# Patient Record
Sex: FEMALE | Race: WHITE | NOT HISPANIC OR LATINO | Employment: OTHER | URBAN - METROPOLITAN AREA
[De-identification: names, ages, dates, MRNs, and addresses within clinical notes are randomized per-mention and may not be internally consistent; named-entity substitution may affect disease eponyms.]

---

## 2017-01-01 ENCOUNTER — APPOINTMENT (OUTPATIENT)
Dept: PHYSICAL THERAPY | Facility: CLINIC | Age: 72
DRG: 856 | End: 2017-01-01
Payer: OTHER MISCELLANEOUS

## 2017-01-02 ENCOUNTER — APPOINTMENT (OUTPATIENT)
Dept: PHYSICAL THERAPY | Facility: CLINIC | Age: 72
DRG: 856 | End: 2017-01-02
Payer: OTHER MISCELLANEOUS

## 2017-01-04 ENCOUNTER — CARE COORDINATION (OUTPATIENT)
Dept: CASE MANAGEMENT | Facility: CLINIC | Age: 72
End: 2017-01-04

## 2017-01-04 ENCOUNTER — HOSPITAL LABORATORY (OUTPATIENT)
Dept: OTHER | Facility: CLINIC | Age: 72
End: 2017-01-04

## 2017-01-04 LAB
AST SERPL W P-5'-P-CCNC: 25 U/L (ref 0–45)
BASOPHILS # BLD AUTO: 0.1 10E9/L (ref 0–0.2)
BASOPHILS NFR BLD AUTO: 0.4 %
BUN SERPL-MCNC: 19 MG/DL (ref 7–30)
CREAT SERPL-MCNC: 0.85 MG/DL (ref 0.52–1.04)
CRP SERPL-MCNC: 85.8 MG/L (ref 0–8)
DIFFERENTIAL METHOD BLD: ABNORMAL
EOSINOPHIL # BLD AUTO: 0.2 10E9/L (ref 0–0.7)
EOSINOPHIL NFR BLD AUTO: 2.1 %
ERYTHROCYTE [DISTWIDTH] IN BLOOD BY AUTOMATED COUNT: 14.1 % (ref 10–15)
ERYTHROCYTE [SEDIMENTATION RATE] IN BLOOD BY WESTERGREN METHOD: 67 MM/H (ref 0–30)
GFR SERPL CREATININE-BSD FRML MDRD: 66 ML/MIN/1.7M2
HCT VFR BLD AUTO: 34 % (ref 35–47)
HGB BLD-MCNC: 10.7 G/DL (ref 11.7–15.7)
IMM GRANULOCYTES # BLD: 0.2 10E9/L (ref 0–0.4)
IMM GRANULOCYTES NFR BLD: 1.8 %
LYMPHOCYTES # BLD AUTO: 1.4 10E9/L (ref 0.8–5.3)
LYMPHOCYTES NFR BLD AUTO: 12.4 %
MCH RBC QN AUTO: 29.2 PG (ref 26.5–33)
MCHC RBC AUTO-ENTMCNC: 31.5 G/DL (ref 31.5–36.5)
MCV RBC AUTO: 93 FL (ref 78–100)
MONOCYTES # BLD AUTO: 0.7 10E9/L (ref 0–1.3)
MONOCYTES NFR BLD AUTO: 6.4 %
NEUTROPHILS # BLD AUTO: 8.7 10E9/L (ref 1.6–8.3)
NEUTROPHILS NFR BLD AUTO: 76.9 %
NRBC # BLD AUTO: 0 10*3/UL
NRBC BLD AUTO-RTO: 0 /100
PLATELET # BLD AUTO: 451 10E9/L (ref 150–450)
RBC # BLD AUTO: 3.66 10E12/L (ref 3.8–5.2)
WBC # BLD AUTO: 11.3 10E9/L (ref 4–11)

## 2017-01-11 LAB
AST SERPL W P-5'-P-CCNC: 23 U/L (ref 0–45)
BASOPHILS # BLD AUTO: 0.1 10E9/L (ref 0–0.2)
BASOPHILS NFR BLD AUTO: 1 %
CREAT SERPL-MCNC: 0.75 MG/DL (ref 0.52–1.04)
CRP SERPL-MCNC: 30.3 MG/L (ref 0–8)
DIFFERENTIAL METHOD BLD: ABNORMAL
EOSINOPHIL # BLD AUTO: 0.3 10E9/L (ref 0–0.7)
EOSINOPHIL NFR BLD AUTO: 4.1 %
ERYTHROCYTE [DISTWIDTH] IN BLOOD BY AUTOMATED COUNT: 13.8 % (ref 10–15)
ERYTHROCYTE [SEDIMENTATION RATE] IN BLOOD BY WESTERGREN METHOD: 62 MM/H (ref 0–30)
GFR SERPL CREATININE-BSD FRML MDRD: 76 ML/MIN/1.7M2
HCT VFR BLD AUTO: 34.7 % (ref 35–47)
HGB BLD-MCNC: 11 G/DL (ref 11.7–15.7)
IMM GRANULOCYTES # BLD: 0.1 10E9/L (ref 0–0.4)
IMM GRANULOCYTES NFR BLD: 0.8 %
LYMPHOCYTES # BLD AUTO: 1.6 10E9/L (ref 0.8–5.3)
LYMPHOCYTES NFR BLD AUTO: 19.7 %
MCH RBC QN AUTO: 29.2 PG (ref 26.5–33)
MCHC RBC AUTO-ENTMCNC: 31.7 G/DL (ref 31.5–36.5)
MCV RBC AUTO: 92 FL (ref 78–100)
MONOCYTES # BLD AUTO: 0.6 10E9/L (ref 0–1.3)
MONOCYTES NFR BLD AUTO: 7.2 %
NEUTROPHILS # BLD AUTO: 5.3 10E9/L (ref 1.6–8.3)
NEUTROPHILS NFR BLD AUTO: 67.2 %
NRBC # BLD AUTO: 0 10*3/UL
NRBC BLD AUTO-RTO: 0 /100
PLATELET # BLD AUTO: 577 10E9/L (ref 150–450)
RBC # BLD AUTO: 3.77 10E12/L (ref 3.8–5.2)
WBC # BLD AUTO: 7.9 10E9/L (ref 4–11)

## 2017-01-11 PROCEDURE — 85652 RBC SED RATE AUTOMATED: CPT | Performed by: INTERNAL MEDICINE

## 2017-01-11 PROCEDURE — 82565 ASSAY OF CREATININE: CPT | Performed by: INTERNAL MEDICINE

## 2017-01-11 PROCEDURE — 84450 TRANSFERASE (AST) (SGOT): CPT | Performed by: INTERNAL MEDICINE

## 2017-01-11 PROCEDURE — 86140 C-REACTIVE PROTEIN: CPT | Performed by: INTERNAL MEDICINE

## 2017-01-11 PROCEDURE — 85025 COMPLETE CBC W/AUTO DIFF WBC: CPT | Performed by: INTERNAL MEDICINE

## 2017-01-12 NOTE — PROGRESS NOTES
Clinic Care Coordination Contact  OUTREACH    Referral Information:  Referral Source: IP/TCU Report  Reason for Contact: Follow up after hospitalization for wound on foot  Care Conference: No     Universal Utilization:   ED Visits in last year: 1  Hospital visits in last year: 1           Multiple Providers or Specialists: Dr Fang, Dr Puri, Inf. Disease    Clinical Concerns:  Current Medical Concerns:Infection  Current Behavioral Concerns: none noted    Education Provided to patient: Importance of keeping areas clean and dry. Ambulate to keep strength, as is able  Clinical Pathway Name: None  Clinical Pathway: None    Medication Management:  Current Outpatient Prescriptions   Medication     order for DME     HYDROmorphone (DILAUDID) 2 MG tablet     colchicine 0.6 MG tablet     senna-docusate (SENOKOT-S;PERICOLACE) 8.6-50 MG per tablet     pantoprazole (PROTONIX) 40 MG EC tablet     ibuprofen 200 MG capsule     cefTRIAXone (ROCEPHIN) 1 GM vial     order for DME     Furosemide (LASIX PO)     Cholecalciferol (VITAMIN D3 PO)     POTASSIUM PO     valsartan-hydrochlorothiazide (DIOVAN-HCT) 80-12.5 MG per tablet     ASPIRIN PO     multivitamin, therapeutic with minerals (THERA-VIT-M) TABS     simvastatin (ZOCOR) 40 MG tablet     No current facility-administered medications for this visit.     Functional Status:  Mobility Status: Independent  Equipment Currently Used at Home: walker, standard  Transportation: Provided by /family     Psychosocial:  Current living arrangement:: I live in a private home with spouse  Financial/Insurance: Work Comp.  Resources and Interventions:  Current Resources: Home Care;          Advanced Care Plans/Directives on file:: Yes        Goals:   Goal 1 Statement: I want to be able to get off the antibiotics, get rid of the PICC line and boot and live normally again  Goal 1 Progression Percent: 20%  Goal 1 Progression Date: 01/09/17     Barriers: Infection persists  Strengths: Member is  strong, healthy and determined  Patient/Caregiver understanding: Spoke to member today as a follow up from most recent hospitaization dx of Sepsis due to group B streptococcus bacteremia and left ankle infection. Stated she had been feeling weak and over [place] was running a temp. Of 102.5. She has had debridement done as well as a wound vac in place. She has a PICC line and continues on antibiotic therapy. Pain is being managed with use of Dilaudid. She is focusing on eating 70 grams of protein per day, drinking 5-8ounce glasses of water. She denies constipation.  Member is able to ambulate with use of walker. She stated she is able to bear weight.  Follow up appt with Infectious Disease on 1/11/2017.  Frequency of Care Coordination: as needed  Upcoming appointment: 01/11/17 (infectious disease)     Plan: Will follow up with member as needed.  Denied the need for additional resources at this time.    Mine Marks RN  Care Coordinator/  Phone: [phone]  Email: [email or web address]

## 2017-01-18 LAB
AST SERPL W P-5'-P-CCNC: 40 U/L (ref 0–45)
BASOPHILS # BLD AUTO: 0.1 10E9/L (ref 0–0.2)
BASOPHILS NFR BLD AUTO: 0.7 %
BUN SERPL-MCNC: 21 MG/DL (ref 7–30)
CRP SERPL-MCNC: 15.8 MG/L (ref 0–8)
DIFFERENTIAL METHOD BLD: ABNORMAL
EOSINOPHIL # BLD AUTO: 0.2 10E9/L (ref 0–0.7)
EOSINOPHIL NFR BLD AUTO: 2.9 %
ERYTHROCYTE [DISTWIDTH] IN BLOOD BY AUTOMATED COUNT: 13.9 % (ref 10–15)
ERYTHROCYTE [SEDIMENTATION RATE] IN BLOOD BY WESTERGREN METHOD: 41 MM/H (ref 0–30)
HCT VFR BLD AUTO: 36.6 % (ref 35–47)
HGB BLD-MCNC: 11.4 G/DL (ref 11.7–15.7)
IMM GRANULOCYTES # BLD: 0.1 10E9/L (ref 0–0.4)
IMM GRANULOCYTES NFR BLD: 0.6 %
LYMPHOCYTES # BLD AUTO: 1.5 10E9/L (ref 0.8–5.3)
LYMPHOCYTES NFR BLD AUTO: 18.4 %
MCH RBC QN AUTO: 28.7 PG (ref 26.5–33)
MCHC RBC AUTO-ENTMCNC: 31.1 G/DL (ref 31.5–36.5)
MCV RBC AUTO: 92 FL (ref 78–100)
MONOCYTES # BLD AUTO: 0.6 10E9/L (ref 0–1.3)
MONOCYTES NFR BLD AUTO: 7 %
NEUTROPHILS # BLD AUTO: 5.8 10E9/L (ref 1.6–8.3)
NEUTROPHILS NFR BLD AUTO: 70.4 %
NRBC # BLD AUTO: 0 10*3/UL
NRBC BLD AUTO-RTO: 0 /100
PLATELET # BLD AUTO: 474 10E9/L (ref 150–450)
RBC # BLD AUTO: 3.97 10E12/L (ref 3.8–5.2)
WBC # BLD AUTO: 8.3 10E9/L (ref 4–11)

## 2017-01-18 PROCEDURE — 82565 ASSAY OF CREATININE: CPT | Performed by: INTERNAL MEDICINE

## 2017-01-18 PROCEDURE — 85025 COMPLETE CBC W/AUTO DIFF WBC: CPT | Performed by: INTERNAL MEDICINE

## 2017-01-18 PROCEDURE — 84520 ASSAY OF UREA NITROGEN: CPT | Performed by: INTERNAL MEDICINE

## 2017-01-18 PROCEDURE — 86140 C-REACTIVE PROTEIN: CPT | Performed by: INTERNAL MEDICINE

## 2017-01-18 PROCEDURE — 84450 TRANSFERASE (AST) (SGOT): CPT | Performed by: INTERNAL MEDICINE

## 2017-01-18 PROCEDURE — 85652 RBC SED RATE AUTOMATED: CPT | Performed by: INTERNAL MEDICINE

## 2017-01-19 LAB
CREAT SERPL-MCNC: 0.88 MG/DL (ref 0.52–1.04)
GFR SERPL CREATININE-BSD FRML MDRD: 64 ML/MIN/1.7M2

## 2017-01-25 LAB
AST SERPL W P-5'-P-CCNC: 25 U/L (ref 0–45)
BASOPHILS # BLD AUTO: 0.1 10E9/L (ref 0–0.2)
BASOPHILS NFR BLD AUTO: 1.1 %
BUN SERPL-MCNC: 26 MG/DL (ref 7–30)
CREAT SERPL-MCNC: 0.83 MG/DL (ref 0.52–1.04)
CRP SERPL-MCNC: 11 MG/L (ref 0–8)
DIFFERENTIAL METHOD BLD: ABNORMAL
EOSINOPHIL # BLD AUTO: 0.3 10E9/L (ref 0–0.7)
EOSINOPHIL NFR BLD AUTO: 4.8 %
ERYTHROCYTE [DISTWIDTH] IN BLOOD BY AUTOMATED COUNT: 14 % (ref 10–15)
ERYTHROCYTE [SEDIMENTATION RATE] IN BLOOD BY WESTERGREN METHOD: 30 MM/H (ref 0–30)
GFR SERPL CREATININE-BSD FRML MDRD: 68 ML/MIN/1.7M2
HCT VFR BLD AUTO: 38.6 % (ref 35–47)
HGB BLD-MCNC: 11.9 G/DL (ref 11.7–15.7)
IMM GRANULOCYTES # BLD: 0 10E9/L (ref 0–0.4)
IMM GRANULOCYTES NFR BLD: 0.3 %
LYMPHOCYTES # BLD AUTO: 1.6 10E9/L (ref 0.8–5.3)
LYMPHOCYTES NFR BLD AUTO: 24.5 %
MCH RBC QN AUTO: 28.2 PG (ref 26.5–33)
MCHC RBC AUTO-ENTMCNC: 30.8 G/DL (ref 31.5–36.5)
MCV RBC AUTO: 92 FL (ref 78–100)
MONOCYTES # BLD AUTO: 0.5 10E9/L (ref 0–1.3)
MONOCYTES NFR BLD AUTO: 8 %
NEUTROPHILS # BLD AUTO: 4 10E9/L (ref 1.6–8.3)
NEUTROPHILS NFR BLD AUTO: 61.3 %
NRBC # BLD AUTO: 0 10*3/UL
NRBC BLD AUTO-RTO: 0 /100
PLATELET # BLD AUTO: 346 10E9/L (ref 150–450)
RBC # BLD AUTO: 4.22 10E12/L (ref 3.8–5.2)
WBC # BLD AUTO: 6.6 10E9/L (ref 4–11)

## 2017-01-25 PROCEDURE — 85025 COMPLETE CBC W/AUTO DIFF WBC: CPT | Performed by: INTERNAL MEDICINE

## 2017-01-25 PROCEDURE — 86140 C-REACTIVE PROTEIN: CPT | Performed by: INTERNAL MEDICINE

## 2017-01-25 PROCEDURE — 84520 ASSAY OF UREA NITROGEN: CPT | Performed by: INTERNAL MEDICINE

## 2017-01-25 PROCEDURE — 85652 RBC SED RATE AUTOMATED: CPT | Performed by: INTERNAL MEDICINE

## 2017-01-25 PROCEDURE — 84450 TRANSFERASE (AST) (SGOT): CPT | Performed by: INTERNAL MEDICINE

## 2017-01-25 PROCEDURE — 82565 ASSAY OF CREATININE: CPT | Performed by: INTERNAL MEDICINE

## 2017-02-01 LAB
AST SERPL W P-5'-P-CCNC: 28 U/L (ref 0–45)
BASOPHILS # BLD AUTO: 0.1 10E9/L (ref 0–0.2)
BASOPHILS NFR BLD AUTO: 0.9 %
BUN SERPL-MCNC: 24 MG/DL (ref 7–30)
CRP SERPL-MCNC: 12.5 MG/L (ref 0–8)
DIFFERENTIAL METHOD BLD: ABNORMAL
EOSINOPHIL # BLD AUTO: 0.3 10E9/L (ref 0–0.7)
EOSINOPHIL NFR BLD AUTO: 6 %
ERYTHROCYTE [DISTWIDTH] IN BLOOD BY AUTOMATED COUNT: 14.1 % (ref 10–15)
ERYTHROCYTE [SEDIMENTATION RATE] IN BLOOD BY WESTERGREN METHOD: 30 MM/H (ref 0–30)
HCT VFR BLD AUTO: 37.5 % (ref 35–47)
HGB BLD-MCNC: 11.6 G/DL (ref 11.7–15.7)
IMM GRANULOCYTES # BLD: 0 10E9/L (ref 0–0.4)
IMM GRANULOCYTES NFR BLD: 0.4 %
LYMPHOCYTES # BLD AUTO: 1.8 10E9/L (ref 0.8–5.3)
LYMPHOCYTES NFR BLD AUTO: 31.1 %
MCH RBC QN AUTO: 28.4 PG (ref 26.5–33)
MCHC RBC AUTO-ENTMCNC: 30.9 G/DL (ref 31.5–36.5)
MCV RBC AUTO: 92 FL (ref 78–100)
MONOCYTES # BLD AUTO: 0.5 10E9/L (ref 0–1.3)
MONOCYTES NFR BLD AUTO: 9.5 %
NEUTROPHILS # BLD AUTO: 3 10E9/L (ref 1.6–8.3)
NEUTROPHILS NFR BLD AUTO: 52.1 %
NRBC # BLD AUTO: 0 10*3/UL
NRBC BLD AUTO-RTO: 0 /100
PLATELET # BLD AUTO: 315 10E9/L (ref 150–450)
RBC # BLD AUTO: 4.08 10E12/L (ref 3.8–5.2)
WBC # BLD AUTO: 5.7 10E9/L (ref 4–11)

## 2017-02-01 PROCEDURE — 86140 C-REACTIVE PROTEIN: CPT | Performed by: INTERNAL MEDICINE

## 2017-02-01 PROCEDURE — 85652 RBC SED RATE AUTOMATED: CPT | Performed by: INTERNAL MEDICINE

## 2017-02-01 PROCEDURE — 84520 ASSAY OF UREA NITROGEN: CPT | Performed by: INTERNAL MEDICINE

## 2017-02-01 PROCEDURE — 82565 ASSAY OF CREATININE: CPT | Performed by: INTERNAL MEDICINE

## 2017-02-01 PROCEDURE — 84450 TRANSFERASE (AST) (SGOT): CPT | Performed by: INTERNAL MEDICINE

## 2017-02-01 PROCEDURE — 85025 COMPLETE CBC W/AUTO DIFF WBC: CPT | Performed by: INTERNAL MEDICINE

## 2017-02-02 LAB
CREAT SERPL-MCNC: 0.76 MG/DL (ref 0.52–1.04)
GFR SERPL CREATININE-BSD FRML MDRD: 75 ML/MIN/1.7M2

## 2017-02-06 ENCOUNTER — CARE COORDINATION (OUTPATIENT)
Dept: CASE MANAGEMENT | Facility: CLINIC | Age: 72
End: 2017-02-06

## 2017-02-07 NOTE — PROGRESS NOTES
Clinic Care Coordination Contact  OUTREACH    Referral Information:  Referral Source: IP/TCU Report  Reason for Contact: follow up  Care Conference: No     Universal Utilization:   ED Visits in last year: 1  Hospital visits in last year: 1  Last PCP appointment: 02/01/17  Missed Appointments: 0  Concerns: none at this time  Multiple Providers or Specialists: Ortho and PCP    Clinical Concerns:  Current Medical Concerns: Foot infection/wound/PICC line    Current Behavioral Concerns: none noted at this time    Education Provided to patient: Importance of ongoing care, treatments and follow up appts   Clinical Pathway Name: None  Clinical Pathway: None    Medication Management:  Current Outpatient Prescriptions   Medication     order for DME     HYDROmorphone (DILAUDID) 2 MG tablet     colchicine 0.6 MG tablet     senna-docusate (SENOKOT-S;PERICOLACE) 8.6-50 MG per tablet     pantoprazole (PROTONIX) 40 MG EC tablet     ibuprofen 200 MG capsule     order for DME     Furosemide (LASIX PO)     Cholecalciferol (VITAMIN D3 PO)     POTASSIUM PO     valsartan-hydrochlorothiazide (DIOVAN-HCT) 80-12.5 MG per tablet     ASPIRIN PO     multivitamin, therapeutic with minerals (THERA-VIT-M) TABS     simvastatin (ZOCOR) 40 MG tablet     No current facility-administered medications for this visit.     Patient Active Problem List   Diagnosis     Syncope     Hypertension     Bradycardia     Hyperlipidemia LDL goal <130     S/P foot surgery      Pain in joint, ankle and foot     Sprain of neck     Sprain and strain of shoulder and upper arm     Elbow strain     Wrist strain     S/P ankle fusion     Cellulitis     Sepsis (H)     Functional Status:  Mobility Status: Independent  Equipment Currently Used at Home: other (see comments) (bunion boot)  Transportation: /friends drive her     Psychosocial:  Current living arrangement:: I live in a private home with spouse  Financial/Insurance: Work Comp     Resources and  Interventions:  Current Resources: Home Care;    PAS Number:  (NA)  Senior Linkage Line Referral Placed:  (NA)  Advanced Care Plans/Directives on file:: Yes  Referrals Placed:  (none today)     Patient/Caregiver understanding: Spoke with member today. Stated she just got her PICC line taken out today. Wound vac was taken away on Tuesday. She saw the plastic surgeon on Friday. Dr Arambula(plastic surgeon) advised treatment of 1 tablespoon bleach, 1 tsp baking soda, and mix into boiled water that has cooled. Soak a gauze pad in the solution , apply to wound and wrap with ace wrap. She stated she has started this and is noticing a difference already. Member is taking only Ibuprofen at this time. Does not feel the need for Dilaudid. Feels everything is going much better and hoping 'this is the end of this'.  Frequency of Care Coordination: Will make 1 additional follow up call to ensure progress continues  Upcoming appointment:  (tbd)     Plan: Follow up call from this writer  Member will continue with treatment as directed and follow up with MD's as directed  Mine Marks RN  FPA Care Coordinator/  Phone: [phone]  Email: [email or web address]

## 2017-02-08 ENCOUNTER — HOSPITAL LABORATORY (OUTPATIENT)
Dept: OTHER | Facility: CLINIC | Age: 72
End: 2017-02-08

## 2017-02-08 LAB
APPEARANCE FLD: NORMAL
COLOR FLD: YELLOW
LYMPHOCYTES NFR FLD MANUAL: 6 %
MONOS+MACROS NFR FLD MANUAL: 1 %
NEUTS BAND NFR FLD MANUAL: 93 %
RBC # FLD: NORMAL /UL
SPECIMEN SOURCE FLD: NORMAL
WBC # FLD AUTO: 4055 /UL

## 2017-02-13 LAB
BACTERIA SPEC CULT: NO GROWTH
MICRO REPORT STATUS: NORMAL
SPECIMEN SOURCE: NORMAL

## 2017-02-20 ENCOUNTER — ANESTHESIA EVENT (OUTPATIENT)
Dept: SURGERY | Facility: CLINIC | Age: 72
DRG: 464 | End: 2017-02-20
Payer: OTHER MISCELLANEOUS

## 2017-02-20 ENCOUNTER — HOSPITAL ENCOUNTER (INPATIENT)
Facility: CLINIC | Age: 72
LOS: 4 days | Discharge: NURSING, REHAB OR LONG-TERM CARE | DRG: 464 | End: 2017-02-24
Attending: ORTHOPAEDIC SURGERY | Admitting: ORTHOPAEDIC SURGERY
Payer: OTHER MISCELLANEOUS

## 2017-02-20 ENCOUNTER — APPOINTMENT (OUTPATIENT)
Dept: GENERAL RADIOLOGY | Facility: CLINIC | Age: 72
DRG: 464 | End: 2017-02-20
Attending: ORTHOPAEDIC SURGERY
Payer: OTHER MISCELLANEOUS

## 2017-02-20 ENCOUNTER — ANESTHESIA (OUTPATIENT)
Dept: SURGERY | Facility: CLINIC | Age: 72
DRG: 464 | End: 2017-02-20
Payer: OTHER MISCELLANEOUS

## 2017-02-20 DIAGNOSIS — M00.262 STREPTOCOCCAL ARTHRITIS OF LEFT KNEE (H): Primary | ICD-10-CM

## 2017-02-20 PROBLEM — M00.9 SEPTIC JOINT OF LEFT KNEE JOINT (H): Status: ACTIVE | Noted: 2017-02-20

## 2017-02-20 LAB
CREAT SERPL-MCNC: 0.9 MG/DL (ref 0.52–1.04)
GFR SERPL CREATININE-BSD FRML MDRD: 62 ML/MIN/1.7M2
PLATELET # BLD AUTO: 300 10E9/L (ref 150–450)
POTASSIUM SERPL-SCNC: 3.2 MMOL/L (ref 3.4–5.3)

## 2017-02-20 PROCEDURE — 25000128 H RX IP 250 OP 636: Performed by: ANESTHESIOLOGY

## 2017-02-20 PROCEDURE — 40000171 ZZH STATISTIC PRE-PROCEDURE ASSESSMENT III: Performed by: ORTHOPAEDIC SURGERY

## 2017-02-20 PROCEDURE — 25000128 H RX IP 250 OP 636: Performed by: NURSE ANESTHETIST, CERTIFIED REGISTERED

## 2017-02-20 PROCEDURE — 36415 COLL VENOUS BLD VENIPUNCTURE: CPT | Performed by: ANESTHESIOLOGY

## 2017-02-20 PROCEDURE — 37000008 ZZH ANESTHESIA TECHNICAL FEE, 1ST 30 MIN: Performed by: ORTHOPAEDIC SURGERY

## 2017-02-20 PROCEDURE — 12000007 ZZH R&B INTERMEDIATE

## 2017-02-20 PROCEDURE — 25000132 ZZH RX MED GY IP 250 OP 250 PS 637: Performed by: ORTHOPAEDIC SURGERY

## 2017-02-20 PROCEDURE — 0SPC0JZ REMOVAL OF SYNTHETIC SUBSTITUTE FROM RIGHT KNEE JOINT, OPEN APPROACH: ICD-10-PCS | Performed by: ORTHOPAEDIC SURGERY

## 2017-02-20 PROCEDURE — 36000058 ZZH SURGERY LEVEL 3 EA 15 ADDTL MIN: Performed by: ORTHOPAEDIC SURGERY

## 2017-02-20 PROCEDURE — S0077 INJECTION, CLINDAMYCIN PHOSP: HCPCS | Performed by: ORTHOPAEDIC SURGERY

## 2017-02-20 PROCEDURE — 71000012 ZZH RECOVERY PHASE 1 LEVEL 1 FIRST HR: Performed by: ORTHOPAEDIC SURGERY

## 2017-02-20 PROCEDURE — 25000125 ZZHC RX 250: Performed by: ANESTHESIOLOGY

## 2017-02-20 PROCEDURE — 85049 AUTOMATED PLATELET COUNT: CPT | Performed by: ORTHOPAEDIC SURGERY

## 2017-02-20 PROCEDURE — 37000009 ZZH ANESTHESIA TECHNICAL FEE, EACH ADDTL 15 MIN: Performed by: ORTHOPAEDIC SURGERY

## 2017-02-20 PROCEDURE — 27210995 ZZH RX 272: Performed by: ORTHOPAEDIC SURGERY

## 2017-02-20 PROCEDURE — 25800025 ZZH RX 258: Performed by: ANESTHESIOLOGY

## 2017-02-20 PROCEDURE — 36000056 ZZH SURGERY LEVEL 3 1ST 30 MIN: Performed by: ORTHOPAEDIC SURGERY

## 2017-02-20 PROCEDURE — 36415 COLL VENOUS BLD VENIPUNCTURE: CPT | Performed by: ORTHOPAEDIC SURGERY

## 2017-02-20 PROCEDURE — 0SHC08Z INSERTION OF SPACER INTO RIGHT KNEE JOINT, OPEN APPROACH: ICD-10-PCS | Performed by: ORTHOPAEDIC SURGERY

## 2017-02-20 PROCEDURE — 25000125 ZZHC RX 250: Performed by: NURSE ANESTHETIST, CERTIFIED REGISTERED

## 2017-02-20 PROCEDURE — 27210794 ZZH OR GENERAL SUPPLY STERILE: Performed by: ORTHOPAEDIC SURGERY

## 2017-02-20 PROCEDURE — 25000125 ZZHC RX 250: Performed by: ORTHOPAEDIC SURGERY

## 2017-02-20 PROCEDURE — 27810169 ZZH OR IMPLANT GENERAL: Performed by: ORTHOPAEDIC SURGERY

## 2017-02-20 PROCEDURE — 82565 ASSAY OF CREATININE: CPT | Performed by: ORTHOPAEDIC SURGERY

## 2017-02-20 PROCEDURE — 87070 CULTURE OTHR SPECIMN AEROBIC: CPT | Performed by: ORTHOPAEDIC SURGERY

## 2017-02-20 PROCEDURE — 25000128 H RX IP 250 OP 636: Performed by: ORTHOPAEDIC SURGERY

## 2017-02-20 PROCEDURE — 84132 ASSAY OF SERUM POTASSIUM: CPT | Performed by: ANESTHESIOLOGY

## 2017-02-20 PROCEDURE — 25800025 ZZH RX 258: Performed by: ORTHOPAEDIC SURGERY

## 2017-02-20 PROCEDURE — 25000566 ZZH SEVOFLURANE, EA 15 MIN: Performed by: ORTHOPAEDIC SURGERY

## 2017-02-20 PROCEDURE — 87075 CULTR BACTERIA EXCEPT BLOOD: CPT | Performed by: ORTHOPAEDIC SURGERY

## 2017-02-20 PROCEDURE — 73560 X-RAY EXAM OF KNEE 1 OR 2: CPT | Mod: RT

## 2017-02-20 DEVICE — BONE CEMENT SIMPLEX W/TOBRAMYCIN 6197-9-001
Type: IMPLANTABLE DEVICE | Site: KNEE | Status: NON-FUNCTIONAL
Removed: 2017-04-24

## 2017-02-20 RX ORDER — ONDANSETRON 4 MG/1
4 TABLET, ORALLY DISINTEGRATING ORAL EVERY 30 MIN PRN
Status: DISCONTINUED | OUTPATIENT
Start: 2017-02-20 | End: 2017-02-20 | Stop reason: HOSPADM

## 2017-02-20 RX ORDER — ALBUTEROL SULFATE 90 UG/1
2 AEROSOL, METERED RESPIRATORY (INHALATION) EVERY 6 HOURS PRN
Status: DISCONTINUED | OUTPATIENT
Start: 2017-02-20 | End: 2017-02-24 | Stop reason: HOSPADM

## 2017-02-20 RX ORDER — CLINDAMYCIN PHOSPHATE 900 MG/50ML
900 INJECTION, SOLUTION INTRAVENOUS SEE ADMIN INSTRUCTIONS
Status: DISCONTINUED | OUTPATIENT
Start: 2017-02-20 | End: 2017-02-20 | Stop reason: HOSPADM

## 2017-02-20 RX ORDER — CLINDAMYCIN PHOSPHATE 900 MG/50ML
900 INJECTION, SOLUTION INTRAVENOUS
Status: COMPLETED | OUTPATIENT
Start: 2017-02-20 | End: 2017-02-20

## 2017-02-20 RX ORDER — SODIUM CHLORIDE, SODIUM LACTATE, POTASSIUM CHLORIDE, CALCIUM CHLORIDE 600; 310; 30; 20 MG/100ML; MG/100ML; MG/100ML; MG/100ML
INJECTION, SOLUTION INTRAVENOUS CONTINUOUS
Status: DISCONTINUED | OUTPATIENT
Start: 2017-02-20 | End: 2017-02-20 | Stop reason: HOSPADM

## 2017-02-20 RX ORDER — HYDROMORPHONE HYDROCHLORIDE 1 MG/ML
.3-.5 INJECTION, SOLUTION INTRAMUSCULAR; INTRAVENOUS; SUBCUTANEOUS
Status: DISCONTINUED | OUTPATIENT
Start: 2017-02-20 | End: 2017-02-24 | Stop reason: HOSPADM

## 2017-02-20 RX ORDER — ACETAMINOPHEN 325 MG/1
650 TABLET ORAL EVERY 4 HOURS PRN
Status: DISCONTINUED | OUTPATIENT
Start: 2017-02-23 | End: 2017-02-24 | Stop reason: HOSPADM

## 2017-02-20 RX ORDER — METOCLOPRAMIDE HYDROCHLORIDE 5 MG/ML
5 INJECTION INTRAMUSCULAR; INTRAVENOUS EVERY 6 HOURS PRN
Status: DISCONTINUED | OUTPATIENT
Start: 2017-02-20 | End: 2017-02-24 | Stop reason: HOSPADM

## 2017-02-20 RX ORDER — ASPIRIN 81 MG/1
81 TABLET, CHEWABLE ORAL DAILY
Status: DISCONTINUED | OUTPATIENT
Start: 2017-02-20 | End: 2017-02-24 | Stop reason: HOSPADM

## 2017-02-20 RX ORDER — NALOXONE HYDROCHLORIDE 0.4 MG/ML
.1-.4 INJECTION, SOLUTION INTRAMUSCULAR; INTRAVENOUS; SUBCUTANEOUS
Status: DISCONTINUED | OUTPATIENT
Start: 2017-02-20 | End: 2017-02-24 | Stop reason: HOSPADM

## 2017-02-20 RX ORDER — POTASSIUM CHLORIDE 29.8 MG/ML
20 INJECTION INTRAVENOUS
Status: DISCONTINUED | OUTPATIENT
Start: 2017-02-20 | End: 2017-02-24 | Stop reason: HOSPADM

## 2017-02-20 RX ORDER — VALSARTAN AND HYDROCHLOROTHIAZIDE 80; 12.5 MG/1; MG/1
1 TABLET, FILM COATED ORAL DAILY
Status: DISCONTINUED | OUTPATIENT
Start: 2017-02-21 | End: 2017-02-24 | Stop reason: HOSPADM

## 2017-02-20 RX ORDER — FUROSEMIDE 20 MG
20-40 TABLET ORAL DAILY
Status: DISCONTINUED | OUTPATIENT
Start: 2017-02-21 | End: 2017-02-24 | Stop reason: HOSPADM

## 2017-02-20 RX ORDER — ALBUTEROL SULFATE 90 UG/1
2 AEROSOL, METERED RESPIRATORY (INHALATION) EVERY 6 HOURS PRN
Status: ON HOLD | COMMUNITY
End: 2017-04-24

## 2017-02-20 RX ORDER — ACETAMINOPHEN 500 MG
1000 TABLET ORAL ONCE
Status: COMPLETED | OUTPATIENT
Start: 2017-02-20 | End: 2017-02-20

## 2017-02-20 RX ORDER — PROPOFOL 10 MG/ML
INJECTION, EMULSION INTRAVENOUS CONTINUOUS PRN
Status: DISCONTINUED | OUTPATIENT
Start: 2017-02-20 | End: 2017-02-20

## 2017-02-20 RX ORDER — ONDANSETRON 2 MG/ML
4 INJECTION INTRAMUSCULAR; INTRAVENOUS EVERY 6 HOURS PRN
Status: DISCONTINUED | OUTPATIENT
Start: 2017-02-20 | End: 2017-02-24 | Stop reason: HOSPADM

## 2017-02-20 RX ORDER — EPHEDRINE SULFATE 50 MG/ML
INJECTION, SOLUTION INTRAMUSCULAR; INTRAVENOUS; SUBCUTANEOUS PRN
Status: DISCONTINUED | OUTPATIENT
Start: 2017-02-20 | End: 2017-02-20

## 2017-02-20 RX ORDER — METOCLOPRAMIDE 5 MG/1
5 TABLET ORAL EVERY 6 HOURS PRN
Status: DISCONTINUED | OUTPATIENT
Start: 2017-02-20 | End: 2017-02-24 | Stop reason: HOSPADM

## 2017-02-20 RX ORDER — ONDANSETRON 4 MG/1
4 TABLET, ORALLY DISINTEGRATING ORAL EVERY 6 HOURS PRN
Status: DISCONTINUED | OUTPATIENT
Start: 2017-02-20 | End: 2017-02-24 | Stop reason: HOSPADM

## 2017-02-20 RX ORDER — AMOXICILLIN 250 MG
1-2 CAPSULE ORAL 2 TIMES DAILY
Status: DISCONTINUED | OUTPATIENT
Start: 2017-02-20 | End: 2017-02-24 | Stop reason: HOSPADM

## 2017-02-20 RX ORDER — HYDROMORPHONE HYDROCHLORIDE 1 MG/ML
.3-.5 INJECTION, SOLUTION INTRAMUSCULAR; INTRAVENOUS; SUBCUTANEOUS EVERY 5 MIN PRN
Status: DISCONTINUED | OUTPATIENT
Start: 2017-02-20 | End: 2017-02-20 | Stop reason: HOSPADM

## 2017-02-20 RX ORDER — LIDOCAINE 40 MG/G
CREAM TOPICAL
Status: DISCONTINUED | OUTPATIENT
Start: 2017-02-20 | End: 2017-02-24 | Stop reason: HOSPADM

## 2017-02-20 RX ORDER — POTASSIUM CHLORIDE 1.5 G/1.58G
20-40 POWDER, FOR SOLUTION ORAL
Status: DISCONTINUED | OUTPATIENT
Start: 2017-02-20 | End: 2017-02-24 | Stop reason: HOSPADM

## 2017-02-20 RX ORDER — CLINDAMYCIN PHOSPHATE 900 MG/50ML
900 INJECTION, SOLUTION INTRAVENOUS EVERY 8 HOURS
Status: COMPLETED | OUTPATIENT
Start: 2017-02-20 | End: 2017-02-21

## 2017-02-20 RX ORDER — SIMVASTATIN 40 MG
40 TABLET ORAL EVERY MORNING
Status: DISCONTINUED | OUTPATIENT
Start: 2017-02-21 | End: 2017-02-24 | Stop reason: HOSPADM

## 2017-02-20 RX ORDER — DEXTROSE MONOHYDRATE, SODIUM CHLORIDE, AND POTASSIUM CHLORIDE 50; 1.49; 4.5 G/1000ML; G/1000ML; G/1000ML
INJECTION, SOLUTION INTRAVENOUS CONTINUOUS
Status: DISCONTINUED | OUTPATIENT
Start: 2017-02-20 | End: 2017-02-24 | Stop reason: HOSPADM

## 2017-02-20 RX ORDER — POTASSIUM CHLORIDE 7.45 MG/ML
10 INJECTION INTRAVENOUS
Status: DISCONTINUED | OUTPATIENT
Start: 2017-02-20 | End: 2017-02-24 | Stop reason: HOSPADM

## 2017-02-20 RX ORDER — HYDROMORPHONE HYDROCHLORIDE 2 MG/1
2-4 TABLET ORAL EVERY 4 HOURS PRN
Status: DISCONTINUED | OUTPATIENT
Start: 2017-02-20 | End: 2017-02-24 | Stop reason: HOSPADM

## 2017-02-20 RX ORDER — LIDOCAINE HYDROCHLORIDE 20 MG/ML
INJECTION, SOLUTION INFILTRATION; PERINEURAL PRN
Status: DISCONTINUED | OUTPATIENT
Start: 2017-02-20 | End: 2017-02-20

## 2017-02-20 RX ORDER — FENTANYL CITRATE 50 UG/ML
INJECTION, SOLUTION INTRAMUSCULAR; INTRAVENOUS PRN
Status: DISCONTINUED | OUTPATIENT
Start: 2017-02-20 | End: 2017-02-20

## 2017-02-20 RX ORDER — PROPOFOL 10 MG/ML
INJECTION, EMULSION INTRAVENOUS PRN
Status: DISCONTINUED | OUTPATIENT
Start: 2017-02-20 | End: 2017-02-20

## 2017-02-20 RX ORDER — ONDANSETRON 2 MG/ML
4 INJECTION INTRAMUSCULAR; INTRAVENOUS EVERY 30 MIN PRN
Status: DISCONTINUED | OUTPATIENT
Start: 2017-02-20 | End: 2017-02-20 | Stop reason: HOSPADM

## 2017-02-20 RX ORDER — PROCHLORPERAZINE MALEATE 5 MG
5 TABLET ORAL EVERY 6 HOURS PRN
Status: DISCONTINUED | OUTPATIENT
Start: 2017-02-20 | End: 2017-02-24 | Stop reason: HOSPADM

## 2017-02-20 RX ORDER — DEXAMETHASONE SODIUM PHOSPHATE 4 MG/ML
INJECTION, SOLUTION INTRA-ARTICULAR; INTRALESIONAL; INTRAMUSCULAR; INTRAVENOUS; SOFT TISSUE PRN
Status: DISCONTINUED | OUTPATIENT
Start: 2017-02-20 | End: 2017-02-20

## 2017-02-20 RX ORDER — MAGNESIUM HYDROXIDE 1200 MG/15ML
LIQUID ORAL PRN
Status: DISCONTINUED | OUTPATIENT
Start: 2017-02-20 | End: 2017-02-20 | Stop reason: HOSPADM

## 2017-02-20 RX ORDER — ACETAMINOPHEN 325 MG/1
975 TABLET ORAL
Status: DISPENSED | OUTPATIENT
Start: 2017-02-20 | End: 2017-02-23

## 2017-02-20 RX ORDER — ONDANSETRON 2 MG/ML
INJECTION INTRAMUSCULAR; INTRAVENOUS PRN
Status: DISCONTINUED | OUTPATIENT
Start: 2017-02-20 | End: 2017-02-20

## 2017-02-20 RX ORDER — FENTANYL CITRATE 50 UG/ML
25-50 INJECTION, SOLUTION INTRAMUSCULAR; INTRAVENOUS
Status: DISCONTINUED | OUTPATIENT
Start: 2017-02-20 | End: 2017-02-20 | Stop reason: HOSPADM

## 2017-02-20 RX ORDER — FENTANYL CITRATE 50 UG/ML
100 INJECTION, SOLUTION INTRAMUSCULAR; INTRAVENOUS
Status: DISCONTINUED | OUTPATIENT
Start: 2017-02-20 | End: 2017-02-20 | Stop reason: HOSPADM

## 2017-02-20 RX ORDER — CALCIUM CARBONATE 500 MG/1
500-1000 TABLET, CHEWABLE ORAL
Status: DISCONTINUED | OUTPATIENT
Start: 2017-02-20 | End: 2017-02-24 | Stop reason: HOSPADM

## 2017-02-20 RX ORDER — POTASSIUM CHLORIDE 1500 MG/1
20-40 TABLET, EXTENDED RELEASE ORAL
Status: DISCONTINUED | OUTPATIENT
Start: 2017-02-20 | End: 2017-02-24 | Stop reason: HOSPADM

## 2017-02-20 RX ADMIN — HYDROMORPHONE HYDROCHLORIDE 4 MG: 2 TABLET ORAL at 23:11

## 2017-02-20 RX ADMIN — FENTANYL CITRATE 100 MCG: 50 INJECTION, SOLUTION INTRAMUSCULAR; INTRAVENOUS at 12:33

## 2017-02-20 RX ADMIN — HYDROMORPHONE HYDROCHLORIDE 0.5 MG: 1 INJECTION, SOLUTION INTRAMUSCULAR; INTRAVENOUS; SUBCUTANEOUS at 14:19

## 2017-02-20 RX ADMIN — TRANEXAMIC ACID 1 G: 100 INJECTION, SOLUTION INTRAVENOUS at 12:44

## 2017-02-20 RX ADMIN — ACETAMINOPHEN 975 MG: 325 TABLET, FILM COATED ORAL at 18:15

## 2017-02-20 RX ADMIN — POTASSIUM CHLORIDE, DEXTROSE MONOHYDRATE AND SODIUM CHLORIDE: 150; 5; 450 INJECTION, SOLUTION INTRAVENOUS at 15:48

## 2017-02-20 RX ADMIN — Medication 5 MG: at 12:50

## 2017-02-20 RX ADMIN — POTASSIUM CHLORIDE, DEXTROSE MONOHYDRATE AND SODIUM CHLORIDE: 150; 5; 450 INJECTION, SOLUTION INTRAVENOUS at 21:38

## 2017-02-20 RX ADMIN — POTASSIUM CHLORIDE 20 MEQ: 1500 TABLET, EXTENDED RELEASE ORAL at 21:38

## 2017-02-20 RX ADMIN — HYDROMORPHONE HYDROCHLORIDE 0.5 MG: 1 INJECTION, SOLUTION INTRAMUSCULAR; INTRAVENOUS; SUBCUTANEOUS at 13:53

## 2017-02-20 RX ADMIN — ASPIRIN 81 MG 81 MG: 81 TABLET ORAL at 18:16

## 2017-02-20 RX ADMIN — PROPOFOL 35 MCG/KG/MIN: 10 INJECTION, EMULSION INTRAVENOUS at 12:33

## 2017-02-20 RX ADMIN — SODIUM CHLORIDE, POTASSIUM CHLORIDE, SODIUM LACTATE AND CALCIUM CHLORIDE: 600; 310; 30; 20 INJECTION, SOLUTION INTRAVENOUS at 11:34

## 2017-02-20 RX ADMIN — PROPOFOL 170 MG: 10 INJECTION, EMULSION INTRAVENOUS at 12:33

## 2017-02-20 RX ADMIN — HYDROMORPHONE HYDROCHLORIDE 0.5 MG: 1 INJECTION, SOLUTION INTRAMUSCULAR; INTRAVENOUS; SUBCUTANEOUS at 13:05

## 2017-02-20 RX ADMIN — LIDOCAINE HYDROCHLORIDE 1 ML: 10 INJECTION, SOLUTION EPIDURAL; INFILTRATION; INTRACAUDAL; PERINEURAL at 11:34

## 2017-02-20 RX ADMIN — DEXAMETHASONE SODIUM PHOSPHATE 4 MG: 4 INJECTION, SOLUTION INTRAMUSCULAR; INTRAVENOUS at 12:52

## 2017-02-20 RX ADMIN — SODIUM CHLORIDE, POTASSIUM CHLORIDE, SODIUM LACTATE AND CALCIUM CHLORIDE: 600; 310; 30; 20 INJECTION, SOLUTION INTRAVENOUS at 13:45

## 2017-02-20 RX ADMIN — SENNOSIDES AND DOCUSATE SODIUM 1 TABLET: 8.6; 5 TABLET ORAL at 21:38

## 2017-02-20 RX ADMIN — Medication 5 MG: at 12:38

## 2017-02-20 RX ADMIN — CLINDAMYCIN PHOSPHATE 900 MG: 18 INJECTION, SOLUTION INTRAVENOUS at 11:35

## 2017-02-20 RX ADMIN — EPINEPHRINE 30 ML GIVEN: 1 INJECTION, SOLUTION INTRAMUSCULAR; SUBCUTANEOUS at 12:21

## 2017-02-20 RX ADMIN — CLINDAMYCIN PHOSPHATE 900 MG: 18 INJECTION, SOLUTION INTRAVENOUS at 21:36

## 2017-02-20 RX ADMIN — HYDROMORPHONE HYDROCHLORIDE 0.5 MG: 1 INJECTION, SOLUTION INTRAMUSCULAR; INTRAVENOUS; SUBCUTANEOUS at 18:15

## 2017-02-20 RX ADMIN — ONDANSETRON 4 MG: 2 INJECTION INTRAMUSCULAR; INTRAVENOUS at 13:41

## 2017-02-20 RX ADMIN — POTASSIUM CHLORIDE 40 MEQ: 1500 TABLET, EXTENDED RELEASE ORAL at 18:19

## 2017-02-20 RX ADMIN — HYDROMORPHONE HYDROCHLORIDE 0.5 MG: 1 INJECTION, SOLUTION INTRAMUSCULAR; INTRAVENOUS; SUBCUTANEOUS at 15:58

## 2017-02-20 RX ADMIN — ACETAMINOPHEN 1000 MG: 500 TABLET ORAL at 11:18

## 2017-02-20 RX ADMIN — PHENYLEPHRINE HYDROCHLORIDE 100 MCG: 10 INJECTION, SOLUTION INTRAMUSCULAR; INTRAVENOUS; SUBCUTANEOUS at 12:48

## 2017-02-20 RX ADMIN — HYDROMORPHONE HYDROCHLORIDE 4 MG: 2 TABLET ORAL at 19:21

## 2017-02-20 RX ADMIN — MIDAZOLAM HYDROCHLORIDE 2 MG: 1 INJECTION, SOLUTION INTRAMUSCULAR; INTRAVENOUS at 11:48

## 2017-02-20 RX ADMIN — Medication 5 MG: at 12:45

## 2017-02-20 RX ADMIN — FENTANYL CITRATE 50 MCG: 50 INJECTION, SOLUTION INTRAMUSCULAR; INTRAVENOUS at 14:27

## 2017-02-20 RX ADMIN — CLINDAMYCIN PHOSPHATE 900 MG: 18 INJECTION, SOLUTION INTRAVENOUS at 12:35

## 2017-02-20 RX ADMIN — LIDOCAINE HYDROCHLORIDE 60 MG: 20 INJECTION, SOLUTION INFILTRATION; PERINEURAL at 12:33

## 2017-02-20 RX ADMIN — Medication 5 MG: at 12:40

## 2017-02-20 NOTE — IP AVS SNAPSHOT
"[place] [address] [facility]: [phone]                                              INTERAGENCY TRANSFER FORM - LAB / IMAGING / EKG / EMG RESULTS   2017                    Hospital Admission Date: 2017  KE LIZ   : 1945  Sex: Female        Attending Provider: Husam Berkowitz MD     Allergies:  Pcn [Penicillin V Potassium], Oxycodone, Sulfa Drugs, Valium [Diazepam], Vicodin [Hydrocodone-acetaminophen], Iodine Solution [Povidone Iodine]    Infection:  None   Service:  ORTHOPEDICS    Ht:  1.676 m (5' 6\")   Wt:  75.3 kg (166 lb)   Admission Wt:  75.3 kg (166 lb)    BMI:  26.79 kg/m 2   BSA:  1.87 m 2            Patient PCP Information     Provider PCP Type    Thad Puri MD General         Lab Results - 3 Days      Blood culture [[id number]]  Resulted: 17 0755, Result status: Preliminary result    Ordering provider: Paul Johnson MD  17 0843 Resulting lab: [facility]    Specimen Information    Type Source Collected On   Blood  17 0912          Components       Value Reference Range Flag Lab   Specimen Description Blood Right Hand   FrStHsLb   Special Requests Aerobic and anaerobic bottles received   FrStHsLb   Culture Micro No growth after 2 days   225   Micro Report Status Pending   225            Blood culture [[id number]]  Resulted: 17 0755, Result status: Preliminary result    Ordering provider: Paul Johnson MD  17 0843 Resulting lab: [facility]    Specimen Information    Type Source Collected On   Blood  17 0908          Components       Value Reference Range Flag Lab   Specimen Description Blood Right Arm   FrStHsLb   Special Requests Aerobic and anaerobic bottles received   FrStHsLb   Culture Micro No growth after 2 days   225   Micro Report Status Pending   225            Platelet count [[id number]]  Resulted: 17 0731, Result status: " Final result    Ordering provider: Husam Berkowitz MD  02/23/17 0000 Resulting lab: [facility]    Specimen Information    Type Source Collected On   Blood  02/23/17 0711          Components       Value Reference Range Flag Lab   Platelet Count 327 150 - 450 10e9/L  FrStHsLb            Hemoglobin [[id number]] (Abnormal)  Resulted: 02/22/17 0729, Result status: Final result    Ordering provider: Husam Berkowitz MD  02/22/17 0000 Resulting lab: [facility]    Specimen Information    Type Source Collected On   Blood  02/22/17 0710          Components       Value Reference Range Flag Lab   Hemoglobin 11.4 11.7 - 15.7 g/dL L FrStHsLb            Fluid Culture Aerobic Bacterial [[id number]]  Resulted: 02/21/17 1248, Result status: Preliminary result    Ordering provider: Husam Berkowitz MD  02/20/17 1257 Resulting lab: [facility]    Specimen Information    Type Source Collected On   Fluid Knee, Right 02/20/17 1257   Comment:  Phone [phone]          Components       Value Reference Range Flag Lab   Specimen Description Right Knee fluid   75   Culture Micro Culture negative monitoring continues   225   Micro Report Status Pending   225            Anaerobic bacterial culture [[id number]]  Resulted: 02/21/17 1136, Result status: Preliminary result    Ordering provider: Husam Berkowitz MD  02/20/17 1257 Resulting lab: [facility]    Specimen Information    Type Source Collected On   Fluid Knee, Right 02/20/17 1257   Comment:  Phone [phone]          Components       Value Reference Range Flag Lab   Specimen Description Right Knee Fluid   FrStHsLb   Special Requests Specimen collected in surgery   FrStHsLb   Culture Micro Culture negative monitoring continues   225   Micro Report Status Pending   225            Hemoglobin [[id number]] (Abnormal)  Resulted: 02/21/17 0731, Result status: Final result     Ordering provider: Husam Berkowitz MD  02/21/17 0001 Resulting lab: [facility]    Specimen Information    Type Source Collected On   Blood  02/21/17 0650          Components       Value Reference Range Flag Lab   Hemoglobin 10.4 11.7 - 15.7 g/dL L FrStHsLb            Potassium [[id number]]  Resulted: 02/21/17 0309, Result status: Final result    Ordering provider: Husam Berkowitz MD  02/21/17 0041 Resulting lab: [facility]    Specimen Information    Type Source Collected On   Blood  02/21/17 0245          Components       Value Reference Range Flag Lab   Potassium 4.3 3.4 - 5.3 mmol/L  FrStHsLb            Creatinine [[id number]]  Resulted: 02/20/17 1720, Result status: Final result    Ordering provider: Husam Berkowitz MD  02/20/17 1526 Resulting lab: [facility]    Specimen Information    Type Source Collected On   Blood  02/20/17 1659          Components       Value Reference Range Flag Lab   Creatinine 0.90 0.52 - 1.04 mg/dL  FrStHsLb   GFR Estimate 62 >60 mL/min/1.7m2  FrStHsLb   Comment:  Non  GFR Calc   GFR Estimate If Black 74 >60 mL/min/1.7m2  FrStHsLb   Comment:   GFR Calc            Platelet count [[id number]]  Resulted: 02/20/17 1705, Result status: Final result    Ordering provider: Husam Berkowitz MD  02/20/17 1526 Resulting lab: [facility]    Specimen Information    Type Source Collected On   Blood  02/20/17 1659          Components       Value Reference Range Flag Lab   Platelet Count 300 150 - 450 10e9/L  FrStHsLb            Potassium [[id number]] (Abnormal)  Resulted: 02/20/17 1116, Result status: Final result    Ordering provider: Nilda Castro MD  02/20/17 1047 Resulting lab: [facility]    Specimen Information    Type Source Collected On   Blood  02/20/17 1055          Components       Value Reference Range Flag Lab   Potassium 3.2 3.4 - 5.3  "mmol/L L FrStHsLb            Testing Performed By     Lab - Abbreviation Name Director Address Valid Date Range    14 - FrStHsLb [facility] Unknown [address] [place] [address] 05/08/15 1057 - Present    75 - Unknown [facility] Unknown [address] [facility] [address] 01/15/15 1019 - Present    225 - Unknown [facility] Unknown [address] [facility] [address] 12/19/14 0954 - Present            Unresulted Labs (24h ago through future)    Start       Ordered    02/24/17 0600  Creatinine  EVERY THREE DAYS,   Routine      02/23/17 1203    02/23/17 0600  Platelet count  (enoxaparin (LOVENOX) (Weight  kg with CrCl greater than 30 mL/min is prechecked))  EVERY THREE DAYS,   Routine     Comments:  Repeat every 3 days while on VTE prophylaxis. If no result is listed, this lab has not been done the past 365 days. LATEST LAB RESULT: Platelet Count (10e9/L)       Date                     Value                 02/01/2017               315              ----------      02/20/17 1526    Unscheduled  Hemoglobin  CONDITIONAL X 2,   Routine     Comments:  Release on POD 1 and POD 2 if the morning Hgb is less than 8.0    02/20/17 1526    Unscheduled  Potassium  (Potassium Replacement - \"Standard\" - For K levels less than 3.4 mmol/L - UU,UR,UA,RH,SH,PH,WY )  CONDITIONAL (SPECIFY),   Routine     Comments:  Obtain Potassium Level for these conditions:  *IF no potassium result within 24 hours before initiation of order set, draw potassium level with next lab collect.    *2 HOURS AFTER last IV potassium replacement dose and 4 hours after an oral replacement dose.  *Next morning after potassium dose.     Repeat Potassium Replacement if necessary.    02/20/17 1537         Imaging Results - 3 Days      XR Knee Port Right 1/2 Views [[id number]]  Resulted: 02/21/17 0750, Result status: Final result    Ordering provider: Husam Berkowitz, " MD  02/20/17 1412 Resulted by: Giovani Lorenzana MD    Performed: 02/20/17 1609 - 02/20/17 1623 Resulting lab: RADIOLOGY RESULTS    Narrative:       XR KNEE PORT RT 1/2 VW 2/20/2017 4:23 PM    COMPARISON: None.    HISTORY: Arthroplasty explantation      Impression:       IMPRESSION: Postoperative changes of right total knee arthroplasty  explantation with spacer placement. Surgical drain in place. No  displaced fractures are seen.    GIOVANI LORENZANA      Testing Performed By     Lab - Abbreviation Name Director Address Valid Date Range    104 - Rad Rslts RADIOLOGY RESULTS Unknown Unknown 02/16/05 1553 - Present                Encounter-Level Documents:     There are no encounter-level documents.      Order-Level Documents - 02/20/2017:            Scan on 2/17/2017 10:34 AM by Outside, Provider : [facility] (below)

## 2017-02-20 NOTE — IP AVS SNAPSHOT
` [place] [address] [facility]: [phone]                 INTERAGENCY TRANSFER FORM - NOTES (H&P, Discharge Summary, Consults, Procedures, Therapies)   2017                    Hospital Admission Date: 2017  MARÍA LIZ   : 1945  Sex: Female        Patient PCP Information     Provider PCP Type    Thad Puri MD General         History & Physicals      H&P signed by Michael Mas at 2017 10:33 AM      Author:  Michael Mas Service:  (none) Author Type:  Physician    Filed:  2017 10:33 AM Date of Service:  2017 10:12 AM Note Created:  2017 10:33 AM    Status:  Signed :  Michael Mas (Physician)     Scan on 2017 10:33 AM by Chace Provider : [facility]- HISTORY AND PHYSICAL 2016          Revision History        User Key Date/Time User Provider Type Action    > [N/A] 2017 10:33 AM Chace Provider Physician Sign                     Discharge Summaries      Discharge Summaries by Husam Berkowitz MD at 2017  8:00 AM     Author:  Husam Berkowitz MD Service:  Orthopedics Author Type:  Physician    Filed:  2017  8:00 AM Date of Service:  2017  8:00 AM Note Created:  2017  7:57 AM    Status:  Signed :  Husam Berkowitz MD (Physician)         Discharge Summary    María Liz MRN# [id number]   YOB: 1945 Age: 71 year old     Date of Admission:  2017  Date of Discharge:  2017  Admitting Physician:  Husam Berkowitz MD  Discharge Physician:[ES1.1]  Husam Berkowitz MD[ES1.2]     Primary Provider: Thad uPri          Admission Diagnoses:   Infected left total knee replacement          Discharge Diagnosis:   Same           Surgical Procedure:   Explantation left total knee           Secondary Diagnosis:[ES1.1]     Patient Active Problem List   Diagnosis     Syncope     Hypertension      Bradycardia     Hyperlipidemia LDL goal <130     S/P foot surgery      Pain in joint, ankle and foot     Sprain of neck     Sprain and strain of shoulder and upper arm     Elbow strain     Wrist strain     S/P ankle fusion     Cellulitis     Sepsis (H)     Septic joint of left knee joint (H)[ES1.2]              Discharge Disposition:   Admited to home care:   Agency: TBD  Discharged to home           Medications Prior to Admission:[ES1.1]     Prescriptions Prior to Admission   Medication Sig Dispense Refill Last Dose     CALCIUM PO Take 600 mg by mouth daily   2/16/2017     Probiotic Product (TRUBIOTICS PO) Take 1 tablet by mouth daily   2/19/2017 at am     albuterol (PROAIR HFA/PROVENTIL HFA/VENTOLIN HFA) 108 (90 BASE) MCG/ACT Inhaler Inhale 2 puffs into the lungs every 6 hours as needed for shortness of breath / dyspnea or wheezing   more than a month at prn     Pyridoxine HCl (VITAMIN B6 PO) Take 1 tablet by mouth daily   2/16/2017     DAKINS EX Externally apply topically daily   2/20/2017 at 0830     Furosemide (LASIX PO) Take 20-40 mg by mouth daily    2/20/2017 at 0500     Cholecalciferol (VITAMIN D3 PO) Take 400 Units by mouth daily   2/16/2017     POTASSIUM PO Take 595 mg by mouth every morning    2/16/2017     valsartan-hydrochlorothiazide (DIOVAN-HCT) 80-12.5 MG per tablet Take 1 tablet by mouth daily   2/20/2017 at 0500     ASPIRIN PO Take 81 mg by mouth daily    2/13/2017     multivitamin, therapeutic with minerals (THERA-VIT-M) TABS Take 1 tablet by mouth daily.   2/13/2017     simvastatin (ZOCOR) 40 MG tablet Take 40 mg by mouth every morning    2/20/2017 at 0500     [DISCONTINUED] IBUPROFEN PO Take 400 mg by mouth At Bedtime   2/15/2017     [DISCONTINUED] Glucosamine-Chondroitin (OSTEO BI-FLEX REGULAR STRENGTH PO) Take 1 tablet by mouth daily   2/13/2017     [DISCONTINUED] HYDROMORPHONE HCL PO Take 4 mg by mouth At Bedtime (Takes 2 x 2mg tablet = 4mg)   2/19/2017 at pm     order for DME Equipment  being ordered: Walker Wheels () and Walker ()  Treatment Diagnosis: impaired gait 1 each 0      order for DME Equipment being ordered: Walker Wheels () and Walker ()  Treatment Diagnosis: difficulty with gait 1 each 0[ES1.3]              Discharge Medications:[ES1.1]     Current Discharge Medication List      START taking these medications    Details   enoxaparin (LOVENOX) 40 MG/0.4ML injection Inject 0.4 mLs (40 mg) Subcutaneous every 24 hours for 7 days  Qty: 2.8 mL, Refills: 0    Associated Diagnoses: Streptococcal arthritis of left knee (H)      polyethylene glycol (MIRALAX/GLYCOLAX) Packet Take 17 g by mouth daily  Qty: 20 packet, Refills: 1    Associated Diagnoses: Streptococcal arthritis of left knee (H)         CONTINUE these medications which have CHANGED    Details   HYDROmorphone (DILAUDID) 2 MG tablet Take 1-2 tablets (2-4 mg) by mouth every 4 hours as needed for moderate to severe pain  Qty: 70 tablet, Refills: 0    Associated Diagnoses: Streptococcal arthritis of left knee (H)         CONTINUE these medications which have NOT CHANGED    Details   CALCIUM PO Take 600 mg by mouth daily      Probiotic Product (TRUBIOTICS PO) Take 1 tablet by mouth daily      albuterol (PROAIR HFA/PROVENTIL HFA/VENTOLIN HFA) 108 (90 BASE) MCG/ACT Inhaler Inhale 2 puffs into the lungs every 6 hours as needed for shortness of breath / dyspnea or wheezing      Pyridoxine HCl (VITAMIN B6 PO) Take 1 tablet by mouth daily      DAKINS EX Externally apply topically daily      Furosemide (LASIX PO) Take 20-40 mg by mouth daily       Cholecalciferol (VITAMIN D3 PO) Take 400 Units by mouth daily      POTASSIUM PO Take 595 mg by mouth every morning       valsartan-hydrochlorothiazide (DIOVAN-HCT) 80-12.5 MG per tablet Take 1 tablet by mouth daily      ASPIRIN PO Take 81 mg by mouth daily       multivitamin, therapeutic with minerals (THERA-VIT-M) TABS Take 1 tablet by mouth daily.      simvastatin (ZOCOR) 40 MG  tablet Take 40 mg by mouth every morning       !! order for DME Equipment being ordered: Walker Wheels () and Walker ()  Treatment Diagnosis: impaired gait  Qty: 1 each, Refills: 0    Associated Diagnoses: Sepsis, due to unspecified organism (H)      !! order for DME Equipment being ordered: Walker Wheels () and Walker ()  Treatment Diagnosis: difficulty with gait  Qty: 1 each, Refills: 0    Associated Diagnoses: Pain in joint, ankle and foot, left       !! - Potential duplicate medications found. Please discuss with provider.      STOP taking these medications       IBUPROFEN PO Comments:   Reason for Stopping:         Glucosamine-Chondroitin (OSTEO BI-FLEX REGULAR STRENGTH PO) Comments:   Reason for Stopping:[ES1.3]                     Consultations:   Consultation during this admission received from infectious disease           Hospital Course:   The patient was admitted after the surgical procedure. The patient underwent an uneventful explantation of her septic left total knee replacement. Postoperatively, anticoagulation  with Lovenox was started. No transfusion was required. The patient will be admited to home care:   Agency: TBD. Home medications have been reconciled. Dilaudid 2 mg was prescribed for pain. Lovenox  will be prescribed.             Pending Results:   None           Discharge Instructions and Follow-Up:        Discharge activity: use a walker  no weight on left side  foot   Discharge follow-up: Follow up with me in 2 weeks   Outpatient therapy: None    Home Care agency: TBD    Supplies and equipment: None        Wound care: dry dressing daily and as needed   Other instructions: None[ES1.1]         Revision History        User Key Date/Time User Provider Type Action    > ES1.3 2/22/2017  8:00 AM Husam Berkowitz MD Physician Sign     ES1.2 2/22/2017  7:58 AM Husam Berkowitz MD Physician      ES1.1 2/22/2017  7:57 AM Husam Berkowitz MD Physician                       Consult Notes      Consults by Jani Adame MD at 2/23/2017  4:54 PM     Author:  Jani Adame MD Service:  (none) Author Type:  Physician    Filed:  2/23/2017  4:54 PM Date of Service:  2/23/2017  4:54 PM Note Created:  2/23/2017  4:49 PM    Status:  Signed :  Jani Adame MD (Physician)     Consult Orders:    1. Orthopedic Surgery IP Consult: Patient to be seen: Routine - within 24 hours; the wound on the ankle is still open from surgery in dec, now in with right knee prosthetic infection; Consultant may enter orders: Yes [[id number]] ordered by Paul Johnson MD at 02/23/17 1425                Orthopedic inpatient consult  I received request.  I talked with Dr. Berkowitz and the charge nurse has also spoken to Dr. Fang's assistant Nick Saldana, PAC    I reviewed the chart and met with the patient and her .  The patient is not having any new pain.  She feels the wound is healing nicely.  She has photographic evidence of the wound healing over time.  She is not having any pain or swelling or  Complaints with regards to the left ankle.    Examination showed there was no significant redness or erythema.  Benign-appearing granulating wound on the top of her left ankle.  No significant edema.    Assessment left ankle fusion with history of slow wound healing, does not look infected  Plan to follow up with Dr. Fang as scheduled.  No intervention recommended at this time.  Jani Adame MD[DH1.1]     Revision History        User Key Date/Time User Provider Type Action    > DH1.1 2/23/2017  4:54 PM Jani Adame MD Physician Sign            Consults by Paul Johnson MD at 2/21/2017  7:00 AM     Author:  Paul Johnson MD Service:  Infectious Disease Author Type:  Physician    Filed:  2/21/2017  9:21 AM Date of Service:  2/21/2017  7:00 AM Note Created:  2/21/2017  8:44 AM    Status:  Signed :  Paul Johnson MD (Physician)     Consult Orders:    1. Infectious  Diseases IP Consult: Patient to be seen: Routine - within 24 hours; Call back #: Septic total knee; Septic total knee; Consultant may enter orders: Yes [[id number]] ordered by Husam Berkowitz MD at 02/20/17 1412                [facility]    Infectious Disease Consultation     Date of Admission:  2/20/2017  Date of Consult (When I saw the patient): 02/21/17    Assessment & Plan   María Henderson is a 71 year old female who was admitted on 2/20/2017. I was asked to see the patient for[DS1.1] right knee prosthetic infection.     Impression[DS1.2]:[DS1.1]   71 y.o female familiar to me from previous admission, with previous multiple ankle surgeries with fusion in place since 2014..   Recent revision surgery in November 2016 and has had draining from the area. Was admitted to [facility] in December bacteremic with Group B Strep and with[DS1.2] concern for ankle infection.[DS1.3] S/P I and D with wound vac placement.[DS1.4]   Even at that time the right knee was painful but the final diagnosis was thought to be gout and not infection of the right knee prosthesis after aspiration for diagnosis.   She was eventually discharged with 6 weeks of Ceftriaxone.   She finished that course 2 weeks ago and right after the right knee started to hurt and swell.[DS1.3]  S/P removal of right knee prosthesis and antibiotics spacer placement.   New this morning complaining of left shoulder pain also has prosthesis in the area, on exam does not appear to be warm or swollen.[DS1.4]     Recommendations:  [DS1.3]  Will start on Ceftriaxone ( PCN listed as allergy but tolerated Ceftriaxone without any issues previously ) as had group B strep bacteremia last admission, this was also found in the ankle wound along with multiple other anaerobes and GNR but think Group B strep is the dominant and possibly the causative organism.   Received Clindamycin perioperatively can stop.   Will wait for the cultures from the OR.    Ortho to evaluate the left shoulder.   Bigger question is continued hardware in the right ankle from the fusion and still a non healing wound, ? Removal of the hardware[DS1.4], ? I and D[DS1.5]       Paul Johnson MD    Reason for Consult   Reason for consult: I was asked by[DS1.1] Dr. Berkowitz[DS1.2] to evaluate this patient for[DS1.1] above mentioned[DS1.2].    Primary Care Physician   Thad Puri    Chief Complaint[DS1.1]   Right knee pain swelling.[DS1.2]     History is obtained from the patient and medical records    History of Present Illness   María Henderson is a 71 year old female[DS1.1] who is familiar to me from admission in December at [facility] where she was admitted bacteremic and Septic with group B strep and left ankle wound infection. This ankle has been an issue for quite some time. 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 Even at that time there was a concern with swelling and pain in the right knee which was then aspirated but tested negative for infection the suspicion was that of gout. She was placed on 6 weeks of IV ceftriaxone for the ankle when she finished that course she started to complain of the the right knee pain and swelling so admitted for removal of the prosthesis.[DS1.4]     Past Medical History   I have reviewed this patient's medical history and updated it with pertinent information if needed.   Past Medical History   Diagnosis Date     Allergic rhinitis      Gastro-oesophageal reflux disease      Heart murmur      ?murmur     Hyperlipidemia      Hypertension      Numbness and tingling      numbness left distal medial tibia     Osteoarthrosis        Past Surgical History   I have reviewed this patient's surgical history and updated it with pertinent information if needed.  Past Surgical History   Procedure Laterality Date     Tonsillectomy       7 foot and ankle        Total knee arthroplasy       Hernia repair       Arthroplasty revision ankle  3/10/2014      Procedure: ARTHROPLASTY REVISION ANKLE;  REVISION LEFT TOTAL ANKLE  WITH CONVERSION TO  IM LAURIE FUSION WITH FEMORAL HEAD ALLOGRAFT (C-ARM);  Surgeon: Ramirez Fang MD;  [facility]:  [facility]     Aspiration needle knee Right 12/27/2016     Procedure: ASPIRATION NEEDLE KNEE;  Surgeon: Jian Fisher MD;  Location:  OR     Bunionectomy vero  10/24/2011     Procedure:BUNIONECTOMY VERO; Left Foot Bunionette Repair (C-Arm); Surgeon:RAMIREZ FANG; Location: [facility]     Irrigation and debridement foot, combined Left 12/27/2016     Procedure: COMBINED IRRIGATION AND DEBRIDEMENT FOOT;  Surgeon: Jian Fisher MD;  Location:  OR     Orthopedic surgery       RTKA     Orthopedic surgery Left      ROTATOR CUFF     Remove hardware arthroplasty knee, irrig & zena, place antibiotic cement spacer, combined Right 2/20/2017     Procedure: COMBINED REMOVE HARDWARE ARTHROPLASTY KNEE, IRRIGATION AND DEBRIDEMENT, PLACE ANTIBIOTIC CEMENT BEADS / SPACER;  Surgeon: Husam Berkowitz MD;  Location:  OR       Prior to Admission Medications   Prior to Admission Medications   Prescriptions Last Dose Informant Patient Reported? Taking?   ASPIRIN PO 2/13/2017 Self Yes Yes   Sig: Take 81 mg by mouth daily    CALCIUM PO 2/16/2017 Self Yes Yes   Sig: Take 600 mg by mouth daily   Cholecalciferol (VITAMIN D3 PO) 2/16/2017 Self Yes Yes   Sig: Take 400 Units by mouth daily   DAKINS EX 2/20/2017 at 0830  Yes Yes   Sig: Externally apply topically daily   Furosemide (LASIX PO) 2/20/2017 at 0500 Self Yes Yes   Sig: Take 20-40 mg by mouth daily    Glucosamine-Chondroitin (OSTEO BI-FLEX REGULAR STRENGTH PO) 2/13/2017 Self Yes Yes   Sig: Take 1 tablet by mouth daily   HYDROMORPHONE HCL PO 2/19/2017 at pm Self Yes Yes   Sig: Take 4 mg by mouth At Bedtime (Takes 2 x 2mg tablet = 4mg)   IBUPROFEN PO 2/15/2017 Self Yes Yes   Sig: Take 400 mg by mouth At Bedtime   POTASSIUM PO 2/16/2017 Self Yes Yes   Sig: Take 595 mg by mouth  every morning    Probiotic Product (TRUBIOTICS PO) 2/19/2017 at am Self Yes Yes   Sig: Take 1 tablet by mouth daily   Pyridoxine HCl (VITAMIN B6 PO) 2/16/2017 Self Yes Yes   Sig: Take 1 tablet by mouth daily   albuterol (PROAIR HFA/PROVENTIL HFA/VENTOLIN HFA) 108 (90 BASE) MCG/ACT Inhaler more than a month at prn Self Yes Yes   Sig: Inhale 2 puffs into the lungs every 6 hours as needed for shortness of breath / dyspnea or wheezing   multivitamin, therapeutic with minerals (THERA-VIT-M) TABS 2/13/2017 Self Yes Yes   Sig: Take 1 tablet by mouth daily.   order for DME   No No   Sig: Equipment being ordered: Walker Wheels () and Walker ()  Treatment Diagnosis: difficulty with gait   order for DME   No No   Sig: Equipment being ordered: Walker Wheels () and Walker ()  Treatment Diagnosis: impaired gait   simvastatin (ZOCOR) 40 MG tablet 2/20/2017 at 0500 Self Yes Yes   Sig: Take 40 mg by mouth every morning    valsartan-hydrochlorothiazide (DIOVAN-HCT) 80-12.5 MG per tablet 2/20/2017 at 0500 Self Yes Yes   Sig: Take 1 tablet by mouth daily      Facility-Administered Medications: None     Allergies   Allergies   Allergen Reactions     Pcn [Penicillin V Potassium] Anaphylaxis     Oxycodone      Sulfa Drugs      Valium [Diazepam] Other (See Comments)     unconsciousness     Vicodin [Hydrocodone-Acetaminophen]      Patient unsure if allergy     Iodine Solution [Povidone Iodine] Rash       Immunization History   Immunization History   Administered Date(s) Administered     Pneumococcal 23 valent 03/11/2014       Social History   I have reviewed this patient's social history and updated it with pertinent information if needed. María Freeman Santiago  reports that she has never smoked. She does not have any smokeless tobacco history on file. She reports that she does not drink alcohol or use illicit drugs.    Family History   I have reviewed this patient's family history and updated it with pertinent  information if needed.   No family history on file.    Review of Systems   The 10 point Review of Systems is negative other than noted in the HPI or here.     Physical Exam   Temp: 98.2  F (36.8  C) Temp src: Oral BP: 129/69 Pulse: 60 Heart Rate: 55 Resp: 16 SpO2: 100 % O2 Device: Nasal cannula Oxygen Delivery: 2 LPM  Vital Signs with Ranges  Temp:  [97.2  F (36.2  C)-98.4  F (36.9  C)] 98.2  F (36.8  C)  Pulse:  [60-77] 60  Heart Rate:  [55-77] 55  Resp:  [10-20] 16  BP: ()/(48-91) 129/69  SpO2:  [92 %-100 %] 100 %  166 lbs 0 oz    GENERAL APPEARANCE: alert and no distress  EYES: Eyes grossly normal to inspection, PERRL and conjunctivae and sclerae normal  HENT: ear canals and TM's normal and nose and mouth without ulcers or lesions  NECK: no adenopathy, no asymmetry, masses, or scars and thyroid normal to palpation  RESP: lungs clear to auscultation - no rales, rhonchi or wheezes  CV: regular rates and rhythm, normal S1 S2, no S3 or S4 and no murmur, click or rub  LYMPHATICS: normal ant/post cervical and supraclavicular nodes  ABDOMEN: soft, nontender, without hepatosplenomegaly or masses and bowel sounds normal  MS: the right knee is in bandage and there is an ace bandage on the left ankle  SKIN: no suspicious lesions or rashes  NEURO: Normal strength and tone, mentation intact and speech normal  PSYCH: mentation appears normal and affect normal/bright    Data[DS1.1]   Lab Results   Component Value Date    WBC 5.7 02/01/2017    HGB 10.4 (L) 02/21/2017    HCT 37.5 02/01/2017     02/20/2017     01/01/2017    POTASSIUM 4.3 02/21/2017    CHLORIDE 107 01/01/2017    CO2 30 01/01/2017    BUN 24 02/01/2017    CR 0.90 02/20/2017    GLC 95 01/01/2017    SED 30 02/01/2017    DD 0.3 10/25/2011    TROPI <0.012 10/25/2011    AST 28 02/01/2017    ALT 50 12/25/2016    ALKPHOS 83 12/25/2016    BILITOTAL 0.8 12/25/2016    INR 1.29 (H) 12/25/2016[DS1.6]       Recent Labs  Lab 02/20/17  1257   CULT Pending      Recent Labs   Lab Test  02/20/17   1257  02/08/17   1345  12/27/16   1655  12/27/16   1647  12/27/16   0915  12/27/16   0853  12/26/16   1202  12/26/16   1154  12/25/16   2132   CULT  Pending  No growth  Heavy growth Beta hemolytic Streptococcus group B Susceptibility testing done on   previous specimen  Light growth Enterobacter cloacae complex  Moderate growth Actinomyces species Identification obtained by MALDI-TOF mass   spectrometry research use only database. Test characteristics determined and   verified by the Infect[facility] Diagnostic Laboratory ([facility]) [place], [place]. Susceptibility testing not routinely done  *  Heavy growth Finegoldia magna (Peptostreptococcus raymundo)  Susceptibility testing not routinely done  *  Culture negative after 4 weeks  No anaerobes isolated  Heavy growth Beta hemolytic Streptococcus group B  Light growth Enterobacter cloacae complex Susceptibility testing done on previous   specimen  Light growth Streptococcus mitis group Susceptibility testing not routinely done  These bacteria are part of normal skin henna, but on occasion, may be true   pathogens.  Clinical correlation must be applied to interpreting this   microbiology result.  *  No anaerobes isolated  No growth  No growth  No growth  Cultured on the 1st day of incubation: Beta hemolytic Streptococcus group B  Critical Value/Significant Value, preliminary result only, called to and read   back by Tamera HURT 12/27/16 at 0252 CF  Susceptibility testing done on previous specimen  *  Cultured on the 1st day of incubation: Beta hemolytic Streptococcus group B  Critical Value/Significant Value, preliminary result only, called to and read   back by Inga BLUMRTS 12.27.16 0116 CF  Susceptibility testing done on previous specimen  *  10,000 to 50,000 colonies/mL mixed urogenital henna[DS1.1]                Revision History        User Key Date/Time User Provider Type Action    > DS1.5  2/21/2017  9:21 AM Paul Johnson MD Physician Sign     DS1.4 2/21/2017  9:07 AM Paul Johnson MD Physician      DS1.3 2/21/2017  9:01 AM Paul Johnson MD Physician      DS1.2 2/21/2017  8:56 AM Paul Johnson MD Physician      DS1.6 2/21/2017  8:45 AM Paul Johnson MD Physician      DS1.1 2/21/2017  8:44 AM Paul Johnson MD Physician                      Progress Notes - Physician (Notes from 02/20/17 through 02/23/17)      Progress Notes by Rona Jimenez LSW at 2/23/2017  2:42 PM     Author:  Rona Jimenez LSW Service:  Social Work Author Type:      Filed:  2/23/2017  3:53 PM Date of Service:  2/23/2017  2:42 PM Note Created:  2/23/2017  2:42 PM    Status:  Addendum :  Rona Jimenez LSW ()         JENNY  D: JENNY following for discharge planning needs.  JENNY was informed that patient will not be ready for discharge until tomorrow as MD will be coming in today or tomorrow to evaluate her ankle wound.  I: JENNY spoke to Ursula in Admissions at [facility] [place] and she confirmed that they will have a bed available for patient tomorrow.  Patient and her significant other were updated on this and they have requested that SW arrange a w/c ride for tomorrow.  Patient anticipates that her Workman's Comp will cover the cost of transportation.  JENNY discussed if Workman's Comp does not cover the cost of transportation that it would be private pay.  JENNY discussed the cost of transportation being a $65 base fee and $4.42 per mile.  JENNY spoke to Destiny at [facility] and scheduled transportation for tomorrow at 1330.  JENNY faxed the facesheet to [facility].  JENNY spoke to Ursula in Admissions at [facility] [place] to update her on the transport time for tomorrow.  A: Patient is alert and oriented X3.  P: SW will continue to follow and assist with finalizing the discharge plan as appropriate.    BRENDA Coleman  [SB1.1]    PAS-RR    D: Per DHS regulation, SW  completed and submitted PAS-RR to [place] [employer] Direct Connect via the Senior LinkAge Line.  PAS-RR confirmation # is : [id number].    I: SW spoke with patient and her significant other and they are aware a PAS-RR has been submitted.  SW reviewed with patient and her significant other that they may be contacted for a follow up appointment within 10 days of hospital discharge if their SNF stay is < 30 days.  Contact information for Senior LinkAge Line was also provided.    A: Patient and significant other verbalized understanding.    P: Further questions may be directed to [facility] LinkAge Line at #[phone], option #4 for PAS-RR staff.    BRENDA Coleman  [SB1.2]         Revision History        User Key Date/Time User Provider Type Action    > SB1.2 2/23/2017  3:53 PM Rona Jimenez LSW  Addend     SB1.1 2/23/2017  2:47 PM Rona Jimenez LSW  Sign            Progress Notes by Paul Johnson MD at 2/23/2017  1:41 PM     Author:  Paul Johnson MD Service:  Infectious Disease Author Type:  Physician    Filed:  2/23/2017  2:22 PM Date of Service:  2/23/2017  1:41 PM Note Created:  2/23/2017  1:41 PM    Status:  Signed :  Paul Johnson MD (Physician)         [facility]    Infectious Disease Progress Note    Date of Service (when I saw the patient): 02/23/2017     Assessment & Plan   María Henderson is a 71 year old female who was admitted on 2/20/2017.     Impression:   71 y.o female familiar to me from previous admission, with previous multiple ankle surgeries with fusion in place since 2014..   Recent revision surgery in November 2016 and has had draining from the area. Was admitted to [facility] in December bacteremic with Group B Strep and with concern for ankle infection. S/P I and D with wound vac placement.   Even at that time the right knee was painful but the final diagnosis was thought to be gout and not infection of the right knee  prosthesis after aspiration for diagnosis.   She was eventually discharged with 6 weeks of Ceftriaxone.   She finished that course 2 weeks ago and right after the right knee started to hurt and swell. S/P removal of right knee prosthesis and antibiotics spacer placement.   Had left shoulder pain this admission, evaluated by Ortho, no concern for infection.      Recommendations:    On Ceftriaxone ( PCN listed as allergy but tolerated Ceftriaxone without any issues previously ) as had group B strep bacteremia last admission, this was also found in the ankle wound along with multiple other anaerobes and GNR but think Group B strep is the dominant and possibly the causative organism.   Will follow up on the cultures from OR so far [facility].   Appreciate Ortho`s evaluation of the left shoulder, no concern for infection per ortho`s eval. The left UE also has itching, but this started even before ceftriaxone was started.   Bigger question is continued hardware in the right ankle from the fusion and still a non healing wound, ? Removal of the hardware, ? I and D     Paul Johnson MD    Interval History   Afebrile      Physical Exam   Temp: 98.6  F (37  C) Temp src: Oral BP: 120/66   Heart Rate: 63 Resp: 16 SpO2: 100 % O2 Device: None (Room air)    Vitals:    02/20/17 1119   Weight: 75.3 kg (166 lb)     Vital Signs with Ranges  Temp:  [97.6  F (36.4  C)-98.6  F (37  C)] 98.6  F (37  C)  Heart Rate:  [62-85] 63  Resp:  [16] 16  BP: (105-148)/(56-74) 120/66  SpO2:  [94 %-100 %] 100 %    Constitutional: Awake, alert, cooperative, no apparent distress  Lungs: Clear to auscultation bilaterally, no crackles or wheezing  Cardiovascular: Regular rate and rhythm, normal S1 and S2, and no murmur noted  Abdomen: Normal bowel sounds, soft, non-distended, non-tender  Skin: No rashes, no cyanosis, no edema  Other: the left UE is slightly erythematous.     Medications     dextrose 5% and 0.45% NaCl + KCl 20 mEq/L 125 mL/hr at 02/22/17 0142        polyethylene glycol  17 g Oral Daily     cefTRIAXone  2 g Intravenous Q24H     aspirin chewable tablet 81 mg  81 mg Oral Daily     furosemide (LASIX) tablet 20-40 mg  20-40 mg Oral Daily     simvastatin  40 mg Oral QAM     valsartan-hydrochlorothiazide  1 tablet Oral Daily     sodium chloride (PF)  3 mL Intracatheter Q8H     enoxaparin  40 mg Subcutaneous Q24H     acetaminophen  975 mg Oral 3 times per day     senna-docusate  1-2 tablet Oral BID       Data   All microbiology laboratory data reviewed.  Recent Labs   Lab Test  02/23/17   0711  02/22/17   0710  02/21/17   0650  02/20/17   1659  02/01/17   0945  01/25/17   1030  01/18/17   1030   WBC   --    --    --    --   5.7  6.6  8.3   HGB   --   11.4*  10.4*   --   11.6*  11.9  11.4*   HCT   --    --    --    --   37.5  38.6  36.6   MCV   --    --    --    --   92  92  92   PLT  327   --    --   300  315  346  474*     Recent Labs   Lab Test  02/20/17   1659  02/01/17   0945  01/25/17   1030   CR  0.90  0.76  0.83     Recent Labs   Lab Test  02/01/17   0945   SED  30     Recent Labs   Lab Test  02/21/17   0912  02/21/17   0908  02/20/17   1257  02/08/17   1345  12/27/16   1655  12/27/16   1647  12/27/16   0915  12/27/16   0853  12/26/16   1202   CULT  No growth after 2 days  No growth after 2 days  Culture negative monitoring continues  Culture negative monitoring continues  No growth  Heavy growth Beta hemolytic Streptococcus group B Susceptibility testing done on   previous specimen  Light growth Enterobacter cloacae complex  Moderate growth Actinomyces species Identification obtained by MALDI-TOF mass   spectrometry research use only database. Test characteristics determined and   verified by the Infectious Diseases Diagnostic Laboratory ([facility]) [place], [place]. Susceptibility testing not routinely done  *  Heavy growth Finegoldia magna (Peptostreptococcus raymundo)  Susceptibility testing not routinely done  *  Culture negative after 4 weeks  No  anaerobes isolated  Heavy growth Beta hemolytic Streptococcus group B  Light growth Enterobacter cloacae complex Susceptibility testing done on previous   specimen  Light growth Streptococcus mitis group Susceptibility testing not routinely done  These bacteria are part of normal skin henna, but on occasion, may be true   pathogens.  Clinical correlation must be applied to interpreting this   microbiology result.  *  No anaerobes isolated  No growth  No growth  No growth  Cultured on the 1st day of incubation: Beta hemolytic Streptococcus group B  Critical Value/Significant Value, preliminary result only, called to and read   back by Tamera HURT 16 at 0252 CF  Susceptibility testing done on previous specimen  *[DS1.1]        Revision History        User Key Date/Time User Provider Type Action    > DS1.1 2017  2:22 PM Paul Johnson MD Physician Sign            Progress Notes by Husam Berkowitz MD at 2017  7:26 AM     Author:  Husam Berkowitz MD Service:  Orthopedics Author Type:  Physician    Filed:  2017  7:27 AM Date of Service:  2017  7:26 AM Note Created:  2017  7:26 AM    Status:  Signed :  Husam Berkowitz MD (Physician)         María Freeman Henderson  2017  POD #3    Doing well.  Pain well-controlled.  Tolerating physical therapy and rehabilitation well.    She has elected to go TCU.    Temperatures:  Current - Temp: 98.1  F (36.7  C); Max - Temp  Av.3  F (36.8  C)  Min: 98  F (36.7  C)  Max: 98.6  F (37  C)  Pulse range: Pulse  Av  Min: 76  Max: 76  Blood pressure range: Systolic (24hrs), Av , Min:105 , Max:143   ; Diastolic (24hrs), Av, Min:56, Max:67    CMS: intact  Labs:none    PLAN:  Discharge plan: Skilled Nursing Facility today.[ES1.1]     Revision History        User Key Date/Time User Provider Type Action    > ES1.1 2017  7:27 AM Husam Berkowitz MD Physician Sign             Progress Notes by Darya Wasserman OT at 2/22/2017  2:11 PM     Author:  Darya Wasserman OT Service:  (none) Author Type:  Occupational Therapist    Filed:  2/22/2017  2:11 PM Date of Service:  2/22/2017  2:11 PM Note Created:  2/22/2017  2:11 PM    Status:  Signed :  Darya Wasserman OT (Occupational Therapist)          02/22/17 1335   Quick Adds   Type of Visit Initial Occupational Therapy Evaluation   Living Environment   Lives With significant other   Living Arrangements house   Home Accessibility stairs to enter home;stairs within home   Number of Stairs to Enter Home 1   Number of Stairs Within Home 14  (basement)   Transportation Available car;family or friend will provide   Living Environment Comment Pt lives with significant other in house, 1 step to enter from garage, 3 from front, one step within home to get to bedroom bath, tub/shower with shower chair, RTS   Self-Care   Dominant Hand right   Usual Activity Tolerance good   Current Activity Tolerance fair   Regular Exercise no   Equipment Currently Used at Home shower chair   Functional Level Prior   Ambulation 0-->independent  (cam boot)   Transferring 0-->independent   Toileting 0-->independent   Bathing 0-->independent   Dressing 0-->independent   Eating 0-->independent   Communication 0-->understands/communicates without difficulty   Swallowing 0-->swallows foods/liquids without difficulty   Cognition 0 - no cognition issues reported   Fall history within last six months no   Which of the above functional risks had a recent onset or change? 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 Current status impacts ability to participate in ADLs, IADLs at OF.   Precautions/Limitations fall precautions;other (see comments)  (CAM boot on L foot, NWB RLE)   Weight-Bearing Status - RLE nonweight-bearing   Cognitive Status Examination   Orientation orientation to person, place and time   Level of Consciousness alert   Able to Follow Commands WNL/WFL   Personal Safety (Cognitive) WNL/WFL   Memory intact   Visual Perception   Visual Perception Wears glasses  (at all times)   Sensory Examination   Sensory Comments Pt denies numbness/tingling in BUEs   Pain Assessment   Patient Currently in Pain Yes, see Vital Sign flowsheet  (4-5/10 pain in R knee)   Range of Motion (ROM)   ROM Quick Adds No deficits were identified   Strength   Manual Muscle Testing Quick Adds No deficits were identified   Hand Strength   Hand Strength Comments WFL   Coordination   Upper Extremity Coordination No deficits were identified   Bed Mobility Skill: Sit to Supine   Level of [place]: Sit/Supine minimum assist (75% patients effort)   Physical Assist/Nonphysical Assist: Sit/Supine 1 person assist   Bed Mobility Skill: Supine to Sit   Level of [place]: Supine/Sit minimum assist (75% patients effort)   Physical Assist/Nonphysical Assist: Supine/Sit 1 person assist   Transfer Skill: Bed to Chair/Chair to Bed   Level of [place]: Bed to Chair contact guard   Physical Assist/Nonphysical Assist: Bed to Chair 1 person assist   Weight-Bearing Restrictions nonweight-bearing   Assistive Device - Transfer Skill Bed to Chair Chair to Bed Rehab Eval standard walker   Transfer Skill: Sit to Stand   Level of [place]: Sit/Stand contact guard   Physical Assist/Nonphysical Assist: Sit/Stand 1 person assist   Transfer Skill: Sit to Stand nonweight-bearing   Assistive Device for Transfer: Sit/Stand standard walker   Transfer  Skill: Toilet Transfer   Level of [place]: Toilet minimum assist (75% patients effort)   Physical Assist/Nonphysical Assist: Toilet 1 person assist   Weight-Bearing Restrictions: Toilet nonweight-bearing   Assistive Device standard walker   Balance   Balance Comments SBA seated EOB; CGA/min A while in stance FWW level while maintaining NWB RLE   Upper Body Dressing   Level of [place]: Dress Upper Body stand-by assist   Physical Assist/Nonphysical Assist: Dress Upper Body 1 person assist   Lower Body Dressing   Level of [place]: Dress Lower Body moderate assist (50% patients effort)   Physical Assist/Nonphysical Assist: Dress Lower Body 1 person assist   Toileting   Level of [place]: Toilet moderate assist (50% patients effort)   Physical Assist/Nonphysical Assist: Toilet 1 person assist   Grooming   Level of [place]: Grooming stand-by assist   Physical Assist/Nonphysical Assist: Grooming 1 person assist   Instrumental Activities of Daily Living (IADL)   Previous Responsibilities meal prep;housekeeping;laundry;shopping;medication management;driving   IADL Comments Pt has support of significant other for IADLs as needed upon return home   Activities of Daily Living Analysis   Impairments Contributing to Impaired Activities of Daily Living balance impaired;pain;post surgical precautions;strength decreased   ADL Comments Pt would benefit from skilled OT to maximize safety and independence in all ADLs including bathing, dressing, grooming and toileting   General Therapy Interventions   Planned Therapy Interventions ADL retraining;IADL retraining;transfer training   Clinical Impression   Criteria for Skilled Therapeutic Interventions Met yes, treatment indicated   OT Diagnosis Impaired ADLs, IADLs and functional mobility tasks   Influenced by the following impairments Decreased strength and functional activity tolerance, impaired balance, pain, post-surgical precautions   Assessment of  "Occupational Performance 5 or more Performance Deficits   Identified Performance Deficits Bathing, dressing, grooming, toileting, transfers, homemaking   Therapy Frequency daily   Predicted Duration of Therapy Intervention (days/wks) 3 days   Anticipated Discharge Disposition Home with Assist   Risks and Benefits of Treatment have been explained. Yes   Patient, Family & other staff in agreement with plan of care Yes   Clinical Impression Comments Pt would benefit from skilled OT to maximize safety and independence in all ADLs, IADLs and functional mobility tasks due to current deficits impacting function   [employer] AM-PAC  \"6 Clicks\" Daily Activity Inpatient Short Form   1. Putting on and taking off regular lower body clothing? 2 - A Lot   2. Bathing (including washing, rinsing, drying)? 2 - A Lot   3. Toileting, which includes using toilet, bedpan or urinal? 2 - A Lot   4. Putting on and taking off regular upper body clothing? 3 - A Little   5. Taking care of personal grooming such as brushing teeth? 3 - A Little   6. Eating meals? 4 - None   Daily Activity Raw Score (Score out of 24.Lower scores equate to lower levels of function) 16   Total Evaluation Time   Total Evaluation Time (Minutes) 10[JL1.1]        Revision History        User Key Date/Time User Provider Type Action    > JL1.1 2/22/2017  2:11 PM Darya Wasserman OT Occupational Therapist Sign            Progress Notes by Paul Johnson MD at 2/22/2017 12:58 PM     Author:  Paul Johnson MD Service:  Infectious Disease Author Type:  Physician    Filed:  2/22/2017  1:45 PM Date of Service:  2/22/2017 12:58 PM Note Created:  2/22/2017 12:58 PM    Status:  Signed :  Paul Johnson MD (Physician)         [facility]    Infectious Disease Progress Note    Date of Service (when I saw the patient): 02/22/2017     Assessment & Plan   María Henderson is a 71 year old female who was admitted on 2/20/2017.     Impression:   71 y.o " female familiar to me from previous admission, with previous multiple ankle surgeries with fusion in place since 2014..   Recent revision surgery in November 2016 and has had draining from the area. Was admitted to [facility] in December bacteremic with Group B Strep and with concern for ankle infection. S/P I and D with wound vac placement.   Even at that time the right knee was painful but the final diagnosis was thought to be gout and not infection of the right knee prosthesis after aspiration for diagnosis.   She was eventually discharged with 6 weeks of Ceftriaxone.   She finished that course 2 weeks ago and right after the right knee started to hurt and swell. S/P removal of right knee prosthesis and antibiotics spacer placement.   Had left shoulder pain this admission, evaluated by Ortho, no concern for infection.      Recommendations:    Will start on Ceftriaxone ( PCN listed as allergy but tolerated Ceftriaxone without any issues previously ) as had group B strep bacteremia last admission, this was also found in the ankle wound along with multiple other anaerobes and GNR but think Group B strep is the dominant and possibly the causative organism.   Will follow up on the cultures from OR.   Appreciate Ortho`s evaluation of the left shoulder, no concern for infection per ortho`s eval.[DS1.1] The left UE also has itching, but this started even before ceftriaxone was started.[DS1.2]   Bigger question is continued hardware in the right ankle from the fusion and still a non healing wound, ? Removal of the hardware, ? I and D     Paul Johnson MD    Interval History   Afebrile      Physical Exam   Temp: 98.3  F (36.8  C) Temp src: Oral BP: 130/63 Pulse: 76 Heart Rate: 76 Resp: 16 SpO2: 97 % O2 Device: None (Room air)    Vitals:    02/20/17 1119   Weight: 75.3 kg (166 lb)     Vital Signs with Ranges  Temp:  [97.8  F (36.6  C)-98.3  F (36.8  C)] 98.3  F (36.8  C)  Pulse:  [76] 76  Heart Rate:  [69-82] 76  Resp:  [16-18]  16  BP: (121-142)/(55-78) 130/63  SpO2:  [96 %-98 %] 97 %    Constitutional: Awake, alert, cooperative, no apparent distress  Lungs: Clear to auscultation bilaterally, no crackles or wheezing  Cardiovascular: Regular rate and rhythm, normal S1 and S2, and no murmur noted  Abdomen: Normal bowel sounds, soft, non-distended, non-tender  Skin: No rashes, no cyanosis, no edema  Other: the left UE is slightly erythematous.     Medications     dextrose 5% and 0.45% NaCl + KCl 20 mEq/L 125 mL/hr at 02/22/17 0142       polyethylene glycol  17 g Oral Daily     cefTRIAXone  2 g Intravenous Q24H     aspirin chewable tablet 81 mg  81 mg Oral Daily     furosemide (LASIX) tablet 20-40 mg  20-40 mg Oral Daily     simvastatin  40 mg Oral QAM     valsartan-hydrochlorothiazide  1 tablet Oral Daily     sodium chloride (PF)  3 mL Intracatheter Q8H     enoxaparin  40 mg Subcutaneous Q24H     acetaminophen  975 mg Oral 3 times per day     senna-docusate  1-2 tablet Oral BID       Data   All microbiology laboratory data reviewed.  Recent Labs   Lab Test  02/22/17   0710  02/21/17   0650  02/20/17   1659  02/01/17   0945  01/25/17   1030  01/18/17   1030   WBC   --    --    --   5.7  6.6  8.3   HGB  11.4*  10.4*   --   11.6*  11.9  11.4*   HCT   --    --    --   37.5  38.6  36.6   MCV   --    --    --   92  92  92   PLT   --    --   300  315  346  474*     Recent Labs   Lab Test  02/20/17   1659  02/01/17   0945  01/25/17   1030   CR  0.90  0.76  0.83     Recent Labs   Lab Test  02/01/17   0945   SED  30     Recent Labs   Lab Test  02/21/17   0912  02/21/17   0908  02/20/17   1257  02/08/17   1345  12/27/16   1655  12/27/16   1647  12/27/16   0915  12/27/16   0853  12/26/16   1202   CULT  No growth after 23 hours  No growth after 23 hours  Culture negative monitoring continues  Culture negative monitoring continues  No growth  Heavy growth Beta hemolytic Streptococcus group B Susceptibility testing done on   previous specimen  Light growth  Enterobacter cloacae complex  Moderate growth Actinomyces species Identification obtained by MALDI-TOF mass   spectrometry research use only database. Test characteristics determined and   verified by the Infectious Diseases Diagnostic Laboratory ([facility]) [place], [place]. Susceptibility testing not routinely done  *  Heavy growth Finegoldia magna (Peptostreptococcus raymundo)  Susceptibility testing not routinely done  *  Culture negative after 4 weeks  No anaerobes isolated  Heavy growth Beta hemolytic Streptococcus group B  Light growth Enterobacter cloacae complex Susceptibility testing done on previous   specimen  Light growth Streptococcus mitis group Susceptibility testing not routinely done  These bacteria are part of normal skin henna, but on occasion, may be true   pathogens.  Clinical correlation must be applied to interpreting this   microbiology result.  *  No anaerobes isolated  No growth  No growth  No growth  Cultured on the 1st day of incubation: Beta hemolytic Streptococcus group B  Critical Value/Significant Value, preliminary result only, called to and read   back by Tamera HURT 12/27/16 at 0252 CF  Susceptibility testing done on previous specimen  *[DS1.1]        Revision History        User Key Date/Time User Provider Type Action    > DS1.2 2/22/2017  1:45 PM Paul Johnson MD Physician Sign     DS1.1 2/22/2017 12:58 PM Paul Johnson MD Physician             Progress Notes by Husam Berkowitz MD at 2/22/2017  7:48 AM     Author:  Husam Berkowitz MD Service:  Orthopedics Author Type:  Physician    Filed:  2/22/2017  7:49 AM Date of Service:  2/22/2017  7:48 AM Note Created:  2/22/2017  7:48 AM    Status:  Signed :  Husam Berkowitz MD (Physician)         Maríamarivel Freeman Santiago  2/22/2017  POD #2    Doing well.  Pain well-controlled.  Tolerating physical therapy and rehabilitation well.    She does not have a shoulder prosthesis. Her  redness is a skin condition. She does not appear to have a shoulder infection.    Temperatures:  Current - Temp: 98  F (36.7  C); Max - Temp  Av.1  F (36.7  C)  Min: 97.8  F (36.6  C)  Max: 98.2  F (36.8  C)  Pulse range: Pulse  Av  Min: 60  Max: 60  Blood pressure range: Systolic (24hrs), Av , Min:121 , Max:142   ; Diastolic (24hrs), Av, Min:55, Max:78    CMS: intact  Labs:[ES1.1]  Hemoglobin   Date Value Ref Range Status   2017 11.4 (L) 11.7 - 15.7 g/dL Final[ES1.2]   ]      PLAN:  Continue physical therapy  Discharge plan: Home Health tomorrow[ES1.1]     Revision History        User Key Date/Time User Provider Type Action    > ES1.2 2017  7:49 AM Husam Berkowitz MD Physician Sign     ES1.1 2017  7:48 AM Husam Berkowitz MD Physician             Progress Notes by Rona Jimenez LSW at 2017  2:08 PM     Author:  Rona Jimenez LSW Service:  Social Work Author Type:      Filed:  2017  3:27 PM Date of Service:  2017  2:08 PM Note Created:  2017  2:08 PM    Status:  Addendum :  Rona Jimenez LSW ()         Care Transition Initial Assessment -   Reason For Consult: discharge planning  Met with: Patient and her significant other.   Active Problems:    Septic joint of left knee joint (H)         DATA  Lives With: significant other  Living Arrangements: house  Description of Support System: Supportive, Involved  Who is your support system?: Significant Other  Support Assessment: Adequate family and caregiver support, Adequate social supports.   Identified issues/concerns regarding health management:   Patient feels that they have adequate support @ home?  Yes           Quality Of Family Relationships: supportive, involved    ASSESSMENT  Cognitive Status:  Awake, alert and oriented X3.  Concerns to be addressed:     Per automatic referral, SW met with patient and her significant other to discuss  discharge planning needs.  Patient is a 71-year-old female who was admitted to the hospital on 2-20-17 with a right knee infection.  Prior to hospitalization, patient was living in a house with her significant other where they were managing well.  Patient is hopeful that she can return home upon discharge and states that her significant other and her are familiar with IV Antibiotics at home.  SW made a referral to [facility] to have them check the patients Home IV Antibiotic coverage.  Patient and her significant other are also aware that MD has mentioned TCU as a possible option.  If TCU is needed, patient and her significant other expressed interest in [facility].  SW made a referral to the facility via Discharge on the Double.  SW will follow up regarding transportation once the discharge plan has been finalized.       PLAN  Financial costs for the patient includes none.  Patient given options and choices for discharge: yes, provided options for Home Infusion agencies and TCU facility options.  Patient Goals and Preferences: Home vs TCU.  Patient anticipates discharging to:  Home vs TCU.    BRENDA Coleman  [SB1.1]    JENNY  D: JENNY following for discharge planning needs.  SW received a message from [facility] with [facility] stating that patient would have 100% coverage for Home IV Antibiotics through her Workman's Comp.  P: JENNY will continue to follow and assist with finalizing the discharge plan as appropriate.    BRENDA Coleman  [SB1.2]           Revision History        User Key Date/Time User Provider Type Action    > SB1.2 2/21/2017  3:27 PM Rona Jimenez LSW  Addend     SB1.1 2/21/2017  2:14 PM Rona Jimenez LSW  Sign            Progress Notes by Estela Vila PT at 2/21/2017 11:40 AM     Author:  Estela Vila PT Service:  Acute IP Rehab Author Type:  Physical Therapist    Filed:  2/21/2017 12:28 PM Date of  Service:  2/21/2017 11:40 AM Note Created:  2/21/2017 12:28 PM    Status:  Signed :  Estela Vila PT (Physical Therapist)            02/21/17 1117   Quick Adds   Type of Visit Initial PT Evaluation   Living Environment   Lives With significant other   Living Arrangements house   Home Accessibility stairs to enter home;stairs within home   Number of Stairs to Enter Home 1   Number of Stairs Within Home 14  (basement)   Living Environment Comment 1 step from garage, 3 from front, one step within home to get to bedrooom bath   Functional Level Prior   Ambulation 0-->independent  (cam boot)   Transferring 0-->independent   Toileting 0-->independent   Bathing 0-->independent   Dressing 0-->independent   Prior Functional Level Comment indep w/ mobility in cam boot on L, noAD, indep w/ ADLS    General Information   Onset of Illness/Injury or Date of Surgery - Date 02/20/17   Referring Physician Husam Berkowitz MD   Patient/Family Goals Statement return home   Pertinent History of Current Problem (include personal factors and/or comorbidities that impact the POC) s/p RTKA hardware removal spacer implant, PMH includes L ankle surgery 1/17, wound on dorsum of L foot   Precautions/Limitations fall precautions   Weight-Bearing Status - LLE (Post op shoe)   Weight-Bearing Status - RLE nonweight-bearing   Cognitive Status Examination   Orientation orientation to person, place and time   Level of Consciousness alert   Follows Commands and Answers Questions 100% of the time   Personal Safety and Judgment intact   Memory intact   Range of Motion (ROM)   ROM Comment Dec R knee ROM - spacer implant, Dec L ankle ROM    Strength   Strength Comments LLE strength WFL, observed to lift RLE AG in immobilizer   Bed Mobility   Bed Mobility Comments CGA   Transfer Skills   Transfer Comments min A with WW, R NWB, L    Gait   Gait Comments min A w/ WW and R NWB, L post op shoe, step to pattern, shortened step length  "  Balance   Balance Comments impaired in standing, non weightbearing   General Therapy Interventions   Planned Therapy Interventions bed mobility training;gait training;strengthening;transfer training   Clinical Impression   Criteria for Skilled Therapeutic Intervention yes, treatment indicated   PT Diagnosis difficulty walking   Influenced by the following impairments Decreased ROM, spacer implant, decreased strength, impaired gait   Functional limitations due to impairments impaired functional mobility   Clinical Presentation Evolving/Changing   Clinical Presentation Rationale Clinical observation   Clinical Decision Making (Complexity) Moderate complexity   Therapy Frequency` 2 times/day   Predicted Duration of Therapy Intervention (days/wks) 3 days   Anticipated Discharge Disposition Home with Home Therapy;Transitional Care Facility  (pending progress)   Risk & Benefits of therapy have been explained Yes   Patient, Family & other staff in agreement with plan of care Yes   [facility] [employer] TM \"6 Clicks\"   2016, Trustees of [facility], under license to [employer].  All rights reserved.   6 Clicks Short Forms Basic Mobility Inpatient Short Form   [facility] [employer]  \"6 Clicks\" V.2 Basic Mobility Inpatient Short Form   1. Turning from your back to your side while in a flat bed without using bedrails? 3 - A Little   2. Moving from lying on your back to sitting on the side of a flat bed without using bedrails? 3 - A Little   3. Moving to and from a bed to a chair (including a wheelchair)? 3 - A Little   4. Standing up from a chair using your arms (e.g., wheelchair, or bedside chair)? 3 - A Little   5. To walk in hospital room? 2 - A Lot   6. Climbing 3-5 steps with a railing? 2 - A Lot   Basic Mobility Raw Score (Score out of 24.Lower scores equate to lower levels of function) 16[LC1.1]        Revision History        User Key Date/Time User Provider Type Action    > LC1.1 2/21/2017 12:28 PM " Estela Vila, PT Physical Therapist Sign            Progress Notes by Halima Oliveira at 2/21/2017 11:06 AM     Author:  Halima Oliveira Service:  Spiritual Health Author Type:      Filed:  2/21/2017 11:13 AM Date of Service:  2/21/2017 11:06 AM Note Created:  2/21/2017 11:06 AM    Status:  Signed :  Halima Oliveira ()         SPIRITUAL HEALTH SERVICES Progress Note  [facility]      PRIMARY FOCUS:      Emotional/spiritual/Synagogue distress    Support for coping    ILLNESS CIRCUMSTANCES:    Reviewed documentation. Reflective conversation shared with Pat which integrated elements of illness and family narratives.         Context of Serious Illness/Symptom(s) -  Sepsis, which started in ankle after surgery in November, is particularly left knee at this time, but concern that infection is spreading into arm as well.    Resources for Support - Significant Other, strong connection to [employer]      DISTRESS:      Emotional/ Existential/Relational Distress - Frustrated and worried about the extended nature of this infection    Spiritual/Sabianist Distress - Cannot understand with so many people praying for her why God hasn't allowed her to heal    Social/Cultural/EconomicDistress - none discussed       SPIRITUAL/Yazidism (Coping):      Zoroastrian/Renetta - Believes strongly in God and that everything happens for a reason.  She has the support of many people through [employer] and volunteer work, which keeps her feeling connected.    Spiritual Practice(s) - Prayer, sewing for [employer], helping with ministry groups    Emotional/Existential Relational Connections - significant other, many connections through re[employer]/volunteering      GOALS OF CARE:    Goals of Care - End of this infection, a true end to it.    Meaning/Sense-Making - None discussed      PLAN:  will place explicit communion request with [facility].  If pt has not d/c,  plans to f/u in 3-6 days.                                                                                                                  Jer Philip Resident  Pager [phone][ED1.1]     Revision History        User Key Date/Time User Provider Type Action    > ED1.1 2017 11:13 AM Halima Oliveira Sign            Progress Notes by Husam Berkowitz MD at 2017  7:11 AM     Author:  Husam Berkowitz MD Service:  Orthopedics Author Type:  Physician    Filed:  2017  7:11 AM Date of Service:  2017  7:11 AM Note Created:  2017  7:11 AM    Status:  Signed :  Husam Berkowitz MD (Physician)         Maríamarivel Freeman Santiago  2017  POD #1    Doing well.  Pain well-controlled.  Temperatures:  Current - Temp: 97.8  F (36.6  C); Max - Temp  Av.8  F (36.6  C)  Min: 97.2  F (36.2  C)  Max: 98.4  F (36.9  C)  Pulse range: Pulse  Av.8  Min: 62  Max: 77  Blood pressure range: Systolic (24hrs), Av , Min:96 , Max:145   ; Diastolic (24hrs), Av, Min:48, Max:91    CMS: intact  Labs:  Hemoglobin   Date Value Ref Range Status   2017 11.6 (L) 11.7 - 15.7 g/dL Final   ]  X-rays are fine.    PLAN:  Start physical therapy  Discharge plan: Home Health vs. TCU[ES1.1]     Revision History        User Key Date/Time User Provider Type Action    > ES1.1 2017  7:11 AM Husam Berkowitz MD Physician Sign                  Procedure Notes     No notes of this type exist for this encounter.         Progress Notes - Therapies (Notes from 17 through 17)      Progress Notes by Darya Wasserman OT at 2017  2:11 PM     Author:  Darya Wasserman OT Service:  (none) Author Type:  Occupational Therapist    Filed:  2017  2:11 PM Date of Service:  2017  2:11 PM Note Created:  2017  2:11 PM    Status:  Signed :  Darya Wasserman OT (Occupational Therapist)          [id number]   Quick Adds   Type of Visit Initial  Occupational Therapy Evaluation   Living Environment   Lives With significant other   Living Arrangements house   Home Accessibility stairs to enter home;stairs within home   Number of Stairs to Enter Home 1   Number of Stairs Within Home 14  (basement)   Transportation Available car;family or friend will provide   Living Environment Comment Pt lives with significant other in house, 1 step to enter from garage, 3 from front, one step within home to get to bedroom bath, tub/shower with shower chair, RTS   Self-Care   Dominant Hand right   Usual Activity Tolerance good   Current Activity Tolerance fair   Regular Exercise no   Equipment Currently Used at Home shower chair   Functional Level Prior   Ambulation 0-->independent  (cam boot)   Transferring 0-->independent   Toileting 0-->independent   Bathing 0-->independent   Dressing 0-->independent   Eating 0-->independent   Communication 0-->understands/communicates without difficulty   Swallowing 0-->swallows foods/liquids without difficulty   Cognition 0 - no cognition issues reported   Fall history within last six months no   Which of the above functional risks had a recent onset or change? transferring;toileting;bathing;dressing   Prior Functional Level Comment Pt reports independence in all ADLs, IADLs and mobility tasks (with use of CAM boot on L) with no AD at baseline   General Information   Onset of Illness/Injury or Date of Surgery - Date 02/20/17   Referring Physician Husam Berkowitz MD   Patient/Family Goals Statement Pt's goal is to d/c home   Additional Occupational Profile Info/Pertinent History of Current Problem Patient s/p R infected TKA hardware removal and space implant. Current status impacts ability to participate in ADLs, IADLs at [facility].   Precautions/Limitations fall precautions;other (see comments)  (CAM boot on L foot, NWB RLE)   Weight-Bearing Status - RLE nonweight-bearing   Cognitive Status Examination   Orientation orientation to  person, place and time   Level of Consciousness alert   Able to Follow Commands WNL/WFL   Personal Safety (Cognitive) WNL/WFL   Memory intact   Visual Perception   Visual Perception Wears glasses  (at all times)   Sensory Examination   Sensory Comments Pt denies numbness/tingling in BUEs   Pain Assessment   Patient Currently in Pain Yes, see Vital Sign flowsheet  (4-5/10 pain in R knee)   Range of Motion (ROM)   ROM Quick Adds No deficits were identified   Strength   Manual Muscle Testing Quick Adds No deficits were identified   Hand Strength   Hand Strength Comments WFL   Coordination   Upper Extremity Coordination No deficits were identified   Bed Mobility Skill: Sit to Supine   Level of Real: Sit/Supine minimum assist (75% patients effort)   Physical Assist/Nonphysical Assist: Sit/Supine 1 person assist   Bed Mobility Skill: Supine to Sit   Level of Real: Supine/Sit minimum assist (75% patients effort)   Physical Assist/Nonphysical Assist: Supine/Sit 1 person assist   Transfer Skill: Bed to Chair/Chair to Bed   Level of Real: Bed to Chair contact guard   Physical Assist/Nonphysical Assist: Bed to Chair 1 person assist   Weight-Bearing Restrictions nonweight-bearing   Assistive Device - Transfer Skill Bed to Chair Chair to Bed Rehab Eval standard walker   Transfer Skill: Sit to Stand   Level of Real: Sit/Stand contact guard   Physical Assist/Nonphysical Assist: Sit/Stand 1 person assist   Transfer Skill: Sit to Stand nonweight-bearing   Assistive Device for Transfer: Sit/Stand standard walker   Transfer Skill: Toilet Transfer   Level of Real: Toilet minimum assist (75% patients effort)   Physical Assist/Nonphysical Assist: Toilet 1 person assist   Weight-Bearing Restrictions: Toilet nonweight-bearing   Assistive Device standard walker   Balance   Balance Comments SBA seated EOB; CGA/min A while in stance FWW level while maintaining NWB RLE   Upper Body Dressing   Level of  [place]: Dress Upper Body stand-by assist   Physical Assist/Nonphysical Assist: Dress Upper Body 1 person assist   Lower Body Dressing   Level of [place]: Dress Lower Body moderate assist (50% patients effort)   Physical Assist/Nonphysical Assist: Dress Lower Body 1 person assist   Toileting   Level of [place]: Toilet moderate assist (50% patients effort)   Physical Assist/Nonphysical Assist: Toilet 1 person assist   Grooming   Level of [place]: Grooming stand-by assist   Physical Assist/Nonphysical Assist: Grooming 1 person assist   Instrumental Activities of Daily Living (IADL)   Previous Responsibilities meal prep;housekeeping;laundry;shopping;medication management;driving   IADL Comments Pt has support of significant other for IADLs as needed upon return home   Activities of Daily Living Analysis   Impairments Contributing to Impaired Activities of Daily Living balance impaired;pain;post surgical precautions;strength decreased   ADL Comments Pt would benefit from skilled OT to maximize safety and independence in all ADLs including bathing, dressing, grooming and toileting   General Therapy Interventions   Planned Therapy Interventions ADL retraining;IADL retraining;transfer training   Clinical Impression   Criteria for Skilled Therapeutic Interventions Met yes, treatment indicated   OT Diagnosis Impaired ADLs, IADLs and functional mobility tasks   Influenced by the following impairments Decreased strength and functional activity tolerance, impaired balance, pain, post-surgical precautions   Assessment of Occupational Performance 5 or more Performance Deficits   Identified Performance Deficits Bathing, dressing, grooming, toileting, transfers, homemaking   Therapy Frequency daily   Predicted Duration of Therapy Intervention (days/wks) 3 days   Anticipated Discharge Disposition Home with Assist   Risks and Benefits of Treatment have been explained. Yes   Patient, Family & other staff in  "agreement with plan of care Yes   Clinical Impression Comments Pt would benefit from skilled OT to maximize safety and independence in all ADLs, IADLs and functional mobility tasks due to current deficits impacting function   [facility] AM-PAC  \"6 Clicks\" Daily Activity Inpatient Short Form   1. Putting on and taking off regular lower body clothing? 2 - A Lot   2. Bathing (including washing, rinsing, drying)? 2 - A Lot   3. Toileting, which includes using toilet, bedpan or urinal? 2 - A Lot   4. Putting on and taking off regular upper body clothing? 3 - A Little   5. Taking care of personal grooming such as brushing teeth? 3 - A Little   6. Eating meals? 4 - None   Daily Activity Raw Score (Score out of 24.Lower scores equate to lower levels of function) 16   Total Evaluation Time   Total Evaluation Time (Minutes) 10[JL1.1]        Revision History        User Key Date/Time User Provider Type Action    > JL1.1 2/22/2017  2:11 PM Darya Wasserman OT Occupational Therapist Sign            Progress Notes by Estela Vila PT at 2/21/2017 11:40 AM     Author:  Estela Vila PT Service:  Acute IP Rehab Author Type:  Physical Therapist    Filed:  2/21/2017 12:28 PM Date of Service:  2/21/2017 11:40 AM Note Created:  2/21/2017 12:28 PM    Status:  Signed :  Estela Vila PT (Physical Therapist)            02/21/17 1117   Quick Adds   Type of Visit Initial PT Evaluation   Living Environment   Lives With significant other   Living Arrangements house   Home Accessibility stairs to enter home;stairs within home   Number of Stairs to Enter Home 1   Number of Stairs Within Home 14  (basement)   Living Environment Comment 1 step from garage, 3 from front, one step within home to get to bedrooom bath   Functional Level Prior   Ambulation 0-->independent  (cam boot)   Transferring 0-->independent   Toileting 0-->independent   Bathing 0-->independent   Dressing 0-->independent   Prior Functional Level " Comment indep w/ mobility in cam boot on L, noAD, indep w/ ADLS    General Information   Onset of Illness/Injury or Date of Surgery - Date 02/20/17   Referring Physician Husam Berkowitz MD   Patient/Family Goals Statement return home   Pertinent History of Current Problem (include personal factors and/or comorbidities that impact the POC) s/p RTKA hardware removal spacer implant, PMH includes L ankle surgery 1/17, wound on dorsum of L foot   Precautions/Limitations fall precautions   Weight-Bearing Status - LLE (Post op shoe)   Weight-Bearing Status - RLE nonweight-bearing   Cognitive Status Examination   Orientation orientation to person, place and time   Level of Consciousness alert   Follows Commands and Answers Questions 100% of the time   Personal Safety and Judgment intact   Memory intact   Range of Motion (ROM)   ROM Comment Dec R knee ROM - spacer implant, Dec L ankle ROM    Strength   Strength Comments LLE strength WFL, observed to lift RLE AG in immobilizer   Bed Mobility   Bed Mobility Comments CGA   Transfer Skills   Transfer Comments min A with WW, R NWB, L    Gait   Gait Comments min A w/ WW and R NWB, L post op shoe, step to pattern, shortened step length   Balance   Balance Comments impaired in standing, non weightbearing   General Therapy Interventions   Planned Therapy Interventions bed mobility training;gait training;strengthening;transfer training   Clinical Impression   Criteria for Skilled Therapeutic Intervention yes, treatment indicated   PT Diagnosis difficulty walking   Influenced by the following impairments Decreased ROM, spacer implant, decreased strength, impaired gait   Functional limitations due to impairments impaired functional mobility   Clinical Presentation Evolving/Changing   Clinical Presentation Rationale Clinical observation   Clinical Decision Making (Complexity) Moderate complexity   Therapy Frequency` 2 times/day   Predicted Duration of Therapy Intervention  "(days/wks) 3 days   Anticipated Discharge Disposition Home with Home Therapy;Transitional Care Facility  (pending progress)   Risk & Benefits of therapy have been explained Yes   Patient, Family & other staff in agreement with plan of care Yes   [facility] TM \"6 Clicks\"   2016, Trustees of [facility], under license to [employer].  All rights reserved.   6 Clicks Short Forms Basic Mobility Inpatient Short Form   [facility] AM-PAC  \"6 Clicks\" V.2 Basic Mobility Inpatient Short Form   1. Turning from your back to your side while in a flat bed without using bedrails? 3 - A Little   2. Moving from lying on your back to sitting on the side of a flat bed without using bedrails? 3 - A Little   3. Moving to and from a bed to a chair (including a wheelchair)? 3 - A Little   4. Standing up from a chair using your arms (e.g., wheelchair, or bedside chair)? 3 - A Little   5. To walk in hospital room? 2 - A Lot   6. Climbing 3-5 steps with a railing? 2 - A Lot   Basic Mobility Raw Score (Score out of 24.Lower scores equate to lower levels of function) 16[LC1.1]        Revision History        User Key Date/Time User Provider Type Action    > LC1.1 2/21/2017 12:28 PM Estela Vila PT Physical Therapist Sign            "

## 2017-02-20 NOTE — ANESTHESIA PROCEDURE NOTES
Peripheral nerve/Neuraxial procedure note : femoral and Femoral  Pre-Procedure  Performed by BARTOLO WORTHINGTON  Location: pre-op      Pre-Anesthestic Checklist: patient identified, IV checked, site marked, risks and benefits discussed, informed consent, monitors and equipment checked, at physician/surgeon's request and post-op pain management    Timeout  Correct Patient: Yes   Correct Procedure: Yes   Correct Site: Yes   Correct Laterality: Yes   Correct Position: Yes   Site Marked: Yes   .   Procedure Documentation    .    Procedure:    Femoral and Femoral.  Local skin infiltrated with 3 mL of 1% lidocaine.     Ultrasound used to identify targeted nerve, plexus, or vascular marker and placed a needle adjacent to it. A permanent image is entered into the patient's record.  Patient Prep;mask, sterile gloves, chlorhexidine gluconate and isopropyl alcohol, patient draped.  Nerve Stim: Initial Level 1 mA. Lowest motor response mA..  Needle: insulated, short bevel (20 G. 2 in). .  Spinal Needle: . . .     Assessment/Narrative  Paresthesias: No.  .  The placement was negative for: blood aspirated, painful injection and site bleeding.  Bolus given via needle..   Secured via.   Complications: none. Comments:  Single shot femoral nerve block  30 ml 0.5% Bupivacaine with 1:400,000 Epinephrine

## 2017-02-20 NOTE — BRIEF OP NOTE
[facility] Brief Operative Note    Pre-operative diagnosis: INFECTED RIGHT TOTAL KNEE    Post-operative diagnosis Septic right total knee replacement      Procedure: Procedure(s):  EXPLANTATION OF RIGHT TOTAL KNEE (BIOMET), INSERTION ANTIBIOTC SPACER - Wound Class: I-Clean   Surgeon(s): Surgeon(s) and Role:     * Husam Berkowitz MD - Primary     * Kingsley Milton Jr. - Assisting   Estimated blood loss: 20 mL    Specimens:   ID Type Source Tests Collected by Time Destination   1 : RIGHT KNEE Fluid Knee, Right ANAEROBIC BACTERIAL CULTURE, FLUID CULTURE AEROBIC BACTERIAL Husam Berkowitz MD 2/20/2017 12:57 PM       Findings: Obvious osteomyelitis of both the femur and the tibia

## 2017-02-20 NOTE — ANESTHESIA PREPROCEDURE EVALUATION
Procedure: Procedure(s):  ARTHROPLASTY REVISION KNEE  Preop diagnosis: INFECTED RIGHT TOTAL KNEE     Allergies   Allergen Reactions     Pcn [Penicillin V Potassium] Anaphylaxis     Oxycodone      Sulfa Drugs      Valium [Diazepam] Other (See Comments)     unconsciousness     Vicodin [Hydrocodone-Acetaminophen]      Patient unsure if allergy     Iodine Solution [Povidone Iodine] Rash     Past Medical History   Diagnosis Date     Allergic rhinitis      Gastro-oesophageal reflux disease      Heart murmur      ?murmur     Hyperlipidemia      Hypertension      Numbness and tingling      numbness left distal medial tibia     Osteoarthrosis      Past Surgical History   Procedure Laterality Date     Tonsillectomy       7 foot and ankle        Total knee arthroplasy       Hernia repair       Arthroplasty revision ankle  3/10/2014     Procedure: ARTHROPLASTY REVISION ANKLE;  REVISION LEFT TOTAL ANKLE  WITH CONVERSION TO  IM LAURIE FUSION WITH FEMORAL HEAD ALLOGRAFT (C-ARM);  Surgeon: Ramirez Fang MD;  Location: [facility]     Aspiration needle knee Right 12/27/2016     Procedure: ASPIRATION NEEDLE KNEE;  Surgeon: Jian Fisher MD;  Location:  OR     Bunionectomy vero  10/24/2011     Procedure:BUNIONECTOMY VERO; Left Foot Bunionette Repair (C-Arm); Surgeon:RAMIREZ FANG; Location:[facility]     Irrigation and debridement foot, combined Left 12/27/2016     Procedure: COMBINED IRRIGATION AND DEBRIDEMENT FOOT;  Surgeon: Jian Fisher MD;  Location:  OR     Orthopedic surgery       RTKA     Orthopedic surgery Left      ROTATOR CUFF     Prior to Admission medications    Medication Sig Start Date End Date Taking? Authorizing Provider   IBUPROFEN PO Take 400 mg by mouth At Bedtime   Yes Reported, Patient   CALCIUM PO Take 600 mg by mouth daily   Yes Reported, Patient   Glucosamine-Chondroitin (OSTEO BI-FLEX REGULAR STRENGTH PO) Take 1 tablet by mouth daily   Yes Reported, Patient   Probiotic Product  (TRUBIOTICS PO) Take 1 tablet by mouth daily   Yes Reported, Patient   albuterol (PROAIR HFA/PROVENTIL HFA/VENTOLIN HFA) 108 (90 BASE) MCG/ACT Inhaler Inhale 2 puffs into the lungs every 6 hours as needed for shortness of breath / dyspnea or wheezing   Yes Reported, Patient   Pyridoxine HCl (VITAMIN B6 PO) Take 1 tablet by mouth daily   Yes Reported, Patient   HYDROMORPHONE HCL PO Take 4 mg by mouth At Bedtime (Takes 2 x 2mg tablet = 4mg)   Yes Reported, Patient   Furosemide (LASIX PO) Take 20-40 mg by mouth daily    Yes Unknown, Entered By History   Cholecalciferol (VITAMIN D3 PO) Take 400 Units by mouth daily   Yes Unknown, Entered By History   POTASSIUM PO Take 595 mg by mouth every morning    Yes Unknown, Entered By History   valsartan-hydrochlorothiazide (DIOVAN-HCT) 80-12.5 MG per tablet Take 1 tablet by mouth daily   Yes Reported, Patient   ASPIRIN PO Take 81 mg by mouth daily    Yes Reported, Patient   multivitamin, therapeutic with minerals (THERA-VIT-M) TABS Take 1 tablet by mouth daily.   Yes Unknown, Entered By History   simvastatin (ZOCOR) 40 MG tablet Take 40 mg by mouth every morning    Yes Reported, Patient   order for DME Equipment being ordered: Walker Wheels () and Walker ()  Treatment Diagnosis: impaired gait 1/2/17   Jian Fisher MD   order for DME Equipment being ordered: Walker Wheels () and Walker ()  Treatment Diagnosis: difficulty with gait 12/28/16   Jian Fisher MD     Current Facility-Administered Medications Ordered in Epic   Medication Dose Route Frequency Last Rate Last Dose     clindamycin (CLEOCIN) infusion 900 mg  900 mg Intravenous Pre-Op/Pre-procedure x 1 dose         clindamycin (CLEOCIN) infusion 900 mg  900 mg Intravenous See Admin Instructions         tranexamic acid (CYKLOKAPRON) 1 g in NaCl 0.9 % 60 mL bolus  1 g Intravenous Once         acetaminophen (TYLENOL) tablet 1,000 mg  1,000 mg Oral Once         lidocaine 1 % 1 mL  1 mL  Other Q1H PRN         sodium chloride (PF) 0.9% PF flush 3 mL  3 mL Intracatheter Q1H PRN         lactated ringers infusion   Intravenous Continuous         No current [facility]-ordered outpatient prescriptions on file.     Wt Readings from Last 1 Encounters:   12/27/16 78.9 kg (174 lb)     Temp Readings from Last 1 Encounters:   01/02/17 35.9  C (96.7  F) (Oral)     BP Readings from Last 6 Encounters:   01/02/17 165/79   03/12/14 112/67   07/12/13 143/82   10/26/11 125/75   10/24/11 118/78     Pulse Readings from Last 4 Encounters:   01/01/17 75   03/11/14 61   10/25/11 50   10/24/11 64     Resp Readings from Last 1 Encounters:   01/02/17 16     SpO2 Readings from Last 1 Encounters:   01/02/17 95%     Recent Labs   Lab Test  02/01/17   0945  01/25/17   1030   01/01/17   0520  12/30/16   2043  12/29/16   0618   NA   --    --    --   143   --   145*   POTASSIUM   --    --    --   3.7  3.4  3.5   CHLORIDE   --    --    --   107   --   110*   CO2   --    --    --   30   --   28   ANIONGAP   --    --    --   6   --   7   GLC   --    --    --   95   --   95   BUN  24  26   < >  13   --   11   CR  0.76  0.83   < >  0.69   --   0.74   MATHEW   --    --    --   9.3   --   8.5    < > = values in this interval not displayed.     Recent Labs   Lab Test  02/01/17   0945  01/25/17   1030   WBC  5.7  6.6   HGB  11.6*  11.9   PLT  315  346     Recent Labs   Lab Test  12/25/16   2030  07/12/13   0925   INR  1.29*  0.91        Anesthesia Evaluation     .        ROS/MED HX    ENT/Pulmonary:     (+)allergic rhinitis, , . .    Neurologic:     (+)neuropathy - L foot ,     Cardiovascular:     (+) Dyslipidemia, hypertension----. : . . . :. valvular problems/murmurs (murmur) .       METS/Exercise Tolerance:     Hematologic:         Musculoskeletal:   (+) arthritis, , , -       GI/Hepatic:     (+) GERD       Renal/Genitourinary:     (+) Nephrolithiasis ,       Endo:         Psychiatric:         Infectious Disease:         Malignancy:         Other:                Physical Exam  Normal systems: dental    Airway   Mallampati: III  TM distance: >3 FB  Neck ROM: full    Dental     Cardiovascular   Rhythm and rate: regular      Pulmonary    breath sounds clear to auscultation                    Anesthesia Plan      History & Physical Review  History and physical reviewed and following examination; no interval change.    ASA Status:  2 .    NPO Status:  > 8 hours    Plan for General and LMA   PONV prophylaxis:  Ondansetron (or other 5HT-3) and Dexamethasone or Solumedrol  Propofol gtt       Postoperative Care      Consents                          .

## 2017-02-20 NOTE — ANESTHESIA POSTPROCEDURE EVALUATION
Patient: María Henderson    Procedure(s):  EXPLANTATION OF RIGHT TOTAL KNEE (BIOMET), INSERTION ANTIBIOTC SPACER - Wound Class: I-Clean    Diagnosis:INFECTED RIGHT TOTAL KNEE   Diagnosis Additional Information: No value filed.    Anesthesia Type:  General, LMA    Note:  Anesthesia Post Evaluation    Patient location during evaluation: PACU  Patient participation: Able to fully participate in evaluation  Level of consciousness: awake, awake and alert and responsive to verbal stimuli  Pain management: adequate  Airway patency: patent  Cardiovascular status: acceptable  Respiratory status: acceptable  Hydration status: acceptable  PONV: none     Anesthetic complications: None          Last vitals:  Vitals:    02/20/17 1420 02/20/17 1427 02/20/17 1430   BP: 135/71  119/69   Pulse:      Resp: 12  10   Temp:      SpO2: 100% 99% 100%         Electronically Signed By: Ana Schrader  February 20, 2017  2:38 PM

## 2017-02-20 NOTE — IP AVS SNAPSHOT
"` [place] [address] [facility]: [phone]                                              INTERAGENCY TRANSFER FORM - NURSING   2017                    Hospital Admission Date: 2017  KE LIZ   : 1945  Sex: Female        Attending Provider: Husam Berkowitz MD     Allergies:  Pcn [Penicillin V Potassium], Oxycodone, Sulfa Drugs, Valium [Diazepam], Vicodin [Hydrocodone-acetaminophen], Iodine Solution [Povidone Iodine]    Infection:  None   Service:  ORTHOPEDICS    Ht:  1.676 m (5' 6\")   Wt:  75.3 kg (166 lb)   Admission Wt:  75.3 kg (166 lb)    BMI:  26.79 kg/m 2   BSA:  1.87 m 2            Patient PCP Information     Provider PCP Type    Thad Puri MD General      Current Code Status     Date Active Code Status Order ID Comments User Context       Prior      Code Status History     Date Active Date Inactive Code Status Order ID Comments User Context    2017  7:32 AM  Full Code [id number]  Husam Berkowitz MD Outpatient    2017  7:56 AM 2017  7:32 AM Full Code [id number]  Husam Berkowitz MD Outpatient    2017  3:26 PM 2017  7:56 AM Full Code [id number]  Husam Berkowitz MD Inpatient    2017  1:51 PM 2017  3:26 PM Full Code [id number]  Masoud Wallis DO Outpatient    2016 12:12 AM 2016  7:28 PM Full Code [id number]  Sujey Hu MD Inpatient    3/10/2014  1:59 PM 3/12/2014  3:57 PM Full Code [id number]  Hallie Musa, RN Inpatient    10/25/2011  4:23 PM 10/26/2011  7:40 PM Full Code [id number]  Garrett Perez, DO Inpatient      Advance Directives        Does patient have a scanned Advance Directive/ACP document in EPIC?           No        Hospital Problems as of 2017              Priority Class Noted POA    Septic joint of left knee joint (H) Medium  2017 Yes      Non-Hospital Problems as of 2017              Priority Class Noted    S/P " foot surgery    10/24/2011    Syncope   10/25/2011    Hypertension   10/25/2011    Bradycardia   10/25/2011    Hyperlipidemia LDL goal <130   10/25/2011    Pain in joint, ankle and foot   1/13/2012    Sprain of neck   2/8/2012    Sprain and strain of shoulder and upper arm   2/8/2012    Elbow strain   2/8/2012    Wrist strain   2/8/2012    S/P ankle fusion   3/10/2014    Cellulitis Medium  12/26/2016    Sepsis (H) Medium  12/27/2016      Immunizations     Name Date      Pneumococcal 23 valent 03/11/14          END      ASSESSMENT     Discharge Profile Flowsheet     EXPECTED DISCHARGE     Patient's communication style  spoken language (English or Bilingual) 02/20/17 1125    Expected Discharge Date  02/24/17 ([facility] at 1330) 02/23/17 1439   FINAL RESOURCES      DISCHARGE NEEDS ASSESSMENT     Resources List  Transitional Care 02/23/17 1439    Concerns To Be Addressed  discharge planning concerns 02/21/17 1407   Transitional Care  [facility] [phone] 02/23/17 1439    Concerns Comments  frustration 02/06/17 1623   PAS Number  [id number] 02/23/17 1552    Patient/family verbalizes understanding of discharge plan recommendations?  Yes 02/24/17 0953   Senior Linkage Line Referral Placed  02/23/17 02/23/17 1552    Medical Team notified of plan?  yes 02/24/17 0953   F/U Appointment Brochure Provided  -- (NA) 02/06/17 1623    Equipment Currently Used at Home  shower chair 02/22/17 1358   Referrals Placed  Senior Linkage Line 02/23/17 1552    Transportation Available  van, wheelchair accessible ([facility] at 1330) 02/23/17 1439   SKIN      # of Referrals Placed by CTS  Senior Linkage Line 02/23/17 1552   Inspection  Full 02/24/17 1008    Equipment Used at Home  none (Has crutches, FWW and 4WW) 03/11/14 1932   Skin WDL  ex 02/24/17 1008    ASSESSMENT OF FUNCTIONAL STATUS     Skin Temperature  warm 02/22/17 2210    Assesssment of Functional Status  Needs placement in a  "SNF/TCF for rehabilitation 02/21/17 1407   Skin Moisture  dry 02/22/17 2210    GASTROINTESTINAL (ADULT,PEDIATRIC,OB)     Skin Integrity  incision(s) 02/24/17 1008    GI WDL  WDL 02/24/17 1008   Skin Color/Characteristics  redness blanchable 02/23/17 0932    Last Bowel Movement  02/23/17 02/24/17 1008   SAFETY      Passing flatus  yes 02/24/17 1008   Safety WDL  WDL 02/24/17 1008    COMMUNICATION ASSESSMENT                        Assessment WDL (Within Defined Limits) Definitions           Safety WDL     Effective: 09/28/15    Row Information: <b>WDL Definition:</b> Bed in low position, wheels locked; call light in reach; upper side rails up x 2; ID band on<br> <font color=\"gray\"><i>Item=AS safety wdl>>List=AS safety wdl>>Version=F14</i></font>      Skin WDL     Effective: 09/28/15    Row Information: <b>WDL Definition:</b> Warm; dry; intact; elastic; without discoloration; pressure points without redness<br> <font color=\"gray\"><i>Item=AS skin wdl>>List=AS skin wdl>>Version=F14</i></font>      Vitals     Vital Signs Flowsheet     VITAL SIGNS     Pain Intervention(s)  Cold applied;Repositioned 02/24/17 1003    Temp  97.6  F (36.4  C) 02/24/17 0812   Response to Interventions  Decrease in pain 02/23/17 2113    Temp src  Axillary 02/24/17 0812   ANALGESIA SIDE EFFECTS MONITORING      Resp  16 02/24/17 0812   Side Effects Monitoring: Respiratory Quality  R 02/24/17 1003    Pulse  81 02/24/17 0812   Side Effects Monitoring: Respiratory Depth  N 02/24/17 1003    Heart Rate  -- 02/24/17 0651   Side Effects Monitoring: Sedation Level  1 02/24/17 1003    Pulse/Heart Rate Source  Monitor 02/24/17 0812   HEIGHT AND WEIGHT      BP  125/59 02/24/17 0812   Height  1.676 m (5' 6\") 02/20/17 1122    BP Location  Left arm 02/24/17 0812   Weight  75.3 kg (166 lb) 02/20/17 1122    Patient Position  Lying 02/20/17 1122   BSA (Calculated - sq m)  1.87 02/20/17 1122    OXYGEN THERAPY     BMI (Calculated)  26.85 02/20/17 1122    SpO2  95 % " 02/24/17 0812   POSITIONING      O2 Device  None (Room air) 02/24/17 0812   Head of Bed (HOB)  HOB at 30-45 degrees 02/24/17 1008    Oxygen Delivery  2 LPM 02/21/17 0314   Body Position  lower extremity elevated, left 02/24/17 1008    PAIN/COMFORT     Positioning/Transfer Devices  immobilization device 02/21/17 2118    Patient Currently in Pain  yes 02/24/17 1003   ECG      Preferred Pain Scale  number (Numeric Rating Pain Scale) 02/24/17 1003   ECG Rhythm  Normal sinus rhythm 02/20/17 1435    0-10 Pain Scale  6 02/24/17 1055   DAILY CARE      Pain Location  Knee 02/24/17 1003   Activity Type  activity adjusted per tolerance;activity encouraged;ambulated in room;ambulated to bathroom;dorsiflexion, plantar flexion encouraged;ROM, active encouraged;sitting, edge of bed;up ad doris;up in chair 02/24/17 1008    Pain Orientation  Right 02/24/17 1003   Activity Level of Assistance  assistance, 1 person 02/24/17 1008    Pain Descriptors  Aching;Constant;Sore 02/23/17 0925                 Patient Lines/Drains/Airways Status    Active LINES/DRAINS/AIRWAYS     Name: Placement date: Placement time: Site: Days: Last dressing change:    Closed/Suction Drain Left  12/27/16         59     Negative Pressure Wound Therapy Leg Left 12/27/16   1656   Leg   58     Incision/Surgical Site 03/10/14 Left Ankle 03/10/14   1056    1082     Incision/Surgical Site 12/27/16 Right Knee 12/27/16   1640    58     Incision/Surgical Site 02/20/17 Right Knee 02/20/17   1342    3             Patient Lines/Drains/Airways Status    Active PICC/CVC     Name: Placement date: Placement time: Site: Days: Additional Info Last dressing change:    PICC Single Lumen 12/29/16 Right Basilic 12/29/16   1225   Basilic   57 External Cath Length (cm): 0 cm            Size (Fr): 4 Fr            Orientation: Right            Extremity Circumference (cm): 30 cm            Catheter Brand: Bard Solo             Dressing Intervention: New dressing;Securing  device;Transparent;Chlorhexidine patch            Description: Valved;Power PICC            Total Catheter Length (cm) Trimmed: 46 cm            Site Prep: Chlorhexidine            Local Anesthetic: Injectable            Inserted by: Tia Mg RN            Insertion attempts with ultrasound: 1            Patient Tolerance: Tolerated well            Placement Verification: Blood Return;Other (Comment)            Difficulty with threading line: No            Tip location: SVC/RA Junction            Full barrier precautions done: Yes            Consent Signed: Yes            Time Out performed: Yes            Lot #: [id number]            Use for : IV antibiotics       PICC Single Lumen 02/23/17 Right Brachial vein lateral 02/23/17   1531   Brachial vein lateral   less than 1 External Cath Length (cm): 3 cm            Size (Fr): 4 Fr            Orientation: Right            Extremity Circumference (cm): 29 cm            Catheter Brand: Bard Solo            Dressing Intervention: Transparent;New dressing;Securing device;Chlorhexidine patch            Description: Valved;Power PICC            Total Catheter Length (cm) Trimmed: 40 cm            Site Prep: Chlorhexidine            Local Anesthetic: Injectable            Inserted by: Gabriela Blake RN            Insertion attempts with ultrasound: 2            Patient Tolerance: Tolerated well            Placement Verification: Other (Comment);Blood Return            Difficulty with threading line: No            Tip location: SVC            Full barrier precautions done: Yes            Consent Signed: Yes            Time Out performed: Yes            Lot #: [id number]            Use for : OK to Use               Intake/Output Detail Report     Date Intake     Output       Net    Shift P.O. I.V. IV Piggyback Total Urine Emesis/NG output Drains Blood Total       Noc 02/22/17 2300 - 02/23/17 0659 -- -- -- -- 200 -- -- -- 200 -200    Day 02/23/17 0700 - 02/23/17 1459 900 -- -- 900  -- -- -- -- -- 900    Denise 02/23/17 1500 - 02/23/17 2259 400 -- -- 400 250 -- -- -- 250 150    Noc 02/23/17 2300 - 02/24/17 0659 500 -- -- 500 550 -- -- -- 550 -50    Day 02/24/17 0700 - 02/24/17 1459 -- -- -- -- -- -- -- -- -- 0      Last Void/BM       Most Recent Value    Urine Occurrence 1 at 02/23/2017 1328    Stool Occurrence 1 at 02/22/2017 0908      Case Management/Discharge Planning     Case Management/Discharge Planning Flowsheet     REFERRAL INFORMATION     Reaction To Health Status  accepting 02/21/17 1407    Did the Initial Social Work Assessment result in a Social Work Case?  Yes 02/21/17 1407   Understanding Of Condition And Treatment  adequate understanding of medical condition;adequate understanding of treatment 02/21/17 1407    Admission Type  inpatient 02/21/17 1407   COPING/STRESS CAREGIVER      Arrived From  home or self-care 02/21/17 1407   Major Change/Loss/Stressor  none 02/21/17 1407    Referral Source  interdisciplinary rounds 02/21/17 1407   Primary Caregiver Strengths  expressive of needs;motivated;positive attitude;successful coping history 02/21/17 1407    # of Referrals Placed by [facility]  Senior Linkage Line 02/23/17 1552   Sources Of Support  adult child(inez);friend(s);other family members 02/21/17 1407    Reason For Consult  discharge planning 02/21/17 1407   Reaction To Health Status  accepting 02/21/17 1407    Record Reviewed  history and physical;medical record 02/21/17 1407   Understanding Of Condition And Treatment  adequate understanding of medical condition;adequate understanding of treatment 02/21/17 1407     Assigned to Case  BRENDA Coleman 02/24/17 0948   EXPECTED DISCHARGE      [facility]  01/02/17 1539   Expected Discharge Date  02/24/17 ([facility] at [facility]30) 02/23/17 1439    Primary Care MD Name  Thad Puri MD 02/21/17 1407   ASSESSMENT/CONCERNS TO BE ADDRESSED      LIVING ENVIRONMENT     Concerns  To Be Addressed  discharge planning concerns 02/21/17 1407    Lives With  significant other 02/22/17 1337   Concerns Comments  frustration 02/06/17 1623    Living Arrangements  house 02/22/17 1337   DISCHARGE PLANNING      Provides Primary Care For  no one 02/21/17 1407   Patient/family verbalizes understanding of discharge plan recommendations?  Yes 02/24/17 0953    Quality Of Family Relationships  supportive;involved 02/21/17 1407   Medical Team notified of plan?  yes 02/24/17 0953    Able to Return to Prior Living Arrangements  other (see comments) (Pending progress in therapy) 02/21/17 1407   Transportation Available  van, wheelchair accessible ([employer] at 1330) 02/23/17 1439    HOME SAFETY     Equipment Used at Home  none (Has crutches, FWW and 4WW) 03/11/14 1932    Patient Feels Safe Living in Home?  yes 02/21/17 1407   FINAL NOTE      ASSESSMENT OF FAMILY/SOCIAL SUPPORT     Final Note  D/C to [facility] on 2-24-17 via w/c at 1330 02/24/17 0953    Marital Status  Lives with Significant Other 02/21/17 1407   FINAL RESOURCES      Who is your support system?  Significant Other 02/21/17 1407   Equipment Currently Used at Home  shower chair 02/22/17 1358    Significant Other's Name  Leon 02/21/17 1407   Resources List  Transitional Care 02/23/17 1439    Description of Support System  Supportive;Involved 02/21/17 1407   Transitional Care  [facility] [phone] 02/23/17 1439    Support Assessment  Adequate family and caregiver support;Adequate social supports 02/21/17 1407   PAS Number  [id number] 02/23/17 1552    Quality of Family Relationships  supportive;involved 02/21/17 1407   Senior Linkage Line Referral Placed  02/23/17 02/23/17 1552    ASSESSMENT OF FUNCTIONAL STATUS     F/U Appointment Brochure Provided  -- (NA) 02/06/17 1623    Assesssment of Functional Status  Needs placement in a SNF/TCF for rehabilitation 02/21/17 1407   Referrals Placed  Senior  Linkage Line 02/23/17 7102    EMPLOYMENT     ABUSE RISK SCREEN      Do you work full or part-time?  no 02/21/17 1407   QUESTION TO PATIENT:  Has a member of your family or a partner(now or in the past) intimidated, hurt, manipulated, or controlled you in any way?  no 02/20/17 1132    COPING/STRESS     QUESTION TO PATIENT: Do you feel safe going back to the place where you are living?  yes 02/20/17 1132    Major Change/Loss/Stressor  hospitalization;surgery/procedure 02/21/17 1407   OBSERVATION: Is there reason to believe there has been maltreatment of a vulnerable adult (ie. Physical/Sexual/Emotional abuse, self neglect, lack of adequate food, shelter, medical care, or financial exploitation)?  no 02/20/17 1132    Patient Personal Strengths  expressive of needs;motivated;positive attitude;strong support system 02/21/17 1407   (R) MENTAL HEALTH SUICIDE RISK      Sources Of Support  friend(s);other family members;significant other 02/21/17 1407   Are you depressed or being treated for depression?  No 02/20/17 1205

## 2017-02-20 NOTE — IP AVS SNAPSHOT
MRN:[id number]                      After Visit Summary   2/20/2017    María Henderson    MRN: [id number]           Thank you!     Thank you for choosing [facility] for your care. Our goal is always to provide you with excellent care. Hearing back from our patients is one way we can continue to improve our services. Please take a few minutes to complete the written survey that you may receive in the mail after you visit with us. Thank you!        Patient Information     Date Of Birth          1945        About your hospital stay     You were admitted on:  February 20, 2017 You last received care in the:  [place] [address] [facility]    You were discharged on:  February 24, 2017        Reason for your hospital stay       Explantation of septic total knee            Reason for your hospital stay       explantation right total knee    She also has a right ankle wound                  Who to Call     For medical emergencies, please call 911.  For non-urgent questions about your medical care, please call your primary care provider or clinic, [phone]  For questions related to your surgery, please call your surgery clinic        Attending Provider     Provider Specialty    Husam Berkowitz MD Orthopedics       Primary Care Provider Office Phone # Fax #    Thad Puri [phone]       [facility] [address]  [place] [address]        After Care Instructions     Activity       Your activity upon discharge: activity as tolerated--no weight on right foot.            Activity - Up with nursing assistance           Diet       Follow this diet upon discharge: Orders Placed This Encounter      Regular Diet Adult            General info for SNF       Length of Stay Estimate: Short Term Care: Estimated # of Days 31-90  Condition at Discharge: Improving  Level of care:skilled   Rehabilitation Potential: Excellent  Admission H&P  remains valid and up-to-date: Yes  Recent Chemotherapy: N/A  Use Nursing Home Standing Orders: Yes            IV access       PICC.            Mantoux instructions       Give two-step Mantoux (PPD) Per Facility Policy Yes            Weight bearing status       NWB right lower extremity    WBAT left foot in cam walker            Wound care       Site:   Left ankle  Instructions:  BID wet to dry gauze            Wound care (specify)       Site:   right knee  Instructions:  Daily dry dressing            Wound care and dressings       Instructions to care for your wound at home: daily dressing changes.                  Follow-up Appointments     Follow Up and recommended labs and tests       Follow up with me in 2 weeks.  The following labs/tests are recommended: per ID.            Follow-up and recommended labs and tests        Follow up with me,  Husam Berkowitz, within 2 weeks. to evaluate after surgery.  No follow up labs or test are needed.                  Additional Services     Occupational Therapy Adult Consult       Evaluate and treat as clinically indicated.    Reason:  Explanted total knee            Physical Therapy Adult Consult       Evaluate and treat as clinically indicated.    Reason:  Explanted right total knee                  Further instructions from your care team       You have been [facility]   Phone Number is [phone]  Fax Number is [phone]    Pending Results     Date and Time Order Name Status Description    2/21/2017 0843 Blood culture Preliminary     2/21/2017 0843 Blood culture Preliminary     2/20/2017 1257 Fluid Culture Aerobic Bacterial Preliminary     2/20/2017 1257 Anaerobic bacterial culture Preliminary             Statement of Approval     Ordered          02/22/17 0757  I have reviewed and agree with all the recommendations and orders detailed in this document.  EFFECTIVE NOW     Approved and electronically signed by:  Husam Berkowitz  "MD Maik             Admission Information     Date & Time Provider Department Dept. Phone    2017 Husam Berkowitz MD [place] [address] [facility] [phone]      Your Vitals Were     Blood Pressure Pulse Temperature Respirations Height Weight    125/59 (BP Location: Left arm) 81 97.6  F (36.4  C) (Axillary) 16 1.676 m (5' 6\") 75.3 kg (166 lb)    Pulse Oximetry BMI (Body Mass Index)                95% 26.79 kg/m2          [employer] Information     [employer] lets you send messages to your doctor, view your test results, renew your prescriptions, schedule appointments and more. To sign up, go to [email or web address] . Click on \"Log in\" on the left side of the screen, which will take you to the Welcome page. Then click on \"Sign up Now\" on the right side of the page.     You will be asked to enter the access code listed below, as well as some personal information. Please follow the directions to create your username and password.     Your access code is: [email or web address]  Expires: 3/30/2017  8:57 AM     Your access code will  in 90 days. If you need help or a new code, please call your [facility] clinic or [phone].        Care EveryWhere ID     This is your Care EveryWhere ID. This could be used by other organizations to access your [facility] medical records  [id number]           Review of your medicines      START taking        Dose / Directions    cefTRIAXone 1 GM vial   Commonly known as:  ROCEPHIN        Dose:  2000 mg   Inject 2 g (2,000 mg) into the vein daily CBC with differential, creatinine, SGOT weekly while on this medication to be faxed to Dr. Douglas office.   Quantity:  600 mL   Refills:  0       enoxaparin 40 MG/0.4ML injection   Commonly known as:  LOVENOX        Dose:  40 mg   Inject 0.4 mLs (40 mg) Subcutaneous every 24 hours for 7 days   Quantity:  2.8 mL   Refills:  0       polyethylene glycol Packet   Commonly known as:  MIRALAX/GLYCOLAX        Dose:  17 g "   Take 17 g by mouth daily   Quantity:  20 packet   Refills:  1         CONTINUE these medicines which may have CHANGED, or have new prescriptions. If we are uncertain of the size of tablets/capsules you have at home, strength may be listed as something that might have changed.        Dose / Directions    HYDROmorphone 2 MG tablet   Commonly known as:  DILAUDID   This may have changed:    - medication strength  - how much to take  - when to take this  - reasons to take this  - additional instructions        Dose:  2-4 mg   Take 1-2 tablets (2-4 mg) by mouth every 4 hours as needed for moderate to severe pain   Quantity:  70 tablet   Refills:  0         CONTINUE these medicines which have NOT CHANGED        Dose / Directions    albuterol 108 (90 BASE) MCG/ACT Inhaler   Commonly known as:  PROAIR HFA/PROVENTIL HFA/VENTOLIN HFA        Dose:  2 puff   Inhale 2 puffs into the lungs every 6 hours as needed for shortness of breath / dyspnea or wheezing   Refills:  0       ASPIRIN PO        Dose:  81 mg   Take 81 mg by mouth daily   Refills:  0       CALCIUM PO        Dose:  600 mg   Take 600 mg by mouth daily   Refills:  0       DAKINS EX        Externally apply topically daily   Refills:  0       LASIX PO        Dose:  20-40 mg   Take 20-40 mg by mouth daily   Refills:  0       multivitamin, therapeutic with minerals Tabs tablet        Dose:  1 tablet   Take 1 tablet by mouth daily.   Refills:  0       * order for DME   Used for:  Pain in joint, ankle and foot, left        Equipment being ordered: Walker Wheels () and Walker () Treatment Diagnosis: difficulty with gait   Quantity:  1 each   Refills:  0       * order for DME   Used for:  Sepsis, due to unspecified organism (H)        Equipment being ordered: Walker Wheels () and Walker () Treatment Diagnosis: impaired gait   Quantity:  1 each   Refills:  0       POTASSIUM PO        Dose:  595 mg   Take 595 mg by mouth every morning   Refills:  0        TRUBIOTICS PO        Dose:  1 tablet   Take 1 tablet by mouth daily   Refills:  0       valsartan-hydrochlorothiazide 80-12.5 MG per tablet   Commonly known as:  DIOVAN-HCT        Dose:  1 tablet   Take 1 tablet by mouth daily   Refills:  0       VITAMIN B6 PO        Dose:  1 tablet   Take 1 tablet by mouth daily   Refills:  0       VITAMIN D3 PO        Dose:  400 Units   Take 400 Units by mouth daily   Refills:  0       ZOCOR 40 MG tablet   Generic drug:  simvastatin        Dose:  40 mg   Take 40 mg by mouth every morning   Refills:  0       * Notice:  This list has 2 medication(s) that are the same as other medications prescribed for you. Read the directions carefully, and ask your doctor or other care provider to review them with you.      STOP taking     IBUPROFEN PO           OSTEO BI-FLEX REGULAR STRENGTH PO                Where to get your medicines      These medications were sent to [facility] [place], [place] - [address]  [address], [place] [address]     Phone:  [phone]     enoxaparin 40 MG/0.4ML injection    polyethylene glycol Packet         Some of these will need a paper prescription and others can be bought over the counter. Ask your nurse if you have questions.     Bring a paper prescription for each of these medications     HYDROmorphone 2 MG tablet       You don't need a prescription for these medications     cefTRIAXone 1 GM vial                Protect others around you: Learn how to safely use, store and throw away your medicines at www.disposemymeds.org.             Medication List: This is a list of all your medications and when to take them. Check marks below indicate your daily home schedule. Keep this list as a reference.      Medications           Morning Afternoon Evening Bedtime As Needed    albuterol 108 (90 BASE) MCG/ACT Inhaler   Commonly known as:  PROAIR HFA/PROVENTIL HFA/VENTOLIN HFA   Inhale 2 puffs into the lungs every 6 hours as needed for  shortness of breath / dyspnea or wheezing                                ASPIRIN PO   Take 81 mg by mouth daily   Last time this was given:  81 mg on 2/24/2017  8:40 AM                                CALCIUM PO   Take 600 mg by mouth daily                                cefTRIAXone 1 GM vial   Commonly known as:  ROCEPHIN   Inject 2 g (2,000 mg) into the vein daily CBC with differential, creatinine, SGOT weekly while on this medication to be faxed to Dr. Douglas office.   Last time this was given:  2 g on 2/24/2017  8:40 AM                                DAKINS EX   Externally apply topically daily                                enoxaparin 40 MG/0.4ML injection   Commonly known as:  LOVENOX   Inject 0.4 mLs (40 mg) Subcutaneous every 24 hours for 7 days   Last time this was given:  40 mg on 2/24/2017  8:39 AM                                HYDROmorphone 2 MG tablet   Commonly known as:  DILAUDID   Take 1-2 tablets (2-4 mg) by mouth every 4 hours as needed for moderate to severe pain   Last time this was given:  4 mg on 2/24/2017 10:54 AM                                LASIX PO   Take 20-40 mg by mouth daily   Last time this was given:  20 mg on 2/24/2017  8:40 AM                                multivitamin, therapeutic with minerals Tabs tablet   Take 1 tablet by mouth daily.                                * order for DME   Equipment being ordered: Walker Wheels () and Walker () Treatment Diagnosis: difficulty with gait                                * order for DME   Equipment being ordered: Walker Wheels () and Walker () Treatment Diagnosis: impaired gait                                polyethylene glycol Packet   Commonly known as:  MIRALAX/GLYCOLAX   Take 17 g by mouth daily   Last time this was given:  17 g on 2/24/2017  8:39 AM                                POTASSIUM PO   Take 595 mg by mouth every morning                                TRUBIOTICS PO   Take 1 tablet by mouth daily                                 valsartan-hydrochlorothiazide 80-12.5 MG per tablet   Commonly known as:  DIOVAN-HCT   Take 1 tablet by mouth daily   Last time this was given:  1 tablet on 2/24/2017  8:39 AM                                VITAMIN B6 PO   Take 1 tablet by mouth daily                                VITAMIN D3 PO   Take 400 Units by mouth daily                                ZOCOR 40 MG tablet   Take 40 mg by mouth every morning   Last time this was given:  40 mg on 2/24/2017  8:40 AM   Generic drug:  simvastatin                                * Notice:  This list has 2 medication(s) that are the same as other medications prescribed for you. Read the directions carefully, and ask your doctor or other care provider to review them with you.

## 2017-02-20 NOTE — IP AVS SNAPSHOT
` [place] [address] [facility]: [phone]            Medication Administration Report for María Henderson as of 02/24/17 1250   Legend:    Given Hold Not Given Due Canceled Entry Other Actions    Time Time (Time) Time  Time-Action       Inactive    Active    Linked        Medications 02/18/17 02/19/17 02/20/17 02/21/17 02/22/17 02/23/17 02/24/17    acetaminophen (TYLENOL) tablet 650 mg  Dose: 650 mg Freq: EVERY 4 HOURS PRN Route: PO  PRN Reason: other  PRN Comment: surgical pain  Start: 02/23/17 0000   Admin Instructions: May give first dose 4 hours after last dose scheduled of acetaminophen.  Maximum acetaminophen dose from all sources = 75 mg/kg/day not to exceed 4 grams/day.           1249 (650 mg)-Given             Dose: 975 mg Freq: 3 times per day Route: PO  Start: 02/20/17 1545   End: 02/23/17 1759   Admin Instructions: Do not use if patient has an active opioid/acetaminophen analgesic order for pain  Maximum acetaminophen dose from all sources = 75 mg/kg/day not to exceed 4 grams/day.       1815 (975 mg)-Given        0311 (975 mg)-Given       (1030)-Not Given       1804 (975 mg)-Given        0134 (975 mg)-Given       0934 (975 mg)-Given       1801 (975 mg)-Given        0219 (975 mg)-Given       1018 (975 mg)-Given       1759-Med Discontinued        albuterol (PROAIR HFA/PROVENTIL HFA/VENTOLIN HFA) Inhaler 2 puff  Dose: 2 puff Freq: EVERY 6 HOURS PRN Route: IN  PRN Reasons: shortness of breath / dyspnea,wheezing  Start: 02/20/17 1526              aspirin chewable tablet 81 mg  Dose: 81 mg Freq: DAILY Route: PO  Start: 02/20/17 1800      1816 (81 mg)-Given        (1621)-Not Given        0940 (81 mg)-Given        0850 (81 mg)-Given        0840 (81 mg)-Given           benzocaine-menthol (CHLORASEPTIC) 6-10 MG lozenge 1-2 lozenge  Dose: 1-2 lozenge Freq: EVERY 1 HOUR PRN Route: BU  PRN Reason: sore throat  PRN Comment: sore throat without fever  Start: 02/20/17 1526               calcium carbonate (TUMS) chewable tablet 500-1,000 mg  Dose: 500-1,000 mg Freq: EVERY 2 HOURS PRN Route: PO  PRN Reason: heartburn  Start: 02/20/17 2313   Admin Instructions: Do not give if calcium level greater than 10 mg/dL.               camphor-menthol (DERMASARRA) lotion  Freq: EVERY 6 HOURS PRN Route: Top  PRN Reason: skin care  Start: 02/22/17 1346   Admin Instructions: Apply to the area of itching               cefTRIAXone (ROCEPHIN) 2 g vial to attach to  ml bag for ADULTS or NS 50 ml bag for PEDS  Dose: 2 g Freq: EVERY 24 HOURS Route: IV  Indications of Use: BONE AND JOINT INFECTION  Start: 02/21/17 0900       1006 (2 g)-New Bag        0935 (2 g)-New Bag        0930 (2 g)-New Bag        0840 (2 g)-New Bag           dextrose 5% and 0.45% NaCl + KCl 20 mEq/L infusion  Rate: 125 mL/hr Freq: CONTINUOUS Route: IV  Start: 02/20/17 1530   Admin Instructions: Change to saline lock when PO well tolerated.       1548 ( )-New Bag       2138 ( )-New Bag        0523 ( )-New Bag       1626 ( )-New Bag        0142 ( )-New Bag             enoxaparin (LOVENOX) injection 40 mg  Dose: 40 mg Freq: EVERY 24 HOURS Route: SC  Start: 02/21/17 0900   Admin Instructions: Check to make sure start date/time is 12-24 hours post op unless documented complication, AND no sooner than 22 hours post op if spinal anesthesia used.   Continue until discharge to home. HOLD if platelet count falls below 50% of baseline or less than 100,000/ L and notify provider.        0953 (40 mg)-Given        0935 (40 mg)-Given        0850 (40 mg)-Given        0839 (40 mg)-Given           furosemide (LASIX) tablet 20-40 mg  Dose: 20-40 mg Freq: DAILY Route: PO  Start: 02/21/17 0900       (1620)-Not Given        (0947)-Not Given        (0858)-Not Given        0840 (20 mg)-Given           HYDROmorphone (DILAUDID) tablet 2-4 mg  Dose: 2-4 mg Freq: EVERY 4 HOURS PRN Route: PO  PRN Reason: moderate to severe pain  Start: 02/20/17 1526   Admin Instructions:  "Hold while on PCA or with regular IV opioid dosing.  IF CrCl is UNKNOWN start at lowest end of dosing range.       1921 (4 mg)-Given       2311 (4 mg)-Given        0311 (4 mg)-Given       0721 (4 mg)-Given       1519 (4 mg)-Given       2042 (4 mg)-Given        0133 (4 mg)-Given       0555 (4 mg)-Given       0933 (4 mg)-Given       1415 (4 mg)-Given       1800 (4 mg)-Given       2214 (4 mg)-Given        0221 (4 mg)-Given       0850 (4 mg)-Given       1434 (4 mg)-Given       2232 (4 mg)-Given        0249 (4 mg)-Given       0635 (4 mg)-Given       1054 (4 mg)-Given           HYDROmorphone (PF) (DILAUDID) injection 0.3-0.5 mg  Dose: 0.3-0.5 mg Freq: EVERY 2 HOURS PRN Route: IV  PRN Reason: severe pain  PRN Comment: or if patient unable to take PO  Start: 02/20/17 1526   Admin Instructions: Hold while on PCA.       1558 (0.5 mg)-Given       1815 (0.5 mg)-Given        0039 (0.5 mg)-Given       1626 (0.5 mg)-Given              lidocaine (LMX4) cream  Freq: EVERY 1 HOUR PRN Route: Top  PRN Reason: pain  PRN Comment: with VAD insertion or accessing implanted port.  Start: 02/20/17 1526   Admin Instructions: Do NOT give if patient has a history of allergy to any local anesthetic or any \"shahab\" product.   Apply 30 minutes prior to VAD insertion or port access.  MAX Dose:  2.5 g (  of 5 g tube)               lidocaine 1 % 1 mL  Dose: 1 mL Freq: EVERY 1 HOUR PRN Route: OTHER  PRN Comment: mild pain with VAD insertion or accessing implanted port  Start: 02/20/17 1526   Admin Instructions: Do NOT give if patient has a history of allergy to any local anesthetic or any \"shahab\" product. MAX dose 1 mL subcutaneous OR intradermal in divided doses.               metoclopramide (REGLAN) tablet 5 mg  Dose: 5 mg Freq: EVERY 6 HOURS PRN Route: PO  PRN Reasons: nausea,vomiting  Start: 02/20/17 1526   Admin Instructions: This is Step 3 of nausea and vomiting management.  Give if nausea not resolved 15 minutes after giving prochlorperazine " (COMPAZINE).  If nausea not resolved in 15-30 minutes, Notify provider.              Or  metoclopramide (REGLAN) injection 5 mg  Dose: 5 mg Freq: EVERY 6 HOURS PRN Route: IV  PRN Reasons: nausea,vomiting  Start: 02/20/17 1526   Admin Instructions: This is Step 3 of nausea and vomiting management.  Give if nausea not resolved 15 minutes after giving prochlorperazine (COMPAZINE).  If nausea not resolved in 15-30 minutes, Notify provider.               naloxone (NARCAN) injection 0.1-0.4 mg  Dose: 0.1-0.4 mg Freq: EVERY 2 MIN PRN Route: IV  PRN Reason: opioid reversal  Start: 02/20/17 1526   Admin Instructions: For respiratory rate LESS than or EQUAL to 8.  Partial reversal dose:  0.1 mg titrated q 2 minutes for Analgesia Side Effects Monitoring Sedation Level of 3 (frequently drowsy, arousable, drifts to sleep during conversation).Full reversal dose:  0.4 mg bolus for Analgesia Side Effects Monitoring Sedation Level of 4 (somnolent, minimal or no response to stimulation).               ondansetron (ZOFRAN-ODT) ODT tab 4 mg  Dose: 4 mg Freq: EVERY 6 HOURS PRN Route: PO  PRN Reason: nausea  Start: 02/20/17 1526   Admin Instructions: This is Step 1 of nausea and vomiting management.  If nausea not resolved in 15 minutes, go to Step 2 prochlorperazine (COMPAZINE). Do not push through foil backing. Peel back foil and gently remove. Place on tongue immediately. Administration with liquid unnecessary              Or  ondansetron (ZOFRAN) injection 4 mg  Dose: 4 mg Freq: EVERY 6 HOURS PRN Route: IV  PRN Reasons: nausea,vomiting  Start: 02/20/17 1526   Admin Instructions: This is Step 1 of nausea and vomiting management.  If nausea not resolved in 15 minutes, go to Step 2 prochlorperazine (COMPAZINE).  Irritant.               polyethylene glycol (MIRALAX/GLYCOLAX) Packet 17 g  Dose: 17 g Freq: DAILY Route: PO  Start: 02/21/17 0900   Admin Instructions: 1 Packet = 17 grams. Mixed prescribed dose in 8 ounces of water. Follow  with 8 oz. of water.        0953 (17 g)-Given        0940 (17 g)-Given        0851 (17 g)-Given        0839 (17 g)-Given           potassium chloride (KLOR-CON) Packet 20-40 mEq  Dose: 20-40 mEq Freq: EVERY 2 HOURS PRN Route: ORAL OR FEED  PRN Reason: potassium supplementation  Start: 02/20/17 1537   Admin Instructions: Use if unable to tolerate tablets.  If Serum K+ 3.0-3.3, dose = 60 mEq po total dose (40 mEq x1 followed in 2 hours by 20 mEq x1). Recheck K+ level 4 hours after dose and the next AM.  If Serum K+ 2.5-2.9, dose = 80 mEq po total dose (40 mEq Q2H x2). Recheck K+ level 4 hours after dose and the next AM.  If Serum K+ less than 2.5, See IV order.  Dissolve packet contents in 4-8 ounces of cold water or juice.               potassium chloride 10 mEq in 100 mL intermittent infusion  Dose: 10 mEq Freq: EVERY 1 HOUR PRN Route: IV  PRN Reason: potassium supplementation  Start: 02/20/17 1537   Admin Instructions: Infuse via PERIPHERAL LINE or CENTRAL LINE. Use for central line replacement if patient weight less than 65 kg, if patient is on TPN with high potassium content or if unit does not stock 20 mEq bags.   If Serum K+ 3.0-3.3, dose = 10 mEq/hr x4 doses (40 mEq IV total dose). Recheck K+ level 2 hours after dose and the next AM.   If Serum K+ less than 3.0, dose = 10 mEq/hr x6 doses (60 mEq IV total dose). Recheck K+ level 2 hours after dose and the next AM.               potassium chloride 10 mEq in 100 mL intermittent infusion with 10 mg lidocaine  Dose: 10 mEq Freq: EVERY 1 HOUR PRN Route: IV  PRN Reason: potassium supplementation  Start: 02/20/17 1537   Admin Instructions: Infuse via PERIPHERAL LINE. Use potassium with lidocaine for pain with peripheral administration.  If Serum K+ 3.0-3.3, dose = 10 mEq/hr x4 doses (40 mEq IV total dose). Recheck K+ level 2 hours after dose and the next AM.  If Serum K+ less than 3.0, dose = 10 mEq/hr x6 doses (60 mEq IV total dose). Recheck K+ level 2 hours after  dose and the next AM.               potassium chloride 20 mEq in 50 mL intermittent infusion  Dose: 20 mEq Freq: EVERY 1 HOUR PRN Route: IV  PRN Reason: potassium supplementation  Start: 02/20/17 1537   Admin Instructions: Infuse via CENTRAL LINE Only. May need EKG if less than 65 kg or on TPN - Max rate is 0.3 mEq/kg/hr for patients not on EKG monitoring.   If Serum K+ 3.0-3.3, dose = 20 mEq/hr x2 doses (40 mEq IV total dose). Recheck K+ level 2 hours after dose and the next AM.  If Serum K+ less than 3.0, dose = 20 mEq/hr x3 doses (60 mEq IV total dose). Recheck K+ level 2 hours after dose and the next AM.               potassium chloride SA (K-DUR/KLOR-CON M) CR tablet 20-40 mEq  Dose: 20-40 mEq Freq: EVERY 2 HOURS PRN Route: PO  PRN Reason: potassium supplementation  Start: 02/20/17 1537   Admin Instructions: Use if able to take PO.   If Serum K+ 3.0-3.3, dose = 60 mEq po total dose (40 mEq x1 followed in 2 hours by 20 mEq x1). Recheck K+ level 4 hours after dose and the next AM.  If Serum K+ 2.5-2.9, dose = 80 mEq po total dose (40 mEq Q2H x2). Recheck K+ level 4 hours after dose and the next AM.  If Serum K+ less than 2.5, See IV order.  DO NOT CRUSH       1819 (40 mEq)-Given       2138 (20 mEq)-Given               prochlorperazine (COMPAZINE) injection 5 mg  Dose: 5 mg Freq: EVERY 6 HOURS PRN Route: IV  PRN Reasons: nausea,vomiting  Start: 02/20/17 1526   Admin Instructions: This is Step 2 of nausea and vomiting management.   If nausea not resolved in 15 minutes, give metoclopramide (REGLAN) if ordered (step 3 of nausea and vomiting management)              Or  prochlorperazine (COMPAZINE) tablet 5 mg  Dose: 5 mg Freq: EVERY 6 HOURS PRN Route: PO  PRN Reasons: nausea,vomiting  Start: 02/20/17 1526   Admin Instructions: This is Step 2 of nausea and vomiting management.   If nausea not resolved in 15 minutes, give metoclopramide (REGLAN) if ordered (step 3 of nausea and vomiting management)                senna-docusate (SENOKOT-S;PERICOLACE) 8.6-50 MG per tablet 1-2 tablet  Dose: 1-2 tablet Freq: 2 TIMES DAILY Route: PO  Start: 02/20/17 2100   Admin Instructions: Start with 1 tablet PO BID, If no bowel movement in 24 hours, increase to 2 tablets PO BID.  Hold for loose stools.       (2136)-Not Given       2138 (1 tablet)-Given        (1234)-Not Given       (2043)-Not Given        (0948)-Not Given       (2047)-Not Given        (0858)-Not Given       (2233)-Not Given        (0849)-Not Given       [ ] 2100           simvastatin (ZOCOR) tablet 40 mg  Dose: 40 mg Freq: EVERY MORNING Route: PO  Start: 02/21/17 0900       (1621)-Not Given        0940 (40 mg)-Given        0851 (40 mg)-Given        0840 (40 mg)-Given           sodium chloride (PF) 0.9% PF flush 10 mL  Dose: 10 mL Freq: EVERY 8 HOURS Route: IK  Start: 02/23/17 1500   Admin Instructions: And Q1H PRN, to lock CVC - Open Ended (Tunneled and Non-Tunneled) dormant lumen.          1842 (10 mL)-Given        0049 (10 mL)-Given       0635 (10 mL)-Given       [ ] 1500       [ ] 2300           sodium chloride (PF) 0.9% PF flush 10-20 mL  Dose: 10-20 mL Freq: EVERY 1 HOUR PRN Route: IK  PRN Reasons: line flush,post meds or blood draw  Start: 02/23/17 1449   Admin Instructions: to flush CVC - Open Ended (Tunneled and Non-Tunneled).  10 mL post IV meds; 20 mL post blood draw.               sodium chloride (PF) 0.9% PF flush 3 mL  Dose: 3 mL Freq: EVERY 8 HOURS Route: IK  Start: 02/20/17 1530   Admin Instructions: And Q1H PRN, to lock peripheral IV dormant line.       (1820)-Not Given        (0312)-Not Given       (1234)-Not Given       (1804)-Not Given        (0142)-Not Given       (1000)-Not Given       1801 (3 mL)-Given        0221 (3 mL)-Given       1000 (3 mL)-Given       1840 (3 mL)-Given        (0300)-Not Given       (1055)-Not Given       [ ] 1800           sodium chloride (PF) 0.9% PF flush 3 mL  Dose: 3 mL Freq: EVERY 1 HOUR PRN Route: IK  PRN Reason: line  flush  PRN Comment: for peripheral IV flush post IV meds  Start: 02/20/17 1526              valsartan-hydrochlorothiazide (DIOVAN-HCT) 80-12.5 MG per tablet 1 tablet  Dose: 1 tablet Freq: DAILY Route: PO  Start: 02/21/17 0900       1519 (1 tablet)-Given        0939 (1 tablet)-Given        0856 (1 tablet)-Given        0839 (1 tablet)-Given          Completed Medications  Medications 02/18/17 02/19/17 02/20/17 02/21/17 02/22/17 02/23/17 02/24/17         Dose: 0.5-5 mL Freq: ONCE PRN Route: OTHER  PRN Comment: mild pain For local anesthetic during PICC insertion.  Start: 02/23/17 1448   End: 02/23/17 1558   Admin Instructions: Give Sub-Q/Intradermal.  Give in divided doses as needed.          1558 (2 mL)-Given by Other              Dose: 5-50 mL Freq: ONCE PRN Route: IK  PRN Reason: line flush  PRN Comment: to flush each lumen with line placement  Start: 02/23/17 1448   End: 02/23/17 1559   Admin Instructions: May repeat x 1          1559 (20 mL)-Given by Other [C]

## 2017-02-20 NOTE — IP AVS SNAPSHOT
"[place] [address] [facility]: [phone]                                              INTERAGENCY TRANSFER FORM - PHYSICIAN ORDERS   2017                    Hospital Admission Date: 2017  KE LIZ   : 1945  Sex: Female        Attending Provider: Husam Berkowitz MD     Allergies:  Pcn [Penicillin V Potassium], Oxycodone, Sulfa Drugs, Valium [Diazepam], Vicodin [Hydrocodone-acetaminophen], Iodine Solution [Povidone Iodine]    Infection:  None   Service:  ORTHOPEDICS    Ht:  1.676 m (5' 6\")   Wt:  75.3 kg (166 lb)   Admission Wt:  75.3 kg (166 lb)    BMI:  26.79 kg/m 2   BSA:  1.87 m 2            Patient PCP Information     Provider PCP Type    Thad Puri MD General      ED Clinical Impression     Diagnosis Description Comment Added By Time Added    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] 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]  Husam Berkowitz MD 2017  7:50 AM      Hospital Problems as of 2017              Priority Class Noted POA    Septic joint of left knee joint (H) Medium  2017 Yes      Non-Hospital Problems as of 2017              Priority Class Noted    S/P foot surgery    10/24/2011    Syncope   10/25/2011    Hypertension   10/25/2011    Bradycardia   10/25/2011    Hyperlipidemia LDL goal <130   10/25/2011    Pain in joint, ankle and foot   2012    Sprain of neck   2012    Sprain and strain of shoulder and upper arm   2012    Elbow strain   2012    Wrist strain   2012    S/P ankle fusion   3/10/2014    Cellulitis Medium  2016    Sepsis (H) Medium  2016      Code Status History     Date Active Date Inactive Code Status Order ID Comments User Context    2017  7:32 AM  Full Code [id number]  Husam Berkowitz MD Outpatient    2017  7:56 AM 2017  7:32 AM Full Code [id number]  Husam Berkowitz MD Outpatient    2017  3:26 PM " 2/22/2017  7:56 AM Full Code [id number]  Husam Berkowitz MD Inpatient    1/2/2017  1:51 PM 2/20/2017  3:26 PM Full Code [id number]  Masoud Wallis, DO Outpatient    12/26/2016 12:12 AM 12/27/2016  7:28 PM Full Code [id number]  Sujey Hu MD Inpatient    3/10/2014  1:59 PM 3/12/2014  3:57 PM Full Code [id number]  Hallie Musa, RN Inpatient    10/25/2011  4:23 PM 10/26/2011  7:40 PM Full Code [id number]  Garrett Perez, DO Inpatient         Medication Review      START taking        Dose / Directions Comments    cefTRIAXone 1 GM vial   Commonly known as:  ROCEPHIN        Dose:  2000 mg   Inject 2 g (2,000 mg) into the vein daily CBC with differential, creatinine, SGOT weekly while on this medication to be faxed to Dr. Douglas office.   Quantity:  600 mL   Refills:  0        enoxaparin 40 MG/0.4ML injection   Commonly known as:  LOVENOX        Dose:  40 mg   Inject 0.4 mLs (40 mg) Subcutaneous every 24 hours for 7 days   Quantity:  2.8 mL   Refills:  0        polyethylene glycol Packet   Commonly known as:  MIRALAX/GLYCOLAX        Dose:  17 g   Take 17 g by mouth daily   Quantity:  20 packet   Refills:  1          CONTINUE these medications which may have CHANGED, or have new prescriptions. If we are uncertain of the size of tablets/capsules you have at home, strength may be listed as something that might have changed.        Dose / Directions Comments    HYDROmorphone 2 MG tablet   Commonly known as:  DILAUDID   This may have changed:    - medication strength  - how much to take  - when to take this  - reasons to take this  - additional instructions        Dose:  2-4 mg   Take 1-2 tablets (2-4 mg) by mouth every 4 hours as needed for moderate to severe pain   Quantity:  70 tablet   Refills:  0          CONTINUE these medications which have NOT CHANGED        Dose / Directions Comments    albuterol 108 (90 BASE) MCG/ACT Inhaler   Commonly known as:  PROAIR HFA/PROVENTIL HFA/VENTOLIN  HFA        Dose:  2 puff   Inhale 2 puffs into the lungs every 6 hours as needed for shortness of breath / dyspnea or wheezing   Refills:  0        ASPIRIN PO        Dose:  81 mg   Take 81 mg by mouth daily   Refills:  0        CALCIUM PO        Dose:  600 mg   Take 600 mg by mouth daily   Refills:  0        DAKINS EX        Externally apply topically daily   Refills:  0        LASIX PO        Dose:  20-40 mg   Take 20-40 mg by mouth daily   Refills:  0        multivitamin, therapeutic with minerals Tabs tablet        Dose:  1 tablet   Take 1 tablet by mouth daily.   Refills:  0        * order for DME   Used for:  Pain in joint, ankle and foot, left        Equipment being ordered: Walker Wheels () and Walker () Treatment Diagnosis: difficulty with gait   Quantity:  1 each   Refills:  0        * order for DME   Used for:  Sepsis, due to unspecified organism (H)        Equipment being ordered: Walker Wheels () and Walker () Treatment Diagnosis: impaired gait   Quantity:  1 each   Refills:  0        POTASSIUM PO        Dose:  595 mg   Take 595 mg by mouth every morning   Refills:  0        TRUBIOTICS PO        Dose:  1 tablet   Take 1 tablet by mouth daily   Refills:  0        valsartan-hydrochlorothiazide 80-12.5 MG per tablet   Commonly known as:  DIOVAN-HCT        Dose:  1 tablet   Take 1 tablet by mouth daily   Refills:  0        VITAMIN B6 PO        Dose:  1 tablet   Take 1 tablet by mouth daily   Refills:  0        VITAMIN D3 PO        Dose:  400 Units   Take 400 Units by mouth daily   Refills:  0        ZOCOR 40 MG tablet   Generic drug:  simvastatin        Dose:  40 mg   Take 40 mg by mouth every morning   Refills:  0        * Notice:  This list has 2 medication(s) that are the same as other medications prescribed for you. Read the directions carefully, and ask your doctor or other care provider to review them with you.      STOP taking     IBUPROFEN PO           OSTEO BI-FLEX REGULAR  STRENGTH PO                     Further instructions from your care team       You have been discharged to [facility]   Phone Number is [phone]  Fax Number is [phone]    Summary of Visit     Reason for your hospital stay       Explantation of septic total knee       Reason for your hospital stay       explantation right total knee    She also has a right ankle wound             After Care     Activity       Your activity upon discharge: activity as tolerated--no weight on right foot.       Activity - Up with nursing assistance           Diet       Follow this diet upon discharge: Orders Placed This Encounter      Regular Diet Adult       General info for SNF       Length of Stay Estimate: Short Term Care: Estimated # of Days 31-90  Condition at Discharge: Improving  Level of care:skilled   Rehabilitation Potential: Excellent  Admission H&P remains valid and up-to-date: Yes  Recent Chemotherapy: N/A  Use Nursing Home Standing Orders: Yes       IV access       PICC.       Mantoux instructions       Give two-step Mantoux (PPD) Per Facility Policy Yes       Weight bearing status       NWB right lower extremity    WBAT left foot in cam walker       Wound care       Site:   Left ankle  Instructions:  BID wet to dry gauze       Wound care (specify)       Site:   right knee  Instructions:  Daily dry dressing       Wound care and dressings       Instructions to care for your wound at home: daily dressing changes.             Referrals     Occupational Therapy Adult Consult       Evaluate and treat as clinically indicated.    Reason:  Explanted total knee       Physical Therapy Adult Consult       Evaluate and treat as clinically indicated.    Reason:  Explanted right total knee              MD face to face encounter       Documentation of Face to Face and Certification for Home Health Services    I certify that patient: María Henderson is under my care and that I, or a nurse practitioner or  physician's assistant working with me, had a face-to-face encounter that meets the physician face-to-face encounter requirements with this patient on: 2/22/2017.    This encounter with the patient was in whole, or in part, for the following medical condition, which is the primary reason for home health care: explanted total knee--infected.    I certify that, based on my findings, the following services are medically necessary home health services: Nursing and Physical Therapy.    My clinical findings support the need for the above services because: Nurse is needed: To provide caregiver training to assist with: IV antibiotics..    Further, I certify that my clinical findings support that this patient is homebound (i.e. absences from home require considerable and taxing effort and are for medical reasons or Religion services or infrequently or of short duration when for other reasons) because: Requires assistance of another person or specialized equipment to access medical services because patient: Has prohibitive pain during ambulation. and Is unable to exit home safely on own due to: problems with both her right knee and left ankle...    Based on the above findings. I certify that this patient is confined to the home and needs intermittent skilled nursing care, physical therapy and/or speech therapy.  The patient is under my care, and I have initiated the establishment of the plan of care.  This patient will be followed by a physician who will periodically review the plan of care.  Physician/Provider to provide follow up care: Thad Puri    Attending hospital physician (the Medicare certified [facility] provider): Husam Berkowitz  Physician Signature: See electronic signature associated with these discharge orders.  Date: 2/22/2017                  Follow-Up Appointment Instructions     Future Labs/Procedures    Follow Up and recommended labs and tests     Comments:    Follow up with me in 2 weeks.   The following labs/tests are recommended: per ID.    Follow-up and recommended labs and tests      Comments:    Follow up with me,  Husam Berkowitz, within 2 weeks. to evaluate after surgery.  No follow up labs or test are needed.      Follow-Up Appointment Instructions     Follow Up and recommended labs and tests       Follow up with me in 2 weeks.  The following labs/tests are recommended: per ID.       Follow-up and recommended labs and tests        Follow up with me,  Husam Berkowitz, within 2 weeks. to evaluate after surgery.  No follow up labs or test are needed.             Statement of Approval     Ordered          02/22/17 0757  I have reviewed and agree with all the recommendations and orders detailed in this document.  EFFECTIVE NOW     Approved and electronically signed by:  Husam Berkowitz MD

## 2017-02-20 NOTE — ANESTHESIA CARE TRANSFER NOTE
Patient: María Henderson    Procedure(s):  EXPLANTATION OF RIGHT TOTAL KNEE (BIOMET), INSERTION ANTIBIOTC SPACER - Wound Class: I-Clean    Diagnosis: INFECTED RIGHT TOTAL KNEE   Diagnosis Additional Information: No value filed.    Anesthesia Type:   General, LMA     Note:  Airway :Face Mask  Patient transferred to:PACU  Comments: Awake.  No c/o pain.  Report to RN.      Vitals: (Last set prior to Anesthesia Care Transfer)    CRNA VITALS  2/20/2017 1337 - 2/20/2017 1409      2/20/2017             NIBP: 120/67    Pulse: 73    NIBP Mean: 82    SpO2: 99 %    Resp Rate (set): 10                Electronically Signed By: Puja Guerra  February 20, 2017  2:09 PM

## 2017-02-20 NOTE — IP AVS SNAPSHOT
[address] [facility]    [address] [place] [address]    Phone:  [phone]                                       After Visit Summary   2/20/2017    María Henderson    MRN: [id number]           After Visit Summary Signature Page     I have received my discharge instructions, and my questions have been answered. I have discussed any challenges I see with this plan with the nurse or doctor.    ..........................................................................................................................................  Patient/Patient Representative Signature      ..........................................................................................................................................  Patient Representative Print Name and Relationship to Patient    ..................................................               ................................................  Date                                            Time    ..........................................................................................................................................  Reviewed by Signature/Title    ...................................................              ..............................................  Date                                                            Time

## 2017-02-20 NOTE — OP NOTE
2/20/2017    María Henderson    Preoperative diagnosis: Septic right total knee replacement    Postoperative diagnosis: Same    Procedure: Explantation septic Right total knee replacement, insertion of antibiotic spacer.    Anesthesia: Gen.    Surgeon: Dr. Husam Berkowitz    Asst.: JERED Larson    Description of procedure:  The patient received a single shot femoral nerve block in the preoperative area. She is brought into the operating Room, and positioned supine on the  Operating room table. Gen. Anesthesia is induced. A thigh tourniquet is placed. A Hancock catheter is placed. The right lower extremity is prepped with ChloraPrep, and draped in standard fashion. A brief time out is held to verify the procedure and laterality. Her previous incisional scar is re-incised. Full-thickness flaps are raised only to the extent necessary. The knee is entered via a medial parapatellar arthrotomy. Deep cultures are sent. We are able to safely displace the extensor mechanism laterally enough without disrupting the quadriceps tendon. Osteotomes were used to develop the implant cement interface under the femoral component, and the component is removed with minimal bone loss. There is obvious osteomyelitis of The femur. Any residual cement is removed.    Subperiosteal dissection is carried down the proximal tibial metaphysis medially. An oscillating saw is used to develop the interface over the medial tibial plateau. The rest of the tibia is freed up with osteotomes, and removed without difficulty. The cement is morselized and removed. This includes the intramedullary cement.    Attention is turned to the patella. An oscillating saw is taken through the cement mantle. The peg is removed with serially increasing Diameter drill bits. All cement is removed.    The knee is copiously irrigated with polyantimicrobial solution and the pulse lavage. A tobramycin cement spacer is placed. 2 drains are brought out via separate stab  incisions. The knee capsule is closed with 0 Ethibond sutures. The subcuticular tissues were closed with 2-0 Vicryl. The skin closure is performed with skin staples. A bulky sterile dressing and knee Immobilizer are applied. The patient was taken to recovery in satisfactory condition.

## 2017-02-21 ENCOUNTER — APPOINTMENT (OUTPATIENT)
Dept: PHYSICAL THERAPY | Facility: CLINIC | Age: 72
DRG: 464 | End: 2017-02-21
Attending: ORTHOPAEDIC SURGERY
Payer: OTHER MISCELLANEOUS

## 2017-02-21 LAB
HGB BLD-MCNC: 10.4 G/DL (ref 11.7–15.7)
POTASSIUM SERPL-SCNC: 4.3 MMOL/L (ref 3.4–5.3)

## 2017-02-21 PROCEDURE — 40000193 ZZH STATISTIC PT WARD VISIT: Performed by: PHYSICAL THERAPIST

## 2017-02-21 PROCEDURE — 25000128 H RX IP 250 OP 636: Performed by: ORTHOPAEDIC SURGERY

## 2017-02-21 PROCEDURE — 85018 HEMOGLOBIN: CPT | Performed by: ORTHOPAEDIC SURGERY

## 2017-02-21 PROCEDURE — 97116 GAIT TRAINING THERAPY: CPT | Mod: GP | Performed by: PHYSICAL THERAPIST

## 2017-02-21 PROCEDURE — 36415 COLL VENOUS BLD VENIPUNCTURE: CPT | Performed by: INTERNAL MEDICINE

## 2017-02-21 PROCEDURE — 12000007 ZZH R&B INTERMEDIATE

## 2017-02-21 PROCEDURE — 40000556 ZZH STATISTIC PERIPHERAL IV START W US GUIDANCE

## 2017-02-21 PROCEDURE — 25000128 H RX IP 250 OP 636: Performed by: INTERNAL MEDICINE

## 2017-02-21 PROCEDURE — 25800025 ZZH RX 258: Performed by: ORTHOPAEDIC SURGERY

## 2017-02-21 PROCEDURE — 25000125 ZZHC RX 250: Performed by: ORTHOPAEDIC SURGERY

## 2017-02-21 PROCEDURE — 97110 THERAPEUTIC EXERCISES: CPT | Mod: GP | Performed by: PHYSICAL THERAPIST

## 2017-02-21 PROCEDURE — 84132 ASSAY OF SERUM POTASSIUM: CPT | Performed by: ORTHOPAEDIC SURGERY

## 2017-02-21 PROCEDURE — 97530 THERAPEUTIC ACTIVITIES: CPT | Mod: GP | Performed by: PHYSICAL THERAPIST

## 2017-02-21 PROCEDURE — 25000132 ZZH RX MED GY IP 250 OP 250 PS 637: Performed by: ORTHOPAEDIC SURGERY

## 2017-02-21 PROCEDURE — S0077 INJECTION, CLINDAMYCIN PHOSP: HCPCS | Performed by: ORTHOPAEDIC SURGERY

## 2017-02-21 PROCEDURE — 87040 BLOOD CULTURE FOR BACTERIA: CPT | Performed by: INTERNAL MEDICINE

## 2017-02-21 PROCEDURE — 36415 COLL VENOUS BLD VENIPUNCTURE: CPT | Performed by: ORTHOPAEDIC SURGERY

## 2017-02-21 PROCEDURE — 97162 PT EVAL MOD COMPLEX 30 MIN: CPT | Mod: GP | Performed by: PHYSICAL THERAPIST

## 2017-02-21 PROCEDURE — 40000257 ZZH STATISTIC CONSULT NO CHARGE VASC ACCESS

## 2017-02-21 RX ORDER — POLYETHYLENE GLYCOL 3350 17 G/17G
17 POWDER, FOR SOLUTION ORAL DAILY
Status: DISCONTINUED | OUTPATIENT
Start: 2017-02-21 | End: 2017-02-24 | Stop reason: HOSPADM

## 2017-02-21 RX ORDER — CEFTRIAXONE 2 G/1
2 INJECTION, POWDER, FOR SOLUTION INTRAMUSCULAR; INTRAVENOUS EVERY 24 HOURS
Status: DISCONTINUED | OUTPATIENT
Start: 2017-02-21 | End: 2017-02-24 | Stop reason: HOSPADM

## 2017-02-21 RX ADMIN — HYDROMORPHONE HYDROCHLORIDE 4 MG: 2 TABLET ORAL at 07:21

## 2017-02-21 RX ADMIN — HYDROMORPHONE HYDROCHLORIDE 4 MG: 2 TABLET ORAL at 03:11

## 2017-02-21 RX ADMIN — ACETAMINOPHEN 975 MG: 325 TABLET, FILM COATED ORAL at 18:04

## 2017-02-21 RX ADMIN — HYDROMORPHONE HYDROCHLORIDE 0.5 MG: 1 INJECTION, SOLUTION INTRAMUSCULAR; INTRAVENOUS; SUBCUTANEOUS at 00:39

## 2017-02-21 RX ADMIN — HYDROMORPHONE HYDROCHLORIDE 0.5 MG: 1 INJECTION, SOLUTION INTRAMUSCULAR; INTRAVENOUS; SUBCUTANEOUS at 16:26

## 2017-02-21 RX ADMIN — CEFTRIAXONE 2 G: 2 INJECTION, POWDER, FOR SOLUTION INTRAMUSCULAR; INTRAVENOUS at 10:06

## 2017-02-21 RX ADMIN — ENOXAPARIN SODIUM 40 MG: 40 INJECTION SUBCUTANEOUS at 09:53

## 2017-02-21 RX ADMIN — ACETAMINOPHEN 975 MG: 325 TABLET, FILM COATED ORAL at 03:11

## 2017-02-21 RX ADMIN — POLYETHYLENE GLYCOL 3350 17 G: 17 POWDER, FOR SOLUTION ORAL at 09:53

## 2017-02-21 RX ADMIN — POTASSIUM CHLORIDE, DEXTROSE MONOHYDRATE AND SODIUM CHLORIDE: 150; 5; 450 INJECTION, SOLUTION INTRAVENOUS at 16:26

## 2017-02-21 RX ADMIN — HYDROMORPHONE HYDROCHLORIDE 4 MG: 2 TABLET ORAL at 20:42

## 2017-02-21 RX ADMIN — CLINDAMYCIN PHOSPHATE 900 MG: 18 INJECTION, SOLUTION INTRAVENOUS at 05:23

## 2017-02-21 RX ADMIN — VALSARTAN AND HYDROCHLOROTHIAZIDE 1 TABLET: 80; 12.5 TABLET, FILM COATED ORAL at 15:19

## 2017-02-21 RX ADMIN — POTASSIUM CHLORIDE, DEXTROSE MONOHYDRATE AND SODIUM CHLORIDE: 150; 5; 450 INJECTION, SOLUTION INTRAVENOUS at 05:23

## 2017-02-21 RX ADMIN — HYDROMORPHONE HYDROCHLORIDE 4 MG: 2 TABLET ORAL at 15:19

## 2017-02-21 NOTE — PROGRESS NOTES
02/21/17 1117   Quick Adds   Type of Visit Initial PT Evaluation   Living Environment   Lives With significant other   Living Arrangements house   Home Accessibility stairs to enter home;stairs within home   Number of Stairs to Enter Home 1   Number of Stairs Within Home 14  (basement)   Living Environment Comment 1 step from garage, 3 from front, one step within home to get to bedrooom bath   Functional Level Prior   Ambulation 0-->independent  (cam boot)   Transferring 0-->independent   Toileting 0-->independent   Bathing 0-->independent   Dressing 0-->independent   Prior Functional Level Comment indep w/ mobility in cam boot on L, noAD, indep w/ ADLS    General Information   Onset of Illness/Injury or Date of Surgery - Date 02/20/17   Referring Physician Husam Berkowitz MD   Patient/Family Goals Statement return home   Pertinent History of Current Problem (include personal factors and/or comorbidities that impact the POC) s/p RTKA hardware removal spacer implant, PMH includes L ankle surgery 1/17, wound on dorsum of L foot   Precautions/Limitations fall precautions   Weight-Bearing Status - LLE (Post op shoe)   Weight-Bearing Status - RLE nonweight-bearing   Cognitive Status Examination   Orientation orientation to person, place and time   Level of Consciousness alert   Follows Commands and Answers Questions 100% of the time   Personal Safety and Judgment intact   Memory intact   Range of Motion (ROM)   ROM Comment Dec R knee ROM - spacer implant, Dec L ankle ROM    Strength   Strength Comments LLE strength WFL, observed to lift RLE AG in immobilizer   Bed Mobility   Bed Mobility Comments CGA   Transfer Skills   Transfer Comments min A with WW, R NWB, L    Gait   Gait Comments min A w/ WW and R NWB, L post op shoe, step to pattern, shortened step length   Balance   Balance Comments impaired in standing, non weightbearing   General Therapy Interventions   Planned Therapy Interventions bed mobility  "training;gait training;strengthening;transfer training   Clinical Impression   Criteria for Skilled Therapeutic Intervention yes, treatment indicated   PT Diagnosis difficulty walking   Influenced by the following impairments Decreased ROM, spacer implant, decreased strength, impaired gait   Functional limitations due to impairments impaired functional mobility   Clinical Presentation Evolving/Changing   Clinical Presentation Rationale Clinical observation   Clinical Decision Making (Complexity) Moderate complexity   Therapy Frequency` 2 times/day   Predicted Duration of Therapy Intervention (days/wks) 3 days   Anticipated Discharge Disposition Home with Home Therapy;Transitional Care Facility  (pending progress)   Risk & Benefits of therapy have been explained Yes   Patient, Family & other staff in agreement with plan of care Yes   [facility] TM \"6 Clicks\"   2016, Trustees of [facility], under license to [employer].  All rights reserved.   6 Clicks Short Forms Basic Mobility Inpatient Short Form   [facility]  \"6 Clicks\" V.2 Basic Mobility Inpatient Short Form   1. Turning from your back to your side while in a flat bed without using bedrails? 3 - A Little   2. Moving from lying on your back to sitting on the side of a flat bed without using bedrails? 3 - A Little   3. Moving to and from a bed to a chair (including a wheelchair)? 3 - A Little   4. Standing up from a chair using your arms (e.g., wheelchair, or bedside chair)? 3 - A Little   5. To walk in hospital room? 2 - A Lot   6. Climbing 3-5 steps with a railing? 2 - A Lot   Basic Mobility Raw Score (Score out of 24.Lower scores equate to lower levels of function) 16     "

## 2017-02-21 NOTE — PROVIDER NOTIFICATION
9:05 AM - I placed a call to Dr. Johnson's office for a call back. Ceftriaxone was ordered for a patient with an allergy to penicillin, which was reported as anaphylaxis. It was over ridden, but no reason was given. Want to make sure physician wants to proceed, given risk of cross sensitivity.    9:14 AM - Received a call back from Dr. Johnson. Patient tolerated a 6 week course of Ceftriaxone, in December, for Group B strep. It is OK to proceed with the order.

## 2017-02-21 NOTE — PLAN OF CARE
Problem: Goal Outcome Summary  Goal: Goal Outcome Summary  Outcome: No Change  VSS on room air. A&O x4. Non weight bearing on left leg. Voided 250 mL on bedside commode. Vomiting this morning, pt declined antiemetic . Left upper arm slightly red and warm, MD notified. Continue to monitor per plan of care.

## 2017-02-21 NOTE — PROVIDER NOTIFICATION
MD Notification    Notified Person:  MD    Notified Persons Name: Dr. Rader    Notification Date/Time:  2/21/17 5490    Notification Interaction:  Message left for MD    Purpose of Notification: pt LUE now red and warm    Orders Received: no new orders, continue to monitor    Comments:

## 2017-02-21 NOTE — PLAN OF CARE
Problem: Goal Outcome Summary  Goal: Goal Outcome Summary  Outcome: No Change  Pain controlled with iv and po dilaudid. Tolerating po ok. Turns well with 1 assist. Will continue to monitor.

## 2017-02-21 NOTE — PROGRESS NOTES
María Henderson  2017  POD #1    Doing well.  Pain well-controlled.  Temperatures:  Current - Temp: 97.8  F (36.6  C); Max - Temp  Av.8  F (36.6  C)  Min: 97.2  F (36.2  C)  Max: 98.4  F (36.9  C)  Pulse range: Pulse  Av.8  Min: 62  Max: 77  Blood pressure range: Systolic (24hrs), Av , Min:96 , Max:145   ; Diastolic (24hrs), Av, Min:48, Max:91    CMS: intact  Labs:  Hemoglobin   Date Value Ref Range Status   2017 11.6 (L) 11.7 - 15.7 g/dL Final   ]  X-rays are fine.    PLAN:  Start physical therapy  Discharge plan: Home Health vs. TCU

## 2017-02-21 NOTE — CONSULTS
[facility]    Infectious Disease Consultation     Date of Admission:  2/20/2017  Date of Consult (When I saw the patient): 02/21/17    Assessment & Plan   María Henderson is a 71 year old female who was admitted on 2/20/2017. I was asked to see the patient for right knee prosthetic infection.     Impression:   71 y.o female familiar to me from previous admission, with previous multiple ankle surgeries with fusion in place since 2014..   Recent revision surgery in November 2016 and has had draining from the area. Was admitted to [facility] in December bacteremic with Group B Strep and with concern for ankle infection. S/P I and D with wound vac placement.   Even at that time the right knee was painful but the final diagnosis was thought to be gout and not infection of the right knee prosthesis after aspiration for diagnosis.   She was eventually discharged with 6 weeks of Ceftriaxone.   She finished that course 2 weeks ago and right after the right knee started to hurt and swell.  S/P removal of right knee prosthesis and antibiotics spacer placement.   New this morning complaining of left shoulder pain also has prosthesis in the area, on exam does not appear to be warm or swollen.     Recommendations:    Will start on Ceftriaxone ( PCN listed as allergy but tolerated Ceftriaxone without any issues previously ) as had group B strep bacteremia last admission, this was also found in the ankle wound along with multiple other anaerobes and GNR but think Group B strep is the dominant and possibly the causative organism.   Received Clindamycin perioperatively can stop.   Will wait for the cultures from the OR.   Ortho to evaluate the left shoulder.   Bigger question is continued hardware in the right ankle from the fusion and still a non healing wound, ? Removal of the hardware, ? I and D       Paul Johnson MD    Reason for Consult   Reason for consult: I was asked by Dr. Berkowitz to evaluate this patient  for above mentioned.    Primary Care Physician   Thad Puri    Chief Complaint   Right knee pain swelling.     History is obtained from the patient and medical records    History of Present Illness   María Henderson is a 71 year old female who is familiar to me from admission in December at [facility] where she was admitted bacteremic and Septic with group B strep and left ankle wound infection. This ankle has been an issue for quite some time. 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 Even at that time there was a concern with swelling and pain in the right knee which was then aspirated but tested negative for infection the suspicion was that of gout. She was placed on 6 weeks of IV ceftriaxone for the ankle when she finished that course she started to complain of the the right knee pain and swelling so admitted for removal of the prosthesis.     Past Medical History   I have reviewed this patient's medical history and updated it with pertinent information if needed.   Past Medical History   Diagnosis Date     Allergic rhinitis      Gastro-oesophageal reflux disease      Heart murmur      ?murmur     Hyperlipidemia      Hypertension      Numbness and tingling      numbness left distal medial tibia     Osteoarthrosis        Past Surgical History   I have reviewed this patient's surgical history and updated it with pertinent information if needed.  Past Surgical History   Procedure Laterality Date     Tonsillectomy       7 foot and ankle        Total knee arthroplasy       Hernia repair       Arthroplasty revision ankle  3/10/2014     Procedure: ARTHROPLASTY REVISION ANKLE;  REVISION LEFT TOTAL ANKLE  WITH CONVERSION TO  IM LAURIE FUSION WITH FEMORAL HEAD ALLOGRAFT (C-ARM);  Surgeon: Ramirez Fang MD;  Location: [facility]     Aspiration needle knee Right 12/27/2016     Procedure: ASPIRATION NEEDLE KNEE;  Surgeon: Jian Fisher MD;  Location:  OR     Bunionectomy [place]  10/24/2011      Procedure:BUNIONECTOMY JONAS; Left Foot Bunionette Repair (C-Arm); Surgeon:BHAVIK PENG; Location: [facility]     Irrigation and debridement foot, combined Left 12/27/2016     Procedure: COMBINED IRRIGATION AND DEBRIDEMENT FOOT;  Surgeon: Jian Fisher MD;  Location:  OR     Orthopedic surgery       RTKA     Orthopedic surgery Left      ROTATOR CUFF     Remove hardware arthroplasty knee, irrig & zena, place antibiotic cement spacer, combined Right 2/20/2017     Procedure: COMBINED REMOVE HARDWARE ARTHROPLASTY KNEE, IRRIGATION AND DEBRIDEMENT, PLACE ANTIBIOTIC CEMENT BEADS / SPACER;  Surgeon: Husam Berkowitz MD;  Location:  OR       Prior to Admission Medications   Prior to Admission Medications   Prescriptions Last Dose Informant Patient Reported? Taking?   ASPIRIN PO 2/13/2017 Self Yes Yes   Sig: Take 81 mg by mouth daily    CALCIUM PO 2/16/2017 Self Yes Yes   Sig: Take 600 mg by mouth daily   Cholecalciferol (VITAMIN D3 PO) 2/16/2017 Self Yes Yes   Sig: Take 400 Units by mouth daily   DAKINS EX 2/20/2017 at 0830  Yes Yes   Sig: Externally apply topically daily   Furosemide (LASIX PO) 2/20/2017 at 0500 Self Yes Yes   Sig: Take 20-40 mg by mouth daily    Glucosamine-Chondroitin (OSTEO BI-FLEX REGULAR STRENGTH PO) 2/13/2017 Self Yes Yes   Sig: Take 1 tablet by mouth daily   HYDROMORPHONE HCL PO 2/19/2017 at pm Self Yes Yes   Sig: Take 4 mg by mouth At Bedtime (Takes 2 x 2mg tablet = 4mg)   IBUPROFEN PO 2/15/2017 Self Yes Yes   Sig: Take 400 mg by mouth At Bedtime   POTASSIUM PO 2/16/2017 Self Yes Yes   Sig: Take 595 mg by mouth every morning    Probiotic Product (TRUBIOTICS PO) 2/19/2017 at am Self Yes Yes   Sig: Take 1 tablet by mouth daily   Pyridoxine HCl (VITAMIN B6 PO) 2/16/2017 Self Yes Yes   Sig: Take 1 tablet by mouth daily   albuterol (PROAIR HFA/PROVENTIL HFA/VENTOLIN HFA) 108 (90 BASE) MCG/ACT Inhaler more than a month at prn Self Yes Yes   Sig: Inhale 2 puffs into the lungs  every 6 hours as needed for shortness of breath / dyspnea or wheezing   multivitamin, therapeutic with minerals (THERA-VIT-M) TABS 2/13/2017 Self Yes Yes   Sig: Take 1 tablet by mouth daily.   order for DME   No No   Sig: Equipment being ordered: Walker Wheels () and Walker ()  Treatment Diagnosis: difficulty with gait   order for DME   No No   Sig: Equipment being ordered: Walker Wheels () and Walker ()  Treatment Diagnosis: impaired gait   simvastatin (ZOCOR) 40 MG tablet 2/20/2017 at 0500 Self Yes Yes   Sig: Take 40 mg by mouth every morning    valsartan-hydrochlorothiazide (DIOVAN-HCT) 80-12.5 MG per tablet 2/20/2017 at 0500 Self Yes Yes   Sig: Take 1 tablet by mouth daily      Facility-Administered Medications: None     Allergies   Allergies   Allergen Reactions     Pcn [Penicillin V Potassium] Anaphylaxis     Oxycodone      Sulfa Drugs      Valium [Diazepam] Other (See Comments)     unconsciousness     Vicodin [Hydrocodone-Acetaminophen]      Patient unsure if allergy     Iodine Solution [Povidone Iodine] Rash       Immunization History   Immunization History   Administered Date(s) Administered     Pneumococcal 23 valent 03/11/2014       Social History   I have reviewed this patient's social history and updated it with pertinent information if needed. María Henderson  reports that she has never smoked. She does not have any smokeless tobacco history on file. She reports that she does not drink alcohol or use illicit drugs.    Family History   I have reviewed this patient's family history and updated it with pertinent information if needed.   No family history on file.    Review of Systems   The 10 point Review of Systems is negative other than noted in the HPI or here.     Physical Exam   Temp: 98.2  F (36.8  C) Temp src: Oral BP: 129/69 Pulse: 60 Heart Rate: 55 Resp: 16 SpO2: 100 % O2 Device: Nasal cannula Oxygen Delivery: 2 LPM  Vital Signs with Ranges  Temp:  [97.2  F (36.2  C)-98.4   F (36.9  C)] 98.2  F (36.8  C)  Pulse:  [60-77] 60  Heart Rate:  [55-77] 55  Resp:  [10-20] 16  BP: ()/(48-91) 129/69  SpO2:  [92 %-100 %] 100 %  166 lbs 0 oz    GENERAL APPEARANCE: alert and no distress  EYES: Eyes grossly normal to inspection, PERRL and conjunctivae and sclerae normal  HENT: ear canals and TM's normal and nose and mouth without ulcers or lesions  NECK: no adenopathy, no asymmetry, masses, or scars and thyroid normal to palpation  RESP: lungs clear to auscultation - no rales, rhonchi or wheezes  CV: regular rates and rhythm, normal S1 S2, no S3 or S4 and no murmur, click or rub  LYMPHATICS: normal ant/post cervical and supraclavicular nodes  ABDOMEN: soft, nontender, without hepatosplenomegaly or masses and bowel sounds normal  MS: the right knee is in bandage and there is an ace bandage on the left ankle  SKIN: no suspicious lesions or rashes  NEURO: Normal strength and tone, mentation intact and speech normal  PSYCH: mentation appears normal and affect normal/bright    Data   Lab Results   Component Value Date    WBC 5.7 02/01/2017    HGB 10.4 (L) 02/21/2017    HCT 37.5 02/01/2017     02/20/2017     01/01/2017    POTASSIUM 4.3 02/21/2017    CHLORIDE 107 01/01/2017    CO2 30 01/01/2017    BUN 24 02/01/2017    CR 0.90 02/20/2017    GLC 95 01/01/2017    SED 30 02/01/2017    DD 0.3 10/25/2011    TROPI <0.012 10/25/2011    AST 28 02/01/2017    ALT 50 12/25/2016    ALKPHOS 83 12/25/2016    BILITOTAL 0.8 12/25/2016    INR 1.29 (H) 12/25/2016       Recent Labs  Lab 02/20/17  1257   CULT Pending     Recent Labs   Lab Test  02/20/17   1257  02/08/17   1345  12/27/16   1655  12/27/16   1647  12/27/16   0915  12/27/16   0853  12/26/16   1202  12/26/16   1154  12/25/16   2132   CULT  Pending  No growth  Heavy growth Beta hemolytic Streptococcus group B Susceptibility testing done on   previous specimen  Light growth Enterobacter cloacae complex  Moderate growth Actinomyces species  Identification obtained by MALDI-TOF mass   spectrometry research use only database. Test characteristics determined and   verified by the Infectious Diseases Diagnostic Laboratory ([facility]) [place], [place]. Susceptibility testing not routinely done  *  Heavy growth Finegoldia magna (Peptostreptococcus raymundo)  Susceptibility testing not routinely done  *  Culture negative after 4 weeks  No anaerobes isolated  Heavy growth Beta hemolytic Streptococcus group B  Light growth Enterobacter cloacae complex Susceptibility testing done on previous   specimen  Light growth Streptococcus mitis group Susceptibility testing not routinely done  These bacteria are part of normal skin henna, but on occasion, may be true   pathogens.  Clinical correlation must be applied to interpreting this   microbiology result.  *  No anaerobes isolated  No growth  No growth  No growth  Cultured on the 1st day of incubation: Beta hemolytic Streptococcus group B  Critical Value/Significant Value, preliminary result only, called to and read   back by Tamera HURT 12/27/16 at 0252 CF  Susceptibility testing done on previous specimen  *  Cultured on the 1st day of incubation: Beta hemolytic Streptococcus group B  Critical Value/Significant Value, preliminary result only, called to and read   back by Inga HURT 12.27.16 0116 CF  Susceptibility testing done on previous specimen  *  10,000 to 50,000 colonies/mL mixed urogenital henna

## 2017-02-21 NOTE — PROGRESS NOTES
Care Transition Initial Assessment - JENNY  Reason For Consult: discharge planning  Met with: Patient and her significant other.   Active Problems:    Septic joint of left knee joint (H)         DATA  Lives With: significant other  Living Arrangements: house  Description of Support System: Supportive, Involved  Who is your support system?: Significant Other  Support Assessment: Adequate family and caregiver support, Adequate social supports.   Identified issues/concerns regarding health management:   Patient feels that they have adequate support @ home?  Yes           Quality Of Family Relationships: supportive, involved    ASSESSMENT  Cognitive Status:  Awake, alert and oriented X3.  Concerns to be addressed:     Per automatic referral, SW met with patient and her significant other to discuss discharge planning needs.  Patient is a 71-year-old female who was admitted to the hospital on 2-20-17 with a right knee infection.  Prior to hospitalization, patient was living in a house with her significant other where they were managing well.  Patient is hopeful that she can return home upon discharge and states that her significant other and her are familiar with IV Antibiotics at home.  SW made a referral to  [facility] to have them check the patients Home IV Antibiotic coverage.  Patient and her significant other are also aware that MD has mentioned TCU as a possible option.  If TCU is needed, patient and her significant other expressed interest in [facility].  SW made a referral to the facility via Discharge on the Double.  SW will follow up regarding transportation once the discharge plan has been finalized.       PLAN  Financial costs for the patient includes none.  Patient given options and choices for discharge: yes, provided options for Home Infusion agencies and TCU facility options.  Patient Goals and Preferences: Home vs TCU.  Patient anticipates discharging to:  Home vs TCU.    Rona  BRENDA Jimenez      JENNY  D: SW following for discharge planning needs.  JENNY received a message from [facility] with [facility] stating that patient would have 100% coverage for Home IV Antibiotics through her Workman's Comp.  P: JENNY will continue to follow and assist with finalizing the discharge plan as appropriate.    BRENDA Coleman

## 2017-02-21 NOTE — PLAN OF CARE
Problem: Goal Outcome Summary  Goal: Goal Outcome Summary     PT: Orders received and chart reviewed. Eval completed. Patient s/p R infected TKA hardware removal and space implant, prior to surgery patient reporting indep with mobility w/o AD using L post op shoe. Pt presently requires CGA for bed mobility and min A for sit to stand with WW, R NWB, L post op shoe. Pt ambulated 10 ft w/ min A and WW, maintaining R NWB. Pt limited by decreased strength and ROM RLE, impaired gait, decreased indep w/ transfers and pain. Will benefit from IP OT as patient's goal is to return home.     Surgeon Discharge Plan:  Home or TCU     Current Functional Status: see above     Barriers to Plan/Home: one step entry from garage and another step within home to gain access to living areas, bed and bath    Addendum: PM session, unable to negotiate single 4 in step

## 2017-02-21 NOTE — PLAN OF CARE
Problem: Goal Outcome Summary  Goal: Goal Outcome Summary  Outcome: No Change  Up with 1, NWB to RLE witih cam boot to LLE. PO dilaudid for pain. DTV.

## 2017-02-21 NOTE — PROGRESS NOTES
SPIRITUAL HEALTH SERVICES Progress Note  [facility]      PRIMARY FOCUS:      Emotional/spiritual/Pentecostal distress    Support for coping    ILLNESS CIRCUMSTANCES:    Reviewed documentation. Reflective conversation shared with Pat which integrated elements of illness and family narratives.         Context of Serious Illness/Symptom(s) -  Sepsis, which started in ankle after surgery in November, is particularly left knee at this time, but concern that infection is spreading into arm as well.    Resources for Support - Significant Other, strong connection to [employer]      DISTRESS:      Emotional/ Existential/Relational Distress - Frustrated and worried about the extended nature of this infection    Spiritual/Hinduism Distress - Cannot understand with so many people praying for her why God hasn't allowed her to heal    Social/Cultural/EconomicDistress - none discussed       SPIRITUAL/Uatsdin (Coping):      Adventist/Renetta - Believes strongly in God and that everything happens for a reason.  She has the support of many people through [employer] and volunteer work, which keeps her feeling connected.    Spiritual Practice(s) - Prayer, sewing for [employer], helping with ministry groups    Emotional/Existential Relational Connections - significant other, many connections through renetta/Alevism/volunteering      GOALS OF CARE:    Goals of Care - End of this infection, a true end to it.    Meaning/Sense-Making - None discussed      PLAN:  will place explicit communion request with [facility].  If pt has not d/c,  plans to f/u in 3-6 days.                                                                                                                 Halima Gage M.Div.  Chaplain Resident  Pager [phone]

## 2017-02-22 ENCOUNTER — APPOINTMENT (OUTPATIENT)
Dept: PHYSICAL THERAPY | Facility: CLINIC | Age: 72
DRG: 464 | End: 2017-02-22
Attending: ORTHOPAEDIC SURGERY
Payer: OTHER MISCELLANEOUS

## 2017-02-22 ENCOUNTER — APPOINTMENT (OUTPATIENT)
Dept: OCCUPATIONAL THERAPY | Facility: CLINIC | Age: 72
DRG: 464 | End: 2017-02-22
Attending: ORTHOPAEDIC SURGERY
Payer: OTHER MISCELLANEOUS

## 2017-02-22 LAB — HGB BLD-MCNC: 11.4 G/DL (ref 11.7–15.7)

## 2017-02-22 PROCEDURE — 40000133 ZZH STATISTIC OT WARD VISIT

## 2017-02-22 PROCEDURE — 97530 THERAPEUTIC ACTIVITIES: CPT | Mod: GP | Performed by: PHYSICAL THERAPY ASSISTANT

## 2017-02-22 PROCEDURE — 40000193 ZZH STATISTIC PT WARD VISIT: Performed by: PHYSICAL THERAPY ASSISTANT

## 2017-02-22 PROCEDURE — 97166 OT EVAL MOD COMPLEX 45 MIN: CPT | Mod: GO

## 2017-02-22 PROCEDURE — 25000128 H RX IP 250 OP 636: Performed by: ORTHOPAEDIC SURGERY

## 2017-02-22 PROCEDURE — 36415 COLL VENOUS BLD VENIPUNCTURE: CPT | Performed by: ORTHOPAEDIC SURGERY

## 2017-02-22 PROCEDURE — 85018 HEMOGLOBIN: CPT | Performed by: ORTHOPAEDIC SURGERY

## 2017-02-22 PROCEDURE — 97535 SELF CARE MNGMENT TRAINING: CPT | Mod: GO

## 2017-02-22 PROCEDURE — 25000128 H RX IP 250 OP 636: Performed by: INTERNAL MEDICINE

## 2017-02-22 PROCEDURE — 97110 THERAPEUTIC EXERCISES: CPT | Mod: GP | Performed by: PHYSICAL THERAPY ASSISTANT

## 2017-02-22 PROCEDURE — 12000007 ZZH R&B INTERMEDIATE

## 2017-02-22 PROCEDURE — 25800025 ZZH RX 258: Performed by: ORTHOPAEDIC SURGERY

## 2017-02-22 PROCEDURE — 97530 THERAPEUTIC ACTIVITIES: CPT | Mod: GO

## 2017-02-22 PROCEDURE — 97116 GAIT TRAINING THERAPY: CPT | Mod: GP | Performed by: PHYSICAL THERAPY ASSISTANT

## 2017-02-22 PROCEDURE — 25000132 ZZH RX MED GY IP 250 OP 250 PS 637: Performed by: ORTHOPAEDIC SURGERY

## 2017-02-22 RX ORDER — HYDROMORPHONE HYDROCHLORIDE 2 MG/1
2-4 TABLET ORAL EVERY 4 HOURS PRN
Qty: 70 TABLET | Refills: 0 | Status: ON HOLD | OUTPATIENT
Start: 2017-02-22 | End: 2017-03-25

## 2017-02-22 RX ORDER — POLYETHYLENE GLYCOL 3350 17 G/17G
17 POWDER, FOR SOLUTION ORAL DAILY
Qty: 20 PACKET | Refills: 1 | Status: ON HOLD | OUTPATIENT
Start: 2017-02-22 | End: 2017-03-22

## 2017-02-22 RX ADMIN — POTASSIUM CHLORIDE, DEXTROSE MONOHYDRATE AND SODIUM CHLORIDE: 150; 5; 450 INJECTION, SOLUTION INTRAVENOUS at 01:42

## 2017-02-22 RX ADMIN — CEFTRIAXONE 2 G: 2 INJECTION, POWDER, FOR SOLUTION INTRAMUSCULAR; INTRAVENOUS at 09:35

## 2017-02-22 RX ADMIN — HYDROMORPHONE HYDROCHLORIDE 4 MG: 2 TABLET ORAL at 01:33

## 2017-02-22 RX ADMIN — POLYETHYLENE GLYCOL 3350 17 G: 17 POWDER, FOR SOLUTION ORAL at 09:40

## 2017-02-22 RX ADMIN — HYDROMORPHONE HYDROCHLORIDE 4 MG: 2 TABLET ORAL at 18:00

## 2017-02-22 RX ADMIN — HYDROMORPHONE HYDROCHLORIDE 4 MG: 2 TABLET ORAL at 22:14

## 2017-02-22 RX ADMIN — HYDROMORPHONE HYDROCHLORIDE 4 MG: 2 TABLET ORAL at 14:15

## 2017-02-22 RX ADMIN — ACETAMINOPHEN 975 MG: 325 TABLET, FILM COATED ORAL at 09:34

## 2017-02-22 RX ADMIN — HYDROMORPHONE HYDROCHLORIDE 4 MG: 2 TABLET ORAL at 09:33

## 2017-02-22 RX ADMIN — ACETAMINOPHEN 975 MG: 325 TABLET, FILM COATED ORAL at 18:01

## 2017-02-22 RX ADMIN — HYDROMORPHONE HYDROCHLORIDE 4 MG: 2 TABLET ORAL at 05:55

## 2017-02-22 RX ADMIN — ENOXAPARIN SODIUM 40 MG: 40 INJECTION SUBCUTANEOUS at 09:35

## 2017-02-22 RX ADMIN — VALSARTAN AND HYDROCHLOROTHIAZIDE 1 TABLET: 80; 12.5 TABLET, FILM COATED ORAL at 09:39

## 2017-02-22 RX ADMIN — ASPIRIN 81 MG 81 MG: 81 TABLET ORAL at 09:40

## 2017-02-22 RX ADMIN — SIMVASTATIN 40 MG: 40 TABLET, FILM COATED ORAL at 09:40

## 2017-02-22 RX ADMIN — ACETAMINOPHEN 975 MG: 325 TABLET, FILM COATED ORAL at 01:34

## 2017-02-22 ASSESSMENT — ACTIVITIES OF DAILY LIVING (ADL): PREVIOUS_RESPONSIBILITIES: MEAL PREP;HOUSEKEEPING;LAUNDRY;SHOPPING;MEDICATION MANAGEMENT;DRIVING

## 2017-02-22 NOTE — PLAN OF CARE
Problem: Goal Outcome Summary  Goal: Goal Outcome Summary  Outcome: Improving  Patient A&Ox4, VSS On RA. CMS intact. Dried drainage. Immobilizer on ALL time. Iv infusing. Void adequately on BSC Or bedpan. NON weight bearing on RT leg. Pain managed po dilaudid & tylenol. Pt progressing per plan of care.

## 2017-02-22 NOTE — PROGRESS NOTES
[facility]    Infectious Disease Progress Note    Date of Service (when I saw the patient): 02/22/2017     Assessment & Plan   María Henderson is a 71 year old female who was admitted on 2/20/2017.     Impression:   71 y.o female familiar to me from previous admission, with previous multiple ankle surgeries with fusion in place since 2014..   Recent revision surgery in November 2016 and has had draining from the area. Was admitted to [facility] in December bacteremic with Group B Strep and with concern for ankle infection. S/P I and D with wound vac placement.   Even at that time the right knee was painful but the final diagnosis was thought to be gout and not infection of the right knee prosthesis after aspiration for diagnosis.   She was eventually discharged with 6 weeks of Ceftriaxone.   She finished that course 2 weeks ago and right after the right knee started to hurt and swell. S/P removal of right knee prosthesis and antibiotics spacer placement.   Had left shoulder pain this admission, evaluated by Ortho, no concern for infection.      Recommendations:    Will start on Ceftriaxone ( PCN listed as allergy but tolerated Ceftriaxone without any issues previously ) as had group B strep bacteremia last admission, this was also found in the ankle wound along with multiple other anaerobes and GNR but think Group B strep is the dominant and possibly the causative organism.   Will follow up on the cultures from OR.   Appreciate Ortho`s evaluation of the left shoulder, no concern for infection per ortho`s eval. The left UE also has itching, but this started even before ceftriaxone was started.   Bigger question is continued hardware in the right ankle from the fusion and still a non healing wound, ? Removal of the hardware, ? I and D     Paul Johnson MD    Interval History   Afebrile      Physical Exam   Temp: 98.3  F (36.8  C) Temp src: Oral BP: 130/63 Pulse: 76 Heart Rate: 76 Resp: 16 SpO2: 97 %  O2 Device: None (Room air)    Vitals:    02/20/17 1119   Weight: 75.3 kg (166 lb)     Vital Signs with Ranges  Temp:  [97.8  F (36.6  C)-98.3  F (36.8  C)] 98.3  F (36.8  C)  Pulse:  [76] 76  Heart Rate:  [69-82] 76  Resp:  [16-18] 16  BP: (121-142)/(55-78) 130/63  SpO2:  [96 %-98 %] 97 %    Constitutional: Awake, alert, cooperative, no apparent distress  Lungs: Clear to auscultation bilaterally, no crackles or wheezing  Cardiovascular: Regular rate and rhythm, normal S1 and S2, and no murmur noted  Abdomen: Normal bowel sounds, soft, non-distended, non-tender  Skin: No rashes, no cyanosis, no edema  Other: the left UE is slightly erythematous.     Medications     dextrose 5% and 0.45% NaCl + KCl 20 mEq/L 125 mL/hr at 02/22/17 0142       polyethylene glycol  17 g Oral Daily     cefTRIAXone  2 g Intravenous Q24H     aspirin chewable tablet 81 mg  81 mg Oral Daily     furosemide (LASIX) tablet 20-40 mg  20-40 mg Oral Daily     simvastatin  40 mg Oral QAM     valsartan-hydrochlorothiazide  1 tablet Oral Daily     sodium chloride (PF)  3 mL Intracatheter Q8H     enoxaparin  40 mg Subcutaneous Q24H     acetaminophen  975 mg Oral 3 times per day     senna-docusate  1-2 tablet Oral BID       Data   All microbiology laboratory data reviewed.  Recent Labs   Lab Test  02/22/17   0710  02/21/17   0650  02/20/17 1659 02/01/17 0945  01/25/17   1030  01/18/17   1030   WBC   --    --    --   5.7  6.6  8.3   HGB  11.4*  10.4*   --   11.6*  11.9  11.4*   HCT   --    --    --   37.5  38.6  36.6   MCV   --    --    --   92  92  92   PLT   --    --   300  315  346  474*     Recent Labs   Lab Test  02/20/17 1659 02/01/17   0945  01/25/17   1030   CR  0.90  0.76  0.83     Recent Labs   Lab Test  02/01/17   0945   SED  30     Recent Labs   Lab Test  02/21/17   0912  02/21/17   0908  02/20/17   1257  02/08/17   1345  12/27/16   1655  12/27/16   1647  12/27/16   0915  12/27/16   0853  12/26/16   1202   CULT  No growth after 23 hours   No growth after 23 hours  Culture negative monitoring continues  Culture negative monitoring continues  No growth  Heavy growth Beta hemolytic Streptococcus group B Susceptibility testing done on   previous specimen  Light growth Enterobacter cloacae complex  Moderate growth Actinomyces species Identification obtained by MALDI-TOF mass   spectrometry research use only database. Test characteristics determined and   verified by the Infectious Diseases Diagnostic Laboratory ([facility]) [place], [place]. Susceptibility testing not routinely done  *  Heavy growth Finegoldia magna (Peptostreptococcus raymundo)  Susceptibility testing not routinely done  *  Culture negative after 4 weeks  No anaerobes isolated  Heavy growth Beta hemolytic Streptococcus group B  Light growth Enterobacter cloacae complex Susceptibility testing done on previous   specimen  Light growth Streptococcus mitis group Susceptibility testing not routinely done  These bacteria are part of normal skin henna, but on occasion, may be true   pathogens.  Clinical correlation must be applied to interpreting this   microbiology result.  *  No anaerobes isolated  No growth  No growth  No growth  Cultured on the 1st day of incubation: Beta hemolytic Streptococcus group B  Critical Value/Significant Value, preliminary result only, called to and read   back by Tamera HURT 12/27/16 at 0252 CF  Susceptibility testing done on previous specimen  *

## 2017-02-22 NOTE — DISCHARGE SUMMARY
Discharge Summary    María Henderson MRN# [id number]   YOB: 1945 Age: 71 year old     Date of Admission:  2/20/2017  Date of Discharge:  2/23/2017  Admitting Physician:  Husam Berkowitz MD  Discharge Physician:  Husam Berkowitz MD     Primary Provider: Thad Puri          Admission Diagnoses:   Infected left total knee replacement          Discharge Diagnosis:   Same           Surgical Procedure:   Explantation left total knee           Secondary Diagnosis:     Patient Active Problem List   Diagnosis     Syncope     Hypertension     Bradycardia     Hyperlipidemia LDL goal <130     S/P foot surgery      Pain in joint, ankle and foot     Sprain of neck     Sprain and strain of shoulder and upper arm     Elbow strain     Wrist strain     S/P ankle fusion     Cellulitis     Sepsis (H)     Septic joint of left knee joint (H)              Discharge Disposition:   Admited to home care:   Agency: TBD  Discharged to home           Medications Prior to Admission:     Prescriptions Prior to Admission   Medication Sig Dispense Refill Last Dose     CALCIUM PO Take 600 mg by mouth daily   2/16/2017     Probiotic Product (TRUBIOTICS PO) Take 1 tablet by mouth daily   2/19/2017 at am     albuterol (PROAIR HFA/PROVENTIL HFA/VENTOLIN HFA) 108 (90 BASE) MCG/ACT Inhaler Inhale 2 puffs into the lungs every 6 hours as needed for shortness of breath / dyspnea or wheezing   more than a month at prn     Pyridoxine HCl (VITAMIN B6 PO) Take 1 tablet by mouth daily   2/16/2017     DAKINS EX Externally apply topically daily   2/20/2017 at 0830     Furosemide (LASIX PO) Take 20-40 mg by mouth daily    2/20/2017 at 0500     Cholecalciferol (VITAMIN D3 PO) Take 400 Units by mouth daily   2/16/2017     POTASSIUM PO Take 595 mg by mouth every morning    2/16/2017     valsartan-hydrochlorothiazide (DIOVAN-HCT) 80-12.5 MG per tablet Take 1 tablet by mouth daily   2/20/2017 at 0500     ASPIRIN PO Take  81 mg by mouth daily    2/13/2017     multivitamin, therapeutic with minerals (THERA-VIT-M) TABS Take 1 tablet by mouth daily.   2/13/2017     simvastatin (ZOCOR) 40 MG tablet Take 40 mg by mouth every morning    2/20/2017 at 0500     [DISCONTINUED] IBUPROFEN PO Take 400 mg by mouth At Bedtime   2/15/2017     [DISCONTINUED] Glucosamine-Chondroitin (OSTEO BI-FLEX REGULAR STRENGTH PO) Take 1 tablet by mouth daily   2/13/2017     [DISCONTINUED] HYDROMORPHONE HCL PO Take 4 mg by mouth At Bedtime (Takes 2 x 2mg tablet = 4mg)   2/19/2017 at pm     order for DME Equipment being ordered: Walker Wheels () and Walker ()  Treatment Diagnosis: impaired gait 1 each 0      order for DME Equipment being ordered: Walker Wheels () and Walker ()  Treatment Diagnosis: difficulty with gait 1 each 0              Discharge Medications:     Current Discharge Medication List      START taking these medications    Details   enoxaparin (LOVENOX) 40 MG/0.4ML injection Inject 0.4 mLs (40 mg) Subcutaneous every 24 hours for 7 days  Qty: 2.8 mL, Refills: 0    Associated Diagnoses: Streptococcal arthritis of left knee (H)      polyethylene glycol (MIRALAX/GLYCOLAX) Packet Take 17 g by mouth daily  Qty: 20 packet, Refills: 1    Associated Diagnoses: Streptococcal arthritis of left knee (H)         CONTINUE these medications which have CHANGED    Details   HYDROmorphone (DILAUDID) 2 MG tablet Take 1-2 tablets (2-4 mg) by mouth every 4 hours as needed for moderate to severe pain  Qty: 70 tablet, Refills: 0    Associated Diagnoses: Streptococcal arthritis of left knee (H)         CONTINUE these medications which have NOT CHANGED    Details   CALCIUM PO Take 600 mg by mouth daily      Probiotic Product (TRUBIOTICS PO) Take 1 tablet by mouth daily      albuterol (PROAIR HFA/PROVENTIL HFA/VENTOLIN HFA) 108 (90 BASE) MCG/ACT Inhaler Inhale 2 puffs into the lungs every 6 hours as needed for shortness of breath / dyspnea or wheezing       Pyridoxine HCl (VITAMIN B6 PO) Take 1 tablet by mouth daily      DAKINS EX Externally apply topically daily      Furosemide (LASIX PO) Take 20-40 mg by mouth daily       Cholecalciferol (VITAMIN D3 PO) Take 400 Units by mouth daily      POTASSIUM PO Take 595 mg by mouth every morning       valsartan-hydrochlorothiazide (DIOVAN-HCT) 80-12.5 MG per tablet Take 1 tablet by mouth daily      ASPIRIN PO Take 81 mg by mouth daily       multivitamin, therapeutic with minerals (THERA-VIT-M) TABS Take 1 tablet by mouth daily.      simvastatin (ZOCOR) 40 MG tablet Take 40 mg by mouth every morning       !! order for DME Equipment being ordered: Walker Wheels () and Walker ()  Treatment Diagnosis: impaired gait  Qty: 1 each, Refills: 0    Associated Diagnoses: Sepsis, due to unspecified organism (H)      !! order for DME Equipment being ordered: Walker Wheels () and Walker ()  Treatment Diagnosis: difficulty with gait  Qty: 1 each, Refills: 0    Associated Diagnoses: Pain in joint, ankle and foot, left       !! - Potential duplicate medications found. Please discuss with provider.      STOP taking these medications       IBUPROFEN PO Comments:   Reason for Stopping:         Glucosamine-Chondroitin (OSTEO BI-FLEX REGULAR STRENGTH PO) Comments:   Reason for Stopping:                     Consultations:   Consultation during this admission received from infectious disease           Hospital Course:   The patient was admitted after the surgical procedure. The patient underwent an uneventful explantation of her septic left total knee replacement. Postoperatively, anticoagulation  with Lovenox was started. No transfusion was required. The patient will be admited to home care:   Agency: TBD. Home medications have been reconciled. Dilaudid 2 mg was prescribed for pain. Lovenox  will be prescribed.             Pending Results:   None           Discharge Instructions and Follow-Up:        Discharge activity: use a  walker  no weight on left side  foot   Discharge follow-up: Follow up with me in 2 weeks   Outpatient therapy: None    Home Care agency: [facility]    Supplies and equipment: None        Wound care: dry dressing daily and as needed   Other instructions: None

## 2017-02-22 NOTE — PROGRESS NOTES
Maíra Freeman Santiago  2017  POD #2    Doing well.  Pain well-controlled.  Tolerating physical therapy and rehabilitation well.    She does not have a shoulder prosthesis. Her redness is a skin condition. She does not appear to have a shoulder infection.    Temperatures:  Current - Temp: 98  F (36.7  C); Max - Temp  Av.1  F (36.7  C)  Min: 97.8  F (36.6  C)  Max: 98.2  F (36.8  C)  Pulse range: Pulse  Av  Min: 60  Max: 60  Blood pressure range: Systolic (24hrs), Av , Min:121 , Max:142   ; Diastolic (24hrs), Av, Min:55, Max:78    CMS: intact  Labs:  Hemoglobin   Date Value Ref Range Status   2017 11.4 (L) 11.7 - 15.7 g/dL Final   ]      PLAN:  Continue physical therapy  Discharge plan: Home Health tomorrow

## 2017-02-22 NOTE — PROGRESS NOTES
02/22/17 1335   Quick Adds   Type of Visit Initial Occupational Therapy Evaluation   Living Environment   Lives With significant other   Living Arrangements house   Home Accessibility stairs to enter home;stairs within home   Number of Stairs to Enter Home 1   Number of Stairs Within Home 14  (basement)   Transportation Available car;family or friend will provide   Living Environment Comment Pt lives with significant other in house, 1 step to enter from garage, 3 from front, one step within home to get to bedroom bath, tub/shower with shower chair, RTS   Self-Care   Dominant Hand right   Usual Activity Tolerance good   Current Activity Tolerance fair   Regular Exercise no   Equipment Currently Used at Home shower chair   Functional Level Prior   Ambulation 0-->independent  (cam boot)   Transferring 0-->independent   Toileting 0-->independent   Bathing 0-->independent   Dressing 0-->independent   Eating 0-->independent   Communication 0-->understands/communicates without difficulty   Swallowing 0-->swallows foods/liquids without difficulty   Cognition 0 - no cognition issues reported   Fall history within last six months no   Which of the above functional risks had a recent onset or change? transferring;toileting;bathing;dressing   Prior Functional Level Comment Pt reports independence in all ADLs, IADLs and mobility tasks (with use of CAM boot on L) with no AD at baseline   General Information   Onset of Illness/Injury or Date of Surgery - Date 02/20/17   Referring Physician Husam Berkowitz MD   Patient/Family Goals Statement Pt's goal is to d/c home   Additional Occupational Profile Info/Pertinent History of Current Problem Patient s/p R infected TKA hardware removal and space implant. Current status impacts ability to participate in ADLs, IADLs at [facility].   Precautions/Limitations fall precautions;other (see comments)  (CAM boot on L foot, NWB RLE)   Weight-Bearing Status - RLE nonweight-bearing    Cognitive Status Examination   Orientation orientation to person, place and time   Level of Consciousness alert   Able to Follow Commands WNL/WFL   Personal Safety (Cognitive) WNL/WFL   Memory intact   Visual Perception   Visual Perception Wears glasses  (at all times)   Sensory Examination   Sensory Comments Pt denies numbness/tingling in BUEs   Pain Assessment   Patient Currently in Pain Yes, see Vital Sign flowsheet  (4-5/10 pain in R knee)   Range of Motion (ROM)   ROM Quick Adds No deficits were identified   Strength   Manual Muscle Testing Quick Adds No deficits were identified   Hand Strength   Hand Strength Comments WFL   Coordination   Upper Extremity Coordination No deficits were identified   Bed Mobility Skill: Sit to Supine   Level of [place]: Sit/Supine minimum assist (75% patients effort)   Physical Assist/Nonphysical Assist: Sit/Supine 1 person assist   Bed Mobility Skill: Supine to Sit   Level of [place]: Supine/Sit minimum assist (75% patients effort)   Physical Assist/Nonphysical Assist: Supine/Sit 1 person assist   Transfer Skill: Bed to Chair/Chair to Bed   Level of [place]: Bed to Chair contact guard   Physical Assist/Nonphysical Assist: Bed to Chair 1 person assist   Weight-Bearing Restrictions nonweight-bearing   Assistive Device - Transfer Skill Bed to Chair Chair to Bed Rehab Eval standard walker   Transfer Skill: Sit to Stand   Level of [place]: Sit/Stand contact guard   Physical Assist/Nonphysical Assist: Sit/Stand 1 person assist   Transfer Skill: Sit to Stand nonweight-bearing   Assistive Device for Transfer: Sit/Stand standard walker   Transfer Skill: Toilet Transfer   Level of [place]: Toilet minimum assist (75% patients effort)   Physical Assist/Nonphysical Assist: Toilet 1 person assist   Weight-Bearing Restrictions: Toilet nonweight-bearing   Assistive Device standard walker   Balance   Balance Comments SBA seated EOB; CGA/min A while in stance FWW level  while maintaining NWB RLE   Upper Body Dressing   Level of [place]: Dress Upper Body stand-by assist   Physical Assist/Nonphysical Assist: Dress Upper Body 1 person assist   Lower Body Dressing   Level of [place]: Dress Lower Body moderate assist (50% patients effort)   Physical Assist/Nonphysical Assist: Dress Lower Body 1 person assist   Toileting   Level of [place]: Toilet moderate assist (50% patients effort)   Physical Assist/Nonphysical Assist: Toilet 1 person assist   Grooming   Level of [place]: Grooming stand-by assist   Physical Assist/Nonphysical Assist: Grooming 1 person assist   Instrumental Activities of Daily Living (IADL)   Previous Responsibilities meal prep;housekeeping;laundry;shopping;medication management;driving   IADL Comments Pt has support of significant other for IADLs as needed upon return home   Activities of Daily Living Analysis   Impairments Contributing to Impaired Activities of Daily Living balance impaired;pain;post surgical precautions;strength decreased   ADL Comments Pt would benefit from skilled OT to maximize safety and independence in all ADLs including bathing, dressing, grooming and toileting   General Therapy Interventions   Planned Therapy Interventions ADL retraining;IADL retraining;transfer training   Clinical Impression   Criteria for Skilled Therapeutic Interventions Met yes, treatment indicated   OT Diagnosis Impaired ADLs, IADLs and functional mobility tasks   Influenced by the following impairments Decreased strength and functional activity tolerance, impaired balance, pain, post-surgical precautions   Assessment of Occupational Performance 5 or more Performance Deficits   Identified Performance Deficits Bathing, dressing, grooming, toileting, transfers, homemaking   Therapy Frequency daily   Predicted Duration of Therapy Intervention (days/wks) 3 days   Anticipated Discharge Disposition Home with Assist   Risks and Benefits of Treatment have  "been explained. Yes   Patient, Family & other staff in agreement with plan of care Yes   Clinical Impression Comments Pt would benefit from skilled OT to maximize safety and independence in all ADLs, IADLs and functional mobility tasks due to current deficits impacting function   [facility] AM-PAC  \"6 Clicks\" Daily Activity Inpatient Short Form   1. Putting on and taking off regular lower body clothing? 2 - A Lot   2. Bathing (including washing, rinsing, drying)? 2 - A Lot   3. Toileting, which includes using toilet, bedpan or urinal? 2 - A Lot   4. Putting on and taking off regular upper body clothing? 3 - A Little   5. Taking care of personal grooming such as brushing teeth? 3 - A Little   6. Eating meals? 4 - None   Daily Activity Raw Score (Score out of 24.Lower scores equate to lower levels of function) 16   Total Evaluation Time   Total Evaluation Time (Minutes) 10     "

## 2017-02-22 NOTE — PLAN OF CARE
Problem: Goal Outcome Summary  Goal: Goal Outcome Summary  OT; Order received, eval completed and treatment initiated. Pt is a 71 year old female s/p R infected TKA hardware removal and space implant. Pt lives with significant other in house, 1 step to enter, 1 step within, tub/shower with shower chair, RTS. Pt reports indep/mod I in all ADLs, IADLs and mobility tasks with no AD at baseline. Pt has support of significant other for ADL/IADLs as needed.      Surgeon Discharge Plan: Home with      Current Functional Status: Pt required min A for bed mobility supine <> sit EOB for RLE management. Pt required min/CGA for stand pivot transfer EOB <> BSC, mod A for LE dressing, min A for toileting.      Barriers to Plan/Home: Stair to enter, stair within, NWB RLE

## 2017-02-22 NOTE — PLAN OF CARE
Problem: Goal Outcome Summary  Goal: Goal Outcome Summary  Surgeon Discharge Plan: Per physician home health tomorrow     Current Functional Status: [facility]. Pt transfers sit <> to stand from wheel chair to FWW with min assist. Pt completed 1 stair x 2 with FWW needing mod assist.      Barriers to Plan/Home: one step entry from garage and another step within home to gain access to living areas, bed and bath.         PM PT: Pt not sure if she should go home, pt's significant other present for PM session and agrees. States they will talk about it tonight.

## 2017-02-23 ENCOUNTER — APPOINTMENT (OUTPATIENT)
Dept: PHYSICAL THERAPY | Facility: CLINIC | Age: 72
DRG: 464 | End: 2017-02-23
Attending: ORTHOPAEDIC SURGERY
Payer: OTHER MISCELLANEOUS

## 2017-02-23 ENCOUNTER — APPOINTMENT (OUTPATIENT)
Dept: OCCUPATIONAL THERAPY | Facility: CLINIC | Age: 72
DRG: 464 | End: 2017-02-23
Attending: ORTHOPAEDIC SURGERY
Payer: OTHER MISCELLANEOUS

## 2017-02-23 LAB — PLATELET # BLD AUTO: 327 10E9/L (ref 150–450)

## 2017-02-23 PROCEDURE — 40000133 ZZH STATISTIC OT WARD VISIT: Performed by: OCCUPATIONAL THERAPY ASSISTANT

## 2017-02-23 PROCEDURE — 97530 THERAPEUTIC ACTIVITIES: CPT | Mod: GO | Performed by: OCCUPATIONAL THERAPY ASSISTANT

## 2017-02-23 PROCEDURE — 97535 SELF CARE MNGMENT TRAINING: CPT | Mod: GO | Performed by: OCCUPATIONAL THERAPY ASSISTANT

## 2017-02-23 PROCEDURE — 25000128 H RX IP 250 OP 636: Performed by: INTERNAL MEDICINE

## 2017-02-23 PROCEDURE — 25000125 ZZHC RX 250: Performed by: INTERNAL MEDICINE

## 2017-02-23 PROCEDURE — 36415 COLL VENOUS BLD VENIPUNCTURE: CPT | Performed by: ORTHOPAEDIC SURGERY

## 2017-02-23 PROCEDURE — 85049 AUTOMATED PLATELET COUNT: CPT | Performed by: ORTHOPAEDIC SURGERY

## 2017-02-23 PROCEDURE — 27210219 ZZH KIT SHRLOCK 5FR DBL LUM PWR P

## 2017-02-23 PROCEDURE — 12000007 ZZH R&B INTERMEDIATE

## 2017-02-23 PROCEDURE — 97530 THERAPEUTIC ACTIVITIES: CPT | Mod: GP | Performed by: PHYSICAL THERAPY ASSISTANT

## 2017-02-23 PROCEDURE — 97110 THERAPEUTIC EXERCISES: CPT | Mod: GP | Performed by: PHYSICAL THERAPY ASSISTANT

## 2017-02-23 PROCEDURE — 36569 INSJ PICC 5 YR+ W/O IMAGING: CPT

## 2017-02-23 PROCEDURE — 40000193 ZZH STATISTIC PT WARD VISIT: Performed by: PHYSICAL THERAPY ASSISTANT

## 2017-02-23 PROCEDURE — 40000257 ZZH STATISTIC CONSULT NO CHARGE VASC ACCESS

## 2017-02-23 PROCEDURE — 25000128 H RX IP 250 OP 636: Performed by: ORTHOPAEDIC SURGERY

## 2017-02-23 PROCEDURE — 25000132 ZZH RX MED GY IP 250 OP 250 PS 637: Performed by: ORTHOPAEDIC SURGERY

## 2017-02-23 RX ORDER — CEFTRIAXONE 1 G/1
2000 INJECTION, POWDER, FOR SOLUTION INTRAMUSCULAR; INTRAVENOUS DAILY
Qty: 600 ML | Refills: 0 | DISCHARGE
Start: 2017-02-23 | End: 2017-04-02

## 2017-02-23 RX ADMIN — ENOXAPARIN SODIUM 40 MG: 40 INJECTION SUBCUTANEOUS at 08:50

## 2017-02-23 RX ADMIN — POLYETHYLENE GLYCOL 3350 17 G: 17 POWDER, FOR SOLUTION ORAL at 08:51

## 2017-02-23 RX ADMIN — ASPIRIN 81 MG 81 MG: 81 TABLET ORAL at 08:50

## 2017-02-23 RX ADMIN — HYDROMORPHONE HYDROCHLORIDE 4 MG: 2 TABLET ORAL at 22:32

## 2017-02-23 RX ADMIN — SIMVASTATIN 40 MG: 40 TABLET, FILM COATED ORAL at 08:51

## 2017-02-23 RX ADMIN — HYDROMORPHONE HYDROCHLORIDE 4 MG: 2 TABLET ORAL at 02:21

## 2017-02-23 RX ADMIN — HYDROMORPHONE HYDROCHLORIDE 4 MG: 2 TABLET ORAL at 14:34

## 2017-02-23 RX ADMIN — ACETAMINOPHEN 975 MG: 325 TABLET, FILM COATED ORAL at 10:18

## 2017-02-23 RX ADMIN — LIDOCAINE HYDROCHLORIDE 2 ML: 10 INJECTION, SOLUTION EPIDURAL; INFILTRATION; INTRACAUDAL; PERINEURAL at 15:58

## 2017-02-23 RX ADMIN — HYDROMORPHONE HYDROCHLORIDE 4 MG: 2 TABLET ORAL at 08:50

## 2017-02-23 RX ADMIN — VALSARTAN AND HYDROCHLOROTHIAZIDE 1 TABLET: 80; 12.5 TABLET, FILM COATED ORAL at 08:56

## 2017-02-23 RX ADMIN — ACETAMINOPHEN 975 MG: 325 TABLET, FILM COATED ORAL at 02:19

## 2017-02-23 RX ADMIN — CEFTRIAXONE 2 G: 2 INJECTION, POWDER, FOR SOLUTION INTRAMUSCULAR; INTRAVENOUS at 09:30

## 2017-02-23 NOTE — PLAN OF CARE
Problem: Goal Outcome Summary  Goal: Goal Outcome Summary  Outcome: Improving  Patient VSS On RA. Alert & oriented x4. Cms intact. Dressing c/d/i. Immobilizer on & NWB on RT foot. Void BSC.  Tylenol & dilaudid po for pain. Iv saline locked. Possible d/c Today?

## 2017-02-23 NOTE — PROGRESS NOTES
[facility]    Infectious Disease Progress Note    Date of Service (when I saw the patient): 02/23/2017     Assessment & Plan   María Henderson is a 71 year old female who was admitted on 2/20/2017.     Impression:   71 y.o female familiar to me from previous admission, with previous multiple ankle surgeries with fusion in place since 2014..   Recent revision surgery in November 2016 and has had draining from the area. Was admitted to [facility] in December bacteremic with Group B Strep and with concern for ankle infection. S/P I and D with wound vac placement.   Even at that time the right knee was painful but the final diagnosis was thought to be gout and not infection of the right knee prosthesis after aspiration for diagnosis.   She was eventually discharged with 6 weeks of Ceftriaxone.   She finished that course 2 weeks ago and right after the right knee started to hurt and swell. S/P removal of right knee prosthesis and antibiotics spacer placement.   Had left shoulder pain this admission, evaluated by Ortho, no concern for infection.      Recommendations:    On Ceftriaxone ( PCN listed as allergy but tolerated Ceftriaxone without any issues previously ) as had group B strep bacteremia last admission, this was also found in the ankle wound along with multiple other anaerobes and GNR but think Group B strep is the dominant and possibly the causative organism.   Will follow up on the cultures from OR so far [facility].   Appreciate Ortho`s evaluation of the left shoulder, no concern for infection per ortho`s eval. The left UE also has itching, but this started even before ceftriaxone was started.   Bigger question is continued hardware in the right ankle from the fusion and still a non healing wound, ? Removal of the hardware, ? I and D     Paul Johnson MD    Interval History   Afebrile      Physical Exam   Temp: 98.6  F (37  C) Temp src: Oral BP: 120/66   Heart Rate: 63 Resp: 16 SpO2: 100 % O2  Device: None (Room air)    Vitals:    02/20/17 1119   Weight: 75.3 kg (166 lb)     Vital Signs with Ranges  Temp:  [97.6  F (36.4  C)-98.6  F (37  C)] 98.6  F (37  C)  Heart Rate:  [62-85] 63  Resp:  [16] 16  BP: (105-148)/(56-74) 120/66  SpO2:  [94 %-100 %] 100 %    Constitutional: Awake, alert, cooperative, no apparent distress  Lungs: Clear to auscultation bilaterally, no crackles or wheezing  Cardiovascular: Regular rate and rhythm, normal S1 and S2, and no murmur noted  Abdomen: Normal bowel sounds, soft, non-distended, non-tender  Skin: No rashes, no cyanosis, no edema  Other: the left UE is slightly erythematous.     Medications     dextrose 5% and 0.45% NaCl + KCl 20 mEq/L 125 mL/hr at 02/22/17 0142       polyethylene glycol  17 g Oral Daily     cefTRIAXone  2 g Intravenous Q24H     aspirin chewable tablet 81 mg  81 mg Oral Daily     furosemide (LASIX) tablet 20-40 mg  20-40 mg Oral Daily     simvastatin  40 mg Oral QAM     valsartan-hydrochlorothiazide  1 tablet Oral Daily     sodium chloride (PF)  3 mL Intracatheter Q8H     enoxaparin  40 mg Subcutaneous Q24H     acetaminophen  975 mg Oral 3 times per day     senna-docusate  1-2 tablet Oral BID       Data   All microbiology laboratory data reviewed.  Recent Labs   Lab Test  02/23/17   0711  02/22/17   0710  02/21/17   0650  02/20/17 1659 02/01/17   0945  01/25/17   1030  01/18/17   1030   WBC   --    --    --    --   5.7  6.6  8.3   HGB   --   11.4*  10.4*   --   11.6*  11.9  11.4*   HCT   --    --    --    --   37.5  38.6  36.6   MCV   --    --    --    --   92  92  92   PLT  327   --    --   300  315  346  474*     Recent Labs   Lab Test  02/20/17 1659 02/01/17   0945  01/25/17   1030   CR  0.90  0.76  0.83     Recent Labs   Lab Test  02/01/17   0945   SED  30     Recent Labs   Lab Test  02/21/17   0912  02/21/17   0908  02/20/17   1257  02/08/17   1345  12/27/16   1655  12/27/16   1647  12/27/16   0915  12/27/16   0853  12/26/16   1202   CULT  No  growth after 2 days  No growth after 2 days  Culture negative monitoring continues  Culture negative monitoring continues  No growth  Heavy growth Beta hemolytic Streptococcus group B Susceptibility testing done on   previous specimen  Light growth Enterobacter cloacae complex  Moderate growth Actinomyces species Identification obtained by MALDI-TOF mass   spectrometry research use only database. Test characteristics determined and   verified by the Infectious Diseases Diagnostic Laboratory ([facility]) [place], [place]. Susceptibility testing not routinely done  *  Heavy growth Finegoldia magna (Peptostreptococcus raymundo)  Susceptibility testing not routinely done  *  Culture negative after 4 weeks  No anaerobes isolated  Heavy growth Beta hemolytic Streptococcus group B  Light growth Enterobacter cloacae complex Susceptibility testing done on previous   specimen  Light growth Streptococcus mitis group Susceptibility testing not routinely done  These bacteria are part of normal skin henna, but on occasion, may be true   pathogens.  Clinical correlation must be applied to interpreting this   microbiology result.  *  No anaerobes isolated  No growth  No growth  No growth  Cultured on the 1st day of incubation: Beta hemolytic Streptococcus group B  Critical Value/Significant Value, preliminary result only, called to and read   back by Tamera HURT 12/27/16 at 0252 CF  Susceptibility testing done on previous specimen  *

## 2017-02-23 NOTE — PLAN OF CARE
Problem: Goal Outcome Summary  Goal: Goal Outcome Summary  Surgeon Discharge Plan: TCU      Current Functional Status: [facility]. Pt transfers sit <> to stand from wheel chair to FWW with min assist. Pt transfer from FWW to bed with min assist. Pt transfer from wheel chair to bed with FWW with min assist.      Barriers to Plan/Home: None     Pt discharging today to TCU 2/23. Goals not met.

## 2017-02-23 NOTE — PLAN OF CARE
Problem: Goal Outcome Summary  Goal: Goal Outcome Summary  Occupational Therapy Discharge Summary     Reason for therapy discharge:    Discharged to transitional care facility.     Progress towards therapy goal(s). See goals on Care Plan in [facility] electronic health record for goal details.  Goals not met.  Barriers to achieving goals:   discharge from facility.     Therapy recommendation(s):    Continued therapy is recommended.  Rationale/Recommendations:  to maximize safety and independence in all ADLs, IADLs and mobility tasks as well as continued strengthening. .

## 2017-02-23 NOTE — PLAN OF CARE
Problem: Goal Outcome Summary  Goal: Goal Outcome Summary  Surgeon Discharge Plan: Home with HH however pt stated she would be discharging to TCU due to limited amount of help available at home.      Current Functional Status: Pt Tal for RLE to advance over to EOB with supine to sit, Tal sit to stand, pt able to maintain NWB RLE during amb to/from bathroom, pt fatigues easily with task, several brief standing rest periods required during mobility.  Completed toilet transfer with Tal, LB dressing with AE SBA.       Barriers to Plan/Home: Stair to enter, stair within, NWB RLE     Pt to d/c to TCU today, GOALS NOT MET, see discharge summary

## 2017-02-23 NOTE — PLAN OF CARE
Problem: Goal Outcome Summary  Goal: Goal Outcome Summary  Outcome: Improving  Pt A&Ox4, VSS, CMS intact, dressing changed, incision C,D,I with staples, pt up with assist of one and walker to BSC, voiding well, dilaudid/tylenol for pain management, continues on IV antibx, PICC line to be placed today, significant other changing left ankle wound as per home regimen, tolerating regular diet. Pt plan to d/c to TCU tomorrow.

## 2017-02-23 NOTE — PLAN OF CARE
Problem: Goal Outcome Summary  Goal: Goal Outcome Summary  Outcome: Improving  Pt A&Ox4, VSS, CMS intact, dressing changed, incision C,D,I with staples and small amount of bloody drainage, up with assist of one and walker to chair/BSC, voiding well, tolerating regular diet, dilaudid/tylenol for pain management, IV SL, continues on IV antibiotics, ID following.

## 2017-02-23 NOTE — PROGRESS NOTES
María Pete Henderson  2017  POD #3    Doing well.  Pain well-controlled.  Tolerating physical therapy and rehabilitation well.    She has elected to go TCU.    Temperatures:  Current - Temp: 98.1  F (36.7  C); Max - Temp  Av.3  F (36.8  C)  Min: 98  F (36.7  C)  Max: 98.6  F (37  C)  Pulse range: Pulse  Av  Min: 76  Max: 76  Blood pressure range: Systolic (24hrs), Av , Min:105 , Max:143   ; Diastolic (24hrs), Av, Min:56, Max:67    CMS: intact  Labs:none    PLAN:  Discharge plan: Skilled Nursing Facility today.

## 2017-02-23 NOTE — CONSULTS
Orthopedic inpatient consult  I received request.  I talked with Dr. Berkowitz and the charge nurse has also spoken to Dr. Fang's assistant Nick Saldana, PAC    I reviewed the chart and met with the patient and her .  The patient is not having any new pain.  She feels the wound is healing nicely.  She has photographic evidence of the wound healing over time.  She is not having any pain or swelling or  Complaints with regards to the left ankle.    Examination showed there was no significant redness or erythema.  Benign-appearing granulating wound on the top of her left ankle.  No significant edema.    Assessment left ankle fusion with history of slow wound healing, does not look infected  Plan to follow up with Dr. Fang as scheduled.  No intervention recommended at this time.  Jani Adame MD

## 2017-02-23 NOTE — PROGRESS NOTES
JENNY  D: JENNY following for discharge planning needs.  JENNY was informed that patient will not be ready for discharge until tomorrow as MD will be coming in today or tomorrow to evaluate her ankle wound.  I: JENNY spoke to Ursula in Admissions at [facility] and she confirmed that they will have a bed available for patient tomorrow.  Patient and her significant other were updated on this and they have requested that SW arrange a w/c ride for tomorrow.  Patient anticipates that her Workman's Comp will cover the cost of transportation.  SW discussed if Workman's Comp does not cover the cost of transportation that it would be private pay.  SW discussed the cost of transportation being a $65 base fee and $4.42 per mile.  JENNY spoke to Destiny at [facility] and scheduled transportation for tomorrow at 1330.  JENNY faxed the facesheet to [facility].  JENNY spoke to Ursula in Admissions at [facility] to update her on the transport time for tomorrow.  A: Patient is alert and oriented X3.  P: SW will continue to follow and assist with finalizing the discharge plan as appropriate.    BRENDA Coleman      PAS-RR    D: Per DHS regulation, JENNY completed and submitted PAS-RR to [place] [employer] Direct Connect via the Senior LinkAge Line.  PAS-RR confirmation # is : [id number].    I: JENNY spoke with patient and her significant other and they are aware a PAS-RR has been submitted.  JENNY reviewed with patient and her significant other that they may be contacted for a follow up appointment within 10 days of hospital discharge if their SNF stay is < 30 days.  Contact information for [facility] LinkAge Line was also provided.    A: Patient and significant other verbalized understanding.    P: Further questions may be directed to [facility] LinkAge Line at #[phone], option #4 for PAS-RR staff.    BRENDA Coleman

## 2017-02-24 VITALS
BODY MASS INDEX: 26.68 KG/M2 | SYSTOLIC BLOOD PRESSURE: 125 MMHG | OXYGEN SATURATION: 95 % | WEIGHT: 166 LBS | TEMPERATURE: 97.6 F | RESPIRATION RATE: 16 BRPM | HEIGHT: 66 IN | DIASTOLIC BLOOD PRESSURE: 59 MMHG | HEART RATE: 81 BPM

## 2017-02-24 LAB
CREAT SERPL-MCNC: 0.83 MG/DL (ref 0.52–1.04)
GFR SERPL CREATININE-BSD FRML MDRD: 68 ML/MIN/1.7M2

## 2017-02-24 PROCEDURE — 25000128 H RX IP 250 OP 636: Performed by: INTERNAL MEDICINE

## 2017-02-24 PROCEDURE — 82565 ASSAY OF CREATININE: CPT | Performed by: INTERNAL MEDICINE

## 2017-02-24 PROCEDURE — 40000239 ZZH STATISTIC VAT ROUNDS

## 2017-02-24 PROCEDURE — 25000128 H RX IP 250 OP 636: Performed by: ORTHOPAEDIC SURGERY

## 2017-02-24 PROCEDURE — 25000132 ZZH RX MED GY IP 250 OP 250 PS 637: Performed by: ORTHOPAEDIC SURGERY

## 2017-02-24 RX ADMIN — ENOXAPARIN SODIUM 40 MG: 40 INJECTION SUBCUTANEOUS at 08:39

## 2017-02-24 RX ADMIN — VALSARTAN AND HYDROCHLOROTHIAZIDE 1 TABLET: 80; 12.5 TABLET, FILM COATED ORAL at 08:39

## 2017-02-24 RX ADMIN — CEFTRIAXONE 2 G: 2 INJECTION, POWDER, FOR SOLUTION INTRAMUSCULAR; INTRAVENOUS at 08:40

## 2017-02-24 RX ADMIN — ACETAMINOPHEN 650 MG: 325 TABLET, FILM COATED ORAL at 12:49

## 2017-02-24 RX ADMIN — POLYETHYLENE GLYCOL 3350 17 G: 17 POWDER, FOR SOLUTION ORAL at 08:39

## 2017-02-24 RX ADMIN — FUROSEMIDE 20 MG: 20 TABLET ORAL at 08:40

## 2017-02-24 RX ADMIN — ASPIRIN 81 MG 81 MG: 81 TABLET ORAL at 08:40

## 2017-02-24 RX ADMIN — HYDROMORPHONE HYDROCHLORIDE 4 MG: 2 TABLET ORAL at 10:54

## 2017-02-24 RX ADMIN — HYDROMORPHONE HYDROCHLORIDE 4 MG: 2 TABLET ORAL at 06:35

## 2017-02-24 RX ADMIN — SIMVASTATIN 40 MG: 40 TABLET, FILM COATED ORAL at 08:40

## 2017-02-24 RX ADMIN — HYDROMORPHONE HYDROCHLORIDE 4 MG: 2 TABLET ORAL at 02:49

## 2017-02-24 NOTE — PROGRESS NOTES
JENNY  D: Per MD order, patient okay to discharge today to Rehab.  I: JENNY faxed the discharge orders and confirmed that transportation is arranged for today at 1330.  JENNY spoke to Yocasta in Admissions at [facility] to update her that patient is ready for discharge today and the time of transportation.  A: Patient is alert and oriented X3.  Patient and her significant other are aware and in agreement with the plan for patient to discharge today to Rehab.  P: Patient to discharge today to [facility].  JENNY will be available to assist as needed.    BRENDA Coleman

## 2017-02-24 NOTE — PLAN OF CARE
Problem: Goal Outcome Summary  Goal: Goal Outcome Summary  Outcome: Improving  Pt alert and oriented. Vital signs stable. Dressing CDI. Progressing per plan of care.

## 2017-02-24 NOTE — PROGRESS NOTES
[facility]    Infectious Disease Progress Note    Date of Service (when I saw the patient): 02/24/2017     Assessment & Plan   María Henderson is a 71 year old female who was admitted on 2/20/2017.     Impression:   71 y.o female familiar to me from previous admission, with previous multiple ankle surgeries with fusion in place since 2014..   Recent revision surgery in November 2016 and has had draining from the area. Was admitted to [facility] in December bacteremic with Group B Strep and with concern for ankle infection. S/P I and D with wound vac placement.   Even at that time the right knee was painful but the final diagnosis was thought to be gout and not infection of the right knee prosthesis after aspiration for diagnosis.   She was eventually discharged with 6 weeks of Ceftriaxone.   She finished that course 2 weeks ago and right after the right knee started to hurt and swell. S/P removal of right knee prosthesis and antibiotics spacer placement.   Had left shoulder pain this admission, evaluated by Ortho, no concern for infection.      Recommendations:    On Ceftriaxone ( PCN listed as allergy but tolerated Ceftriaxone without any issues previously ) as had group B strep bacteremia last admission, this was also found in the ankle wound along with multiple other anaerobes and GNR but think Group B strep is the dominant and possibly the causative organism.   Cultures from OR no growth.   Appreciate Ortho`s evaluation of the left shoulder, no concern for infection per ortho`s eval. The left UE also has itching, but this started even before ceftriaxone was started.   Seen by Dr. Adame  for the ankle wound poor healing and no concern of infection in the ankle.   Orders for Ceftriaxone in the discharge navigator.     Palu Johnson MD    Interval History   Afebrile      Physical Exam   Temp: 97.6  F (36.4  C) Temp src: Axillary BP: 125/59 Pulse: 81 Heart Rate: 81 Resp: 16 SpO2: 95 % O2 Device:  None (Room air)    Vitals:    02/20/17 1119   Weight: 75.3 kg (166 lb)     Vital Signs with Ranges  Temp:  [97.6  F (36.4  C)-99.7  F (37.6  C)] 97.6  F (36.4  C)  Pulse:  [81] 81  Heart Rate:  [63-86] 81  Resp:  [16] 16  BP: (104-140)/(54-75) 125/59  SpO2:  [92 %-100 %] 95 %    Constitutional: Awake, alert, cooperative, no apparent distress  Lungs: Clear to auscultation bilaterally, no crackles or wheezing  Cardiovascular: Regular rate and rhythm, normal S1 and S2, and no murmur noted  Abdomen: Normal bowel sounds, soft, non-distended, non-tender  Skin: No rashes, no cyanosis, no edema  Other: the left UE is slightly erythematous.     Medications     dextrose 5% and 0.45% NaCl + KCl 20 mEq/L 125 mL/hr at 02/22/17 0142       sodium chloride (PF)  10 mL Intracatheter Q8H     polyethylene glycol  17 g Oral Daily     cefTRIAXone  2 g Intravenous Q24H     aspirin chewable tablet 81 mg  81 mg Oral Daily     furosemide (LASIX) tablet 20-40 mg  20-40 mg Oral Daily     simvastatin  40 mg Oral QAM     valsartan-hydrochlorothiazide  1 tablet Oral Daily     sodium chloride (PF)  3 mL Intracatheter Q8H     enoxaparin  40 mg Subcutaneous Q24H     senna-docusate  1-2 tablet Oral BID       Data   All microbiology laboratory data reviewed.  Recent Labs   Lab Test  02/23/17   0711  02/22/17   0710  02/21/17   0650  02/20/17 1659  02/01/17   0945  01/25/17   1030  01/18/17   1030   WBC   --    --    --    --   5.7  6.6  8.3   HGB   --   11.4*  10.4*   --   11.6*  11.9  11.4*   HCT   --    --    --    --   37.5  38.6  36.6   MCV   --    --    --    --   92  92  92   PLT  327   --    --   300  315  346  474*     Recent Labs   Lab Test  02/24/17   0557  02/20/17   1659  02/01/17   0945   CR  0.83  0.90  0.76     Recent Labs   Lab Test  02/01/17   0945   SED  30     Recent Labs   Lab Test  02/21/17   0912  02/21/17   0908  02/20/17   1257  02/08/17   1345  12/27/16   1655  12/27/16   1647  12/27/16   0915  12/27/16   0853  12/26/16    1202   CULT  No growth after 3 days  No growth after 3 days  Culture negative monitoring continues  Culture negative monitoring continues  No growth  Heavy growth Beta hemolytic Streptococcus group B Susceptibility testing done on   previous specimen  Light growth Enterobacter cloacae complex  Moderate growth Actinomyces species Identification obtained by MALDI-TOF mass   spectrometry research use only database. Test characteristics determined and   verified by the [facility] Diagnostic Laboratory ([facility]) [place], [place]. Susceptibility testing not routinely done  *  Heavy growth Finegoldia magna (Peptostreptococcus raymundo)  Susceptibility testing not routinely done  *  Culture negative after 4 weeks  No anaerobes isolated  Heavy growth Beta hemolytic Streptococcus group B  Light growth Enterobacter cloacae complex Susceptibility testing done on previous   specimen  Light growth Streptococcus mitis group Susceptibility testing not routinely done  These bacteria are part of normal skin henna, but on occasion, may be true   pathogens.  Clinical correlation must be applied to interpreting this   microbiology result.  *  No anaerobes isolated  No growth  No growth  No growth  Cultured on the 1st day of incubation: Beta hemolytic Streptococcus group B  Critical Value/Significant Value, preliminary result only, called to and read   back by Tamera HURT 12/27/16 at 0252 CF  Susceptibility testing done on previous specimen  *

## 2017-02-24 NOTE — DISCHARGE INSTRUCTIONS
You have been discharged to [facility]   Phone Number is [phone]  Fax Number is [phone]

## 2017-02-26 LAB
BACTERIA SPEC CULT: NO GROWTH
MICRO REPORT STATUS: NORMAL
SPECIMEN SOURCE: NORMAL

## 2017-02-27 ENCOUNTER — RECORDS - HEALTHEAST (OUTPATIENT)
Dept: LAB | Facility: CLINIC | Age: 72
End: 2017-02-27

## 2017-02-27 ENCOUNTER — DOCUMENTATION ONLY (OUTPATIENT)
Dept: CASE MANAGEMENT | Facility: CLINIC | Age: 72
End: 2017-02-27

## 2017-02-27 LAB
AST SERPL W P-5'-P-CCNC: 30 U/L (ref 0–40)
BACTERIA SPEC CULT: NO GROWTH
BACTERIA SPEC CULT: NO GROWTH
CREAT SERPL-MCNC: 0.72 MG/DL (ref 0.6–1.1)
GFR SERPL CREATININE-BSD FRML MDRD: >60 ML/MIN/1.73M2
Lab: NORMAL
Lab: NORMAL
MICRO REPORT STATUS: NORMAL
MICRO REPORT STATUS: NORMAL
SPECIMEN SOURCE: NORMAL
SPECIMEN SOURCE: NORMAL

## 2017-03-06 ENCOUNTER — RECORDS - HEALTHEAST (OUTPATIENT)
Dept: LAB | Facility: CLINIC | Age: 72
End: 2017-03-06

## 2017-03-06 LAB
AST SERPL W P-5'-P-CCNC: 55 U/L (ref 0–40)
BACTERIA SPEC CULT: NORMAL
CREAT SERPL-MCNC: 0.72 MG/DL (ref 0.6–1.1)
GFR SERPL CREATININE-BSD FRML MDRD: >60 ML/MIN/1.73M2
Lab: NORMAL
MICRO REPORT STATUS: NORMAL
SPECIMEN SOURCE: NORMAL

## 2017-03-12 ENCOUNTER — APPOINTMENT (OUTPATIENT)
Dept: GENERAL RADIOLOGY | Facility: CLINIC | Age: 72
End: 2017-03-12
Attending: EMERGENCY MEDICINE
Payer: OTHER MISCELLANEOUS

## 2017-03-12 ENCOUNTER — HOSPITAL ENCOUNTER (EMERGENCY)
Facility: CLINIC | Age: 72
Discharge: HOME | End: 2017-03-12
Attending: EMERGENCY MEDICINE | Admitting: EMERGENCY MEDICINE
Payer: OTHER MISCELLANEOUS

## 2017-03-12 VITALS
RESPIRATION RATE: 16 BRPM | SYSTOLIC BLOOD PRESSURE: 155 MMHG | DIASTOLIC BLOOD PRESSURE: 68 MMHG | TEMPERATURE: 98.3 F | HEIGHT: 66 IN | OXYGEN SATURATION: 99 %

## 2017-03-12 DIAGNOSIS — M00.9 PYOGENIC ARTHRITIS OF LEFT KNEE JOINT, DUE TO UNSPECIFIED ORGANISM (H): ICD-10-CM

## 2017-03-12 DIAGNOSIS — T82.898A OCCLUSION OF PERIPHERALLY INSERTED CENTRAL CATHETER (PICC) LINE, INITIAL ENCOUNTER (H): ICD-10-CM

## 2017-03-12 LAB
ANION GAP SERPL CALCULATED.3IONS-SCNC: 9 MMOL/L (ref 3–14)
BASOPHILS # BLD AUTO: 0.1 10E9/L (ref 0–0.2)
BASOPHILS NFR BLD AUTO: 0.7 %
BUN SERPL-MCNC: 20 MG/DL (ref 7–30)
CALCIUM SERPL-MCNC: 9.5 MG/DL (ref 8.5–10.1)
CHLORIDE SERPL-SCNC: 103 MMOL/L (ref 94–109)
CO2 SERPL-SCNC: 28 MMOL/L (ref 20–32)
CREAT SERPL-MCNC: 0.65 MG/DL (ref 0.52–1.04)
DIFFERENTIAL METHOD BLD: ABNORMAL
EOSINOPHIL # BLD AUTO: 0.2 10E9/L (ref 0–0.7)
EOSINOPHIL NFR BLD AUTO: 2.2 %
ERYTHROCYTE [DISTWIDTH] IN BLOOD BY AUTOMATED COUNT: 15.1 % (ref 10–15)
GFR SERPL CREATININE-BSD FRML MDRD: 89 ML/MIN/1.7M2
GLUCOSE SERPL-MCNC: 93 MG/DL (ref 70–99)
HCT VFR BLD AUTO: 38.5 % (ref 35–47)
HGB BLD-MCNC: 11.5 G/DL (ref 11.7–15.7)
IMM GRANULOCYTES # BLD: 0 10E9/L (ref 0–0.4)
IMM GRANULOCYTES NFR BLD: 0.5 %
LYMPHOCYTES # BLD AUTO: 2 10E9/L (ref 0.8–5.3)
LYMPHOCYTES NFR BLD AUTO: 24.5 %
MAGNESIUM SERPL-MCNC: 2.2 MG/DL (ref 1.6–2.3)
MCH RBC QN AUTO: 27.3 PG (ref 26.5–33)
MCHC RBC AUTO-ENTMCNC: 29.9 G/DL (ref 31.5–36.5)
MCV RBC AUTO: 91 FL (ref 78–100)
MONOCYTES # BLD AUTO: 0.7 10E9/L (ref 0–1.3)
MONOCYTES NFR BLD AUTO: 8.3 %
NEUTROPHILS # BLD AUTO: 5.3 10E9/L (ref 1.6–8.3)
NEUTROPHILS NFR BLD AUTO: 63.8 %
NRBC # BLD AUTO: 0 10*3/UL
NRBC BLD AUTO-RTO: 0 /100
PLATELET # BLD AUTO: 368 10E9/L (ref 150–450)
POTASSIUM SERPL-SCNC: 4.2 MMOL/L (ref 3.4–5.3)
RBC # BLD AUTO: 4.22 10E12/L (ref 3.8–5.2)
SODIUM SERPL-SCNC: 140 MMOL/L (ref 133–144)
WBC # BLD AUTO: 8.2 10E9/L (ref 4–11)

## 2017-03-12 PROCEDURE — 40000940 XR CHEST 1 VW

## 2017-03-12 PROCEDURE — 27210209 ZZH KIT VALVED SINGLE LUMEN

## 2017-03-12 PROCEDURE — 96365 THER/PROPH/DIAG IV INF INIT: CPT

## 2017-03-12 PROCEDURE — 83735 ASSAY OF MAGNESIUM: CPT | Performed by: EMERGENCY MEDICINE

## 2017-03-12 PROCEDURE — 71010 XR CHEST 1 VW: CPT

## 2017-03-12 PROCEDURE — 36569 INSJ PICC 5 YR+ W/O IMAGING: CPT

## 2017-03-12 PROCEDURE — 85025 COMPLETE CBC W/AUTO DIFF WBC: CPT | Performed by: EMERGENCY MEDICINE

## 2017-03-12 PROCEDURE — 80048 BASIC METABOLIC PNL TOTAL CA: CPT | Performed by: EMERGENCY MEDICINE

## 2017-03-12 PROCEDURE — 99285 EMERGENCY DEPT VISIT HI MDM: CPT | Mod: 25

## 2017-03-12 PROCEDURE — 25000128 H RX IP 250 OP 636: Performed by: EMERGENCY MEDICINE

## 2017-03-12 RX ORDER — LIDOCAINE 40 MG/G
CREAM TOPICAL
Status: DISCONTINUED | OUTPATIENT
Start: 2017-03-12 | End: 2017-03-12 | Stop reason: HOSPADM

## 2017-03-12 RX ORDER — CEFTRIAXONE 2 G/1
2 INJECTION, POWDER, FOR SOLUTION INTRAMUSCULAR; INTRAVENOUS ONCE
Status: COMPLETED | OUTPATIENT
Start: 2017-03-12 | End: 2017-03-12

## 2017-03-12 RX ADMIN — CEFTRIAXONE 2 G: 2 INJECTION, POWDER, FOR SOLUTION INTRAMUSCULAR; INTRAVENOUS at 13:57

## 2017-03-12 ASSESSMENT — ENCOUNTER SYMPTOMS: PALPITATIONS: 1

## 2017-03-12 NOTE — ED AVS SNAPSHOT
[facility] Emergency Department    [address]    [facility] [address]    Phone:  [phone]    Fax:  [phone]                                       María Henderson   MRN: [id number]    Department:  [facility] Emergency Department   Date of Visit:  3/12/2017           After Visit Summary Signature Page     I have received my discharge instructions, and my questions have been answered. I have discussed any challenges I see with this plan with the nurse or doctor.    ..........................................................................................................................................  Patient/Patient Representative Signature      ..........................................................................................................................................  Patient Representative Print Name and Relationship to Patient    ..................................................               ................................................  Date                                            Time    ..........................................................................................................................................  Reviewed by Signature/Title    ...................................................              ..............................................  Date                                                            Time

## 2017-03-12 NOTE — ED NOTES
Pt arrives to ED via EMS for a non-working PICC line. Had a spacer put in R knee on 2/20/1017 and has been receiving home infusion abx. Has not been able to receive home antibiotic infusion for x2 days. Home infusion nurse tried checking for any kinks, flushing with NS flush and had pt raise arm in attempt to get PICC functioning. NWB RLE, Knee immobilizer in place. Ace wrap with walking boot to LLE. Took home medication Dilaudid po at 1200 today last for pain. Denies being on any blood thinners at home. Pain currently 5/10 to R knee. Plan to place PIV for abx infusion.

## 2017-03-12 NOTE — ED NOTES
Unable to flush PICC on arrival. MD order to pull back by 1 cm and attempt to flush. If not kinked and able to flush alteplase ordered to use. Drsg taken down in sterile field, catheter pulled back by 1 cm and at that time still unable to flush. New drsg applied (No securement or sutures at site, pt states was removed prior when PICC wasn't working >1 week ago again and staff member pulled back by 2cm to get PICC working.) Flying squad paged to administer alteplase as advised by [facility] however at that time the PICC was flushing slowly with force but unable to get blood flow. Yelitza states it doesn't feel like a clot and doesn't recommend using alteplase. MD updated and states it is in the subclavian and the picc will need to be replaced anyway so [facility] called and confirmed over phone that they will come to address. Ordere entered and consent to be signed on front of clipboard chart. Pt and S.O. Offered ambulance return home upon dc, declined at this time as S.O. Brought pts wc and fww and contacted kids who are going to meet them and help with transportation.

## 2017-03-12 NOTE — PROGRESS NOTES
[facility] PICC  Procedure Note           Peripherally Inserted Central Line Catheter (PICC):       María Henderson  [id number]   March 12, 2017, 4:52 PM Indication: Medication administration           Pause for the cause: Consent for catheter placement procedure signed  Time out completed  Patient ID's verified using two distinct indicators  All necessary equipment is present   Type of line to be used: PICC   Full barrier precautions used: Yes   Skin preparation: Chloraprep   Date of insertion: March 12, 2017, 4:53 PM   Device type: Single lumen, valved, 4.0   Catheter brand: Mimetas   Lot number: [id number]   Insertion location: Right brachial vein (medial)   Method of placement: MST   Number of attempts: With ultrasound: 0   Without ultrasound: 0   Difficulty threading: Yes (see summary)   Midline IV device: Dressing dry and intact  Transparent semmipermeable dressing applied  Chlorhexidine patch  Catheter securement device   Arm circumference: Adults 15 cm   Midline extremity circumference: 31 cm   Internal length: 39 cm   Midline visible catheter length: 1 cm   Total catheter length: 40 cm   Tip termination: Distal SVC   Method of verification: Chest x-ray   Midline patency post placement: Positive blood return  Flushes without difficulty  Saline locked   Line flush: Line flush documented on the eMAR    Placement verified by: Physician   Catheter placed by: Jose Batres   Discontinuation form initiated: No   Patient tolerance: Tolerated well  Intradermal injection   PICC Educational information to patient (Information from PICC package):  Yes   VAD flushing orders entered:  Yes     Summary:  This procedure was performed with slight difficulty.  PICC observed going up toward neck on TLS sherlock device.  Needed to reposition by burst flushing.      Recorded by Jose Batres    Attestation:  Jose Batres RN

## 2017-03-12 NOTE — ED NOTES
Bed: ED09  Expected date: 3/12/17  Expected time: 12:58 PM  Means of arrival: Ambulance  Comments:  NOÉ

## 2017-03-12 NOTE — ED PROVIDER NOTES
History     Chief Complaint:  Vascular access problem    HPI   María Henderson is a 71 year old female, with a history of GERD, hypertension, hyperlipidemia, heart murmur, osteoarthritis, and numbness and tingling, who presents with vascular access problem. The patient reports having a PICC line placed on 3/5/17 for a knee infection and notes that since then it has been malfunctioning, prompting her to come here for evaluation. It is supposed used for her daily 2g Ceftriaxone. For the past 2 days she notes being off of her antibiotic therapy and notes taking 1 Aspirin daily. She took her last dose of Morphine at 1200 today. She endorses some palpitations.    Allergies:  Penicillins - anaphylaxis  Oxycodone  Sulfa drugs  Valium - unconsciousness  Vicodin  Iodine solution - rash    Medications:    Ceftriaxone (rocephin) 1 gm vial  Hydromorphone (dilaudid) 2 mg tablet  Polyethylene glycol (miralax/glycolax) packet  Calcium po  Probiotic product (trubiotics po)  Albuterol (proair hfa/proventil hfa/ventolin hfa) 108 (90 base) mcg/act inhaler  Pyridoxine hcl (vitamin b6 po)  Dakins ex  Order for dme  Order for dme  Furosemide (lasix po)  Cholecalciferol (vitamin d3 po)  Potassium po  Valsartan-hydrochlorothiazide (diovan-hct) 80-12.5 mg per tablet  Aspirin po  Multivitamin, therapeutic with minerals (thera-vit-m) tabs  Simvastatin (zocor) 40 mg tablet    Past Medical History:    Allergic rhinitis  GERD  Heart murmur  Hyperlipidemia  Hypertension  Numbness and tingling  osteoarthritis    Past Surgical History:    Tonsillectomy   7 foot and ankle    Total knee arthroplasy   Hernia repair   Arthroplasty revision ankle   Aspiration needle knee   Bunionectomy vero   Irrigation and debridement foot, combined   RTKA  ROTATOR CUFF  Remove hardware arthroplasty knee, irrig & zena, place antibiotic cement spacer, combined     Family History:    History reviewed. No pertinent family history.      Social History:  Smoking  "status: never  Alcohol use: no   Marital Status:        Review of Systems   Cardiovascular: Positive for palpitations.   All other systems reviewed and are negative.      Physical Exam     Patient Vitals for the past 24 hrs:   BP Temp Temp src Heart Rate Resp SpO2 Height   03/12/17 1312 181/89 98.3  F (36.8  C) Oral 81 16 99 % 1.676 m (5' 6\")           Physical Exam  General: Patient is alert and interactive when I enter the room  Head:  The scalp, face, and head appear normal  Eyes:  The pupils are equal, round, and reactive to light    Conjunctivae and sclerae are normal  ENT:    External acoustic canals are normal    The oropharynx is normal without erythema.     Uvula is in the midline  Neck:  Normal range of motion  CV:  Regular rate. S1/S2. No murmurs.   Resp:  Lungs are clear without wheezes or rales. No distress  GI:  Abdomen is soft, no rigidity, guarding, or rebound    No distension. No tenderness to palpation in any quadrant.     MS:  Normal tone. Joints grossly normal without effusions.     No asymmetric leg swelling, calf or thigh tenderness.      Normal motor assessment of all extremities.  Skin:  No rash or lesions noted. Normal capillary refill noted. PICC site RUE is c/d/i, not kinked.   Neuro: Speech is normal and fluent. Face is symmetric.     Moving all extremities well.   Psych:  Awake. Alert.  Normal affect.  Appropriate interactions.  Lymph: No anterior cervical lymphadenopathy noted      Emergency Department Course   Imaging:  Radiographic findings were communicated with the patient who voiced understanding of the findings.    XR Chest 1 view  IMPRESSION: There has been placement of a right upper extremity PICC  line. The distal tip is in the right subclavian location and should be  advanced.  As read by Radiology.    Laboratory:  CBC: HGB 11.5 (L), o/w WNL (WBC 8.2, )   BMP: WNL (Creatinine 0.65)  Magnesium: 2.2    Interventions:  1357 - Rocephin 2 g IV      Emergency " Department Course:  Past medical records, nursing notes, and vitals reviewed.  1307: I performed an exam of the patient and obtained history, as documented above.   IV inserted and blood drawn.  The patient was sent for a X-ray while in the emergency department, findings above.  1346: I discussed the case with PICC staff regarding the patient.       Impression & Plan      Medical Decision Making:  María Henderson is a 71 year old female who presents for evaluation of clogged picc. Not flushing.    Broad differential of course considered, highest on my list includes kinking, malposition, blood clot in picc, subclavian DVT.   Xrays shows subclavian PICC, needs to be changed and advanced.   I doubt worrisome etiologies such as DVT, PE, etc.    Plan is home, normal care of PICC. She should talk w/ ortho if given immobility she should be on blood thinners till better.        Diagnosis:    ICD-10-CM    1. Pyogenic arthritis of left knee joint, due to unspecified organism (H) M00.9 REPLACE PICC W PORT OR PUMP   2. Occlusion of peripherally inserted central catheter (PICC) line, initial encounter (H) T82.898A        Disposition:  discharged to home    Discharge Medications:  New Prescriptions    No medications on file         Odell Lemus  3/12/2017   [facility] EMERGENCY DEPARTMENT  I, Odell Lemus, am serving as a scribe at 1:07 PM on 3/12/2017 to document services personally performed by Ruben Wise MD based on my observations and the provider's statements to me.       Ruben Wise MD  03/12/17 1521

## 2017-03-14 LAB
AST SERPL W P-5'-P-CCNC: 27 U/L (ref 0–45)
BASOPHILS # BLD AUTO: 0.1 10E9/L (ref 0–0.2)
BASOPHILS NFR BLD AUTO: 0.9 %
CREAT SERPL-MCNC: 0.8 MG/DL (ref 0.52–1.04)
DIFFERENTIAL METHOD BLD: ABNORMAL
EOSINOPHIL # BLD AUTO: 0.4 10E9/L (ref 0–0.7)
EOSINOPHIL NFR BLD AUTO: 5.7 %
ERYTHROCYTE [DISTWIDTH] IN BLOOD BY AUTOMATED COUNT: 15.2 % (ref 10–15)
GFR SERPL CREATININE-BSD FRML MDRD: 71 ML/MIN/1.7M2
HCT VFR BLD AUTO: 37.9 % (ref 35–47)
HGB BLD-MCNC: 11.7 G/DL (ref 11.7–15.7)
IMM GRANULOCYTES # BLD: 0 10E9/L (ref 0–0.4)
IMM GRANULOCYTES NFR BLD: 0.6 %
LYMPHOCYTES # BLD AUTO: 1.6 10E9/L (ref 0.8–5.3)
LYMPHOCYTES NFR BLD AUTO: 25.1 %
MCH RBC QN AUTO: 27.4 PG (ref 26.5–33)
MCHC RBC AUTO-ENTMCNC: 30.9 G/DL (ref 31.5–36.5)
MCV RBC AUTO: 89 FL (ref 78–100)
MONOCYTES # BLD AUTO: 0.5 10E9/L (ref 0–1.3)
MONOCYTES NFR BLD AUTO: 7.1 %
NEUTROPHILS # BLD AUTO: 3.9 10E9/L (ref 1.6–8.3)
NEUTROPHILS NFR BLD AUTO: 60.6 %
NRBC # BLD AUTO: 0 10*3/UL
NRBC BLD AUTO-RTO: 0 /100
PLATELET # BLD AUTO: 407 10E9/L (ref 150–450)
RBC # BLD AUTO: 4.27 10E12/L (ref 3.8–5.2)
WBC # BLD AUTO: 6.4 10E9/L (ref 4–11)

## 2017-03-14 PROCEDURE — 84450 TRANSFERASE (AST) (SGOT): CPT | Performed by: INTERNAL MEDICINE

## 2017-03-14 PROCEDURE — 82565 ASSAY OF CREATININE: CPT | Performed by: INTERNAL MEDICINE

## 2017-03-14 PROCEDURE — 85025 COMPLETE CBC W/AUTO DIFF WBC: CPT | Performed by: INTERNAL MEDICINE

## 2017-03-21 ENCOUNTER — APPOINTMENT (OUTPATIENT)
Dept: GENERAL RADIOLOGY | Facility: CLINIC | Age: 72
End: 2017-03-21
Attending: EMERGENCY MEDICINE
Payer: OTHER MISCELLANEOUS

## 2017-03-21 ENCOUNTER — HOSPITAL ENCOUNTER (OUTPATIENT)
Facility: CLINIC | Age: 72
Setting detail: OBSERVATION
Discharge: NURSING, REHAB OR LONG-TERM CARE | End: 2017-03-25
Attending: EMERGENCY MEDICINE | Admitting: HOSPITALIST
Payer: OTHER MISCELLANEOUS

## 2017-03-21 DIAGNOSIS — I47.10 PAROXYSMAL SUPRAVENTRICULAR TACHYCARDIA (H): Primary | ICD-10-CM

## 2017-03-21 DIAGNOSIS — M00.262 STREPTOCOCCAL ARTHRITIS OF LEFT KNEE (H): ICD-10-CM

## 2017-03-21 LAB
ALBUMIN SERPL-MCNC: 3.1 G/DL (ref 3.4–5)
ALP SERPL-CCNC: 173 U/L (ref 40–150)
ALT SERPL W P-5'-P-CCNC: 41 U/L (ref 0–50)
ANION GAP SERPL CALCULATED.3IONS-SCNC: 9 MMOL/L (ref 3–14)
AST SERPL W P-5'-P-CCNC: 29 U/L (ref 0–45)
AST SERPL W P-5'-P-CCNC: 32 U/L (ref 0–45)
BASOPHILS # BLD AUTO: 0 10E9/L (ref 0–0.2)
BASOPHILS # BLD AUTO: 0.1 10E9/L (ref 0–0.2)
BASOPHILS NFR BLD AUTO: 0.3 %
BASOPHILS NFR BLD AUTO: 0.7 %
BILIRUB SERPL-MCNC: 0.2 MG/DL (ref 0.2–1.3)
BUN SERPL-MCNC: 27 MG/DL (ref 7–30)
CALCIUM SERPL-MCNC: 9.3 MG/DL (ref 8.5–10.1)
CHLORIDE SERPL-SCNC: 104 MMOL/L (ref 94–109)
CO2 SERPL-SCNC: 29 MMOL/L (ref 20–32)
CREAT SERPL-MCNC: 0.7 MG/DL (ref 0.52–1.04)
CREAT SERPL-MCNC: 0.83 MG/DL (ref 0.52–1.04)
DIFFERENTIAL METHOD BLD: ABNORMAL
DIFFERENTIAL METHOD BLD: ABNORMAL
EOSINOPHIL # BLD AUTO: 0.3 10E9/L (ref 0–0.7)
EOSINOPHIL # BLD AUTO: 0.4 10E9/L (ref 0–0.7)
EOSINOPHIL NFR BLD AUTO: 3.2 %
EOSINOPHIL NFR BLD AUTO: 4.8 %
ERYTHROCYTE [DISTWIDTH] IN BLOOD BY AUTOMATED COUNT: 15.5 % (ref 10–15)
ERYTHROCYTE [DISTWIDTH] IN BLOOD BY AUTOMATED COUNT: 15.7 % (ref 10–15)
GFR SERPL CREATININE-BSD FRML MDRD: 68 ML/MIN/1.7M2
GFR SERPL CREATININE-BSD FRML MDRD: 82 ML/MIN/1.7M2
GLUCOSE SERPL-MCNC: 192 MG/DL (ref 70–99)
HCT VFR BLD AUTO: 40.2 % (ref 35–47)
HCT VFR BLD AUTO: 40.6 % (ref 35–47)
HGB BLD-MCNC: 12.3 G/DL (ref 11.7–15.7)
HGB BLD-MCNC: 12.8 G/DL (ref 11.7–15.7)
IMM GRANULOCYTES # BLD: 0 10E9/L (ref 0–0.4)
IMM GRANULOCYTES # BLD: 0 10E9/L (ref 0–0.4)
IMM GRANULOCYTES NFR BLD: 0.4 %
IMM GRANULOCYTES NFR BLD: 0.4 %
INTERPRETATION ECG - MUSE: NORMAL
INTERPRETATION ECG - MUSE: NORMAL
LYMPHOCYTES # BLD AUTO: 1.6 10E9/L (ref 0.8–5.3)
LYMPHOCYTES # BLD AUTO: 1.7 10E9/L (ref 0.8–5.3)
LYMPHOCYTES NFR BLD AUTO: 17.2 %
LYMPHOCYTES NFR BLD AUTO: 22.7 %
MCH RBC QN AUTO: 27 PG (ref 26.5–33)
MCH RBC QN AUTO: 27.3 PG (ref 26.5–33)
MCHC RBC AUTO-ENTMCNC: 30.6 G/DL (ref 31.5–36.5)
MCHC RBC AUTO-ENTMCNC: 31.5 G/DL (ref 31.5–36.5)
MCV RBC AUTO: 87 FL (ref 78–100)
MCV RBC AUTO: 88 FL (ref 78–100)
MONOCYTES # BLD AUTO: 0.5 10E9/L (ref 0–1.3)
MONOCYTES # BLD AUTO: 0.9 10E9/L (ref 0–1.3)
MONOCYTES NFR BLD AUTO: 7.2 %
MONOCYTES NFR BLD AUTO: 9 %
NEUTROPHILS # BLD AUTO: 4.8 10E9/L (ref 1.6–8.3)
NEUTROPHILS # BLD AUTO: 6.6 10E9/L (ref 1.6–8.3)
NEUTROPHILS NFR BLD AUTO: 64.2 %
NEUTROPHILS NFR BLD AUTO: 69.9 %
NRBC # BLD AUTO: 0 10*3/UL
NRBC # BLD AUTO: 0 10*3/UL
NRBC BLD AUTO-RTO: 0 /100
NRBC BLD AUTO-RTO: 0 /100
PLATELET # BLD AUTO: 359 10E9/L (ref 150–450)
PLATELET # BLD AUTO: 387 10E9/L (ref 150–450)
POTASSIUM SERPL-SCNC: 3.7 MMOL/L (ref 3.4–5.3)
PROT SERPL-MCNC: 7 G/DL (ref 6.8–8.8)
RBC # BLD AUTO: 4.55 10E12/L (ref 3.8–5.2)
RBC # BLD AUTO: 4.69 10E12/L (ref 3.8–5.2)
SODIUM SERPL-SCNC: 142 MMOL/L (ref 133–144)
TROPONIN I SERPL-MCNC: <0.015 UG/L (ref 0–0.04)
WBC # BLD AUTO: 7.5 10E9/L (ref 4–11)
WBC # BLD AUTO: 9.8 10E9/L (ref 4–11)

## 2017-03-21 PROCEDURE — 84484 ASSAY OF TROPONIN QUANT: CPT | Performed by: EMERGENCY MEDICINE

## 2017-03-21 PROCEDURE — 96361 HYDRATE IV INFUSION ADD-ON: CPT

## 2017-03-21 PROCEDURE — 72170 X-RAY EXAM OF PELVIS: CPT

## 2017-03-21 PROCEDURE — 85025 COMPLETE CBC W/AUTO DIFF WBC: CPT | Performed by: EMERGENCY MEDICINE

## 2017-03-21 PROCEDURE — 84439 ASSAY OF FREE THYROXINE: CPT | Performed by: EMERGENCY MEDICINE

## 2017-03-21 PROCEDURE — 99285 EMERGENCY DEPT VISIT HI MDM: CPT | Mod: 25

## 2017-03-21 PROCEDURE — 93005 ELECTROCARDIOGRAM TRACING: CPT

## 2017-03-21 PROCEDURE — 83735 ASSAY OF MAGNESIUM: CPT | Performed by: EMERGENCY MEDICINE

## 2017-03-21 PROCEDURE — 25000128 H RX IP 250 OP 636: Performed by: EMERGENCY MEDICINE

## 2017-03-21 PROCEDURE — 80053 COMPREHEN METABOLIC PANEL: CPT | Performed by: EMERGENCY MEDICINE

## 2017-03-21 PROCEDURE — 96360 HYDRATION IV INFUSION INIT: CPT

## 2017-03-21 PROCEDURE — 85025 COMPLETE CBC W/AUTO DIFF WBC: CPT | Performed by: INTERNAL MEDICINE

## 2017-03-21 PROCEDURE — 84443 ASSAY THYROID STIM HORMONE: CPT | Performed by: EMERGENCY MEDICINE

## 2017-03-21 PROCEDURE — 82565 ASSAY OF CREATININE: CPT | Performed by: INTERNAL MEDICINE

## 2017-03-21 PROCEDURE — 84450 TRANSFERASE (AST) (SGOT): CPT | Performed by: INTERNAL MEDICINE

## 2017-03-21 RX ORDER — SODIUM CHLORIDE 9 MG/ML
1000 INJECTION, SOLUTION INTRAVENOUS CONTINUOUS
Status: DISCONTINUED | OUTPATIENT
Start: 2017-03-21 | End: 2017-03-22

## 2017-03-21 RX ADMIN — SODIUM CHLORIDE 1000 ML: 9 INJECTION, SOLUTION INTRAVENOUS at 22:43

## 2017-03-21 NOTE — IP AVS SNAPSHOT
MRN:[id number]                      After Visit Summary   3/21/2017    María Henderson    MRN: [id number]           Thank you!     Thank you for choosing [facility] for your care. Our goal is always to provide you with excellent care. Hearing back from our patients is one way we can continue to improve our services. Please take a few minutes to complete the written survey that you may receive in the mail after you visit with us. Thank you!        Patient Information     Date Of Birth          1945        About your hospital stay     You were admitted on:  March 22, 2017 You last received care in the:  [facility] Cardiac [facility]    You were discharged on:  March 25, 2017        Reason for your hospital stay       Hip pain.   Chronic knee infection  Palpitations, had ablation with resolution of symptoms.                  Who to Call     For medical emergencies, please call 911.  For non-urgent questions about your medical care, please call your primary care provider or clinic, [phone]          Attending Provider     Provider Specialty    Moreno Mesa MD Emergency Medicine    [facility], Alyson JUAREZ MD Internal Medicine       Primary Care Provider Office Phone # Fax #    Thad Puri [phone]       [facility] [address]  [place] [address]        After Care Instructions     Advance Diet as Tolerated       Follow this diet upon discharge: Orders Placed This Encounter      Regular Diet Adult            Fall precautions           General info for SNF       Length of Stay Estimate: Short Term Care: Estimated # of Days <30  Condition at Discharge: Improving  Level of care:skilled   Rehabilitation Potential: Good  Admission H&P remains valid and up-to-date: Yes  Recent Chemotherapy: N/A  Use Nursing Home Standing Orders: Yes            Mantoux instructions       Give two-step Mantoux (PPD) Per Facility Policy Yes             "Weight bearing status       No weight bearing on the right knee                  Follow-up Appointments     Follow Up       Follow up with Dr. Boyd at the [facility] ([phone]) at 4/26 at 1:15pm            Follow Up and recommended labs and tests       Follow up with intermediate physician.  The following labs/tests are recommended: CBC, BMP in one week.  Follow up with Infectious and Cardiology specialists  in 2-3 weeks.                  Your next 10 appointments already scheduled     Apr 26, 2017  1:15 PM CDT   [facility] EP RETURN with Tyrone Boyd MD   [facility] PHYSICIANS HEART AT [place] ([facility])    [address]  [facility] [address] [phone]              Additional Services     Physical Therapy Adult Consult       Evaluate and treat as clinically indicated.    Reason:  Weakness                  Further instructions from your care team       [facility]  [facility]'s phone number is [phone].    Pending Results     No orders found from 3/19/2017 to 3/22/2017.            Statement of Approval     Ordered          03/25/17 1205  I have reviewed and agree with all the recommendations and orders detailed in this document.  EFFECTIVE NOW     Approved and electronically signed by:  Lewis Crockett MD             Admission Information     Date & Time Provider Department Dept. Phone    3/21/2017 Alyson Ledezma MD [facility] Cardiac Specialty Care [phone]      Your Vitals Were     Blood Pressure Pulse Temperature Respirations Height Weight    141/69 (BP Location: Left arm) 68 99.1  F (37.3  C) (Oral) 16 1.676 m (5' 6\") 76.1 kg (167 lb 12.3 oz)    Pulse Oximetry BMI (Body Mass Index)                95% 27.08 kg/m2          MyChart Information     [employer] lets you send messages to your doctor, view your test results, renew your prescriptions, " "schedule appointments and more. To sign up, go to [email or web address] . Click on \"Log in\" on the left side of the screen, which will take you to the Welcome page. Then click on \"Sign up Now\" on the right side of the page.     You will be asked to enter the access code listed below, as well as some personal information. Please follow the directions to create your username and password.     Your access code is: [email or web address]  Expires: 3/30/2017  9:57 AM     Your access code will  in 90 days. If you need help or a new code, please call your [facility] clinic or [phone].        Care EveryWhere ID     This is your Care EveryWhere ID. This could be used by other organizations to access your [facility] medical records  [id number]           Review of your medicines      START taking        Dose / Directions    acetaminophen 325 MG tablet   Commonly known as:  TYLENOL   Used for:  Streptococcal arthritis of left knee (H)        Dose:  650 mg   Take 2 tablets (650 mg) by mouth every 4 hours as needed for mild pain   Quantity:  100 tablet   Refills:  0       metoprolol 25 MG tablet   Commonly known as:  LOPRESSOR   Used for:  Paroxysmal supraventricular tachycardia (H)        Dose:  25 mg   Take 1 tablet (25 mg) by mouth 2 times daily   Quantity:  60 tablet   Refills:  0         CONTINUE these medicines which have NOT CHANGED        Dose / Directions    albuterol 108 (90 BASE) MCG/ACT Inhaler   Commonly known as:  PROAIR HFA/PROVENTIL HFA/VENTOLIN HFA        Dose:  2 puff   Inhale 2 puffs into the lungs every 6 hours as needed for shortness of breath / dyspnea or wheezing   Refills:  0       ASPIRIN PO        Dose:  81 mg   Take 81 mg by mouth daily   Refills:  0       CALCIUM PO        Dose:  600 mg   Take 600 mg by mouth daily   Refills:  0       cefTRIAXone 1 GM vial   Commonly known as:  ROCEPHIN   Used for:  Streptococcal arthritis of left knee (H)        Dose:  2000 mg   Inject 2 g (2,000 mg) into the vein " daily CBC with differential, creatinine, SGOT weekly while on this medication to be faxed to Dr. Douglas office.   Quantity:  600 mL   Refills:  0       DAKINS EX        Externally apply topically daily   Refills:  0       HYDROmorphone 2 MG tablet   Commonly known as:  DILAUDID   Used for:  Streptococcal arthritis of left knee (H)        Dose:  2-4 mg   Take 1-2 tablets (2-4 mg) by mouth every 4 hours as needed for moderate to severe pain   Quantity:  70 tablet   Refills:  0       LASIX PO        Dose:  20 mg   Take 20 mg by mouth daily   Refills:  0       multivitamin, therapeutic with minerals Tabs tablet        Dose:  1 tablet   Take 1 tablet by mouth daily.   Refills:  0       * order for DME   Used for:  Pain in joint, ankle and foot, left        Equipment being ordered: Walker Wheels () and Walker () Treatment Diagnosis: difficulty with gait   Quantity:  1 each   Refills:  0       * order for DME   Used for:  Sepsis, due to unspecified organism (H)        Equipment being ordered: Walker Wheels () and Walker () Treatment Diagnosis: impaired gait   Quantity:  1 each   Refills:  0       polyethylene glycol Packet   Commonly known as:  MIRALAX/GLYCOLAX        Dose:  1 packet   Take 1 packet by mouth every other day   Refills:  0       POTASSIUM PO        Dose:  595 mg   Take 595 mg by mouth every morning   Refills:  0       TRUBIOTICS PO        Dose:  1 tablet   Take 1 tablet by mouth daily   Refills:  0       valsartan-hydrochlorothiazide 80-12.5 MG per tablet   Commonly known as:  DIOVAN-HCT        Dose:  1 tablet   Take 1 tablet by mouth daily   Refills:  0       VITAMIN B6 PO        Dose:  1 tablet   Take 1 tablet by mouth daily   Refills:  0       VITAMIN D3 PO        Dose:  400 Units   Take 400 Units by mouth daily   Refills:  0       ZOCOR 40 MG tablet   Generic drug:  simvastatin        Dose:  40 mg   Take 40 mg by mouth every morning   Refills:  0       * Notice:  This list has 2  medication(s) that are the same as other medications prescribed for you. Read the directions carefully, and ask your doctor or other care provider to review them with you.         Where to get your medicines      Some of these will need a paper prescription and others can be bought over the counter. Ask your nurse if you have questions.     You don't need a prescription for these medications     acetaminophen 325 MG tablet    metoprolol 25 MG tablet         Information about where to get these medications is not yet available     ! Ask your nurse or doctor about these medications     HYDROmorphone 2 MG tablet                Protect others around you: Learn how to safely use, store and throw away your medicines at www.disposemymeds.org.             Medication List: This is a list of all your medications and when to take them. Check marks below indicate your daily home schedule. Keep this list as a reference.      Medications           Morning Afternoon Evening Bedtime As Needed    acetaminophen 325 MG tablet   Commonly known as:  TYLENOL   Take 2 tablets (650 mg) by mouth every 4 hours as needed for mild pain                                albuterol 108 (90 BASE) MCG/ACT Inhaler   Commonly known as:  PROAIR HFA/PROVENTIL HFA/VENTOLIN HFA   Inhale 2 puffs into the lungs every 6 hours as needed for shortness of breath / dyspnea or wheezing                                ASPIRIN PO   Take 81 mg by mouth daily   Last time this was given:  81 mg on 3/25/2017  8:38 AM                                CALCIUM PO   Take 600 mg by mouth daily                                cefTRIAXone 1 GM vial   Commonly known as:  ROCEPHIN   Inject 2 g (2,000 mg) into the vein daily CBC with differential, creatinine, SGOT weekly while on this medication to be faxed to Dr. Douglas office.   Last time this was given:  2 g on 3/25/2017 12:50 PM                                DAKINS EX   Externally apply topically daily                                 HYDROmorphone 2 MG tablet   Commonly known as:  DILAUDID   Take 1-2 tablets (2-4 mg) by mouth every 4 hours as needed for moderate to severe pain   Last time this was given:  4 mg on 3/25/2017  3:59 PM                                LASIX PO   Take 20 mg by mouth daily                                metoprolol 25 MG tablet   Commonly known as:  LOPRESSOR   Take 1 tablet (25 mg) by mouth 2 times daily   Last time this was given:  25 mg on 3/25/2017  8:38 AM                                multivitamin, therapeutic with minerals Tabs tablet   Take 1 tablet by mouth daily.                                * order for DME   Equipment being ordered: Walker Wheels () and Walker () Treatment Diagnosis: difficulty with gait                                * order for DME   Equipment being ordered: Walker Wheels () and Walker () Treatment Diagnosis: impaired gait                                polyethylene glycol Packet   Commonly known as:  MIRALAX/GLYCOLAX   Take 1 packet by mouth every other day   Last time this was given:  17 g on 3/25/2017  8:38 AM                                POTASSIUM PO   Take 595 mg by mouth every morning                                TRUBIOTICS PO   Take 1 tablet by mouth daily                                valsartan-hydrochlorothiazide 80-12.5 MG per tablet   Commonly known as:  DIOVAN-HCT   Take 1 tablet by mouth daily   Last time this was given:  1 tablet on 3/25/2017 11:30 AM                                VITAMIN B6 PO   Take 1 tablet by mouth daily                                VITAMIN D3 PO   Take 400 Units by mouth daily                                ZOCOR 40 MG tablet   Take 40 mg by mouth every morning   Generic drug:  simvastatin                                * Notice:  This list has 2 medication(s) that are the same as other medications prescribed for you. Read the directions carefully, and ask your doctor or other care provider to review them with you.

## 2017-03-21 NOTE — IP AVS SNAPSHOT
"[facility] CARDIAC SPECIALTY CARE: [phone]                                              INTERAGENCY TRANSFER FORM - PHYSICIAN ORDERS   3/21/2017                    Hospital Admission Date: 3/21/2017  KE LIZ   : 1945  Sex: Female        Attending Provider: Alyson Ledezma MD     Allergies:  Pcn [Penicillin V Potassium], Oxycodone, Sulfa Drugs, Valium [Diazepam], Vicodin [Hydrocodone-acetaminophen], Iodine Solution [Povidone Iodine]    Infection:  None   Service:  CARDIAC CATH    Ht:  1.676 m (5' 6\")   Wt:  76.1 kg (167 lb 12.3 oz)   Admission Wt:  75.5 kg (166 lb 7.2 oz)    BMI:  27.08 kg/m 2   BSA:  1.88 m 2            Patient PCP Information     Provider PCP Type    Thad Puri MD General      ED Clinical Impression     Diagnosis Description Comment Added By Time Added    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] 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]  Lewis Crockett MD 3/25/2017 12:04 PM    Paroxysmal supraventricular tachycardia (H) [I47.1] Paroxysmal supraventricular tachycardia (H) [I47.1]  Yael Reveles DO 3/25/2017  6:18 PM      Hospital Problems as of 3/25/2017              Priority Class Noted POA    Left hip pain Medium  3/22/2017 Yes      Non-Hospital Problems as of 3/25/2017              Priority Class Noted    S/P foot surgery    10/24/2011    Syncope   10/25/2011    Hypertension   10/25/2011    Bradycardia   10/25/2011    Hyperlipidemia LDL goal <130   10/25/2011    Pain in joint, ankle and foot   2012    Sprain of neck   2012    Sprain and strain of shoulder and upper arm   2012    Elbow strain   2012    Wrist strain   2012    S/P ankle fusion   3/10/2014    Cellulitis Medium  2016    Sepsis (H) Medium  2016    Septic joint of left knee joint (H) Medium  2017      Code Status History     Date Active Date Inactive Code Status Order ID Comments User Context    3/25/2017 12:04 PM  " Full Code [id number]  Lewis Crockett MD Outpatient    3/22/2017  1:05 AM 3/25/2017 12:04 PM Full Code [id number]  Alyson Ledezma MD Inpatient    2/23/2017  7:32 AM 3/22/2017  1:05 AM Full Code [id number]  Husam Berkowitz MD Outpatient    2/22/2017  7:56 AM 2/23/2017  7:32 AM Full Code [id number]  Husam Berkowitz MD Outpatient    2/20/2017  3:26 PM 2/22/2017  7:56 AM Full Code [id number]  Husam Berkowitz MD Inpatient    1/2/2017  1:51 PM 2/20/2017  3:26 PM Full Code [id number]  Masoud Wallis DO Outpatient    12/26/2016 12:12 AM 12/27/2016  7:28 PM Full Code [id number]  Sujey Hu MD Inpatient    3/10/2014  1:59 PM 3/12/2014  3:57 PM Full Code [id number]  Hallie Musa RN Inpatient    10/25/2011  4:23 PM 10/26/2011  7:40 PM Full Code [id number]  Garrett Perez DO Inpatient         Medication Review      START taking        Dose / Directions Comments    acetaminophen 325 MG tablet   Commonly known as:  TYLENOL   Used for:  Streptococcal arthritis of left knee (H)        Dose:  650 mg   Take 2 tablets (650 mg) by mouth every 4 hours as needed for mild pain   Quantity:  100 tablet   Refills:  0        metoprolol 25 MG tablet   Commonly known as:  LOPRESSOR   Used for:  Paroxysmal supraventricular tachycardia (H)        Dose:  25 mg   Take 1 tablet (25 mg) by mouth 2 times daily   Quantity:  60 tablet   Refills:  0          CONTINUE these medications which have NOT CHANGED        Dose / Directions Comments    albuterol 108 (90 BASE) MCG/ACT Inhaler   Commonly known as:  PROAIR HFA/PROVENTIL HFA/VENTOLIN HFA        Dose:  2 puff   Inhale 2 puffs into the lungs every 6 hours as needed for shortness of breath / dyspnea or wheezing   Refills:  0        ASPIRIN PO        Dose:  81 mg   Take 81 mg by mouth daily   Refills:  0        CALCIUM PO        Dose:  600 mg   Take 600 mg by mouth daily   Refills:  0        cefTRIAXone 1 GM vial   Commonly known as:   ROCEPHIN   Used for:  Streptococcal arthritis of left knee (H)        Dose:  2000 mg   Inject 2 g (2,000 mg) into the vein daily CBC with differential, creatinine, SGOT weekly while on this medication to be faxed to Dr. Douglas office.   Quantity:  600 mL   Refills:  0        DAKINS EX        Externally apply topically daily   Refills:  0        HYDROmorphone 2 MG tablet   Commonly known as:  DILAUDID   Used for:  Streptococcal arthritis of left knee (H)        Dose:  2-4 mg   Take 1-2 tablets (2-4 mg) by mouth every 4 hours as needed for moderate to severe pain   Quantity:  70 tablet   Refills:  0        LASIX PO        Dose:  20 mg   Take 20 mg by mouth daily   Refills:  0        multivitamin, therapeutic with minerals Tabs tablet        Dose:  1 tablet   Take 1 tablet by mouth daily.   Refills:  0        * order for DME   Used for:  Pain in joint, ankle and foot, left        Equipment being ordered: Walker Wheels () and Walker () Treatment Diagnosis: difficulty with gait   Quantity:  1 each   Refills:  0        * order for DME   Used for:  Sepsis, due to unspecified organism (H)        Equipment being ordered: Walker Wheels () and Walker () Treatment Diagnosis: impaired gait   Quantity:  1 each   Refills:  0        polyethylene glycol Packet   Commonly known as:  MIRALAX/GLYCOLAX        Dose:  1 packet   Take 1 packet by mouth every other day   Refills:  0        POTASSIUM PO        Dose:  595 mg   Take 595 mg by mouth every morning   Refills:  0        TRUBIOTICS PO        Dose:  1 tablet   Take 1 tablet by mouth daily   Refills:  0        valsartan-hydrochlorothiazide 80-12.5 MG per tablet   Commonly known as:  DIOVAN-HCT        Dose:  1 tablet   Take 1 tablet by mouth daily   Refills:  0        VITAMIN B6 PO        Dose:  1 tablet   Take 1 tablet by mouth daily   Refills:  0        VITAMIN D3 PO        Dose:  400 Units   Take 400 Units by mouth daily   Refills:  0        ZOCOR 40 MG tablet    Generic drug:  simvastatin        Dose:  40 mg   Take 40 mg by mouth every morning   Refills:  0        * Notice:  This list has 2 medication(s) that are the same as other medications prescribed for you. Read the directions carefully, and ask your doctor or other care provider to review them with you.              Further instructions from your care team       Patient will discharge to [facility] today via [facility] wheelchair at 18:00 today.  [facility]'s phone number is [phone].    Summary of Visit     Reason for your hospital stay       Hip pain.   Chronic knee infection  Palpitations, had ablation with resolution of symptoms.             After Care     Advance Diet as Tolerated       Follow this diet upon discharge: Orders Placed This Encounter      Regular Diet Adult       Fall precautions           General info for SNF       Length of Stay Estimate: Short Term Care: Estimated # of Days <30  Condition at Discharge: Improving  Level of care:skilled   Rehabilitation Potential: Good  Admission H&P remains valid and up-to-date: Yes  Recent Chemotherapy: N/A  Use Nursing Home Standing Orders: Yes       Mantoux instructions       Give two-step Mantoux (PPD) Per Facility Policy Yes       Weight bearing status       No weight bearing on the right knee             Referrals     Physical Therapy Adult Consult       Evaluate and treat as clinically indicated.    Reason:  Weakness             Your next 10 appointments already scheduled     Apr 26, 2017  1:15 PM CDT   [facility] EP RETURN with Tyrone Boyd MD   [facility] PHYS[facility] AT [place] ([facility])    [address] [address] [address] [phone]              Follow-Up Appointment Instructions     Future Labs/Procedures    Follow Up and recommended labs and tests     Comments:    Follow up with group home physician.  The following labs/tests are recommended: CBC, BMP in one week.  Follow up with  Infectious and Cardiology specialists  in 2-3 weeks.    Follow Up     Comments:    Follow up with Dr. Boyd at the [facility] ([phone]) at 4/26 at 1:15pm      Follow-Up Appointment Instructions     Follow Up       Follow up with Dr. Boyd at the [facility] ([phone]) at 4/26 at 1:15pm       Follow Up and recommended labs and tests       Follow up with prison physician.  The following labs/tests are recommended: CBC, BMP in one week.  Follow up with Infectious and Cardiology specialists  in 2-3 weeks.             Statement of Approval     Ordered          03/25/17 1205  I have reviewed and agree with all the recommendations and orders detailed in this document.  EFFECTIVE NOW     Approved and electronically signed by:  Lewis Crockett MD

## 2017-03-21 NOTE — IP AVS SNAPSHOT
` `           [facility] CARDIAC SPECIALTY CARE: [phone]                 INTERAGENCY TRANSFER FORM - NOTES (H&P, Discharge Summary, Consults, Procedures, Therapies)   3/21/2017                    Hospital Admission Date: 3/21/2017  MARÍA LIZ   : 1945  Sex: Female        Patient PCP Information     Provider PCP Type    Thad Puri MD General      History & Physicals     No notes of this type exist for this encounter.         Discharge Summaries      Discharge Summaries by Lewis Crockett MD at 3/25/2017  3:33 PM     Author:  Lewis Crockett MD Service:  Internal Medicine Author Type:  Physician    Filed:  3/25/2017  3:33 PM Date of Service:  3/25/2017  3:33 PM Note Created:  3/25/2017  3:24 PM    Status:  Signed :  Lewis Crockett MD (Physician)         [facility] Discharge Summary    María Liz MRN# [id number]   Age: 72 year old YOB: 1945     Date of Admission:  3/21/2017  Date of Discharge::[NN1.1]  3/25/2017[NN1.2]   Admitting Physician:  Alyson Ledezma MD  Discharge Physician:[NN1.1]  Lewis Crockett MD[NN1.2]    Home clinic: Dr Thad Puri          Admission Diagnoses:   Hip pain [M25.559]            Discharge Diagnosis:[NN1.1]   Patient Active Problem List   Diagnosis     Syncope     Hypertension     Bradycardia     Hyperlipidemia LDL goal <130     S/P foot surgery      Pain in joint, ankle and foot     Sprain of neck     Sprain and strain of shoulder and upper arm     Elbow strain     Wrist strain     S/P ankle fusion     Cellulitis     Sepsis (H)     Septic joint of left knee joint (H)     Left hip pain[NN1.2]     AVNRT status post ablation   Left hip pain - no fracture           Procedures:   Invasive procedures: ablation of abnormal electrical heart pathway   All laboratory and imaging data in the past 24 hours reviewed[NN1.1]  Lab Results   Component Value Date    WBC 6.6 2017    WBC 9.8 2017     WBC 7.5 03/21/2017    HGB 11.4 (L) 03/23/2017    HGB 12.8 03/21/2017    HGB 12.3 03/21/2017    HCT 36.7 03/23/2017    HCT 40.6 03/21/2017    HCT 40.2 03/21/2017     03/23/2017     03/21/2017     03/21/2017     03/24/2017     03/23/2017     03/21/2017    POTASSIUM 4.1 03/25/2017    POTASSIUM 3.9 03/24/2017    POTASSIUM 3.8 03/23/2017    CHLORIDE 106 03/24/2017    CHLORIDE 105 03/23/2017    CHLORIDE 104 03/21/2017    CO2 30 03/24/2017    CO2 30 03/23/2017    CO2 29 03/21/2017    BUN 18 03/24/2017    BUN 18 03/23/2017    BUN 27 03/21/2017    CR 0.63 03/24/2017    CR 0.75 03/23/2017    CR 0.83 03/21/2017    GLC 95 03/24/2017    GLC 91 03/23/2017     (H) 03/21/2017    SED 30 02/01/2017    SED 30 01/25/2017    SED 41 (H) 01/18/2017    DD 0.3 10/25/2011    TROPI <0.015 03/21/2017    TROPI Canceled, Test credited   Specimen not received   03/21/2017    TROPI <0.012 10/25/2011    AST 29 03/21/2017    AST 32 03/21/2017    AST Canceled, Test credited   Specimen not received   03/21/2017    ALT 41 03/21/2017    ALT Canceled, Test credited   Specimen not received   03/21/2017    ALT 50 12/25/2016    ALKPHOS 173 (H) 03/21/2017    ALKPHOS Canceled, Test credited   Specimen not received   03/21/2017    ALKPHOS 83 12/25/2016    BILITOTAL 0.2 03/21/2017    BILITOTAL Canceled, Test credited   Specimen not received   03/21/2017    BILITOTAL 0.8 12/25/2016    INR 1.29 (H) 12/25/2016    INR 0.91 07/12/2013    INR 1.03 05/14/2005[NN1.3]     Imaging performed:   Chest x-rayAP PORTABLE CHEST X-RAY  3/22/2017 6:38 AM      INDICATION: Verify PICC placement.     COMPARISON: 3/12/2017.         IMPRESSION: There is a right PICC with tip in SVC. This appears to be  a new line compared to the previous x-ray as the course of the line in  the axilla is slightly different. Otherwise no change.     MAURO FREEMAN MD       [facility] procedures [facility]fromed:   [facility]  Echocardiography Laboratory  [address] [place] [address]        Name: KE LIZ  MRN: [id number]  : 1945  Study Date: 2017 09:40 AM  Age: 72 yrs  Gender: Female  Patient Location: [facility]  Reason For Study: Other, Please Specify in Comments  Ordering Physician: KOBE BROOKS  Referring Physician: MARGUERITE DESAI  Performed By: Nando Reynolds RDCS     BSA: 1.8 m2  Height: 66 in  Weight: 166 lb  HR: 68  BP: 139/75 mmHg  _____________________________________________________________________________  __        Procedure  Complete Portable Echo Adult.  _____________________________________________________________________________  __        Interpretation Summary     The visual ejection fraction is estimated at 55-60%.  Left ventricular systolic function is normal.  The ascending aorta is Borderline dilated.        MR LUMBAR SPINE WITHOUT CONTRAST 3/22/2017 3:46 PM      HISTORY: Posterior iliac crest/SI area pain, low back pain.     TECHNIQUE: Sagittal T1 and STIR. Sagittal T2 and axial dual-echo T2.      FINDINGS: Five lumbar vertebrae are assumed. Multilevel degenerative  disc disease changes, greatest on the right at T11-12. There is grade  1 degenerative anterolisthesis at L4-5, L5-S1. Slight scoliosis.      Findings by specific level:     There is facet degenerative change as follows: Mild right T11-12, mild  right T12-L1, mild bilateral L1-2, minimal bilateral L2-3, mild  bilateral L3-4, moderate-prominent right and moderate left L4-5,  moderate right and mild left L5-S1. Based upon sagittal imaging there  is right asymmetric disc bulging at T11-12 without cord compression.     There is disc bulging at L1-2, L2-3, L3-4, L4-5, L5-S1. Multilevel  ligamentum flavum thickening.   These findings combine to result in central stenosis as follows: Mild  L1-2, minimal L2-3 and L3-4, mild-moderate L4-5.   There is right foraminal stenosis as follows: Mild-moderate  L5-S1,  moderate L4-5, mild-moderate L1-2, mild-moderate T11-12.   There is left foraminal stenosis as follows: Mild-moderate L5-S1,  moderate to moderate-severe L4-5, mild-moderate L3-4 and L1-2. The  remaining lumbar neural foramina appear adequate.          IMPRESSION:  1. Multilevel degenerative disc and facet disease.  2. L1-2: Mild central stenosis.  3. L2-3: Minimal central stenosis.  4. L3-4: Minimal central stenosis.  5. L4-5: Grade 1 spondylolisthesis. Mild-moderate central stenosis.  Moderate right and moderate to moderate-severe left foraminal  stenosis.  6. L5-S1: Grade 1 spondylolisthesis.         MARY BRANDON MD  MR HIP LEFT WITHOUT CONTRAST  3/22/2017 10:36 PM     HISTORY:  Left hip pain without significant trauma. Evaluate for  occult fracture.     TECHNIQUE:  Multiplanar, multisequence without contrast.     COMPARISON: None.     FINDINGS:   Osseous and Cartilaginous Structures:  No acute-appearing bony  abnormalities. Mild-to-moderate loss of articular cartilage in the  weightbearing hips bilaterally consistent with early degenerative  change. No evidence for osteonecrosis.     Acetabular Labrum: Small cystic structures around the superior  acetabulum bilaterally likely indicating underlying labral tears. If  clinically indicated, this could be better evaluated with MR  arthrogram.     Hip joint space:  No significant joint effusion.      Trochanteric and Iliopsoas Bursae: No fluid collection in the  trochanteric or iliopsoas bursae.     Common Hamstring Tendon: No evidence of tear or significant  tendinosis.     Additional Findings: There is abnormal edema in the lateral left  gluteus medius muscle consistent with at least grade 1 muscle strain.  There is a tiny focus of fluid signal intensity underneath the distal  right gluteus minimus tendon at the insertion on the greater  trochanter consistent with bursitis (image 24 of series 701 and image  17 of series 601). Distal gluteal tendinous  insertions bilaterally are  otherwise normal.     Internal Pelvic Structures: Unremarkable.         IMPRESSION:   1. No acute bony abnormality.  2. Mild bilateral hip degenerative change.  3. Probable small cystic changes adjacent to the superior acetabular  labrum bilaterally indicative of underlying labral pathology.  4. At least grade 1 muscle strain involving the lateral aspect of the  left gluteus medius muscle. This is likely responsible for the  patient's current symptoms.  5. Minimal bursitis at the distal right gluteus minimus tendinous  insertion.     SHAMA HUNT MD         Medications Prior to Admission:[NN1.1]     Prescriptions Prior to Admission   Medication Sig Dispense Refill Last Dose     polyethylene glycol (MIRALAX/GLYCOLAX) Packet Take 1 packet by mouth every other day   3/20/2017     cefTRIAXone (ROCEPHIN) 1 GM vial Inject 2 g (2,000 mg) into the vein daily CBC with differential, creatinine, SGOT weekly while on this medication to be faxed to Dr. Douglas office. 600 mL 0 3/21/2017 at 1200     CALCIUM PO Take 600 mg by mouth daily   3/21/2017 at Unknown time     Probiotic Product (TRUBIOTICS PO) Take 1 tablet by mouth daily   3/21/2017 at Unknown time     albuterol (PROAIR HFA/PROVENTIL HFA/VENTOLIN HFA) 108 (90 BASE) MCG/ACT Inhaler Inhale 2 puffs into the lungs every 6 hours as needed for shortness of breath / dyspnea or wheezing    at prn     Pyridoxine HCl (VITAMIN B6 PO) Take 1 tablet by mouth daily   3/21/2017 at Unknown time     DAKINS EX Externally apply topically daily   3/21/2017 at Unknown time     Furosemide (LASIX PO) Take 20 mg by mouth daily    3/21/2017 at Unknown time     Cholecalciferol (VITAMIN D3 PO) Take 400 Units by mouth daily   3/21/2017 at Unknown time     POTASSIUM PO Take 595 mg by mouth every morning    3/21/2017 at Unknown time     valsartan-hydrochlorothiazide (DIOVAN-HCT) 80-12.5 MG per tablet Take 1 tablet by mouth daily   3/21/2017 at Unknown time     ASPIRIN PO  Take 81 mg by mouth daily    3/21/2017 at Unknown time     multivitamin, therapeutic with minerals (THERA-VIT-M) TABS Take 1 tablet by mouth daily.   3/21/2017 at Unknown time     simvastatin (ZOCOR) 40 MG tablet Take 40 mg by mouth every morning    3/21/2017 at Unknown time     order for DME Equipment being ordered: Walker Wheels () and Walker ()  Treatment Diagnosis: impaired gait 1 each 0      order for DME Equipment being ordered: Walker Wheels () and Walker ()  Treatment Diagnosis: difficulty with gait 1 each 0[NN1.2]              Discharge Medications:     Current Discharge Medication List      START taking these medications    Details   acetaminophen (TYLENOL) 325 MG tablet Take 2 tablets (650 mg) by mouth every 4 hours as needed for mild pain  Qty: 100 tablet    Associated Diagnoses: Streptococcal arthritis of left knee (H)         CONTINUE these medications which have CHANGED    Details   HYDROmorphone (DILAUDID) 2 MG tablet Take 1-2 tablets (2-4 mg) by mouth every 4 hours as needed for moderate to severe pain  Qty: 70 tablet, Refills: 0    Associated Diagnoses: Streptococcal arthritis of left knee (H)         CONTINUE these medications which have NOT CHANGED    Details   polyethylene glycol (MIRALAX/GLYCOLAX) Packet Take 1 packet by mouth every other day      cefTRIAXone (ROCEPHIN) 1 GM vial Inject 2 g (2,000 mg) into the vein daily CBC with differential, creatinine, SGOT weekly while on this medication to be faxed to Dr. Douglas office.  Qty: 600 mL, Refills: 0    Associated Diagnoses: Streptococcal arthritis of left knee (H)      CALCIUM PO Take 600 mg by mouth daily      Probiotic Product (TRUBIOTICS PO) Take 1 tablet by mouth daily      albuterol (PROAIR HFA/PROVENTIL HFA/VENTOLIN HFA) 108 (90 BASE) MCG/ACT Inhaler Inhale 2 puffs into the lungs every 6 hours as needed for shortness of breath / dyspnea or wheezing      Pyridoxine HCl (VITAMIN B6 PO) Take 1 tablet by mouth daily       DAKINS EX Externally apply topically daily      Furosemide (LASIX PO) Take 20 mg by mouth daily       Cholecalciferol (VITAMIN D3 PO) Take 400 Units by mouth daily      POTASSIUM PO Take 595 mg by mouth every morning       valsartan-hydrochlorothiazide (DIOVAN-HCT) 80-12.5 MG per tablet Take 1 tablet by mouth daily      ASPIRIN PO Take 81 mg by mouth daily       multivitamin, therapeutic with minerals (THERA-VIT-M) TABS Take 1 tablet by mouth daily.      simvastatin (ZOCOR) 40 MG tablet Take 40 mg by mouth every morning       !! order for DME Equipment being ordered: Walker Wheels () and Walker ()  Treatment Diagnosis: impaired gait  Qty: 1 each, Refills: 0    Associated Diagnoses: Sepsis, due to unspecified organism (H)      !! order for DME Equipment being ordered: Walker Wheels () and Walker ()  Treatment Diagnosis: difficulty with gait  Qty: 1 each, Refills: 0    Associated Diagnoses: Pain in joint, ankle and foot, left       !! - Potential duplicate medications found. Please discuss with provider.                Consultations:   Consultation during this admission received from cardiology and orthopedics   Cardiac Electrophysiology Progress Note               Assessment and Plan:      Left hip pain    Psvt, 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  No arrhythmia overnight. Ok to be discharged from our perspective.              Brief History of Illness:      María Henderson is a 72-year-old woman with a past medical history significant for hypertension, hyperlipidemia, gastroesophageal reflux disease, osteoarthritis with prior infected right knee prosthesis which was explanted and antibiotic spacer was devised and maintained on IV Rocephin, presented to the emergency room with left hip pain. Admitted to inpatient treatment.          Hospital Course:          Left hip pain, musculoskeletal. No trauma but it happened after patient stood up and attempted to turn around while using her  walker. No fall or other injuries. She is nonweightbearing on her right leg since her knee surgery a month ago. X-ray pelvis in the emergency room does not reveal any fracture. She is tender around her hip joint but no specific tender point. No skin bruise or hematoma noted. She likely has muscle sprain   - Orthopedic Surgery consulted- pain management advised   - MRI completed. Negative for fractures. Has DDD and OA of the LS spine   - Pain control.   - Therapies.       Supraventricular tachycardia, paroxysmal. Patient does have at least 4 episodes of palpitation that she recalls since 12/2016. Initial episode was during hospitalization in the setting of sepsis when her heart rate was up to 172 and patient reports she had a syncopal episode. Her echocardiogram from December shows normal ejection fraction and without regional wall motion abnormality. It seems thyroid was not tested. TSH at 4.66.   - Cards/EP consulted , ablation has been done   - Required adenosine on evening of 3/23.   - echocardiogram completed.       Infected prosthesis right knee. Patient initially had group B streptococcal sepsis in 12/2016, left ankle was thought to be the source, was treated with Rocephin. Patient had arthrodesis of the left ankle. Subsequently, developed infection of the prosthesis on right knee. She underwent explantation of the right total knee and an antibiotic spacer was placed. She is followed by Dr. Berkowitz and Dr. Johnson. Dr. Berkowitz from Orthopedic Surgery and Dr. Johnson from Infectious Disease.   - PICC line in her right arm which appears normal and was replaced last week, according to the patient, due to abnormal position.   - Continue her prior to admission Rocephin. Planned till 4/2/17        Hypertension and hyperlipidemia. Prior to admission Diovan HCT resumed.             Discharge Instructions and Follow-Up:   Discharge diet: Regular   Discharge activity: Activity as tolerated   Discharge follow-up:  Follow up with primary care provider in 7 days           Discharge Disposition:   Discharged to nursing home      Attestation:  I have reviewed today's vital signs, notes, medications, labs and imaging.  Amount of time performed on this discharge summary: 20 minutes.[NN1.1]    Lewis Crockett MD[NN1.2]        Revision History        User Key Date/Time User Provider Type Action    > NN1.3 3/25/2017  3:33 PM Lewis Crockett MD Physician Sign     NN1.2 3/25/2017  3:25 PM Lewis Crockett MD Physician      NN1.1 3/25/2017  3:24 PM Lewis Crockett MD Physician                      Consult Notes      Consults signed by Tyrone Boyd MD at 3/23/2017 10:47 AM      Author:  Tyrone Boyd MD Service:  Cardiology Author Type:  Physician    Filed:  3/23/2017 10:47 AM Date of Service:  3/22/2017 10:26 AM Note Created:  3/22/2017 10:39 AM    Status:  Signed :  Tyrone Boyd MD (Physician)         CARDIOVASCULAR CONSULTATION       DATE OF SERVICE:  03/22/2017      REASON FOR CONSULTATION:  Evaluation of paroxysmal supraventricular tachycardia.      HISTORY OF PRESENT ILLNESS:  Ms. María Henderson is a delightful 72-year-old lady with a history of hypertension, GERD and osteoarthritis who has had several orthopedic surgeries done (19 surgeons in the left ankle, recent right knee replacement), who presents with symptomatic SVT.      The patient informs me she has had several orthopedic surgeries done.  Most recently she had a right knee replacement done which unfortunately got infected.  She had the hardware removed and currently is on antibiotic therapy at home (PICC line in place).  They have hopes to complete antibiotic and redo knee replacement in the next couple of weeks.        At this time she came to the ER because she was having hip pain.  In the ED she was found to have SVT which self-terminated.      When inquired about SVT, she informs me since 12/27 she has had 4 episodes  "(one per month).  The episodes are associated with significant palpitations, shortness of breath.      EKG was reviewed.  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  Echocardiogram obtained on 12/31 showed EF of 50% with borderline septal hypertrophy.  RV was moderately dilated.  No significant valve disease was noticed.  EKG after conversion revealed normal sinus rhythm with right atrial enlargement and frequent PACs.      ASSESSMENT AND PLAN:  Symptomatic paroxysmal SVT.  We discussed options including doing nothing, start pharmacotherapy or pursue catheter ablation procedure.  She seems to be very symptomatic and has frequent episodes.  I explained the catheter ablation procedure in detail.  She understands there is a 1%  to 2% risk of complications associated with the procedure.  Since she seems to have ongoing infection, I favor starting pharmacotherapy at this time. Once the infection process is resolved we will consider ablation.  Of course if she fails pharmacotherapy, will pursue catheter ablation procedure.      I recommend starting metoprolol 25 mg b.i.d., and she will follow up with me in clinic at [place] on 04/26 at 1:15 p.m.       PHYSICAL EXAM:  Vitals:[LA1.1] /75 (BP Location: Left arm)  Temp 98  F (36.7  C) (Oral)  Resp 18  Ht 1.676 m (5' 6\")  Wt 75.3 kg (166 lb 0.1 oz)  SpO2 94%  BMI 26.79 kg/m2[LA1.2]      Intake/Output Summary (Last 24 hours) at 03/23/17 1046  Last data filed at 03/23/17 1000   Gross per 24 hour   Intake              590 ml   Output             1425 ml   Net             -835 ml[LA1.1]     Vitals:    03/22/17 0122 03/23/17 0616   Weight: 75.5 kg (166 lb 7.2 oz) 75.3 kg (166 lb 0.1 oz)[LA1.2]       Constitutional:  AAO x3.  Pt is in NAD.  HEAD: Normocephalic.  SKIN: Skin normal color, texture and turgor with no lesions or eruptions.  Eyes: PERRL, EOMI.  ENT:  Supple, normal JVP. No lymphadenopathy or thyroid enlargement.  Chest:  CTAB.  Cardiac:  RRR, normal  S1 and S2.  No " murmurs rubs or gallop.  Abdomen:  Normal BS.  Soft, non-tender and non-distended.  No rebound or guarding.    Extremities:  Pedious pulses palpable B/L.  No LE edema noticed.   Neurological: Strength and sensation grossly symmetric and intact throughout.       Review of Systems:   As per subjective, otherwise 5 systems reviewed and negative.    CURRENT MEDICATIONS:[LA1.1]    aspirin chewable tablet 81 mg  81 mg Oral Daily     cefTRIAXone  2 g Intravenous Q24H     polyethylene glycol  17 g Oral Daily     valsartan-hydrochlorothiazide  1 tablet Oral Daily     metoprolol  25 mg Oral BID[LA1.2]     PRN Meds:[LA1.1] albuterol, HYDROmorphone, naloxone, acetaminophen, acetaminophen, ondansetron **OR** ondansetron, potassium chloride, potassium chloride, potassium chloride, potassium chloride with lidocaine, potassium chloride, magnesium sulfate, tramadol[LA1.2]    ALLERGIES[LA1.1]     Allergies   Allergen Reactions     Pcn [Penicillin V Potassium] Anaphylaxis     Oxycodone      Sulfa Drugs      Valium [Diazepam] Other (See Comments)     unconsciousness     Vicodin [Hydrocodone-Acetaminophen]      Patient unsure if allergy     Iodine Solution [Povidone Iodine] Rash[LA1.2]       PAST MEDICAL HISTORY:[LA1.1]  Past Medical History:   Diagnosis Date     Allergic rhinitis      Gastro-oesophageal reflux disease      Heart murmur     ?murmur     Hyperlipidemia      Hypertension      Numbness and tingling     numbness left distal medial tibia     Osteoarthrosis[LA1.2]        PAST SURGICAL HISTORY:[LA1.1]  Past Surgical History:   Procedure Laterality Date     7 foot and ankle        ARTHROPLASTY REVISION ANKLE  3/10/2014    Procedure: ARTHROPLASTY REVISION ANKLE;  REVISION LEFT TOTAL ANKLE  WITH CONVERSION TO  IM LAURIE FUSION WITH FEMORAL HEAD ALLOGRAFT (C-ARM);  Surgeon: Ramirze Fang MD;  Location: [facility]     ASPIRATION NEEDLE KNEE Right 12/27/2016    Procedure: ASPIRATION NEEDLE KNEE;  Surgeon: Jian Fisher MD;   Location:  OR     BUNIONECTOMY JONAS  10/24/2011    Procedure:BUNIONECTOMY JONAS; Left Foot Bunionette Repair (C-Arm); Surgeon:BHAVIK PENG; Location: [facility]     HERNIA REPAIR       IRRIGATION AND DEBRIDEMENT FOOT, COMBINED Left 12/27/2016    Procedure: COMBINED IRRIGATION AND DEBRIDEMENT FOOT;  Surgeon: Jian Fisher MD;  Location:  OR     ORTHOPEDIC SURGERY      RTKA     ORTHOPEDIC SURGERY Left     ROTATOR CUFF     REMOVE HARDWARE ARTHROPLASTY KNEE, IRRIG & JOSE, PLACE ANTIBIOTIC CEMENT SPACER, COMBINED Right 2/20/2017    Procedure: COMBINED REMOVE HARDWARE ARTHROPLASTY KNEE, IRRIGATION AND DEBRIDEMENT, PLACE ANTIBIOTIC CEMENT BEADS / SPACER;  Surgeon: Husam Berkowitz MD;  Location:  OR     TONSILLECTOMY       total knee arthroplasy[LA1.2]         FAMILY HISTORY:[LA1.1]  No family history on file.[LA1.2]    SOCIAL HISTORY:[LA1.1]  Social History     Social History     Marital status:      Spouse name: N/A     Number of children: N/A     Years of education: N/A     Social History Main Topics     Smoking status: Never Smoker     Smokeless tobacco: Not on file     Alcohol use No     Drug use: No     Sexual activity: Yes     Other Topics Concern     Not on file     Social History Narrative[LA1.2]       Review of Systems:  Complete review of system is otherwise negative with the exception of what was described above.     Recent Lab Results:[LA1.1]    Recent Labs  Lab 03/23/17  0615 03/21/17  2238 03/21/17  0815 03/21/17  0615   WBC 6.6 9.8 7.5 Canceled, Test credited Specimen not received   HGB 11.4* 12.8 12.3 Canceled, Test credited Specimen not received   MCV 87 87 88 Canceled, Test credited Specimen not received    359 387 Canceled, Test credited Specimen not received    142  --  Canceled, Test credited Specimen not received   POTASSIUM 3.8 3.7  --  Canceled, Test credited Specimen not received   CHLORIDE 105 104  --  Canceled, Test credited Specimen not received    CO2 30 29  --  Canceled, Test credited Specimen not received   BUN 18 27  --  Canceled, Test credited Specimen not received   CR 0.75 0.83 0.70 Canceled, Test credited Specimen not received   ANIONGAP 7 9  --  Canceled, Test credited Specimen not received   MATHEW 9.3 9.3  --  Canceled, Test credited Specimen not received   GLC 91 192*  --  Canceled, Test credited Specimen not received   ALBUMIN  --  3.1*  --  Canceled, Test credited Specimen not received   PROTTOTAL  --  7.0  --  Canceled, Test credited Specimen not received   BILITOTAL  --  0.2  --  Canceled, Test credited Specimen not received   ALKPHOS  --  173*  --  Canceled, Test credited Specimen not received   ALT  --  41  --  Canceled, Test credited Specimen not received   AST  --  29 32 Canceled, Test credited Specimen not received   TROPI  --  <0.015  --  Canceled, Test credited Specimen not received[LA1.2]             TYRONE CHISHOLM MD             D: 2017 10:26   T: 2017 10:39   MT: cw      Name:     KE LIZ   MRN:      [id number]        Account:       [id number]   :      1945           Consult Date:  2017      Document: [id number][LA1.1]         Revision History        User Key Date/Time User Provider Type Action    > LA1.1 3/23/2017 10:47 Tyrone Kiser MD Physician Sign     LA1.2 3/23/2017 10:46 Tyrone Kiser MD Physician      [N/A] 3/22/2017 10:39 Tyrone Kiser MD Physician Edit                     Progress Notes - Physician (Notes from 17 through 17)      Progress Notes by Yael Reveles DO at 3/25/2017  6:19 PM     Author:  Yael Reveles DO Service:  Hospitalist Author Type:  Physician    Filed:  3/25/2017  6:19 PM Date of Service:  3/25/2017  6:19 PM Note Created:  3/25/2017  6:19 PM    Status:  Signed :  Yael Reveles DO (Physician)         Orders clarified with cardiology - will discharge on Metoprolol 25mg BID. Updated  [place] med rec.    Yael Reveles DO  Internal Medicine - Hospitalist  3/25/2017  6:19 PM[KW1.1]         Revision History        User Key Date/Time User Provider Type Action    > KW1.1 3/25/2017  6:19 PM Yael Reveles DO Physician Sign            Progress Notes by Inga Nascimento LICSW at 3/25/2017  3:59 PM     Author:  Inga Nascimento LICSW Service:  Social Work Author Type:      Filed:  3/25/2017  4:02 PM Date of Service:  3/25/2017  3:59 PM Note Created:  3/25/2017  3:59 PM    Status:  Signed :  Inga Nascimento LICSW ()         SW:  D:  Received discharge orders for patient.  Bed available at [facility] for today.  Patient is asking for transport to be arranged.  [employer] wheelchair ride set up for 18:00 today.  Patient informed of the plan and in agreement to the plan.  Call placed to update [facility] and faxed the orders and the PAS.    PAS-RR    D: Per DHS regulation, SW completed and submitted PAS-RR to [place] [employer] Direct Connect via the Senior LinkAge Line.  PAS-RR confirmation # is : [id number].    I: SW spoke with patient and they are aware a PAS-RR has been submitted.  SW reviewed with patient that they may be contacted for a follow up appointment within 10 days of hospital discharge if their SNF stay is < 30 days.  Contact information for Senior LinkAge Line was also provided.    A: Patient verbalized understanding.    P: Further questions may be directed to [facility] LinkAge Line at #[phone], option #4 for PAS-RR staff.[SJ1.1]     Revision History        User Key Date/Time User Provider Type Action    > SJ1.1 3/25/2017  4:02 PM Inga Nascimento LICSW  Sign            Progress Notes by Joaquim Ruano MD at 3/25/2017 10:30 AM     Author:  Joaquim Ruano MD Service:  Electrophysiology Author Type:  Physician    Filed:  3/25/2017 10:32 AM Date of Service:  3/25/2017 10:30 AM Note Created:  3/25/2017 10:30 AM    Status:   "Signed :  Joaquim Ruano MD (Physician)         Cardiac Electrophysiology Progress Note          Assessment and Plan:     Left hip pain    Psvt, 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  No arrhythmia overnight. Ok to be discharged from our perspective.                 Interval History:   no complaints and doing well; no cp, sob, n/v/d, or abd pain.              Review of Systems:   As per subjective, otherwise 5 systems reviewed and negative.           Physical Exam:   Blood pressure 132/70, pulse 68, temperature 98.2  F (36.8  C), temperature source Oral, resp. rate 18, height 1.676 m (5' 6\"), weight 76.1 kg (167 lb 12.3 oz), SpO2 96 %.          Intake/Output Summary (Last 24 hours) at 03/25/17 1030  Last data filed at 03/25/17 0847   Gross per 24 hour   Intake              640 ml   Output              900 ml   Net             -260 ml       Constitutional:   NAD   Skin:   Warm and dry   Head:   Nontraumatic   Neck:   Supple, symmetrical, trachea midline, no adenopathy, thyroid symmetric, not enlarged and no tenderness, skin normal   Lungs:   normal   Cardiovascular:   Normal apical impulse, regular rate and rhythm, normal S1 and S2, no S3 or S4, and no murmur noted    Abdomen:   Benign   Extremities and Back:   No edema, no right groin hematoma   Neurological:   Grossly nonfocal            Medications:       sodium chloride (PF)  3 mL Intracatheter Q8H     aspirin chewable tablet 81 mg  81 mg Oral Daily     cefTRIAXone  2 g Intravenous Q24H     polyethylene glycol  17 g Oral Daily     valsartan-hydrochlorothiazide  1 tablet Oral Daily     metoprolol  25 mg Oral BID     Admission on 03/12/2017, Discharged on 03/12/2017   Component Date Value Ref Range Status     WBC 03/12/2017 8.2  4.0 - 11.0 10e9/L Final     RBC Count 03/12/2017 4.22  3.8 - 5.2 10e12/L Final     Hemoglobin 03/12/2017 11.5* 11.7 - 15.7 g/dL Final     Hematocrit 03/12/2017 38.5  35.0 - 47.0 % Final     MCV 03/12/2017 91  78 - 100 fl Final "     MCH 03/12/2017 27.3  26.5 - 33.0 pg Final     MCHC 03/12/2017 29.9* 31.5 - 36.5 g/dL Final     RDW 03/12/2017 15.1* 10.0 - 15.0 % Final     Platelet Count 03/12/2017 368  150 - 450 10e9/L Final     Diff Method 03/12/2017 Automated Method   Final     % Neutrophils 03/12/2017 63.8  % Final     % Lymphocytes 03/12/2017 24.5  % Final     % Monocytes 03/12/2017 8.3  % Final     % Eosinophils 03/12/2017 2.2  % Final     % Basophils 03/12/2017 0.7  % Final     % Immature Granulocytes 03/12/2017 0.5  % Final     Nucleated RBCs 03/12/2017 0  0 /100 Final     Absolute Neutrophil 03/12/2017 5.3  1.6 - 8.3 10e9/L Final     Absolute Lymphocytes 03/12/2017 2.0  0.8 - 5.3 10e9/L Final     Absolute Monocytes 03/12/2017 0.7  0.0 - 1.3 10e9/L Final     Absolute Eosinophils 03/12/2017 0.2  0.0 - 0.7 10e9/L Final     Absolute Basophils 03/12/2017 0.1  0.0 - 0.2 10e9/L Final     Abs Immature Granulocytes 03/12/2017 0.0  0 - 0.4 10e9/L Final     Absolute Nucleated RBC 03/12/2017 0.0   Final     Sodium 03/12/2017 140  133 - 144 mmol/L Final     Potassium 03/12/2017 4.2  3.4 - 5.3 mmol/L Final    Specimen slightly hemolyzed, potassium may be falsely elevated     Chloride 03/12/2017 103  94 - 109 mmol/L Final     Carbon Dioxide 03/12/2017 28  20 - 32 mmol/L Final     Anion Gap 03/12/2017 9  3 - 14 mmol/L Final     Glucose 03/12/2017 93  70 - 99 mg/dL Final     Urea Nitrogen 03/12/2017 20  7 - 30 mg/dL Final     Creatinine 03/12/2017 0.65  0.52 - 1.04 mg/dL Final     GFR Estimate 03/12/2017 89  >60 mL/min/1.7m2 Final    Non  GFR Calc     GFR Estimate If Black 03/12/2017   >60 mL/min/1.7m2 Final                    Value:>90   GFR Calc       Calcium 03/12/2017 9.5  8.5 - 10.1 mg/dL Final     Magnesium 03/12/2017 2.2  1.6 - 2.3 mg/dL Final                  Joaquim Ruano MD[QP1.1]         Revision History        User Key Date/Time User Provider Type Action    > QP1.1 3/25/2017 10:32 AM Joaquim Ruano MD  Physician Sign            Progress Notes by Lewis Crockett MD at 3/25/2017  8:00 AM     Author:  Lewis Crockett MD Service:  Internal Medicine Author Type:  Physician    Filed:  3/25/2017  8:03 AM Date of Service:  3/25/2017  8:00 AM Note Created:  3/25/2017  8:00 AM    Status:  Signed :  Lewis Crockett MD (Physician)         [facility]  Hospitalist Progress Note  Lewis Crockett MD 03/25/2017             Assessment and Plan:          María Henderson is a 72-year-old woman with a past medical history significant for hypertension, hyperlipidemia, gastroesophageal reflux disease, osteoarthritis with prior infected right knee prosthesis which was explanted and antibiotic spacer was devised and maintained on IV Rocephin, presented to the emergency room with left hip pain.       Left hip pain, musculoskeletal. No trauma but it happened after patient stood up and attempted to turn around while using her walker. No fall or other injuries. She is nonweightbearing on her right leg since her knee surgery a month ago. X-ray pelvis in the emergency room does not reveal any fracture. She is tender around her hip joint but no specific tender point. No skin bruise or hematoma noted. She could have muscle sprain versus minor hairline fracture not noticeable on x-ray.  - Orthopedic Surgery consulted  - MRI completed.   - Pain control.   - Therapies.       Supraventricular tachycardia, paroxysmal. Patient does have at least 4 episodes of palpitation that she recalls since 12/2016. Initial episode was during hospitalization in the setting of sepsis when her heart rate was up to 172 and patient reports she had a syncopal episode. Her echocardiogram from December shows normal ejection fraction and without regional wall motion abnormality. It seems thyroid was not tested. TSH at 4.66.   - Cards/EP consulted    - Required adenosine on evening of 3/23.   - Ablation planned done 3/24.   -  "echocardiogram completed.       Infected prosthesis right knee. Patient initially had group B streptococcal sepsis in 12/2016, left ankle was thought to be the source, was treated with Rocephin. Patient had arthrodesis of the left ankle. Subsequently, developed infection of the prosthesis on right knee. She underwent explantation of the right total knee and an antibiotic spacer was placed. She is followed by Dr. Berkowitz and Dr. Johnson. Dr. Berkowitz from Orthopedic Surgery and Dr. Johnson from Infectious Disease.   - PICC line in her right arm which appears normal and was replaced last week, according to the patient, due to abnormal position.   - Continue her prior to admission Rocephin. Planned till 4/2/17  - Consider ID consult.       Hypertension and hyperlipidemia. Prior to admission Diovan HCT resumed.   - Hold Lasix at this time.       Deep venous thrombosis prophylaxis. pressure compression device      Code: Full code.       Disposition: [facility] with bed available today        Interval History (Subjective):      Feels well. No palpitations. No fever. Improved pain left hip                   Physical Exam:      Last Vital Signs:  BP (P) 126/73 (BP Location: Left arm)  Pulse 66  Temp (P) 98.2  F (36.8  C) (Oral)  Resp (P) 18  Ht 1.676 m (5' 6\")  Wt 76.1 kg (167 lb 12.3 oz)  SpO2 (P) 96%  BMI 27.08 kg/m2[NN1.1]      Intake/Output Summary (Last 24 hours) at 03/25/17 0803  Last data filed at 03/25/17 0506   Gross per 24 hour   Intake              650 ml   Output              900 ml   Net             -250 ml[NN1.2]       Constitutional: Awake, alert, cooperative, no apparent distress   Respiratory: Clear to auscultation bilaterally, no crackles or wheezing   Cardiovascular: Regular rate and rhythm, normal S1 and S2, and no murmur noted   Abdomen: Normal bowel sounds, soft, non-distended, non-tender   Skin: No rashes, no cyanosis, dry to touch   Neuro: Alert and oriented x3, no weakness, numbness, " memory loss   Extremities: No edema, normal range of motion   Other(s):        All other systems: Negative          Medications:      All current medications were reviewed with changes reflected in problem list.         Data:      All new lab and imaging data was reviewed.   Labs:[NN1.1]    Recent Labs  Lab 03/23/17  0615 03/21/17 2238 03/21/17  0815   WBC 6.6 9.8 7.5   HGB 11.4* 12.8 12.3   HCT 36.7 40.6 40.2   MCV 87 87 88    359 387       Recent Labs  Lab 03/25/17  0515 03/24/17  0610 03/23/17  0615 03/21/17 2238 03/21/17  0815 03/21/17  0615   NA  --  142 142 142  --  Canceled, Test credited Specimen not received   POTASSIUM 4.1 3.9 3.8 3.7  --  Canceled, Test credited Specimen not received   CHLORIDE  --  106 105 104  --  Canceled, Test credited Specimen not received   CO2  --  30 30 29  --  Canceled, Test credited Specimen not received   ANIONGAP  --  6 7 9  --  Canceled, Test credited Specimen not received   GLC  --  95 91 192*  --  Canceled, Test credited Specimen not received   BUN  --  18 18 27  --  Canceled, Test credited Specimen not received   CR  --  0.63 0.75 0.83 0.70 Canceled, Test credited Specimen not received   GFRESTIMATED  --  >90Non  GFR Calc 76 68 82 Canceled, Test credited Specimen not received   GFRESTBLACK  --  >90African American GFR Calc >90African American GFR Calc 82 >90African American GFR Calc Canceled, Test credited Specimen not received   MATHEW  --  9.1 9.3 9.3  --  Canceled, Test credited Specimen not received   MAG 2.2 2.2  --  2.2  --   --    PROTTOTAL  --   --   --  7.0  --  Canceled, Test credited Specimen not received   ALBUMIN  --   --   --  3.1*  --  Canceled, Test credited Specimen not received   BILITOTAL  --   --   --  0.2  --  Canceled, Test credited Specimen not received   ALKPHOS  --   --   --  173*  --  Canceled, Test credited Specimen not received   AST  --   --   --  29 32 Canceled, Test credited Specimen not received   ALT  --   --   --  41   --  Canceled, Test credited Specimen not received[NN1.2]      Imaging:[NN1.1]   No results found for this or any previous visit (from the past 24 hour(s)).[NN1.2]       Revision History        User Key Date/Time User Provider Type Action    > NN1.2 3/25/2017  8:03 AM Lewis Crockett MD Physician Sign     NN1.1 3/25/2017  8:00 AM Lewis Crockett MD Physician             Progress Notes by Husam Berkowitz MD at 3/24/2017  4:34 PM     Author:  Husam Berkowitz MD Service:  Orthopedics Author Type:  Physician    Filed:  3/24/2017  4:35 PM Date of Service:  3/24/2017  4:34 PM Note Created:  3/24/2017  4:34 PM    Status:  Signed :  Husam Berkowitz MD (Physician)         3/24/2017  16:00    María Henderson    Her low back is feeling better. To [facility] tomorrow.    I will see her in the office 4/12/2017.[ES1.1]         Revision History        User Key Date/Time User Provider Type Action    > ES1.1 3/24/2017  4:35 PM Husam Berkowitz MD Physician Sign            Progress Notes by Steve Robles DO at 3/24/2017  7:22 AM     Author:  Steve Robles DO Service:  Hospitalist Author Type:  Physician    Filed:  3/24/2017  2:52 PM Date of Service:  3/24/2017  7:22 AM Note Created:  3/24/2017  7:22 AM    Status:  Addendum :  Steve Robles DO (Physician)         [facility]  Hospitalist Progress Note        Steve Robles DO  03/24/2017        Interval History:[ZA1.1]      Patient with repeat episode of SVT overnight.[ZA1.2]          Assessment and Plan:        María Henderson is a 72-year-old woman with a past medical history significant for hypertension, hyperlipidemia, gastroesophageal reflux disease, osteoarthritis with prior infected right knee prosthesis which was explanted and antibiotic spacer was devised and maintained on IV Rocephin, presented to the emergency room with left hip pain.       Left hip pain,  musculoskeletal. No trauma but it happened after patient stood up and attempted to turn around while using her walker. No fall or other injuries. She is nonweightbearing on her right leg since her knee surgery a month ago. X-ray pelvis in the emergency room does not reveal any fracture. She is tender around her hip joint but no specific tender point. No skin bruise or hematoma noted. She could have muscle sprain versus minor hairline fracture not noticeable on x-ray.   - Orthopedic Surgery consult[ZA1.1]ed[ZA1.2]   - MRI completed.    - Pain control.[ZA1.1]    - Therapies.[ZA1.2]       Supraventricular tachycardia, paroxysmal. Patient does have at least 4 episodes of palpitation that she recalls since 12/2016. Initial episode was during hospitalization in the setting of sepsis when her heart rate was up to 172 and patient reports she had a syncopal episode. Her echocardiogram from December shows normal ejection fraction and without regional wall motion abnormality. It seems thyroid was not tested. TSH at 4.66.    - Tele.    - Cards/EP consulted[ZA1.1] again[ZA1.2].[ZA1.1]    - Required adenosine on evening of 3/23.    -[ZA1.2] Ablation planned for 3/24.[ZA1.3]    - echocardiogram[ZA1.1] completed[ZA1.2].        Infected prosthesis right knee. Patient initially had group B streptococcal sepsis in 12/2016, left ankle was thought to be the source, was treated with Rocephin. Patient had arthrodesis of the left ankle. Subsequently, developed infection of the prosthesis on right knee. She underwent explantation of the right total knee and an antibiotic spacer was placed. She is followed by Dr. Berkowitz and Dr. Johnson. Dr. Berkowitz from Orthopedic Surgery and Dr. Johnson from Infectious Disease.    - PICC line in her right arm which appears normal and was replaced last week, according to the patient, due to abnormal position.    - Continue her prior to admission Rocephin.    - Consider ID consult.       Hypertension and  "hyperlipidemia. Prior to admission Diovan HCT resumed.    - Hold Lasix at this time.       Deep venous thrombosis prophylaxis. pressure compression device      Code: Full code.       Disposition:[ZA1.1] Plan for ablation today due to recurrent SVT[ZA1.3]. [facility] home with bed available in next 1-2 days per report.[ZA1.2]                    Physical Exam:      Heart Rate: 135    Blood pressure 101/88, pulse 71, temperature 98.2  F (36.8  C), temperature source Oral, resp. rate 16, height 1.676 m (5' 6\"), weight 74.2 kg (163 lb 9.3 oz), SpO2 93 %.    Vitals:    17 0122 17 0616 17 0500   Weight: 75.5 kg (166 lb 7.2 oz) 75.3 kg (166 lb 0.1 oz) 74.2 kg (163 lb 9.3 oz)       Vital Sign Ranges  Temperature Temp  Av.1  F (36.7  C)  Min: 98  F (36.7  C)  Max: 98.2  F (36.8  C)   Blood pressure Systolic (24hrs), Av , Min:101 , Max:162        Diastolic (24hrs), Av, Min:67, Max:106      Pulse Pulse  Av.4  Min: 68  Max: 147   Respirations Resp  Av  Min: 16  Max: 16   Pulse oximetry SpO2  Av.5 %  Min: 93 %  Max: 94 %     Vital Signs with Ranges  Temp:  [98  F (36.7  C)-98.2  F (36.8  C)] 98.2  F (36.8  C)  Pulse:  [] 71  Heart Rate:  [] 135  Resp:  [16] 16  BP: (101-162)/() 101/88  SpO2:  [93 %-94 %] 93 %    I/O Last 3 Shifts:   I/O last 3 completed shifts:  In: 825 [P.O.:725; I.V.:100]  Out: 275 [Urine:275]    I/O past 24 hours:     Intake/Output Summary (Last 24 hours) at 17 0722  Last data filed at 17 0300   Gross per 24 hour   Intake              825 ml   Output              275 ml   Net              550 ml     GENERAL: Alert and oriented. NAD. Conversational, appropriate.   HEENT: Normocephalic. EOMI. No icterus or injection. Nares normal.   LUNGS: Clear to auscultation. No dyspnea at rest.   HEART: Regular rate. Extremities perfused.   ABDOMEN: Soft, nontender, and nondistended. Positive bowel sounds.   EXTREMITIES: No LE edema noted. "   NEUROLOGIC: Moves extremities x4. Hip pain improved. Right knee with brace. Distal CMS in tact.          Prior to Admission Medications:        Prescriptions Prior to Admission   Medication Sig Dispense Refill Last Dose     polyethylene glycol (MIRALAX/GLYCOLAX) Packet Take 1 packet by mouth every other day   3/20/2017     cefTRIAXone (ROCEPHIN) 1 GM vial Inject 2 g (2,000 mg) into the vein daily CBC with differential, creatinine, SGOT weekly while on this medication to be faxed to Dr. Douglas office. 600 mL 0 3/21/2017 at 1200     HYDROmorphone (DILAUDID) 2 MG tablet Take 1-2 tablets (2-4 mg) by mouth every 4 hours as needed for moderate to severe pain 70 tablet 0  at prn     CALCIUM PO Take 600 mg by mouth daily   3/21/2017 at Unknown time     Probiotic Product (TRUBIOTICS PO) Take 1 tablet by mouth daily   3/21/2017 at Unknown time     albuterol (PROAIR HFA/PROVENTIL HFA/VENTOLIN HFA) 108 (90 BASE) MCG/ACT Inhaler Inhale 2 puffs into the lungs every 6 hours as needed for shortness of breath / dyspnea or wheezing    at prn     Pyridoxine HCl (VITAMIN B6 PO) Take 1 tablet by mouth daily   3/21/2017 at Unknown time     DAKINS EX Externally apply topically daily   3/21/2017 at Unknown time     Furosemide (LASIX PO) Take 20 mg by mouth daily    3/21/2017 at Unknown time     Cholecalciferol (VITAMIN D3 PO) Take 400 Units by mouth daily   3/21/2017 at Unknown time     POTASSIUM PO Take 595 mg by mouth every morning    3/21/2017 at Unknown time     valsartan-hydrochlorothiazide (DIOVAN-HCT) 80-12.5 MG per tablet Take 1 tablet by mouth daily   3/21/2017 at Unknown time     ASPIRIN PO Take 81 mg by mouth daily    3/21/2017 at Unknown time     multivitamin, therapeutic with minerals (THERA-VIT-M) TABS Take 1 tablet by mouth daily.   3/21/2017 at Unknown time     simvastatin (ZOCOR) 40 MG tablet Take 40 mg by mouth every morning    3/21/2017 at Unknown time     order for DME Equipment being ordered: Walker Wheels ()  and Walker ()  Treatment Diagnosis: impaired gait 1 each 0      order for DME Equipment being ordered: Walker Wheels () and Walker ()  Treatment Diagnosis: difficulty with gait 1 each 0             Medications:        Current Facility-Administered Medications   Medication Last Rate     Current Facility-Administered Medications   Medication Dose Route Frequency     aspirin chewable tablet 81 mg  81 mg Oral Daily     cefTRIAXone  2 g Intravenous Q24H     polyethylene glycol  17 g Oral Daily     valsartan-hydrochlorothiazide  1 tablet Oral Daily     metoprolol  25 mg Oral BID     Current Facility-Administered Medications   Medication Dose Route Frequency     metoprolol  5 mg Intravenous Q4H PRN     magnesium sulfate  2 g Intravenous Daily PRN     magnesium sulfate  4 g Intravenous Q4H PRN     potassium chloride  20-40 mEq Oral Q2H PRN     potassium chloride  20-40 mEq Oral or Feeding Tube Q2H PRN     potassium chloride  10 mEq Intravenous Q1H PRN     potassium chloride with lidocaine  10 mEq Intravenous Q1H PRN     potassium chloride  20 mEq Intravenous Q1H PRN     albuterol  2 puff Inhalation Q6H PRN     HYDROmorphone  0.5 mg Intravenous Q4H PRN     naloxone  0.1-0.4 mg Intravenous Q2 Min PRN     acetaminophen  650 mg Oral Q4H PRN     acetaminophen  650 mg Rectal Q4H PRN     ondansetron  4 mg Oral Q6H PRN    Or     ondansetron  4 mg Intravenous Q6H PRN     tramadol  25-50 mg Oral Q6H PRN            Data:      Lab data reviewed.     Recent Labs  Lab 03/24/17  0610 03/23/17  0615 03/21/17  2238 03/21/17  0815 03/21/17  0615   HGB  --  11.4* 12.8 12.3 Canceled, Test credited Specimen not received   MCV  --  87 87 88 Canceled, Test credited Specimen not received   PLT  --  314 359 387 Canceled, Test credited Specimen not received    142 142  --  Canceled, Test credited Specimen not received   POTASSIUM 3.9 3.8 3.7  --  Canceled, Test credited Specimen not received   CHLORIDE 106 105 104  --  Canceled,  Test credited Specimen not received   CO2 30 30 29  --  Canceled, Test credited Specimen not received   BUN 18 18 27  --  Canceled, Test credited Specimen not received   CR 0.63 0.75 0.83 0.70 Canceled, Test credited Specimen not received   ANIONGAP 6 7 9  --  Canceled, Test credited Specimen not received   MATHEW 9.1 9.3 9.3  --  Canceled, Test credited Specimen not received   GLC 95 91 192*  --  Canceled, Test credited Specimen not received   TROPI  --   --  <0.015  --  Canceled, Test credited Specimen not received           Imaging:      Imaging data reviewed.     Dr. Steve Robles D.O.  [facility]  Pager [phone][ZA1.1]       Revision History        User Key Date/Time User Provider Type Action    > [N/A] 3/24/2017  2:52 PM Steve Robles, DO Physician Addend     ZA1.3 3/24/2017  2:52 PM Steve Robles, DO Physician Addend     ZA1.2 3/24/2017  2:51 PM Steve Robles, DO Physician Sign     ZA1.1 3/24/2017  7:22 AM Steve Robles, DO Physician             Progress Notes by Aubree De Luna LSW at 3/24/2017 11:57 AM     Author:  Aubree De Luna LSW Service:  Social Work Author Type:      Filed:  3/24/2017 11:57 AM Date of Service:  3/24/2017 11:57 AM Note Created:  3/24/2017 11:57 AM    Status:  Signed :  Aubree De Luna LSW ()         D: JENNY following for DC planning.   I: [facility] will have a bed for pt on Saturday. Pt updated and in agreement.   P: SW will follow.     BRENDA Joaquin  [id number][JJ1.1]       Revision History        User Key Date/Time User Provider Type Action    > JJ1.1 3/24/2017 11:57 AM Aubree De Luna LSW  Sign            Progress Notes by Neema Fraser APRN CNP at 3/24/2017 10:59 AM     Author:  Neema Fraser APRN CNP Service:  Electrophysiology Author Type:  Nurse Practitioner    Filed:  3/24/2017 11:07  "AM Date of Service:  3/24/2017 10:59 AM Note Created:  3/24/2017 10:59 AM    Status:  Attested :  Neema Fraser APRN CNP (Nurse Practitioner)    Cosigner:  Tyrone Boyd MD at 3/24/2017 11:53 AM        Attestation signed by Tyrone Boyd MD at 3/24/2017 11:53 AM        ADDENDUM     Patient seen and examined.  Case discussed with PA/NP.  Agree with plan above.    In summary, 72-yo with a Hx of hypertension, GERD and osteoarthritis, who p/w hip pain and was found to have SVT.    She failed therapy with metoprolol and had recurrence of SVT last night.  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    Options were discussed including do nothing, add flecainide or catheter ablation procedure.  Procedure was explained in details.  Patient understands the pro/cons of each option.  she understands there is 1-2% risk of complication associated with procedure.  She would like to pursue ablation.  Consent was signed.         PLAN:     1. SVT ablation today.  2. Consider heparin for DVT prophylaxis.        Physical Exam:  Vitals: /74 (BP Location: Left arm)  Pulse 66  Temp 98.7  F (37.1  C) (Oral)  Resp 17  Ht 1.676 m (5' 6\")  Wt 74.2 kg (163 lb 9.3 oz)  SpO2 95%  BMI 26.4 kg/m2      Intake/Output Summary (Last 24 hours) at 03/24/17 1151  Last data filed at 03/24/17 1000   Gross per 24 hour   Intake              725 ml   Output              275 ml   Net              450 ml       Constitutional:  AAO x3.  Pt is in NAD.  HEAD: Normocephalic.  SKIN: Skin normal color, texture and turgor with no lesions or eruptions.  Neck:  Supple, normal JVP, no carotid bruits.  Eyes: PERRL, EOMI.  Chest:  CTAB.  Cardiac:  RRR, normal  S1 and S2.  No murmurs rubs or gallop.   Abdomen:  Normal BS.  Soft, non-tender and non-distended.  No rebound or guarding.    Extremities:  Pedious pulses palpable B/L.  No LE edema noticed.   Neurological: Strength and sensation symmetric and grossly intact throughout.          Tyrone" "MD Oliver                                     Cardiology Progress Note          Assessment and Plan:   Ms. María Henderson is a delightful 72-year-old lady with a history of SVT (first occurrence in 2016) hypertension, GERD and osteoarthritis who has had several orthopedic surgeries done (19 orthopedic surgeries: on the left ankle, foot, and most recent right knee replacement which is infected and was explanted and is now on a course of IV abx).      1. 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 In the ED she was found to have SVT with rate ~172 bpm which self-terminated.   -Notes 5 symptomatic episodes since December.  -Metoprolol 25 mg bid started this admission  -Had another episode last PM terminated with adenosine 6 mg x 2  -Echo shows normal cardiac function    Discussed option of ablation with patient.  Limited of further BB titration given resting bradycardia (HR in the 50's).  She is hesitant at this time given her other medical problems.  Will be going to a TCU.  Would like to discuss further with her significant other[KJ1.1] today.  Will check back.  Keep NPO for now.[KJ1.2]    Pt is scheduled to see  on  at 1:15 pm in [place].                   Interval History:   Comfortable at rest, felt palpitations lat night with SVT              Review of Systems:   As per subjective, otherwise 5 systems reviewed and negative.           Physical Exam:[KJ1.1]   Blood pressure 141/74, pulse 66, temperature 98.7  F (37.1  C), temperature source Oral, resp. rate 17, height 1.676 m (5' 6\"), weight 74.2 kg (163 lb 9.3 oz), SpO2 95 %.[KJ1.3]      Vital Sign Ranges  Temperature Temp  Av.3  F (36.8  C)  Min: 98  F (36.7  C)  Max: 98.7  F (37.1  C)   Blood pressure Systolic (24hrs), Av , Min:101 , Max:162        Diastolic (24hrs), Av, Min:67, Max:106      Pulse Pulse  Av.7  Min: 66  Max: 147   Respirations Resp  Av.2  Min: 16  " Max: 17   Pulse oximetry SpO2  Av %  Min: 93 %  Max: 95 %         Intake/Output Summary (Last 24 hours) at 17 1100  Last data filed at 17 1000   Gross per 24 hour   Intake              725 ml   Output              275 ml   Net              450 ml[KJ1.1]       Wt Readings from Last 2 Encounters:   17 74.2 kg (163 lb 9.3 oz)   17 75.3 kg (166 lb)[KJ1.3]      Constitutional:   NAD   Skin:   Warm and dry   Head:   Nontraumatic   Lungs:   CTAB    Cardiovascular:   S1, S2, RRR   Abdomen:   Benign   Extremities and Back:   R knee brace, no edema   Neurological:   Grossly nonfocal            Medications:   Reviewed             Data:[KJ1.1]     Results for orders placed or performed during the hospital encounter of 17 (from the past 24 hour(s))   EKG 12-lead, tracing only   Result Value Ref Range    Interpretation ECG Click View Image link to view waveform and result    Basic metabolic panel   Result Value Ref Range    Sodium 142 133 - 144 mmol/L    Potassium 3.9 3.4 - 5.3 mmol/L    Chloride 106 94 - 109 mmol/L    Carbon Dioxide 30 20 - 32 mmol/L    Anion Gap 6 3 - 14 mmol/L    Glucose 95 70 - 99 mg/dL    Urea Nitrogen 18 7 - 30 mg/dL    Creatinine 0.63 0.52 - 1.04 mg/dL    GFR Estimate >90  Non  GFR Calc   >60 mL/min/1.7m2    GFR Estimate If Black >90   GFR Calc   >60 mL/min/1.7m2    Calcium 9.1 8.5 - 10.1 mg/dL   Magnesium (Add on or recollect)   Result Value Ref Range    Magnesium 2.2 1.6 - 2.3 mg/dL[KJ1.3]              Revision History        User Key Date/Time User Provider Type Action    > KJ1.2 3/24/2017 11:07 AM Neema Fraser APRN CNP Nurse Practitioner Sign     KJ1.3 3/24/2017 11:00 AM Neema Fraser APRN CNP Nurse Practitioner      KJ1.1 3/24/2017 10:59 AM Neema Fraser APRN CNP Nurse Practitioner             Progress Notes by Ruthy Bonilla MD at 3/24/2017  2:58 AM     Author:  Ruthy Bonilla MD  "Service:  Hospitalist Author Type:  Physician    Filed:  3/24/2017  3:02 AM Date of Service:  3/24/2017  2:58 AM Note Created:  3/24/2017  2:58 AM    Status:  Signed :  Ruthy Bonilla MD (Physician)           Hospitalist x-cover note    Notified of sustained SVT with HR 130s-140s. She attempted a vagal maneuver with no improvement. She has been asymptomatic.     BP (!) 121/93  Temp 98.2  F (36.8  C) (Oral)  Resp 16  Ht 1.676 m (5' 6\")  Wt 75.3 kg (166 lb 0.1 oz)  SpO2 93%  BMI 26.79 kg/m2    Constitutional: Appears comfortable, NAD  Respiratory: Breathing non-labored. Lungs CTAB - no wheezes, crackles, or rhonchi  Cardiovascular: Heart tachycardic, regular  Neuro: Alert and appropriate      A/P: Patient with paroxsymal SVT, suggestive of AVNRT - per Dr. Boyd    - IV Adenosine was pushed at bedside. Required total 12 mg (6 mg IV x2) and is now back in sinus rhythm with occasional PVCs  - Potassium and Magnesium ordered for later this AM      MARY ANNE Bonilla  Hospitalist  [phone][MW1.1]       Revision History        User Key Date/Time User Provider Type Action    > MW1.1 3/24/2017  3:02 AM Ruthy Bonilla MD Physician Sign            Progress Notes by Aubree De Luna LSW at 3/23/2017  2:39 PM     Author:  Aubree De Luna LSW Service:  Social Work Author Type:      Filed:  3/23/2017  3:20 PM Date of Service:  3/23/2017  2:39 PM Note Created:  3/23/2017  2:39 PM    Status:  Addendum :  Aubree De Luna LSW ()         Care Transition Initial Assessment -   Reason For Consult: discharge planning  Met with: PATIENT and significant other Osman  Active Problems:    Left hip pain         DATA  Lives With: significant other  Living Arrangements: house  Description of Support System: Involved, Supportive  Who is your support system?: Significant Other  Support Assessment: Adequate family and caregiver support. Osman help as " needed. Pt has been using [facility] for home RN/PT/OT. Contact is Domenica, p) [phone] f) [phone]  Identified issues/concerns regarding health management: PT recommends TCU. SW met with pt and Osman while pt was working with PT. Pt agreed returning to TCU best. She [facility]. Pt and Osman agree to referrals to [facility]. Pt would like a private room. SW informed both that a private room is typically an additional extra fee per day not covered by insurance. Pt reports she has worker comp insurance that pays all of her bills, including TCU. SW told her a private room charge could not be guaranteed to be covered. Her workers comp  is [employer], [phone]. Voicemail left for her. Pt reports she does not work on Fridays. Referrals sent via [facility].        Quality Of Family Relationships: involved, supportive    ASSESSMENT  Cognitive Status:  Appears alert and oriented. Making her own decisions. Significant other supportive.  Concerns to be addressed: DC planning.      PLAN  Financial costs for the patient includes: Discussed.  Patient given options and choices for discharge: Yes.  Patient/family is agreeable to the plan? Yes.  Patient Goals and Preferences: TCU.  Patient anticipates discharging to:  TCU.    BRENDA Joaquin  [id number][JJ1.1]      Addendum: [facility] is full on Friday and [facility] will most likely also be full. Salty still assessing. SW spoke with No. She states she will authorize the stay. TCU can call her. On Friday they can call her co-worker, Pratibha Dc, from 8-11am at [phone].[JJ1.2]         Revision History        User Key Date/Time User Provider Type Action    > JJ1.2 3/23/2017  3:20 PM Aubree De Luna LSW  Addend     JJ1.1 3/23/2017  2:57 PM Aubree De Luna LSW  Sign            Progress Notes by Estephania Golden, PT at 3/23/2017   2:54 PM     Author:  Estephania Golden PT Service:  (none) Author Type:  Physical Therapist    Filed:  3/23/2017  2:54 PM Date of Service:  3/23/2017  2:54 PM Note Created:  3/23/2017  2:54 PM    Status:  Signed :  Estephania Golden PT (Physical Therapist)          03/23/17 1433   Quick Adds   Type of Visit Initial PT Evaluation   Living Environment   Lives With significant other   Living Arrangements house   Home Accessibility stairs to enter home;stairs within home   Number of Stairs to Enter Home 1   Number of Stairs Within Home 14  (To basement. )   Stair Railings at Home (Yes)   Transportation Available ( has provided since surgery. )   Living Environment Comment Patient has been home from [facility] for about 2 weeks. Was managing at home until having increased left hip pain and racing heart. Patients w/c doesn't fit in the doors at home so patient has to amb into the bedroom. Able to maintain NWB with ww.    Self-Care   Dominant Hand right   Usual Activity Tolerance good  (Prior to infection and right knee surgery. )   Current Activity Tolerance fair   Regular Exercise no   Equipment Currently Used at Home shower chair   Functional Level Prior   Ambulation 4-->completely dependent   Transferring 1-->assistive equipment   Toileting 1-->assistive equipment   Bathing 1-->assistive equipment   Dressing 0-->independent   Eating 0-->independent   Communication 0-->understands/communicates without difficulty   Swallowing 0-->swallows foods/liquids without difficulty   Cognition 0 - no cognition issues reported   Fall history within last six months no   Which of the above functional risks had a recent onset or change? none   Prior Functional Level Comment Wears cam boot on the left due to multiple surgeries. Patient was getting assist of  since discharge from TCU.    General Information   Onset of Illness/Injury or Date of Surgery - Date 03/21/17   Referring Physician Husam Berkowitz   Patient/Family  Goals Statement To go to TCU.    Pertinent History of Current Problem (include personal factors and/or comorbidities that impact the POC) María Henderson is a 72-year-old woman with a past medical history significant for hypertension, hyperlipidemia, gastroesophageal reflux disease, osteoarthritis with prior infected right knee prosthesis which was explanted and antibiotic spacer was devised and maintained on IV Rocephin, presented to the emergency room with left hip pain. Per othopedic MD, x-rays negative.    Precautions/Limitations fall precautions   Weight-Bearing Status - LUE full weight-bearing   Weight-Bearing Status - RUE full weight-bearing   Weight-Bearing Status - LLE full weight-bearing  (Has cam boot on from previous surgeries. )   Weight-Bearing Status - RLE nonweight-bearing   Cognitive Status Examination   Orientation orientation to person, place and time   Level of Consciousness alert   Follows Commands and Answers Questions 100% of the time   Personal Safety and Judgment intact   Memory intact   Pain Assessment   Patient Currently in Pain Yes, see Vital Sign flowsheet  (Left hip only with sit to stand. )   Range of Motion (ROM)   ROM Comment Left ankle fused. Right knee with knee immobilizer on.    Strength   Strength Comments UE strength 5/5, Bilateral LE strength functional for transfers and amb. Fatigues quickly though due to extra energy needed for NWBing status.    Bed Mobility   Bed Mobility Comments Independent   Transfer Skills   Transfer Comments Sit to stand with suprevision and extra time to get tu full standing position from bed. Less pain and les time needed to stand from w/c.    Gait   Gait Comments Gait with ww 5 feet in room with CGA. Additional 40 feet in daily documentation.    Balance   Balance Comments Good static standing balance with support of walker. Good balance with gait and able to maintain NWBing without difficulty.    Sensory Examination   Sensory Perception no  deficits were identified   Coordination   Coordination no deficits were identified   Muscle Tone   Muscle Tone no deficits were identified   Clinical Impression   Criteria for Skilled Therapeutic Intervention yes, treatment indicated   PT Diagnosis Impaired functional endurance and independence.    Influenced by the following impairments Pain in left hip. NWBing on right LE   Functional limitations due to impairments Needs extra time for transfers and fatigues due to NWBing status.    Clinical Presentation Stable/Uncomplicated   Clinical Presentation Rationale Functional ability prior to admission and currently.    Clinical Decision Making (Complexity) Low complexity   Therapy Frequency` (One time only. )   Predicted Duration of Therapy Intervention (days/wks) (Deferred to TCU. )   Anticipated Equipment Needs at Discharge (Has a ww and a w/c already. )   Anticipated Discharge Disposition Transitional Care Facility   Risk & Benefits of therapy have been explained Yes   Patient, Family & other staff in agreement with plan of care Yes   Total Evaluation Time   Total Evaluation Time (Minutes) 18[SL1.1]        Revision History        User Key Date/Time User Provider Type Action    > SL1.1 3/23/2017  2:54 PM Estephania Golden, PT Physical Therapist Sign            Progress Notes by Steve Robles DO at 3/23/2017  8:41 AM     Author:  Steve Robles DO Service:  Hospitalist Author Type:  Physician    Filed:  3/23/2017 12:04 PM Date of Service:  3/23/2017  8:41 AM Note Created:  3/23/2017  8:41 AM    Status:  Signed :  Steve Robles DO (Physician)         [facility]  Hospitalist Progress Note        Steve Robles DO  03/23/2017        Interval History:[ZA1.1]      Patient states that she is improved today.[ZA1.2]          Assessment and Plan:        María Henderson is a 72-year-old woman with a past medical history significant for hypertension, hyperlipidemia,  gastroesophageal reflux disease, osteoarthritis with prior infected right knee prosthesis which was explanted and antibiotic spacer was devised and maintained on IV Rocephin, presented to the emergency room with left hip pain.      Left hip pain, musculoskeletal. No trauma but it happened after patient stood up and attempted to turn around while using her walker. No fall or other injuries. She is nonweightbearing on her right leg since her knee surgery a month ago. X-ray pelvis in the emergency room does not reveal any fracture. She is tender around her hip joint but no specific tender point. No skin bruise or hematoma noted. She could have muscle sprain versus minor hairline fracture not noticeable on x-ray.  - Orthopedic Surgery consult.   -[ZA1.1] MRI completed.[ZA1.2]   - Pain control.      Supraventricular tachycardia, paroxysmal. Patient does have at least 4 episodes of palpitation that she recalls since 12/2016. Initial episode was during hospitalization in the setting of sepsis when her heart rate was up to 172 and patient reports she had a syncopal episode. Her echocardiogram from December shows normal ejection fraction and without regional wall motion abnormality. It seems thyroid was not tested.   - Tele.   - Cards/EP consulted.   - TSH[ZA1.1] at 4.66.    -[ZA1.2] echocardiogram[ZA1.1] pending.[ZA1.2]       Infected prosthesis right knee. Patient initially had group B streptococcal sepsis in 12/2016, left ankle was thought to be the source, was treated with Rocephin. Patient had arthrodesis of the left ankle. Subsequently, developed infection of the prosthesis on right knee. She underwent explantation of the right total knee and an antibiotic spacer was placed. She is followed by Dr. Berkowitz and Dr. Johnson. Dr. Berkowitz from Orthopedic Surgery and Dr. Johnson from Infectious Disease.   - PICC line in her right arm which appears normal and was replaced last week, according to the patient, due to abnormal  "position.   - Continue her prior to admission Rocephin.   - Consider ID consult.      Hypertension and hyperlipidemia. Prior to admission Diovan HCT resumed.   - Hold Lasix at this time.      Deep venous thrombosis prophylaxis. pressure compression device     Code: Full code.      Disposition:[ZA1.1] Discharge today or tomorrow pending course.[ZA1.2]                    Physical Exam:      Heart Rate: 61    Blood pressure 139/75, temperature 98  F (36.7  C), temperature source Oral, resp. rate 18, height 1.676 m (5' 6\"), weight 75.3 kg (166 lb 0.1 oz), SpO2 94 %.    Vitals:    17 0122 17 0616   Weight: 75.5 kg (166 lb 7.2 oz) 75.3 kg (166 lb 0.1 oz)       Vital Sign Ranges  Temperature Temp  Av.1  F (36.7  C)  Min: 98  F (36.7  C)  Max: 98.2  F (36.8  C)   Blood pressure Systolic (24hrs), Av , Min:114 , Max:145        Diastolic (24hrs), Av, Min:62, Max:82      Pulse No Data Recorded   Respirations Resp  Av.6  Min: 16  Max: 19   Pulse oximetry SpO2  Av %  Min: 94 %  Max: 97 %     Vital Signs with Ranges  Temp:  [98  F (36.7  C)-98.2  F (36.8  C)] 98  F (36.7  C)  Heart Rate:  [61-78] 61  Resp:  [16-19] 18  BP: (114-145)/(62-82) 139/75  SpO2:  [94 %-97 %] 94 %    I/O Last 3 Shifts:   I/O last 3 completed shifts:  In: 480 [P.O.:480]  Out: 1425 [Urine:1425]    I/O past 24 hours:     Intake/Output Summary (Last 24 hours) at 17 0841  Last data filed at 17 0256   Gross per 24 hour   Intake              480 ml   Output             1425 ml   Net             -945 ml     GENERAL: Alert and oriented. NAD. Conversational, appropriate.   HEENT: Normocephalic. EOMI. No icterus or injection. Nares normal.   LUNGS: Clear to auscultation. No dyspnea at rest.   HEART: Regular rate. Extremities perfused.   ABDOMEN: Soft, nontender, and nondistended. Positive bowel sounds.   EXTREMITIES: No LE edema noted.   NEUROLOGIC: Moves extremities x4. Hip pain[ZA1.1] improved[ZA1.2]. Right knee with " brace. Distal CMS in tact.          Prior to Admission Medications:        Prescriptions Prior to Admission   Medication Sig Dispense Refill Last Dose     polyethylene glycol (MIRALAX/GLYCOLAX) Packet Take 1 packet by mouth every other day   3/20/2017     cefTRIAXone (ROCEPHIN) 1 GM vial Inject 2 g (2,000 mg) into the vein daily CBC with differential, creatinine, SGOT weekly while on this medication to be faxed to Dr. Douglas office. 600 mL 0 3/21/2017 at 1200     HYDROmorphone (DILAUDID) 2 MG tablet Take 1-2 tablets (2-4 mg) by mouth every 4 hours as needed for moderate to severe pain 70 tablet 0  at prn     CALCIUM PO Take 600 mg by mouth daily   3/21/2017 at Unknown time     Probiotic Product (TRUBIOTICS PO) Take 1 tablet by mouth daily   3/21/2017 at Unknown time     albuterol (PROAIR HFA/PROVENTIL HFA/VENTOLIN HFA) 108 (90 BASE) MCG/ACT Inhaler Inhale 2 puffs into the lungs every 6 hours as needed for shortness of breath / dyspnea or wheezing    at prn     Pyridoxine HCl (VITAMIN B6 PO) Take 1 tablet by mouth daily   3/21/2017 at Unknown time     DAKINS EX Externally apply topically daily   3/21/2017 at Unknown time     Furosemide (LASIX PO) Take 20 mg by mouth daily    3/21/2017 at Unknown time     Cholecalciferol (VITAMIN D3 PO) Take 400 Units by mouth daily   3/21/2017 at Unknown time     POTASSIUM PO Take 595 mg by mouth every morning    3/21/2017 at Unknown time     valsartan-hydrochlorothiazide (DIOVAN-HCT) 80-12.5 MG per tablet Take 1 tablet by mouth daily   3/21/2017 at Unknown time     ASPIRIN PO Take 81 mg by mouth daily    3/21/2017 at Unknown time     multivitamin, therapeutic with minerals (THERA-VIT-M) TABS Take 1 tablet by mouth daily.   3/21/2017 at Unknown time     simvastatin (ZOCOR) 40 MG tablet Take 40 mg by mouth every morning    3/21/2017 at Unknown time     order for DME Equipment being ordered: Walker Wheels () and Walker ()  Treatment Diagnosis: impaired gait 1 each 0       order for DME Equipment being ordered: Walker Wheels () and Walker ()  Treatment Diagnosis: difficulty with gait 1 each 0             Medications:        Current Facility-Administered Medications   Medication Last Rate     Current Facility-Administered Medications   Medication Dose Route Frequency     aspirin chewable tablet 81 mg  81 mg Oral Daily     cefTRIAXone  2 g Intravenous Q24H     polyethylene glycol  17 g Oral Daily     valsartan-hydrochlorothiazide  1 tablet Oral Daily     metoprolol  25 mg Oral BID     Current Facility-Administered Medications   Medication Dose Route Frequency     albuterol  2 puff Inhalation Q6H PRN     HYDROmorphone  0.5 mg Intravenous Q4H PRN     naloxone  0.1-0.4 mg Intravenous Q2 Min PRN     acetaminophen  650 mg Oral Q4H PRN     acetaminophen  650 mg Rectal Q4H PRN     ondansetron  4 mg Oral Q6H PRN    Or     ondansetron  4 mg Intravenous Q6H PRN     potassium chloride  20-40 mEq Oral Q2H PRN     potassium chloride  20-40 mEq Oral or Feeding Tube Q2H PRN     potassium chloride  10 mEq Intravenous Q1H PRN     potassium chloride with lidocaine  10 mEq Intravenous Q1H PRN     potassium chloride  20 mEq Intravenous Q1H PRN     magnesium sulfate  4 g Intravenous Q4H PRN     tramadol  25-50 mg Oral Q6H PRN            Data:      Lab data reviewed.     Recent Labs  Lab 03/23/17  0615 03/21/17  2238 03/21/17  0815 03/21/17  0615   HGB 11.4* 12.8 12.3 Canceled, Test credited Specimen not received   MCV 87 87 88 Canceled, Test credited Specimen not received    359 387 Canceled, Test credited Specimen not received    142  --  Canceled, Test credited Specimen not received   POTASSIUM 3.8 3.7  --  Canceled, Test credited Specimen not received   CHLORIDE 105 104  --  Canceled, Test credited Specimen not received   CO2 30 29  --  Canceled, Test credited Specimen not received   BUN 18 27  --  Canceled, Test credited Specimen not received   CR 0.75 0.83 0.70 Canceled, Test  "credited Specimen not received   ANIONGAP 7 9  --  Canceled, Test credited Specimen not received   MATHEW 9.3 9.3  --  Canceled, Test credited Specimen not received   GLC 91 192*  --  Canceled, Test credited Specimen not received   TROPI  --  <0.015  --  Canceled, Test credited Specimen not received           Imaging:      Imaging data reviewed.     Dr. Steve Robles D.O.  [facility] Center[facility]  Pager [phone][ZA1.1]       Revision History        User Key Date/Time User Provider Type Action    > ZA1.2 3/23/2017 12:04 PM Steve Robles, DO Physician Sign     ZA1.1 3/23/2017  8:41 AM Steve Robles, DO Physician             Progress Notes by Jeanne Lloyd APRN CNP at 3/23/2017 10:01 AM     Author:  Jeanne Lloyd APRN CNP Service:  Electrophysiology Author Type:  Nurse Practitioner    Filed:  3/23/2017 10:21 AM Date of Service:  3/23/2017 10:01 AM Note Created:  3/23/2017 10:01 AM    Status:  Attested :  Jeanne Lloyd APRN CNP (Nurse Practitioner)    Cosigner:  Tyrone Boyd MD at 3/23/2017 10:49 AM        Attestation signed by Tyrone Boyd MD at 3/23/2017 10:49 AM        ADDENDUM     Patient seen and examined.  Case discussed with PA/NP.  Agree with plan above.    In summary, 72-yo with a Hx of hypertension, GERD and osteoarthritis, who p/w hip pain and was found to have SVT.  She is tolerating metoprolol well.  No recurrence of SVT.       PLAN:    1. Continue Metoprolol  2. Follow up with me in clinic at [facility] on 04/26 at 1:15 p.m.       We will sign off.  Please call with questions.      Physical Exam:  Vitals: /75 (BP Location: Left arm)  Temp 98  F (36.7  C) (Oral)  Resp 18  Ht 1.676 m (5' 6\")  Wt 75.3 kg (166 lb 0.1 oz)  SpO2 94%  BMI 26.79 kg/m2      Intake/Output Summary (Last 24 hours) at 03/23/17 1047  Last data filed at 03/23/17 1000   Gross per 24 hour   Intake              590 ml   Output             1425 ml   Net             -835 ml " "      Constitutional:  AAO x3.  Pt is in NAD.  HEAD: Normocephalic.  SKIN: Skin normal color, texture and turgor with no lesions or eruptions.  Neck:  Supple, normal JVP, no carotid bruits.  Eyes: PERRL, EOMI.  Chest:  CTAB.  Cardiac:   RRR, normal  S1 and S2.  No murmurs rubs or gallop.   Abdomen:  Normal BS.  Soft, non-tender and non-distended.  No rebound or guarding.    Extremities:  Pedious pulses palpable B/L.  No LE edema noticed.   Neurological: Strength and sensation symmetric and grossly intact throughout.          Tyrone Boyd MD                                     Cardiology Progress Note          Assessment and Plan:   Ms. María Henderson is a delightful 72-year-old lady with a history of hypertension, GERD and osteoarthritis who has had several orthopedic surgeries done (19 orthopedic surgeries: on the left ankle, foot, and most recent right knee replacement which is infected and was explanted). Please see consult note for full hx.  She came to the ED because she was having hip pain and palpitations. In the ED she was found to have SVT with rate ~172 bpm which self-terminated. EP was consulted for her SVT    1. 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 B/P stable  Echo completed this morning-results pending  Pt reports that she's had 4 \"episodes\" since 12/16 like she experienced upon admission to the ED.    EP signing off, but please with any questions or concerns.   Pt is scheduled to see  on 4/26 at 1:15 pm in [place].    Jeanne Lloyd CNP        Interval History:   Pt doing well. No further SVT noted and tolerating the metoprolol. VSSA, tele sinus 60's         Medications:[AK1.1]       aspirin chewable tablet 81 mg  81 mg Oral Daily     cefTRIAXone  2 g Intravenous Q24H     polyethylene glycol  17 g Oral Daily     valsartan-hydrochlorothiazide  1 tablet Oral Daily     metoprolol  25 mg Oral BID[AK1.2]           " "  Review of Systems: If done, described below  The Review of Systems is negative other than noted in the HPI             Physical Exam:[AK1.1]   Blood pressure 139/75, temperature 98  F (36.7  C), temperature source Oral, resp. rate 18, height 1.676 m (5' 6\"), weight 75.3 kg (166 lb 0.1 oz), SpO2 94 %.[AK1.2]        Vital Sign Ranges  Temperature Temp  Av.1  F (36.7  C)  Min: 98  F (36.7  C)  Max: 98.2  F (36.8  C)   Blood pressure Systolic (24hrs), Av , Min:114 , Max:145        Diastolic (24hrs), Av, Min:62, Max:82      Pulse No Data Recorded   Respirations Resp  Av.3  Min: 16  Max: 18   Pulse oximetry SpO2  Av.5 %  Min: 94 %  Max: 95 %         Intake/Output Summary (Last 24 hours) at 17 1002  Last data filed at 17 0256   Gross per 24 hour   Intake              240 ml   Output             1425 ml   Net            -1185 ml       Constitutional:   in no apparent distress     Lungs:   symmetric, clear to auscultation     Cardiovascular:   Normal apical impulse, regular rate and rhythm, normal S1 and S2, no S3 or S4, and no murmur noted     Extremities and Back:   No edema, right knee brace on and left foot               Data:[AK1.1]     Lab Results   Component Value Date    WBC 6.6 2017    HGB 11.4 (L) 2017    HCT 36.7 2017     2017     2017    POTASSIUM 3.8 2017    CHLORIDE 105 2017    CO2 30 2017    BUN 18 2017    CR 0.75 2017    GLC 91 2017    SED 30 2017    DD 0.3 10/25/2011    TROPI <0.015 2017    AST 29 2017    ALT 41 2017    ALKPHOS 173 (H) 2017    BILITOTAL 0.2 2017    INR 1.29 (H) 2016[AK1.2]               Revision History        User Key Date/Time User Provider Type Action    > AK1.2 3/23/2017 10:21 AM Jeanne Lloyd, APRN CNP Nurse Practitioner Sign     AK1.1 3/23/2017 10:01 AM Jeanne Lloyd, APRN CNP Nurse Practitioner             Progress Notes by " Husam Berkowitz MD at 3/23/2017  8:32 AM     Author:  Husam Berkowitz MD Service:  Orthopedics Author Type:  Physician    Filed:  3/23/2017  8:33 AM Date of Service:  3/23/2017  8:32 AM Note Created:  3/23/2017  8:32 AM    Status:  Signed :  Husam Berkowitz MD (Physician)         3/23/2017    08:30    I do not see any fractures in the hip or pelvis--radiology has not read this yet.    She has pretty severe lumbar disc degeneration.    Mobilize with PT.[ES1.1]     Revision History        User Key Date/Time User Provider Type Action    > ES1.1 3/23/2017  8:33 AM Husam Berkowitz MD Physician Sign            Progress Notes by Alanna Bateman at 3/22/2017  2:30 PM     Author:  Alanna Bateman Service:  Spiritual Health Author Type:      Filed:  3/22/2017  2:32 PM Date of Service:  3/22/2017  2:30 PM Note Created:  3/22/2017  2:30 PM    Status:  Signed :  Alanna Bateman ()         [facility]  Progress Note  [facility] 66    Pt had several visitors and was waiting for an MRI.  She isn't sure what is causing the pain in her hip and right now, she would like to get that figured out.  There don't seem to be any SH needs right now.  I romero top back tomorrow after she has some test results back.          Alanna Bateman  Chaplain Resident  Pager [phone][HM1.1]     Revision History        User Key Date/Time User Provider Type Action    > HM1.1 3/22/2017  2:32 PM Alanna Bateman Sign            Progress Notes by Steve Robles DO at 3/22/2017  8:58 AM     Author:  Steve Robles DO Service:  Hospitalist Author Type:  Physician    Filed:  3/22/2017 12:33 PM Date of Service:  3/22/2017  8:58 AM Note Created:  3/22/2017  8:58 AM    Status:  Signed :  Steve Robles DO (Physician)         [facility]  Hospitalist Progress Note        Steve Robles,   03/22/2017        Interval History:[ZA1.1]       Patient states that she is awaiting ortho eval. Discussed plan of care, verbalized understanding.[ZA1.2]          Assessment and Plan:        María Henderson is a 72-year-old woman with a past medical history significant for hypertension, hyperlipidemia, gastroesophageal reflux disease, osteoarthritis with prior infected right knee prosthesis which was explanted and antibiotic spacer was devised and maintained on IV Rocephin, presented to the emergency room with left hip pain.     Left hip pain, musculoskeletal. No trauma but it happened after patient stood up and attempted to turn around while using her walker. No fall or other injuries. She is nonweightbearing on her right leg since her knee surgery a month ago. X-ray pelvis in the emergency room does not reveal any fracture. She is tender around her hip joint but no specific tender point. No skin bruise or hematoma noted. She could have muscle sprain versus minor hairline fracture not noticeable on x-ray.[ZA1.1]   -[ZA1.3] Orthopedic Surgery consult.[ZA1.1]    -[ZA1.3] Defer further imaging CT versus MRI or clinical followup to Orthopedic Surgery.[ZA1.1]    - Pain control.[ZA1.3]     Supraventricular tachycardia, paroxysmal. Patient does have at least 4 episodes of palpitation that she recalls since 12/2016. Initial episode was during hospitalization in the setting of sepsis when her heart rate was up to 172 and patient reports she had a syncopal episode. Her echocardiogram from December shows normal ejection fraction and without regional wall motion abnormality. It seems thyroid was not tested.[ZA1.1]    - Tele.    - Cards/EP consulted[ZA1.3].[ZA1.1]    -[ZA1.3] TSH and echocardiogram.     Infected prosthesis right knee. Patient initially had group B streptococcal sepsis in 12/2016, left ankle was thought to be the source, was treated with Rocephin. Patient had arthrodesis of the left ankle. Subsequently, developed infection of the prosthesis on right knee.  "She underwent explantation of the right total knee and an antibiotic spacer was placed. She is followed by Dr. Berkowitz and Dr. Johnson. Dr. Berkowitz from Orthopedic Surgery and Dr. Johsnon from Infectious Disease.[ZA1.1]    -[ZA1.2] PICC line in her right arm which appears normal and was replaced last week, according to the patient, due to abnormal position.[ZA1.1]    - C[ZA1.2]ontinue her prior to admission Rocephin.[ZA1.1]    -[ZA1.2] Consider ID consult.[ZA1.3]     Hypertension and hyperlipidemia. Prior to admission Diovan HCT resumed.[ZA1.1]    - H[ZA1.2]old Lasix at this time.     Deep venous thrombosis prophylaxis. pressure compression device    Code: Full code.     Disposition:[ZA1.1] Pending course.[ZA1.2]                    Physical Exam:      Heart Rate: 78    Blood pressure 134/76, temperature 98  F (36.7  C), temperature source Oral, resp. rate 19, height 1.676 m (5' 6\"), weight 75.5 kg (166 lb 7.2 oz), SpO2 97 %.    Vitals:    17 0122   Weight: 75.5 kg (166 lb 7.2 oz)       Vital Sign Ranges  Temperature Temp  Av.2  F (36.8  C)  Min: 98  F (36.7  C)  Max: 98.3  F (36.8  C)   Blood pressure Systolic (24hrs), Av , Min:115 , Max:140        Diastolic (24hrs), Av, Min:62, Max:96      Pulse No Data Recorded   Respirations Resp  Av.6  Min: 10  Max: 24   Pulse oximetry SpO2  Av.9 %  Min: 94 %  Max: 97 %     Vital Signs with Ranges  Temp:  [98  F (36.7  C)-98.3  F (36.8  C)] 98  F (36.7  C)  Heart Rate:  [] 78  Resp:  [10-24] 19  BP: (115-140)/(62-96) 134/76  SpO2:  [94 %-97 %] 97 %    I/O Last 3 Shifts:        I/O past 24 hours:   No intake or output data in the 24 hours ending 17 0858    GENERAL: Alert and oriented. NAD. Conversational, appropriate.   HEENT: Normocephalic. EOMI. No icterus or injection. Nares normal.   LUNGS: Clear to auscultation. No dyspnea at rest.   HEART: Regular rate. Extremities perfused.   ABDOMEN: Soft, nontender, and nondistended. Positive " bowel sounds.   EXTREMITIES: No LE edema noted.   NEUROLOGIC: Moves extremities x4.[ZA1.1] Hip pain. Right knee with brace. Distal CMS in tact.[ZA1.3]          Prior to Admission Medications:        Prescriptions Prior to Admission   Medication Sig Dispense Refill Last Dose     polyethylene glycol (MIRALAX/GLYCOLAX) Packet Take 1 packet by mouth every other day   3/20/2017     cefTRIAXone (ROCEPHIN) 1 GM vial Inject 2 g (2,000 mg) into the vein daily CBC with differential, creatinine, SGOT weekly while on this medication to be faxed to Dr. Douglas office. 600 mL 0 3/21/2017 at 1200     HYDROmorphone (DILAUDID) 2 MG tablet Take 1-2 tablets (2-4 mg) by mouth every 4 hours as needed for moderate to severe pain 70 tablet 0  at prn     CALCIUM PO Take 600 mg by mouth daily   3/21/2017 at Unknown time     Probiotic Product (TRUBIOTICS PO) Take 1 tablet by mouth daily   3/21/2017 at Unknown time     albuterol (PROAIR HFA/PROVENTIL HFA/VENTOLIN HFA) 108 (90 BASE) MCG/ACT Inhaler Inhale 2 puffs into the lungs every 6 hours as needed for shortness of breath / dyspnea or wheezing    at prn     Pyridoxine HCl (VITAMIN B6 PO) Take 1 tablet by mouth daily   3/21/2017 at Unknown time     DAKEFREN EX Externally apply topically daily   3/21/2017 at Unknown time     Furosemide (LASIX PO) Take 20 mg by mouth daily    3/21/2017 at Unknown time     Cholecalciferol (VITAMIN D3 PO) Take 400 Units by mouth daily   3/21/2017 at Unknown time     POTASSIUM PO Take 595 mg by mouth every morning    3/21/2017 at Unknown time     valsartan-hydrochlorothiazide (DIOVAN-HCT) 80-12.5 MG per tablet Take 1 tablet by mouth daily   3/21/2017 at Unknown time     ASPIRIN PO Take 81 mg by mouth daily    3/21/2017 at Unknown time     multivitamin, therapeutic with minerals (THERA-VIT-M) TABS Take 1 tablet by mouth daily.   3/21/2017 at Unknown time     simvastatin (ZOCOR) 40 MG tablet Take 40 mg by mouth every morning    3/21/2017 at Unknown time     order for  DME Equipment being ordered: Walker Wheels () and Walker ()  Treatment Diagnosis: impaired gait 1 each 0      order for DME Equipment being ordered: Walker Wheels () and Walker ()  Treatment Diagnosis: difficulty with gait 1 each 0             Medications:        Current Facility-Administered Medications   Medication Last Rate     NaCl 75 mL/hr at 03/22/17 0656     Current Facility-Administered Medications   Medication Dose Route Frequency     aspirin chewable tablet 81 mg  81 mg Oral Daily     cefTRIAXone  2 g Intravenous Q24H     polyethylene glycol  17 g Oral Daily     valsartan-hydrochlorothiazide  1 tablet Oral Daily     Current Facility-Administered Medications   Medication Dose Route Frequency     HYDROmorphone (PF)  1 mg Intravenous Q15 Min PRN     albuterol  2 puff Inhalation Q6H PRN     HYDROmorphone  0.5 mg Intravenous Q4H PRN     naloxone  0.1-0.4 mg Intravenous Q2 Min PRN     acetaminophen  650 mg Oral Q4H PRN     acetaminophen  650 mg Rectal Q4H PRN     ondansetron  4 mg Oral Q6H PRN    Or     ondansetron  4 mg Intravenous Q6H PRN     potassium chloride  20-40 mEq Oral Q2H PRN     potassium chloride  20-40 mEq Oral or Feeding Tube Q2H PRN     potassium chloride  10 mEq Intravenous Q1H PRN     potassium chloride with lidocaine  10 mEq Intravenous Q1H PRN     potassium chloride  20 mEq Intravenous Q1H PRN     magnesium sulfate  4 g Intravenous Q4H PRN            Data:      Lab data reviewed.     Recent Labs  Lab 03/21/17 2238 03/21/17  0815   HGB 12.8 12.3   MCV 87 88    387     --    POTASSIUM 3.7  --    CHLORIDE 104  --    CO2 29  --    BUN 27  --    CR 0.83 0.70   ANIONGAP 9  --    MATHEW 9.3  --    *  --    TROPI <0.015  --            Imaging:      Imaging data reviewed.     Dr. Steve Robles D.O.  [facility]  Pager [phone][ZA1.1]       Revision History        User Key Date/Time User Provider Type Action    > ZA1.3 3/22/2017 12:33 PM  Steve Robles DO Physician Sign     ZA1.2 3/22/2017 12:24 PM Steve Robles,  Physician      ZA1.1 3/22/2017  8:58 AM Steve Robles,  Physician             Progress Notes by Shamika Brown RN at 3/22/2017 11:37 AM     Author:  Shamika Brown RN Service:  (none) Author Type:      Filed:  3/22/2017 11:40 AM Date of Service:  3/22/2017 11:37 AM Note Created:  3/22/2017 11:37 AM    Status:  Signed :  Shamika Brown, RN ()         Met with patient gave her Observation brochure and explanation of this.  She has been home from TCU living with her significant other.  Has PICC and home health care RN for IV antibiotic .  She was told she may need to return to a TCU due to left hip pain.  MRI to be done. Awaiting plan for discharge.[GM1.1]     Revision History        User Key Date/Time User Provider Type Action    > GM1.1 3/22/2017 11:40 AM Shamika Brown, RN Case Manager Sign            Progress Notes by Tyrone Boyd MD at 3/22/2017 10:18 AM     Author:  Tyrone Boyd MD Service:  Cardiology Author Type:  Physician    Filed:  3/22/2017 10:26 AM Date of Service:  3/22/2017 10:18 AM Note Created:  3/22/2017 10:18 AM    Status:  Signed :  Tyrone Boyd MD (Physician)         Full consult dictated.  In brief, 73 yo F who p/w Hip pain and was found to have SVT (terminated).  She has ongoing knee infection.      Plan:  - Start Metoprolol  - F/u in clinic with me 04/26 at 1:15 PM.  - Consider ablation if failed medical therapy.    Tyrone Boyd MD[LA1.1]     Revision History        User Key Date/Time User Provider Type Action    > LA1.1 3/22/2017 10:26 AM Tyrone Boyd MD Physician Sign            ED Provider Notes by Moreno Mesa MD at 3/21/2017 10:20 PM     Author:  Moreno Mesa MD Service:  Emergency Medicine Author Type:  Physician    Filed:  3/22/2017  1:02 AM Date of Service:  3/21/2017 10:20 PM Note Created:   "3/21/2017 10:41 PM    Status:  Signed :  Moreno Mesa MD (Physician)           History     Chief Complaint:[GG1.1]  P[GG1.2]alpitaions and Hip Pain[GG1.3]    HPI   María Henderson is a 72 year old female[GG1.1] on 2 g Ceftriaxone daily for recent right knee infection with PICC line in place who pr[GG1.4]esents with[GG1.1] left hip pain and palpitations. The patient had a right knee surgery on 2/20[GG1.4] to have an infected knee prosthesis removed[GG1.5] and has a spacer in p[GG1.4]l[GG1.5]ace. She is not ambulatory on her right leg due to this surgery. The patient reports that she stood up from her wheelchair last night around 2130 when her left hip \"popped\". She states she has been unable to bear weight on her left leg now due to pain and has been unable to get around. The patient states she called EMS tonight because she was unable to get around. On arrival to the ED, she reports that she has pain in her left hip. She states the pain increased if she tries to roll over over ambulate. She denies any pain in her left leg or any numbness or weakness. No trauma or injury.    The patient also reports that she had her 4th episode of tachycardia this evening. She states she has had 3 episodes of palpitations[GG1.4] since December 2016[GG1.5] t but has never been evaluated for them. They normally last for about 45 minutes and then go away on their own. She states that this episode started shortly after 1900 this evening and were present when she got to the ED. This episode lasted longer than usual. The patient denies any chest pain, shortness of breath, abdominal pain, vomiting, diarrhea, or any recent fever cough, or cold.[GG1.4]     Allergies:  Penicillin  Oxycodone  Sulfa  Valium  Vicodin[GG1.1]  Iodine[GG1.2]      Medications:[GG1.1]    cefTRIAXone (ROCEPHIN) 1 GM vial  HYDROmorphone (DILAUDID) 2 MG tablet  polyethylene glycol (MIRALAX/GLYCOLAX) Packet  CALCIUM PO  Probiotic Product (TRUBIOTICS " PO)  albuterol (PROAIR HFA/PROVENTIL HFA/VENTOLIN HFA) 108 (90 BASE) MCG/ACT Inhaler  Pyridoxine HCl (VITAMIN B6 PO)  DAKINS EX  Furosemide (LASIX PO)  Cholecalciferol (VITAMIN D3 PO)  POTASSIUM PO  valsartan-hydrochlorothiazide (DIOVAN-HCT) 80-12.5 MG per tablet  ASPIRIN PO  multivitamin, therapeutic with minerals (THERA-VIT-M) TABS  simvastatin (ZOCOR) 40 MG tablet[GG1.2]    Past Medical History:[GG1.1]    GERD  Heart murmur  Hyperlipidemia  Hypertension  Numbness and tingling  Osteoarthritis  Septic left knee joint[GG1.2]     Past Surgical History:[GG1.1]    Tonsillectomy  Foot and ankle surgery x7  Total knee arthroplasty  Hernia repair  Arthroplasty revision ankle  Aspiration needle knee  Bunionectomy  Rotator cuff repair  Remove hardware arthroplasty knee - 2/20/17[GG1.2]    Family History:[GG1.1]    History reviewed. No pertinent family history.[GG1.2]     Social History:[GG1.1]  Smoking status: No  Alcohol use: No[GG1.2]  Marital Status:   [5]     Review of Systems   Constitutional: Negative for[GG1.1] fever[GG1.4].   HENT: Negative for[GG1.1] congestion[GG1.4].    Respiratory: Negative for[GG1.1] cough[GG1.4] and[GG1.1] shortness of breath[GG1.4].    Cardiovascular: Positive for[GG1.1] palpitations[GG1.4]. Negative for[GG1.1] chest pain[GG1.4] and[GG1.1] leg swelling[GG1.4].   Gastrointestinal: Negative for[GG1.1] abdominal pain[GG1.4],[GG1.1] diarrhea[GG1.4],[GG1.1] nausea[GG1.4] and[GG1.1] vomiting[GG1.4].   Musculoskeletal: Positive for[GG1.1] arthralgias[GG1.4] and[GG1.1] gait problem[GG1.4].   Neurological: Negative for[GG1.1] dizziness[GG1.4],[GG1.1] weakness[GG1.4],[GG1.1] light-headedness[GG1.4] and[GG1.1] numbness[GG1.4].[GG1.1]   All other systems reviewed and are negative[GG1.4].      Physical Exam[GG1.1]     Patient Vitals for the past 24 hrs:   BP Temp Temp src Heart Rate Resp SpO2 Height   03/22/17 0033 124/78 - - 82 18 95 % -   03/22/17 0000 140/81 - - 87 20 96 % -   03/21/17 2245 (!)  "139/95 - - - - - -   03/21/17 2236 - - - 96 10 96 % -   03/21/17 2233 - - - 161 16 97 % -   03/21/17 2231 (!) 124/96 - - - 16 96 % -   03/21/17 2226 115/78 98.3  F (36.8  C) Oral 160 - 96 % 1.676 m (5' 6\")[GG1.5]       Physical Exam[GG1.1]  Nursing note and vitals reviewed.  Constitutional:   Awake alert  HENT:   Pharynx normal, tms normal, atraumatic  Mouth/Throat:  Oropharynx is clear and moist.   Eyes:    Conjunctivae normal and EOM are normal. Pupils are equal, round, and reactive to light.   Neck:    Normal range of motion. Neck supple. No tracheal deviation present.   Cardiovascular: Normal rate, regular rhythm, normal heart sounds and intact distal pulses.    Pulmonary/Chest:  Effort normal and breath sounds normal. No respiratory distress. No wheezes. No rales. No tenderness.   Abdominal:   Soft. The patient exhibits no mass. There is no tenderness. There is no rebound and no guarding.   Musculoskeletal:  Left leg no shortening or rotation. Tender left lateral hip, anterior superior iliac spine area up in pelvis. Can move the right hip. Normal range of motion. No edema.   Lymphadenopathy:  No cervical adenopathy.   Neurological:  Alert and oriented to person, place, and time. No cranial nerve deficit. Normal muscle tone.   Skin:    Skin is warm and dry. Right knee incision clean and dry.   Psychiatric:   Normal mood and affect.[GG1.2]     Emergency Department Course   ECG (22:28:22):  Rate 160 bpm. GA interval *. QRS duration 102. QT/QTc 292/476. P-R-T axes * 56 -11. Supraventricular tachycardia. Incomplete right bundle branch block. Inferior infarct, age undetermined. Abnormal ECG   Interpreted by Moreno Mesa MD.    Repeat ECG (22:42:20):  Rate 93 bpm. GA interval 150. QRS duration 96. QT/QTc 346/430. P-R-T axes 63 31 27. Sinus rhythm with occasional premature ventricular complexes. Otherwise normal ECG   Interpreted at 2243 by Moreno Mesa MD.    Imaging:[GG1.1]  Radiographic findings were " communicated with the patient who voiced understanding of the findings.    X-ray Pelvis, 1-2 views:  Degenerative changes lower lumbar spine and pubic  symphysis. Otherwise negative. No acute fracture is identified.  Result per radiology.[GG1.6]     Laboratory:[GG1.1]  Troponin: <0.015  CBC: WNL (WBC 9.8, HGB 12.8, )   CMP: Glucose 192(H), Albumin 3.1(L0, Alkphos 173(H), o/w WNL (Creatinine 0.83)[GG1.6]    Interventions:[GG1.1]  NS 1L IV Bolus  0010: Dilaudid 1 mg IV[GG1.6]    Emergency Department Course:[GG1.1]  The patient arrived in the emergency department via EMS.  Past medical records, nursing notes, and vitals reviewed.  2241: I performed an exam of the patient and obtained history, as documented above.[GG1.2]  IV inserted and blood drawn. The patient was placed on continuous cardiac monitoring and pulse oximetry.  ECG ordered, results above.[GG1.6]     Nurse had her do[GG1.1] valsalva maneuver[GG1.6] and slowed h[GG1.1]eart[GG1.6] rate from 160 to 90[GG1.1].  Repeat ECG ordered, results above.     The patient was sent for a pelvis x-ray while in the emergency department, findings above.  2359: I rechecked the patient. Explained findings to the patient.[GG1.6]    0010: I spoke to Dr. Ledezma of the hospitalist service who accepts the patient for admission.[GG1.2]     Findings and plan explained to the Patient who consents to admission.   Discussed the patient with Dr. Ledezma, who will admit the patient to a [facility] obs bed for further monitoring, evaluation, and treatment.[GG1.5]     Impression & Plan      Medical Decision Making:[GG1.1]  80 year old female who recently had her right infected knee prosthesis removed and a spacer put in. She had streptococcal infection and is on long term daily Rocephin through a right arm PICC line. She is non weight bearing on the right leg of course. Last night she twisted trying to get into bed and felt something pop in her left hip. She has had severe pain and is unable  to weight bear on the left hip now since last night. In addition, she has had what sounds like 4 episodes of tachycardia since December 2016. She has never sought medical attention for this. Usually these last 30-45 minutes. She had a similar tachycardia episode that started about 7pm tonight. She was in SVT rate 160 on admission to the ED here tonight. Nursing staff had her do valsalva and converted her SVT. Cardiac work up including ECG, troponin and laboratory studies was unremarkable. X-ray of the left hip is negative. Probable soft tissue injury but she is unable to walk on either leg so she will be admitted. Dr. Ledezma will admit.[GG1.5]     Diagnosis:[GG1.1]    ICD-10-CM   1. Hip pain M25.559[GG1.7]     Disposition:[GG1.1] Admitted[GG1.6]    Kristin Desai  3/21/2017    EMERGENCY DEPARTMENT    I, Kristin Desai, am serving as a scribe at 10:41 PM on 3/21/2017 to document services personally performed by Moreno Mesa MD based on my observations and the provider's statements to me.[GG1.1]        Moreno Mesa MD  03/22/17 0102  [CL1.1]     Revision History        User Key Date/Time User Provider Type Action    > [N/A] 3/22/2017  1:02 AM Moreno Mesa MD Physician Sign     [N/A] 3/22/2017  1:02 AM Kristin Desai Share     CL1.1 3/22/2017  1:02 AM Moreno Mesa MD Physician      [N/A] 3/22/2017 12:57 AM Moreno Mesa MD Physician Share     GG1.5 3/22/2017 12:57 AM Kristin Desai Scribe      GG1.7 3/22/2017 12:50 AM Kristin Desai Scribkwame Share     GG1.6 3/22/2017 12:47 AM rKistin Desaiibe      [N/A] 3/22/2017 12:35 AM Moreno Mesa MD Physician Share     [N/A] 3/22/2017 12:33 AM Kristin Desaiibkwame Share     GG1.4 3/22/2017 12:23 AM Kristin Desai     GG1.3 3/22/2017 12:17 AM Kristin Desai      GG1.2 3/22/2017 12:11 AM Kristin Desai      GG1.1 3/21/2017 10:51 PM Kristin Desai            ED Notes by Oly Kurtz, RN  at 3/22/2017 12:16 AM     Author:  Oly Kurtz RN Service:  (none) Author Type:  Registered Nurse    Filed:  3/22/2017 12:22 AM Date of Service:  3/22/2017 12:16 AM Note Created:  3/22/2017 12:22 AM    Status:  Signed :  Oly Kurtz RN (Registered Nurse)         [facility]  ED Nurse Handoff Report    ED Chief complaint: palpitaions ( upon arrival, pt c/o L hip mpain after feelimng a pop. recent R knee L ankle surgeries) and Hip Pain      ED Diagnosis:   Final diagnoses:   Hip pain       Code Status: to be addressed    Allergies:   Allergies   Allergen Reactions     Pcn [Penicillin V Potassium] Anaphylaxis     Oxycodone      Sulfa Drugs      Valium [Diazepam] Other (See Comments)     unconsciousness     Vicodin [Hydrocodone-Acetaminophen]      Patient unsure if allergy     Iodine Solution [Povidone Iodine] Rash       Activity level:  Stand with Assist     Needed?: No    Isolation: No  Infection: Not Applicable    Bariatric?: No      Vital Signs:   Vitals:    03/21/17 2236 03/21/17 2240 03/21/17 2245 03/22/17 0000   BP:   (!) 139/95 140/81   Resp: 10 24  20   Temp:       TempSrc:       SpO2: 96% 94%  96%   Height:           Cardiac Rhythm: ,   Cardiac  Cardiac Rhythm: Normal sinus rhythm    Pain level: 0-10 Pain Scale: 6    Is this patient confused?: No    Patient Report: Initial Complaint: left hip pain from fall day prior, palpitations  Focused Assessment: In PSVT rates 160's on arrival, converted with valsalva to NSR with isolated unifocal pvc's, c/o left hip pain  Tests Performed: labs/rad  Abnormal Results: hip xray wnl  Treatments provided: IVF, 12 lead EKG, dilaudid    Family Comments: significant other present    OBS brochure/video discussed/provided to patient: No    ED Medications:   Medications   0.9% sodium chloride BOLUS (1,000 mLs Intravenous New Bag 3/21/17 2243)     Followed by   0.9% sodium chloride infusion (not administered)   HYDROmorphone  (DILAUDID) injection 1 mg (1 mg Intravenous Given 3/22/17 0010)       Drips infusing?:  No      ED NURSE PHONE NUMBER: [phone][SS1.1]          Revision History        User Key Date/Time User Provider Type Action    > SS1.1 3/22/2017 12:22 AM Oly Kurtz RN Registered Nurse Sign                     Procedure Notes      Procedures by Joaquim Ruano MD at 3/24/2017  3:30 PM     Author:  Joaquim Ruano MD Service:  Electrophysiology Author Type:  Physician    Filed:  3/24/2017  3:30 PM Date of Service:  3/24/2017  3:30 PM Note Created:  3/24/2017  3:30 PM    Status:  Signed :  Joaquim Ruano MD (Physician)         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]     Revision History        User Key Date/Time User Provider Type Action    > QP1.1 3/24/2017  3:30 PM Joaquim Ruano MD Physician Sign                     Progress Notes - Therapies (Notes from 03/22/17 through 03/25/17)      Progress Notes by Estephania Golden, PT at 3/23/2017  2:54 PM     Author:  Estephania Golden PT Service:  (none) Author Type:  Physical Therapist    Filed:  3/23/2017  2:54 PM Date of Service:  3/23/2017  2:54 PM Note Created:  3/23/2017  2:54 PM    Status:  Signed :  Estephania Golden PT (Physical Therapist)          03/23/17 1433   Quick Adds   Type of Visit Initial PT Evaluation   Living Environment   Lives With significant other   Living Arrangements house   Home Accessibility stairs to enter home;stairs within home   Number of Stairs to Enter Home 1   Number of Stairs Within Home 14  (To basement. )   Stair Railings at Home (Yes)   Transportation Available ( has provided since surgery. )   Living Environment Comment Patient has been home from TCU for about 2 weeks. Was managing at home until having increased left hip pain and racing heart. Patients w/c doesn't fit in the doors at home so patient has to amb into the bedroom. Able to maintain NWB with ww.    Self-Care   Dominant Hand right   Usual Activity  Tolerance good  (Prior to infection and right knee surgery. )   Current Activity Tolerance fair   Regular Exercise no   Equipment Currently Used at Home shower chair   Functional Level Prior   Ambulation 4-->completely dependent   Transferring 1-->assistive equipment   Toileting 1-->assistive equipment   Bathing 1-->assistive equipment   Dressing 0-->independent   Eating 0-->independent   Communication 0-->understands/communicates without difficulty   Swallowing 0-->swallows foods/liquids without difficulty   Cognition 0 - no cognition issues reported   Fall history within last six months no   Which of the above functional risks had a recent onset or change? none   Prior Functional Level Comment Wears cam boot on the left due to multiple surgeries. Patient was getting assist of  since discharge from TCU.    General Information   Onset of Illness/Injury or Date of Surgery - Date 03/21/17   Referring Physician Husam Berkowitz   Patient/Family Goals Statement To go to TCU.    Pertinent History of Current Problem (include personal factors and/or comorbidities that impact the POC) María Henderson is a 72-year-old woman with a past medical history significant for hypertension, hyperlipidemia, gastroesophageal reflux disease, osteoarthritis with prior infected right knee prosthesis which was explanted and antibiotic spacer was devised and maintained on IV Rocephin, presented to the emergency room with left hip pain. Per othopedic MD, x-rays negative.    Precautions/Limitations fall precautions   Weight-Bearing Status - LUE full weight-bearing   Weight-Bearing Status - RUE full weight-bearing   Weight-Bearing Status - LLE full weight-bearing  (Has cam boot on from previous surgeries. )   Weight-Bearing Status - RLE nonweight-bearing   Cognitive Status Examination   Orientation orientation to person, place and time   Level of Consciousness alert   Follows Commands and Answers Questions 100% of the time   Personal  Safety and Judgment intact   Memory intact   Pain Assessment   Patient Currently in Pain Yes, see Vital Sign flowsheet  (Left hip only with sit to stand. )   Range of Motion (ROM)   ROM Comment Left ankle fused. Right knee with knee immobilizer on.    Strength   Strength Comments UE strength 5/5, Bilateral LE strength functional for transfers and amb. Fatigues quickly though due to extra energy needed for NWBing status.    Bed Mobility   Bed Mobility Comments Independent   Transfer Skills   Transfer Comments Sit to stand with suprevision and extra time to get tu full standing position from bed. Less pain and les time needed to stand from w/c.    Gait   Gait Comments Gait with ww 5 feet in room with CGA. Additional 40 feet in daily documentation.    Balance   Balance Comments Good static standing balance with support of walker. Good balance with gait and able to maintain NWBing without difficulty.    Sensory Examination   Sensory Perception no deficits were identified   Coordination   Coordination no deficits were identified   Muscle Tone   Muscle Tone no deficits were identified   Clinical Impression   Criteria for Skilled Therapeutic Intervention yes, treatment indicated   PT Diagnosis Impaired functional endurance and independence.    Influenced by the following impairments Pain in left hip. NWBing on right LE   Functional limitations due to impairments Needs extra time for transfers and fatigues due to NWBing status.    Clinical Presentation Stable/Uncomplicated   Clinical Presentation Rationale Functional ability prior to admission and currently.    Clinical Decision Making (Complexity) Low complexity   Therapy Frequency` (One time only. )   Predicted Duration of Therapy Intervention (days/wks) (Deferred to TCU. )   Anticipated Equipment Needs at Discharge (Has a ww and a w/c already. )   Anticipated Discharge Disposition Transitional Care Facility   Risk & Benefits of therapy have been explained Yes   Patient,  Family & other staff in agreement with plan of care Yes   Total Evaluation Time   Total Evaluation Time (Minutes) 18[SL1.1]        Revision History        User Key Date/Time User Provider Type Action    > SL1.1 3/23/2017  2:54 PM Estephania Golden, PT Physical Therapist Sign

## 2017-03-21 NOTE — IP AVS SNAPSHOT
` ` Patient Information     Patient Name Sex María Saucedo ([id number]) Female 1945       Room Bed    [facility] 0244-      Patient Demographics     Address Phone E-mail Address    [address] [place] [address] [phone] (Home)  none (Work)  [phone] (Mobile) [email or web address]      Patient Ethnicity & Race     Ethnic Group Patient Race    American White      Emergency Contact(s)     Name Relation Home Work Mobile    Leon Dyer Significant other [phone]      Documents on File        Status Date Received Description       Documents for the Patient    BERTHA Face Sheet Received but Refused Research () 09     Waiver - Payment  09     Privacy Notice - [place]  09     Insurance Card Received 10/25/11     External Medication Information Consent       Patient ID Received 10/24/11     Consent for Services - Hospital/Clinic Received () 10/24/11     Privacy Notice - [place] Received 10/24/11     Patient ID Received 10/25/11     CMS IM for Patient Signature       Consent for EHR Access  13 Copied from existing Consent for services - C/HOD collected on 10/24/2011    HIM YANELI Authorization  13 Chau Law    [facility] Specified Other       Consent for Services - Hospital/Clinic Received () 13     HIM YANELI Authorization  13     HIM YANELI Authorization  13     Patient ID Received 03/10/14     HIM YANELI Authorization  14     Physical Therapy Certification Sent 06/10/14 6/10/14 to 14    HIM YANELI Authorization  14     Consent for Services - Hospital/Clinic Received () 14     HIM YANELI Authorization  14     HIM YANELI Authorization  09/10/14     Business/Insurance/Care Coordination/Health Form - Patient  10/06/14 ADULT REHABILITATION ATTENDANCE POLICY    Consent to Communicate  10/21/14 AUTHORIZATION TO DISCUSS PROTECTED HEALTH INFORMATION    HIM YANELI Authorization - File Only   10/22/14 AUTHORIZATION FOR E-MAIL COMMUNICATION    Consent for Services/Privacy Notice - Hospital/Clinic Received 12/25/16     HIM YANELI Authorization  01/04/17 [facility]    HIM YANELI Authorization  01/12/17     HIM YANELI Authorization  01/31/17     HIM YNAELI Authorization  02/28/17     HIM YANELI Authorization  03/06/17     HIM YANELI Authorization  03/15/17     HIM YANELI Authorization  03/21/17        Documents for the Encounter    CMS IM for Patient Signature       Observation Notice Received 03/22/17     Observation Notice Received 03/22/17 RICK given    EP Report - HIM Scan   AVNRT Report      Admission Information     Attending Provider Admitting Provider Admission Type Admission Date/Time    Alyson Ledezma MD Kharel, Tirtha R, MD Emergency 03/21/17 2226    Discharge Date Hospital Service Auth/Cert Status Service Area     Cardiac Cath Incomplete [facility] SERVICES    Unit Room/Bed Admission Status        CARDIAC SPEC CARE 0244/0244-01 Admission (Confirmed)       Admission     Complaint    Left hip pain      Hospital Account     Name Acct ID Class Status Primary Coverage    María Henderson [id number] Observation Open WORK COMP - WC OTHER            Guarantor Account (for Hospital Account #[id number])     Name Relation to Pt Service Area Active? Acct Type    , [id number] Self FCS Yes Worker's Compensation    Address Phone          [address] [address] [phone](H)  [phone](O)              Coverage Information (for Hospital Account #[id number])     F/O Payor/Plan Precert #    WORK COMP/WC OTHER     Subscriber Subscriber #    [place], [id number]    Address Phone    [address] [id number] [phone]

## 2017-03-21 NOTE — IP AVS SNAPSHOT
"` [facility] CARDIAC SPECIALTY CARE: [phone]                                              INTERAGENCY TRANSFER FORM - NURSING   3/21/2017                    Hospital Admission Date: 3/21/2017  KE LIZ   : 1945  Sex: Female        Attending Provider: Alyson Ledezma MD     Allergies:  Pcn [Penicillin V Potassium], Oxycodone, Sulfa Drugs, Valium [Diazepam], Vicodin [Hydrocodone-acetaminophen], Iodine Solution [Povidone Iodine]    Infection:  None   Service:  CARDIAC CATH    Ht:  1.676 m (5' 6\")   Wt:  76.1 kg (167 lb 12.3 oz)   Admission Wt:  75.5 kg (166 lb 7.2 oz)    BMI:  27.08 kg/m 2   BSA:  1.88 m 2            Patient PCP Information     Provider PCP Type    Thad Puri MD General      Current Code Status     Date Active Code Status Order ID Comments User Context       Prior      Code Status History     Date Active Date Inactive Code Status Order ID Comments User Context    3/25/2017 12:04 PM  Full Code [id number]  Lewis Crockett MD Outpatient    3/22/2017  1:05 AM 3/25/2017 12:04 PM Full Code [id number]  Alyson Ledezma MD Inpatient    2017  7:32 AM 3/22/2017  1:05 AM Full Code [id number]  Husam Berkowitz MD Outpatient    2017  7:56 AM 2017  7:32 AM Full Code [id number]  Husam Berkowitz MD Outpatient    2017  3:26 PM 2017  7:56 AM Full Code [id number]  Husam Berkowitz MD Inpatient    2017  1:51 PM 2017  3:26 PM Full Code [id number]  Masoud Wallis DO Outpatient    2016 12:12 AM 2016  7:28 PM Full Code [id number]  Sujey Hu MD Inpatient    3/10/2014  1:59 PM 3/12/2014  3:57 PM Full Code [id number]  Hallie Musa RN Inpatient    10/25/2011  4:23 PM 10/26/2011  7:40 PM Full Code [id number]  Garrett Perez, DO Inpatient      Advance Directives        Does patient have a scanned Advance Directive/ACP document in EPIC?           No        Hospital Problems as " of 3/25/2017              Priority Class Noted POA    Left hip pain Medium  3/22/2017 Yes      Non-Hospital Problems as of 3/25/2017              Priority Class Noted    S/P foot surgery    10/24/2011    Syncope   10/25/2011    Hypertension   10/25/2011    Bradycardia   10/25/2011    Hyperlipidemia LDL goal <130   10/25/2011    Pain in joint, ankle and foot   1/13/2012    Sprain of neck   2/8/2012    Sprain and strain of shoulder and upper arm   2/8/2012    Elbow strain   2/8/2012    Wrist strain   2/8/2012    S/P ankle fusion   3/10/2014    Cellulitis Medium  12/26/2016    Sepsis (H) Medium  12/27/2016    Septic joint of left knee joint (H) Medium  2/20/2017      Immunizations     Name Date      Pneumococcal 23 valent 03/11/14          END      ASSESSMENT     Discharge Profile Flowsheet     EXPECTED DISCHARGE     COMMUNICATION ASSESSMENT      Expected Discharge Date  03/22/17 (home) 03/22/17 0744   Patient's communication style  spoken language (English or Bilingual) 03/22/17 0206    DISCHARGE NEEDS ASSESSMENT     FINAL RESOURCES      Concerns To Be Addressed  discharge planning concerns 02/21/17 1407   Resources List  Skilled Nursing Facility 03/25/17 1559    Concerns Comments  frustration 02/06/17 1623   Skilled Nursing Facility  [facility] [phone], Fax: [phone] 03/25/17 1559    Equipment Currently Used at Home  shower chair 03/23/17 1439   PAS Number  [id number] 03/25/17 1559    Transportation Available  -- ( has provided since surgery. ) 03/23/17 1439   Senior Linkage Line Referral Placed  03/25/17 03/25/17 1559    # of Referrals Placed by CTS  Post Acute Facilities 03/23/17 1439   F/U Appointment Brochure Provided  -- (NA) 02/06/17 1623    Equipment Used at Home  none (Has crutches, FWW and 4WW) 03/11/14 1932   Referrals Placed  Senior Linkage Line 02/23/17 1552    ASSESSMENT OF FUNCTIONAL STATUS     SKIN      Assesssment of Functional Status  Needs placement in a SNF/TCF for  "rehabilitation 03/23/17 1439   Inspection  Full 03/25/17 1647    GASTROINTESTINAL (ADULT,PEDIATRIC,OB)     Skin WDL  ex 03/25/17 1647    GI WDL  WDL 03/25/17 1647   Skin Integrity  incision(s);bruise(s);scab(s);scar(s) 03/25/17 1647    Last Bowel Movement  03/24/17 03/24/17 1023   SAFETY      Passing flatus  yes 03/24/17 1023   Safety WDL  WDL 03/25/17 1716                 Assessment WDL (Within Defined Limits) Definitions           Safety WDL     Effective: 09/28/15    Row Information: <b>WDL Definition:</b> Bed in low position, wheels locked; call light in reach; upper side rails up x 2; ID band on<br> <font color=\"gray\"><i>Item=AS safety wdl>>List=AS safety wdl>>Version=F14</i></font>      Skin WDL     Effective: 09/28/15    Row Information: <b>WDL Definition:</b> Warm; dry; intact; elastic; without discoloration; pressure points without redness<br> <font color=\"gray\"><i>Item=AS skin wdl>>List=AS skin wdl>>Version=F14</i></font>      Vitals     Vital Signs Flowsheet     VITAL SIGNS     Height  1.676 m (5' 6\") 03/22/17 0201    Temp  99.1  F (37.3  C) 03/25/17 1553   Height Method  Stated 03/22/17 0201    Temp src  Oral 03/25/17 1553   Weight  76.1 kg (167 lb 12.3 oz) (Bed scale) 03/25/17 0551    Resp  16 03/25/17 1553   Estimated Weight (From ED)  74.8 kg (165 lb) 03/21/17 2227    Pulse  68 03/25/17 0840   BSA (Calculated - sq m)  1.87 03/22/17 0216    Heart Rate  73 03/25/17 1553   BMI (Calculated)  26.92 03/22/17 0216    Pulse/Heart Rate Source  Monitor 03/25/17 1553   EKG MONITORING      BP  141/69 03/25/17 1553   Cardiac Regularity  Regular 03/23/17 0153    BP Location  Left arm 03/25/17 1553   Cardiac Rhythm  NSR 03/23/17 0153    OXYGEN THERAPY     DAILY CARE      SpO2  95 % 03/25/17 1553   Activity Type  activity adjusted per tolerance 03/25/17 1716    O2 Device  None (Room air) 03/25/17 1553   Activity Level of Assistance  assistance, 1 person 03/25/17 1716    Oxygen Delivery  2 LPM 03/25/17 1147   " POSITIONING      PAIN/COMFORT     Body Position  independently positioning 03/25/17 1716    Patient Currently in Pain  yes 03/25/17 1528   Head of Bed (HOB)  HOB at 20-30 degrees 03/25/17 1716    Preferred Pain Scale  number (Numeric Rating Pain Scale) 03/25/17 1528   Positioning/Transfer Devices  pillows 03/23/17 1556    Patient's Stated Pain Goal  3 03/25/17 1528   ECG      0-10 Pain Scale  6 03/25/17 1559   Equipment  electrodes changed;telemetry batteries changed 03/23/17 1556    Pain Location  Leg 03/25/17 1528   ECG Rhythm  Normal sinus rhythm 03/24/17 0308    Pain Orientation  Right 03/25/17 1528   Ectopy  PVC 03/24/17 0308    Pain Descriptors  Aching 03/25/17 1528   RIGHT GROIN INTERVENTIONAL PROCEDURE ACCESS      Pain Intervention(s)  Declines 03/25/17 1528   Site Assessment  Unchanged 03/25/17 1032    Response to Interventions  Decrease in pain 03/25/17 0106   Hemostasis management  Unchanged 03/25/17 1032    ANALGESIA SIDE EFFECTS MONITORING     Femoral Bruit  not present 03/25/17 1032    Side Effects Monitoring: Respiratory Quality  R 03/25/17 0612   CMS Right Extremity  WDL 03/25/17 1032    Side Effects Monitoring: Respiratory Depth  N 03/25/17 0612   Dorsalis Pulse - Right Leg  Normal 03/25/17 0612    Side Effects Monitoring: Sedation Level  1 03/25/17 0612   Posterior Tibial Pulse - Right Leg  Normal 03/24/17 1956    HEIGHT AND WEIGHT                   Patient Lines/Drains/Airways Status    Active LINES/DRAINS/AIRWAYS     Name: Placement date: Placement time: Site: Days: Last dressing change:    Negative Pressure Wound Therapy Leg Left 12/27/16   1656   Leg   88     Incision/Surgical Site 03/10/14 Left Ankle 03/10/14   1056    1111     Incision/Surgical Site 12/27/16 Right Knee 12/27/16   1640    88     Incision/Surgical Site 02/20/17 Right Knee 02/20/17   1342    33             Patient Lines/Drains/Airways Status    Active PICC/CVC     Name: Placement date: Placement time: Site: Days: Additional Info  Last dressing change:    PICC Single Lumen 03/12/17 Right Brachial vein medial 03/12/17   1647   Brachial vein medial   13 Internal Lumen Volume (mL): 2 mL            External Cath Length (cm): 1 cm            Size (Fr): 4 Fr            Orientation: Right            Extremity Circumference (cm): 31 cm            Catheter Brand: Bard            Dressing Intervention: Transparent;Securing device;Chlorhexidine patch            Description: Valved;Power PICC            Total Catheter Length (cm) Trimmed: 40 cm            Site Prep: Chlorhexidine            Local Anesthetic: Injectable            Inserted by: Jose Batres            Patient Tolerance: Tolerated well            Placement Verification: Xray;Blood Return            Difficulty with threading line: Yes             Tip location: SVC            Full barrier precautions done: Yes            Consent Signed: Yes            Time Out performed: Yes            Lot #: [id number]            Use for : as ordered               Intake/Output Detail Report     Date Intake     Output Net    Shift P.O. I.V. IV Piggyback Total Urine Total       Day 03/24/17 0700 - 03/24/17 1459 250 -- -- 250 -- -- 250    Denise 03/24/17 1500 - 03/24/17 2259 360 -- -- 360 600 600 -240    Noc 03/24/17 2300 - 03/25/17 0659 -- 40 -- 40 300 300 -260    Day 03/25/17 0700 - 03/25/17 1459 240 -- -- 240 200 200 40    Denise 03/25/17 1500 - 03/25/17 2259 -- -- -- -- -- -- 0      Last Void/BM       Most Recent Value    Urine Occurrence 2 at 03/23/2017 2200    Stool Occurrence 0 at 03/23/2017 2200      Case Management/Discharge Planning     Case Management/Discharge Planning Flowsheet     REFERRAL INFORMATION     Major Change/Loss/Stressor  none 03/22/17 0206    Admission Type  observation 03/23/17 1439   Patient Personal Strengths  expressive of needs;motivated;positive attitude;strong support system 02/21/17 1407    Arrived From  admitted as an inpatient 03/22/17 0143   EXPECTED DISCHARGE      Referral Source   physician 03/23/17 1439   Expected Discharge Date  03/22/17 (home) 03/22/17 0744    # of Referrals Placed by CTS  Post Acute Facilities 03/23/17 1439   ASSESSMENT/CONCERNS TO BE ADDRESSED      Post Acute Facilities  TCU 03/25/17 1559   Concerns To Be Addressed  discharge planning concerns 02/21/17 1407    Reason For Consult  discharge planning 03/23/17 1439   Concerns Comments  frustration 02/06/17 1623    Record Reviewed  clinical discipline documentation;history and physical;medical record 03/23/17 1439   DISCHARGE PLANNING       Assigned to Case  Inga Azam 03/25/17 1559   Transportation Available  -- ( has provided since surgery. ) 03/23/17 1439    [facility]  01/02/17 1539   Equipment Used at Home  none (Has crutches, FWW and 4WW) 03/11/14 1932    Primary Care MD Name  Thad Puri MD 02/21/17 1407   FINAL NOTE      LIVING ENVIRONMENT     Final Note  D/C to [facility] 03/25/17 1559    Lives With  significant other 03/23/17 1439   FINAL RESOURCES      Living Arrangements  house 03/23/17 1439   Equipment Currently Used at Home  shower chair 03/23/17 1439    Provides Primary Care For  no one, unable/limited ability to care for self 03/23/17 1439   Resources List  Skilled Nursing Facility 03/25/17 1559    Primary Care Provided By  spouse/significant other 03/23/17 1439   Skilled Nursing Facility  [facility] [phone], Fax: [phone] 03/25/17 1559    Quality Of Family Relationships  involved;supportive 03/23/17 1439   PAS Number  [id number] 03/25/17 1559    Able to Return to Prior Living Arrangements  no 03/23/17 1439   Senior Linkage Line Referral Placed  03/25/17 03/25/17 1559    ASSESSMENT OF FAMILY/SOCIAL SUPPORT     F/U Appointment Brochure Provided  -- (NA) 02/06/17 1623    Marital Status  Lives with Significant Other 03/23/17 1439   Referrals Placed  Senior Linkage Line 02/23/17 1552    Who is your support system?   Significant Other 03/23/17 1439   ABUSE RISK SCREEN      Description of Support System  Involved;Supportive 03/23/17 1439   QUESTION TO PATIENT:  Has a member of your family or a partner(now or in the past) intimidated, hurt, manipulated, or controlled you in any way?  no 03/22/17 0206    Support Assessment  Adequate family and caregiver support 03/23/17 1439   QUESTION TO PATIENT: Do you feel safe going back to the place where you are living?  yes 03/22/17 0206    Quality of Family Relationships  supportive;involved 03/23/17 1439   OBSERVATION: Is there reason to believe there has been maltreatment of a vulnerable adult (ie. Physical/Sexual/Emotional abuse, self neglect, lack of adequate food, shelter, medical care, or financial exploitation)?  no 03/22/17 0206    ASSESSMENT OF FUNCTIONAL STATUS     (R) MENTAL HEALTH SUICIDE RISK      Assesssment of Functional Status  Needs placement in a SNF/TCF for rehabilitation 03/23/17 1439   Are you depressed or being treated for depression?  No 03/22/17 0206    COPING/STRESS

## 2017-03-21 NOTE — IP AVS SNAPSHOT
` [facility]: [phone]            Medication Administration Report for María Henderson as of 03/25/17 8958   Legend:    Given Hold Not Given Due Canceled Entry Other Actions    Time Time (Time) Time  Time-Action       Inactive    Active    Linked        Medications 03/19/17 03/20/17 03/21/17 03/22/17 03/23/17 03/24/17 03/25/17    acetaminophen (TYLENOL) Suppository 650 mg  Dose: 650 mg Freq: EVERY 4 HOURS PRN Route: RE  PRN Reason: mild pain  Start: 03/22/17 0104   Admin Instructions: Alternate ibuprofen (if ordered) with acetaminophen.  Maximum acetaminophen dose from all sources = 75 mg/kg/day not to exceed 4 grams/day.               acetaminophen (TYLENOL) tablet 650 mg  Dose: 650 mg Freq: EVERY 4 HOURS PRN Route: PO  PRN Reason: mild pain  Start: 03/22/17 0104   Admin Instructions: Alternate ibuprofen (if ordered) with acetaminophen.  Maximum acetaminophen dose from all sources = 75 mg/kg/day not to exceed 4 grams/day.               acetaminophen-codeine (TYLENOL #3) 300-30 MG per tablet 1-2 tablet  Dose: 1-2 tablet Freq: EVERY 4 HOURS PRN Route: PO  PRN Reason: other  PRN Comment: mild or moderate pain  Start: 03/24/17 1554   Admin Instructions: Post-procedurally for CAR electrophysiology studies.  Maximum acetaminophen dose from all sources= 75 mg/kg/day not to exceed 4 grams               albuterol (PROAIR HFA/PROVENTIL HFA/VENTOLIN HFA) Inhaler 2 puff  Dose: 2 puff Freq: EVERY 6 HOURS PRN Route: IN  PRN Reasons: shortness of breath / dyspnea,wheezing  Start: 03/22/17 0101              aspirin chewable tablet 81 mg  Dose: 81 mg Freq: DAILY Route: PO  Start: 03/22/17 0900       0848 (81 mg)-Given        0917 (81 mg)-Given        0903 (81 mg)-Given        0838 (81 mg)-Given           cefTRIAXone (ROCEPHIN) 2 g vial to attach to  ml bag for ADULTS or NS 50 ml bag for PEDS  Dose: 2 g Freq: EVERY 24 HOURS Route: IV  Indications Comment: infected Rt knee  "arthroplasty  Last Dose: 2 g (03/25/17 1250)  Start: 03/22/17 1200       1152 (2 g)-New Bag        1154 (2 g)-New Bag        1231 (2 g)-New Bag        1250 (2 g)-New Bag           HYDROmorphone (DILAUDID) tablet 2-4 mg  Dose: 2-4 mg Freq: EVERY 3 HOURS PRN Route: PO  PRN Reason: moderate to severe pain  Start: 03/25/17 1534          1559 (4 mg)-Given           HYDROmorphone (PF) (DILAUDID) injection 0.5 mg  Dose: 0.5 mg Freq: EVERY 4 HOURS PRN Route: IV  PRN Reason: moderate to severe pain  Start: 03/22/17 0102       0654 (0.5 mg)-Given       1458 (0.5 mg)-Given       1950 (0.5 mg)-Given         0012 (0.5 mg)-Given       2304 (0.5 mg)-Given        0522 (0.5 mg)-Given           lidocaine (LMX4) cream  Freq: EVERY 1 HOUR PRN Route: Top  PRN Reason: pain  PRN Comment: with VAD insertion or accessing implanted port.  Start: 03/24/17 1554   Admin Instructions: Do NOT give if patient has a history of allergy to any local anesthetic or any \"shahab\" product.   Apply 30 minutes prior to VAD insertion or port access. MAX Dose: 2.5 g (  of 5 g tube)               lidocaine 1 % 1 mL  Dose: 1 mL Freq: EVERY 1 HOUR PRN Route: OTHER  PRN Comment: mild pain with VAD insertion or accessing implanted port  Start: 03/24/17 1554   Admin Instructions: Do NOT give if patient has a history of allergy to any local anesthetic or any \"shahab\" product. MAX dose 1 mL subcutaneous OR intradermal in divided doses.               magnesium sulfate 2 g in NS intermittent infusion ([employer] or [facility])  Dose: 2 g Freq: DAILY PRN Route: IV  PRN Reason: magnesium supplementation  Start: 03/24/17 0302   Admin Instructions: For Serum Mg++ 1.6 - 2 mg/dL  Give 2 g and recheck magnesium level next AM.               magnesium sulfate 4 g in 100 mL sterile water (premade)  Dose: 4 g Freq: EVERY 4 HOURS PRN Route: IV  PRN Reason: magnesium supplementation  Start: 03/24/17 0302   Admin Instructions: For serum Mg++ less than 1.6 mg/dL  Give 4 g and recheck " magnesium level 2 hours after dose, and next AM.               metoprolol (LOPRESSOR) injection 5 mg  Dose: 5 mg Freq: EVERY 4 HOURS PRN Route: IV  PRN Reason: other  PRN Comment: HR > 120, hold for SBP < 90  Start: 03/24/17 0220              metoprolol (LOPRESSOR) tablet 25 mg  Dose: 25 mg Freq: 2 TIMES DAILY Route: PO  Start: 03/22/17 1030       1101 (25 mg)-Given       2118 (25 mg)-Given        0916 (25 mg)-Given       2036 (25 mg)-Given        0903 (25 mg)-Given       2052 (25 mg)-Given        0838 (25 mg)-Given       [ ] 2100           naloxone (NARCAN) injection 0.1-0.4 mg  Dose: 0.1-0.4 mg Freq: EVERY 2 MIN PRN Route: IV  PRN Reason: opioid reversal  Start: 03/24/17 1554   Admin Instructions: For respiratory rate LESS than or EQUAL to 8.  Partial reversal dose:  0.1 mg titrated q 2 minutes for Analgesia Side Effects Monitoring Sedation Level of 3 (frequently drowsy, arousable, drifts to sleep during conversation).Full reversal dose:  0.4 mg bolus for Analgesia Side Effects Monitoring Sedation Level of 4 (somnolent, minimal or no response to stimulation).               ondansetron (ZOFRAN-ODT) ODT tab 4 mg  Dose: 4 mg Freq: EVERY 6 HOURS PRN Route: PO  PRN Reason: nausea  Start: 03/22/17 0105   Admin Instructions: This is Step 1 of nausea and vomiting management.  If nausea not resolved in 15 minutes, go to Step 2 prochlorperazine (COMPAZINE). Do not push through foil backing. Peel back foil and gently remove. Place on tongue immediately. Administration with liquid unnecessary              Or  ondansetron (ZOFRAN) injection 4 mg  Dose: 4 mg Freq: EVERY 6 HOURS PRN Route: IV  PRN Reasons: nausea,vomiting  Start: 03/22/17 0105   Admin Instructions: This is Step 1 of nausea and vomiting management.  If nausea not resolved in 15 minutes, go to Step 2 prochlorperazine (COMPAZINE).  Irritant.               polyethylene glycol (MIRALAX/GLYCOLAX) Packet 17 g  Dose: 17 g Freq: DAILY Route: PO  Start: 03/22/17 0900    Admin Instructions: 1 Packet = 17 grams. Mixed prescribed dose in 8 ounces of water. Follow with 8 oz. of water.        0848 (17 g)-Given        (0934)-Not Given        (0905)-Not Given        0838 (17 g)-Given           potassium chloride (KLOR-CON) Packet 20-40 mEq  Dose: 20-40 mEq Freq: EVERY 2 HOURS PRN Route: ORAL OR FEED  PRN Reason: potassium supplementation  Start: 03/24/17 0302   Admin Instructions: Use if unable to tolerate tablets.    If Serum K+ 3.4-4.0, dose = 20 mEq x1. Recheck K+ level the next AM.  If Serum K+ 3.0-3.3, dose = 60 mEq po total dose (40 mEq x 1 followed in 2 hours by 20 mEq X1). Recheck K+ level 4 hours after dose and the next AM.  If Serum K+ 2.5-2.9, dose = 80 mEq po total dose (40 mEq Q2H x2). Recheck K+ level 4 hours after dose and the next AM.  If Serum K+ less than 2.5, See IV order.  Dissolve packet contents in 4-8 ounces of cold water or juice.               potassium chloride 10 mEq in 100 mL intermittent infusion  Dose: 10 mEq Freq: EVERY 1 HOUR PRN Route: IV  PRN Reason: potassium supplementation  Start: 03/24/17 0302   Admin Instructions: Infuse via PERIPHERAL LINE or CENTRAL LINE. Use for central line replacement if patient weight less than 65 kg, if patient is on TPN with high potassium content or if unit does not stock 20 mEq bags.  If Serum K+ 3.4-4.0, dose = 10 mEq/hr x2 doses. Recheck K+ level the next AM.  If Serum K+ 3.0-3.3, dose = 10 mEq/hr x4 doses (40 mEq IV total dose). Recheck K+ level 2 hours after dose and the next AM.  If Serum K+ less than 3.0, dose = 10 mEq/hr x6 doses (60 mEq IV total dose). Recheck K+ level 2 hours after dose and the next AM.               potassium chloride 10 mEq in 100 mL intermittent infusion with 10 mg lidocaine  Dose: 10 mEq Freq: EVERY 1 HOUR PRN Route: IV  PRN Reason: potassium supplementation  Start: 03/24/17 0302   Admin Instructions: Infuse via PERIPHERAL LINE. Use potassium with lidocaine for pain with peripheral  administration.  If Serum K+ 3.4-4.0, dose = 10 mEq/hr x2 doses. Recheck K+ level the next AM.  If Serum K+ 3.0-3.3, dose = 10 mEq/hr x4 doses (40 mEq IV total dose). Recheck K+ level 2 hours after dose and the next AM.  If Serum K+ less than 3.0, dose = 10 mEq/hr x6 doses (60 mEq IV total dose). Recheck K+ level 2 hours after dose and the next AM.               potassium chloride 20 mEq in 50 mL intermittent infusion  Dose: 20 mEq Freq: EVERY 1 HOUR PRN Route: IV  PRN Reason: potassium supplementation  Start: 03/24/17 0302   Admin Instructions: Infuse via CENTRAL LINE Only.  May need EKG if less than 65 kg or on TPN - Max rate is 0.3 mEq/kg/hr for patients not on EKG monitoring.    If Serum K+ 3.4-4.0, dose = 20 mEq/hr x1 doses. Recheck K+ level the next AM.  If Serum K+ 3.0-3.3, dose = 20 mEq/hr x2 doses (40 mEq IV total dose).  Recheck K+ level 2 hours after dose and the next AM.  If Serum K+ less than 3.0, dose = 20 mEq/hr x3 doses (60 mEq IV total dose). Recheck K+ level 2 hours after dose and the next AM.               potassium chloride SA (K-DUR/KLOR-CON M) CR tablet 20-40 mEq  Dose: 20-40 mEq Freq: EVERY 2 HOURS PRN Route: PO  PRN Reason: potassium supplementation  Start: 03/24/17 0302   Admin Instructions: Use if able to take PO.   If Serum K+ 3.4-4.0, dose = 20 mEq x1. Recheck K+ level the next AM.  If Serum K+ 3.0-3.3, dose = 60 mEq po total dose (40 mEq x1 followed in 2 hours by 20 mEq x1). Recheck K+ level 4 hours after dose and the next AM.  If Serum K+ 2.5-2.9, dose = 80 mEq po total dose (40 mEq Q2H x2). Recheck K+ level 4 hours after dose and the next AM.  If Serum K+ less than 2.5, See IV order.  DO NOT CRUSH          0904 (20 mEq)-Given            sodium chloride (PF) 0.9% PF flush 3 mL  Dose: 3 mL Freq: EVERY 8 HOURS Route: IK  Start: 03/24/17 2200   Admin Instructions: And Q1H PRN, to lock peripheral IV dormant line.                 2304 (3 mL)-Given        0506 (3 mL)-Given [C]       1329 (3  mL)-Given       [ ] 2200           sodium chloride (PF) 0.9% PF flush 3 mL  Dose: 3 mL Freq: EVERY 1 HOUR PRN Route: IK  PRN Reason: line flush  Start: 03/24/17 1554   Admin Instructions: for peripheral IV flush post IV meds               traMADol (ULTRAM) half-tab 25-50 mg  Dose: 25-50 mg Freq: EVERY 6 HOURS PRN Route: PO  PRN Reason: moderate to severe pain  Start: 03/22/17 1532        0157 (25 mg)-Given       0434 (25 mg)-Given       1154 (25 mg)-Given       1949 (25 mg)-Given             valsartan-hydrochlorothiazide (DIOVAN-HCT) 80-12.5 MG per tablet 1 tablet  Dose: 1 tablet Freq: DAILY Route: PO  Start: 03/22/17 0900       0848 (1 tablet)-Given        0917 (1 tablet)-Given        0903 (1 tablet)-Given        1130 (1 tablet)-Given          Completed Medications  Medications 03/19/17 03/20/17 03/21/17 03/22/17 03/23/17 03/24/17 03/25/17         Dose: 12 mg Freq: ONCE Route: IV  Start: 03/24/17 0245   End: 03/24/17 0251         0251 (6 mg)-Given [C]              Dose: 6 mg Freq: ONCE Route: IV  Start: 03/24/17 0230   End: 03/24/17 0253         0253 (6 mg)-Given              Dose: 10-30 mL Freq: ONCE PRN Route: ID  PRN Comment: for implants; for local anesthesia as verbally ordered by provider during the procedure.  Start: 03/24/17 1425   End: 03/24/17 1430   Admin Instructions: The provider administers the medication. Dose may be  into smaller doses for administration          1430 (100 mg)-Given by Other           Discontinued Medications  Medications 03/19/17 03/20/17 03/21/17 03/22/17 03/23/17 03/24/17 03/25/17         Rate: 30 mL/hr Freq: CONTINUOUS Route: IV  Start: 03/24/17 1245   End: 03/24/17 1545   Admin Instructions: Start 2 hours pre-procedure and then per provider direction intra-procedure.  Pre-procedurally for CAR electrophysiology studies.                 1545-Med Discontinued          Dose: 6-12 mg Freq: EVERY 5 MIN PRN Route: IV  PRN Comment: when verbally ordered by provider during  procedure.  Start: 03/24/17 1425   End: 03/24/17 1545   Admin Instructions: Rapid IVP.  Each dose should immediately be followed by a 10 mL NS flush rapid IVP.          1545-Med Discontinued          Dose: 0.5-1 mg Freq: EVERY 1 MIN PRN Route: IV  PRN Reason: other  PRN Comment: when verbally ordered by provider during procedure  Start: 03/24/17 1425   End: 03/24/17 1545         1545-Med Discontinued          Dose: 25,000 Units Freq: ONCE PRN Route: IR  PRN Comment: for pacemaker/defibrillator pocket antibiotic irrigation.   Start: 03/24/17 1425   End: 03/24/17 1545   Admin Instructions: Irrigate pacemaker/defibrillator pocket when verbally ordered by provider during procedure.          1545-Med Discontinued          Dose: 10-30 mL Freq: ONCE PRN Route: ID  PRN Comment: for local anesthesia as verbally ordered by provider during the procedure.  Start: 03/24/17 1425   End: 03/24/17 1545   Admin Instructions: The provider administers the medication. Dose may be  into smaller doses for administration.    To be mixed with lidocaine 1%          1545-Med Discontinued       And    Dose: 10-30 mL Freq: ONCE PRN Route: ID  PRN Comment: for local anesthesia as verbally ordered by provider during the procedure.  Start: 03/24/17 1425   End: 03/24/17 1545   Admin Instructions: The provider administers the medication. Dose may be  into smaller doses for administration.    To be mixed with bupivacaine 0.25%.          1545-Med Discontinued          Dose: 10-30 mL Freq: ONCE PRN Route: ID  PRN Comment: for implants; for local anesthesia as verbally ordered by provider during procedure.  Start: 03/24/17 1425   End: 03/24/17 1545   Admin Instructions: The provider administers the medication. Dose may be  into smaller doses for administration.          1545-Med Discontinued          Dose: 25-50 mg Freq: ONCE PRN Route: IV  PRN Reason: itching  PRN Comment: sedation, when verbally ordered by the provider  during the procedure  Start: 03/24/17 1425   End: 03/24/17 1545         1545-Med Discontinued          Rate: 11.1-89 mL/hr Freq: CONTINUOUS PRN Route: IV  PRN Comment: Titrate as verbally ordered by the provider during the procedure.  Start: 03/24/17 1425   End: 03/24/17 1545   Admin Instructions: For range orders: start at lowest dose ordered  Vesicant.          1545-Med Discontinued          Rate: 20-40 mL/hr Freq: CONTINUOUS PRN Route: IV  PRN Comment: severe pain, when verbally ordered by the provider during the procedure.  Start: 03/24/17 1425   End: 03/24/17 1545         1545-Med Discontinued          Dose: 25-50 mcg Freq: EVERY 2 MIN PRN Route: IV  PRN Reason: severe pain  PRN Comment: or sedation, when verbally ordered by the provider during the procedure.  Start: 03/24/17 1425   End: 03/24/17 1545   Admin Instructions: Doses can be exceeded under direct oversight of patient by physician.          1430 (25 mcg)-Given       1442 (50 mcg)-Given       1455 (25 mcg)-Given       1545-Med Discontinued          Dose: 0.2 mg Freq: EVERY 1 MIN PRN Route: IV  PRN Reason: other  PRN Comment: sedation reversal when verbally ordered by provider during the procedure.  Start: 03/24/17 1425   End: 03/24/17 1545   Admin Instructions: MAX cumulative dose of 1 mg  Irritant.          1545-Med Discontinued          Dose:  mg Freq: ONCE PRN Route: IV  PRN Comment: for diuresis, when verbally ordered by provider during the procedure.  Start: 03/24/17 1425   End: 03/24/17 1545         1545-Med Discontinued          Dose: 1,000-10,000 Units Freq: EVERY 5 MIN PRN Route: IV  PRN Reason: other  PRN Comment: bolus dose Up to a max of 25,000 units, when verbally order by provider during procedure.  Start: 03/24/17 1425   End: 03/24/17 1545   Admin Instructions: Provider may request an additional bolus.          1545-Med Discontinued          Freq: CONTINUOUS PRN Route: IV  PRN Reason: other  PRN Comment: Through ablation catheter,  for anticoagulation during the ablation burn for the cooling solution  Start: 03/24/17 1425   End: 03/24/17 1545   Admin Instructions: when verbally ordered by provider during the procedure.          1545-Med Discontinued          Freq: CONTINUOUS PRN Route: IV  PRN Reason: other  PRN Comment: Through transseptal sheath, for anticoagulation during the ablation burn for the cooling solution  Start: 03/24/17 1425   End: 03/24/17 1545   Admin Instructions: when verbally ordered by provider during the procedure.          1545-Med Discontinued          Rate: 5-25 mL/hr Freq: CONTINUOUS Route: IV  Start: 03/24/17 1430   End: 03/24/17 1545   Admin Instructions: Titrate continuous for anticoagulation as verbally ordered by provider during the procedure.                 1545-Med Discontinued          Dose: 0.01 mg/kg Freq: ONCE PRN Route: IV  PRN Comment: FOR patients less than 60 kg, when verbally ordered by the provider during procedure.  Start: 03/24/17 1425   End: 03/24/17 1545   Admin Instructions: Nurse to dilute 1 mg in 50 mL NaCl 0.9%          1545-Med Discontinued          Dose: 1 mg Freq: ONCE PRN Route: IV  PRN Comment: FOR patients greater than or equal to 60 kg, when verbally ordered by the provider during procedure.  Start: 03/24/17 1425   End: 03/24/17 1545   Admin Instructions: Nurse to dilute 1 mg in 50 mL NaCl 0.9%          1545-Med Discontinued          Rate: 7.5-300 mL/hr Freq: CONTINUOUS PRN Route: IV  PRN Comment: when verbally ordered by the provider during the procedure. Titrate up as directed by the provider.  Last Dose: Stopped (03/24/17 1524)  Start: 03/24/17 1425   End: 03/24/17 1545   Admin Instructions: Protect from light.          1502 (3 mcg/min)-Started       1508-Stopped       1513 (3 mcg/min)-Started       1524-Stopped       1545-Med Discontinued          Dose: 15 mg Freq: ONCE PRN Route: IV  PRN Reason: moderate to severe pain  PRN Comment: when verbally ordered by prescriber during the  "procedure for patients greater or equal to 65 years in age.  Start: 03/24/17 1425   End: 03/24/17 1545   Admin Instructions: 15 mg dose recommended for patients with creatinine clearance greater than 30 mL/min  Contraindicated in advanced renal impairment, for patients with creatinine clearance CrCL less than  30 mL/min            1545-Med Discontinued          Freq: EVERY 1 HOUR PRN Route: Top  PRN Reason: pain  PRN Comment: with VAD insertion or accessing implanted port.  Start: 03/24/17 1235   End: 03/24/17 1545   Admin Instructions: Do NOT give if patient has a history of allergy to any local anesthetic or any \"shahab\" product.   Apply 30 minutes prior to VAD insertion or port access. MAX Dose: 2.5 g (  of 5 g tube)          1545-Med Discontinued          Dose: 1 mL Freq: EVERY 1 HOUR PRN Route: OTHER  PRN Comment: mild pain with VAD insertion or accessing implanted port  Start: 03/24/17 1235   End: 03/24/17 1545   Admin Instructions: Do NOT give if patient has a history of allergy to any local anesthetic or any \"shahab\" product. MAX dose 1 mL subcutaneous OR intradermal in divided doses.          1545-Med Discontinued          Dose: 10-30 mL Freq: ONCE PRN Route: ID  PRN Comment: for implants; for local anesthesia as verbally ordered by provider during the procedure.  Start: 03/24/17 1425   End: 03/24/17 1545   Admin Instructions: The provider administers the medication. Dose may be  into smaller doses for administration          1545-Med Discontinued          Dose: 0.5-2 mg Freq: EVERY 10 MIN PRN Route: IV  PRN Reason: anxiety  PRN Comment: when verbally ordered by the provider during the procedure  Start: 03/24/17 1425   End: 03/24/17 1545   Admin Instructions: For IV PUSH: Dilute with equal volume of NS.          1545-Med Discontinued          Dose: 4 g Freq: EVERY 4 HOURS PRN Route: IV  PRN Reason: magnesium supplementation  Start: 03/22/17 0106   End: 03/24/17 0305   Admin Instructions: For serum " Mg++ less than 1.6 mg/dL  Give 4 g and recheck magnesium level 2 hours after dose, and next AM.          0305-Med Discontinued          Rate: 2.5-30 mL/hr Freq: CONTINUOUS PRN Route: IV  PRN Comment: sedation, when verbally ordered by the provider during the procedure.  Start: 03/24/17 1425   End: 03/24/17 1545         1545-Med Discontinued          Dose: 0.5-2 mg Freq: EVERY 2 MIN PRN Route: IV  PRN Reason: sedation  PRN Comment: when verbally ordered by the provider during the procedure.  Start: 03/24/17 1425   End: 03/24/17 1545   Admin Instructions: Doses can be exceeded under direct oversight of patient by physician.          1430 (0.5 mg)-Given       1435 (0.5 mg)-Given       1442 (1 mg)-Given       1455 (1 mg)-Given       1545-Med Discontinued          Dose: 1-2 mg Freq: EVERY 5 MIN PRN Route: IV  PRN Reason: other  PRN Comment: moderate pain/sedation, when verbally ordered by provider during procedure  Start: 03/24/17 1425   End: 03/24/17 1545         1545-Med Discontinued          Dose: 0.1-0.4 mg Freq: EVERY 2 MIN PRN Route: IV  PRN Reason: opioid reversal  Start: 03/22/17 0103   End: 03/24/17 1612   Admin Instructions: For respiratory rate LESS than or EQUAL to 8.  Partial reversal dose:  0.1 mg titrated q 2 minutes for Analgesia Side Effects Monitoring Sedation Level of 3 (frequently drowsy, arousable, drifts to sleep during conversation).Full reversal dose:  0.4 mg bolus for Analgesia Side Effects Monitoring Sedation Level of 4 (somnolent, minimal or no response to stimulation).          1612-Med Discontinued          Dose: 0.4 mg Freq: EVERY 5 MIN PRN Route: IV  PRN Reason: other  PRN Comment: when verbally ordered by provider during the procedure.  Start: 03/24/17 1425   End: 03/24/17 1545   Admin Instructions: For respiratory rate LESS than or EQUAL to 8.  Partial reversal dose:  0.1 mg titrated q 2 minutes for Analgesia Side Effects Monitoring Sedation Level of 3 (frequently drowsy, arousable,  drifts to sleep during conversation).Full reversal dose:  0.4 mg bolus for Analgesia Side Effects Monitoring Sedation Level of 4 (somnolent, minimal or no response to stimulation).          1545-Med Discontinued          Dose: 4 mg Freq: EVERY 4 HOURS PRN Route: IV  PRN Reason: nausea  PRN Comment: when verbally ordered by provider during procedure  Start: 03/24/17 1425   End: 03/24/17 1545   Admin Instructions: Irritant.          1545-Med Discontinued          Dose: 20-40 mEq Freq: EVERY 2 HOURS PRN Route: ORAL OR FEED  PRN Reason: potassium supplementation  Start: 03/22/17 0106   End: 03/24/17 0303   Admin Instructions: Use if unable to tolerate tablets.  If Serum K+ 3.0-3.3, dose = 60 mEq po total dose (40 mEq x1 followed in 2 hours by 20 mEq x1). Recheck K+ level 4 hours after dose and the next AM.  If Serum K+ 2.5-2.9, dose = 80 mEq po total dose (40 mEq Q2H x2). Recheck K+ level 4 hours after dose and the next AM.  If Serum K+ less than 2.5, See IV order.  Dissolve packet contents in 4-8 ounces of cold water or juice.          0303-Med Discontinued          Dose: 10 mEq Freq: EVERY 1 HOUR PRN Route: IV  PRN Reason: potassium supplementation  Start: 03/22/17 0106   End: 03/24/17 0303   Admin Instructions: Infuse via PERIPHERAL LINE or CENTRAL LINE. Use for central line replacement if patient weight less than 65 kg, if patient is on TPN with high potassium content or if unit does not stock 20 mEq bags.   If Serum K+ 3.0-3.3, dose = 10 mEq/hr x4 doses (40 mEq IV total dose). Recheck K+ level 2 hours after dose and the next AM.   If Serum K+ less than 3.0, dose = 10 mEq/hr x6 doses (60 mEq IV total dose). Recheck K+ level 2 hours after dose and the next AM.          0303-Med Discontinued          Dose: 10 mEq Freq: EVERY 1 HOUR PRN Route: IV  PRN Reason: potassium supplementation  Start: 03/22/17 0106   End: 03/24/17 0303   Admin Instructions: Infuse via PERIPHERAL LINE. Use potassium with lidocaine for pain with  peripheral administration.  If Serum K+ 3.0-3.3, dose = 10 mEq/hr x4 doses (40 mEq IV total dose). Recheck K+ level 2 hours after dose and the next AM.  If Serum K+ less than 3.0, dose = 10 mEq/hr x6 doses (60 mEq IV total dose). Recheck K+ level 2 hours after dose and the next AM.          0303-Med Discontinued          Dose: 20 mEq Freq: EVERY 1 HOUR PRN Route: IV  PRN Reason: potassium supplementation  Start: 03/22/17 0106   End: 03/24/17 0303   Admin Instructions: Infuse via CENTRAL LINE Only. May need EKG if less than 65 kg or on TPN - Max rate is 0.3 mEq/kg/hr for patients not on EKG monitoring.   If Serum K+ 3.0-3.3, dose = 20 mEq/hr x2 doses (40 mEq IV total dose). Recheck K+ level 2 hours after dose and the next AM.  If Serum K+ less than 3.0, dose = 20 mEq/hr x3 doses (60 mEq IV total dose). Recheck K+ level 2 hours after dose and the next AM.          0303-Med Discontinued          Dose: 20-40 mEq Freq: EVERY 2 HOURS PRN Route: PO  PRN Reason: potassium supplementation  Start: 03/22/17 0106   End: 03/24/17 0303   Admin Instructions: Use if able to take PO.   If Serum K+ 3.0-3.3, dose = 60 mEq po total dose (40 mEq x1 followed in 2 hours by 20 mEq x1). Recheck K+ level 4 hours after dose and the next AM.  If Serum K+ 2.5-2.9, dose = 80 mEq po total dose (40 mEq Q2H x2). Recheck K+ level 4 hours after dose and the next AM.  If Serum K+ less than 2.5, See IV order.  DO NOT CRUSH          0303-Med Discontinued          Dose: 6.25-25 mg Freq: EVERY 4 HOURS PRN Route: IV  PRN Reason: nausea  PRN Comment: when verbally ordered by the provider during the procedure  Start: 03/24/17 1425   End: 03/24/17 9158   Admin Instructions: Vesicant. Dilute 25 mg with 10 mL of normal saline in a syringe. Resulting concentration = 2.5 mg/ mL. Administer over 3-5 minutes. High risk of tissue necrosis with extravasation.          1545-Med Discontinued          Dose: 1-5 mg Freq: ONCE PRN Route: IV  PRN Comment: when verbally  ordered by provider during procedure.  Start: 03/24/17 1425   End: 03/24/17 1545   Admin Instructions: TEST DOSE  Give slow IV push          1545-Med Discontinued          Dose: 5-40 mg Freq: EVERY 10 MIN PRN Route: IV  PRN Comment: when verbally ordered by provider during procedure.  Start: 03/24/17 1425   End: 03/24/17 1545   Admin Instructions: (MAX: 100 mg) Can be started 5 minutes after test dose.  Give slow IV push          1545-Med Discontinued          Dose: 3 mL Freq: EVERY 8 HOURS Route: IK  Start: 03/24/17 1245   End: 03/24/17 1545   Admin Instructions: And Q1H PRN, to lock peripheral IV dormant line.          (8532)-Not Given       1545-Med Discontinued          Dose: 3 mL Freq: EVERY 1 HOUR PRN Route: IK  PRN Reason: line flush  Start: 03/24/17 1235   End: 03/24/17 1545   Admin Instructions: for peripheral IV flush post IV meds          1545-Med Discontinued     Medications 03/19/17 03/20/17 03/21/17 03/22/17 03/23/17 03/24/17 03/25/17

## 2017-03-21 NOTE — IP AVS SNAPSHOT
"          [facility]: [phone]                                              INTERAGENCY TRANSFER FORM - LAB / IMAGING / EKG / EMG RESULTS   3/21/2017                    Hospital Admission Date: 3/21/2017  KE LIZ   : 1945  Sex: Female        Attending Provider: Alyson Ledezma MD     Allergies:  Pcn [Penicillin V Potassium], Oxycodone, Sulfa Drugs, Valium [Diazepam], Vicodin [Hydrocodone-acetaminophen], Iodine Solution [Povidone Iodine]    Infection:  None   Service:  CARDIAC CATH    Ht:  1.676 m (5' 6\")   Wt:  76.1 kg (167 lb 12.3 oz)   Admission Wt:  75.5 kg (166 lb 7.2 oz)    BMI:  27.08 kg/m 2   BSA:  1.88 m 2            Patient PCP Information     Provider PCP Type    Thad Puri MD General         Lab Results - 3 Days      Magnesium [[id number]]  Resulted: 17, Result status: Final result    Ordering provider: Alyson Ledezma MD  17 0515 Resulting lab: [facility]    Specimen Information    Type Source Collected On     17 0515          Components       Value Reference Range Flag Lab   Magnesium 2.2 1.6 - 2.3 mg/dL  FrStHsLb            Potassium [[id number]]  Resulted: 17 0531, Result status: Final result    Ordering provider: Alyson Ledezma MD  17 0001 Resulting lab: [facility]    Specimen Information    Type Source Collected On   Blood  17 0515          Components       Value Reference Range Flag Lab   Potassium 4.1 3.4 - 5.3 mmol/L  FrStHsLb            Basic metabolic panel [[id number]]  Resulted: 17 0655, Result status: Final result    Ordering provider: Steve Robles DO  17 0000 Resulting lab: [facility]    Specimen Information    Type Source Collected On   Blood  17 0610          Components       Value Reference Range Flag Lab   Sodium 142 133 - 144 mmol/L  FrStHsLb   Potassium 3.9 3.4 - 5.3 mmol/L  FrStHsLb   Chloride 106 " 94 - 109 mmol/L  FrStHsLb   Carbon Dioxide 30 20 - 32 mmol/L  FrStHsLb   Anion Gap 6 3 - 14 mmol/L  FrStHsLb   Glucose 95 70 - 99 mg/dL  FrStHsLb   Urea Nitrogen 18 7 - 30 mg/dL  FrStHsLb   Creatinine 0.63 0.52 - 1.04 mg/dL  FrStHsLb   GFR Estimate -- >60 mL/min/1.7m2  FrStHsLb   Result:         >90  Non  GFR Calc     GFR Estimate If Black -- >60 mL/min/1.7m2  FrStHsLb   Result:         >90   GFR Calc     Calcium 9.1 8.5 - 10.1 mg/dL  FrStHsLb   Result:              Magnesium (Add on or recollect) [[id number]]  Resulted: 03/24/17 0655, Result status: Final result    Ordering provider: Ruthy Bonilla MD  03/24/17 0303 Resulting lab: [facility]    Specimen Information    Type Source Collected On   Blood  03/24/17 0610          Components       Value Reference Range Flag Lab   Magnesium 2.2 1.6 - 2.3 mg/dL  FrStHsLb            Basic metabolic panel [[id number]]  Resulted: 03/23/17 0700, Result status: Final result    Ordering provider: Steve Robles DO  03/23/17 0000 Resulting lab: [facility]    Specimen Information    Type Source Collected On   Blood  03/23/17 0615          Components       Value Reference Range Flag Lab   Sodium 142 133 - 144 mmol/L  FrStHsLb   Potassium 3.8 3.4 - 5.3 mmol/L  FrStHsLb   Chloride 105 94 - 109 mmol/L  FrStHsLb   Carbon Dioxide 30 20 - 32 mmol/L  FrStHsLb   Anion Gap 7 3 - 14 mmol/L  FrStHsLb   Glucose 91 70 - 99 mg/dL  FrStHsLb   Urea Nitrogen 18 7 - 30 mg/dL  FrStHsLb   Creatinine 0.75 0.52 - 1.04 mg/dL  FrStHsLb   GFR Estimate 76 >60 mL/min/1.7m2  FrStHsLb   Comment:  Non  GFR Calc   GFR Estimate If Black -- >60 mL/min/1.7m2  FrStHsLb   Result:         >90   GFR Calc     Calcium 9.3 8.5 - 10.1 mg/dL  FrStHsLb   Result:              CBC with platelets [[id number]] (Abnormal)  Resulted: 03/23/17 0644, Result status: Final result    Ordering provider: Steve Robles DO   03/23/17 0000 Resulting lab: [facility]    Specimen Information    Type Source Collected On   Blood  03/23/17 0615          Components       Value Reference Range Flag Lab   WBC 6.6 4.0 - 11.0 10e9/L  FrStHsLb   RBC Count 4.21 3.8 - 5.2 10e12/L  FrStHsLb   Hemoglobin 11.4 11.7 - 15.7 g/dL L FrStHsLb   Hematocrit 36.7 35.0 - 47.0 %  FrStHsLb   MCV 87 78 - 100 fl  FrStHsLb   MCH 27.1 26.5 - 33.0 pg  FrStHsLb   MCHC 31.1 31.5 - 36.5 g/dL L FrStHsLb   RDW 15.9 10.0 - 15.0 % H FrStHsLb   Platelet Count 314 150 - 450 10e9/L  FrStHsLb            CBC with platelets + differential [[id number]]  Resulted: 03/23/17 0638, Result status: Final result    Ordering provider: Maryjo Moran MD  03/21/17 2238 Resulting lab: [facility]    Specimen Information    Type Source Collected On   Blood  03/21/17 0615          Components       Value Reference Range Flag Lab   WBC -- 4.0 - 11.0 10e9/L  FrStHsLb   Result:         Canceled, Test credited   Specimen not received     RBC Count -- 3.8 - 5.2 10e12/L  FrStHsLb   Result:         Canceled, Test credited   Specimen not received     Hemoglobin -- 11.7 - 15.7 g/dL  FrStHsLb   Result:         Canceled, Test credited   Specimen not received     Hematocrit -- 35.0 - 47.0 %  FrStHsLb   Result:         Canceled, Test credited   Specimen not received     MCV -- 78 - 100 fl  FrStHsLb   Result:         Canceled, Test credited   Specimen not received     MCH -- 26.5 - 33.0 pg  FrStHsLb   Result:         Canceled, Test credited   Specimen not received     MCHC -- 31.5 - 36.5 g/dL  FrStHsLb   Result:         Canceled, Test credited   Specimen not received     RDW -- 10.0 - 15.0 %  FrStHsLb   Result:         Canceled, Test credited   Specimen not received     Platelet Count -- 150 - 450 10e9/L  FrStHsLb   Result:         Canceled, Test credited   Specimen not received     Diff Method --   FrStHsLb   Result:         Canceled, Test credited   Specimen not received               Comprehensive metabolic panel [[id number]]  Resulted: 03/23/17 0638, Result status: Final result    Ordering provider: Maryjo Moran MD  03/21/17 2238 Resulting lab: [facility]    Specimen Information    Type Source Collected On   Blood  03/21/17 0615          Components       Value Reference Range Flag Lab   Sodium -- 133 - 144 mmol/L  FrStHsLb   Result:         Canceled, Test credited   Specimen not received     Potassium -- 3.4 - 5.3 mmol/L  FrStHsLb   Result:         Canceled, Test credited   Specimen not received     Chloride -- 94 - 109 mmol/L  FrStHsLb   Result:         Canceled, Test credited   Specimen not received     Carbon Dioxide -- 20 - 32 mmol/L  FrStHsLb   Result:         Canceled, Test credited   Specimen not received     Anion Gap -- 6 - 17 mmol/L  FrStHsLb   Result:         Canceled, Test credited   Specimen not received     Glucose -- 70 - 99 mg/dL  FrStHsLb   Result:         Canceled, Test credited   Specimen not received     Urea Nitrogen -- 7 - 30 mg/dL  FrStHsLb   Result:         Canceled, Test credited   Specimen not received     Creatinine -- 0.52 - 1.04 mg/dL  FrStHsLb   Result:         Canceled, Test credited   Specimen not received     GFR Estimate -- >60 mL/min/1.7m2  FrStHsLb   Result:         Canceled, Test credited   Specimen not received     GFR Estimate If Black -- >60 mL/min/1.7m2  FrStHsLb   Result:         Canceled, Test credited   Specimen not received     Calcium -- 8.5 - 10.1 mg/dL  FrStHsLb   Result:         Canceled, Test credited   Specimen not received     Bilirubin Total -- 0.2 - 1.3 mg/dL  FrStHsLb   Result:         Canceled, Test credited   Specimen not received     Albumin -- 3.4 - 5.0 g/dL  FrStHsLb   Result:         Canceled, Test credited   Specimen not received     Protein Total -- 6.8 - 8.8 g/dL  FrStHsLb   Result:         Canceled, Test credited   Specimen not received     Alkaline Phosphatase -- 40 - 150 U/L  FrStHsLb   Result:    "      Canceled, Test credited   Specimen not received     ALT -- 0 - 50 U/L  FrStHsLb   Result:         Canceled, Test credited   Specimen not received     AST -- 0 - 45 U/L  FrStHsLb   Result:         Canceled, Test credited   Specimen not received              Troponin I [[id number]]  Resulted: 03/23/17 0638, Result status: Final result    Ordering provider: Maryjo Moran MD  03/21/17 2238 Resulting lab: [facility]    Specimen Information    Type Source Collected On   Blood  03/21/17 0615          Components       Value Reference Range Flag Lab   Troponin I ES -- 0.000 - 0.045 ug/L  FrStHsLb   Result:         Canceled, Test credited   Specimen not received              Testing Performed By     Lab - Abbreviation Name Director Address Valid Date Range    14 - FrStHsLb [facility] Unknown [address] [place] [address] 05/08/15 1057 - Present            Unresulted Labs (24h ago through future)    Start       Ordered    Unscheduled  Magnesium  (Magnesium Replacement - Adult - \"High\" - Replacement for all levels less than or equal to 2 mg/dL)  CONDITIONAL (SPECIFY),   Routine     Comments:  Obtain Magnesium Level for these conditions:  *IF no magnesium result within 24 hrs before initiation of order set, draw magnesium level with next lab collect.    *2 HOURS AFTER last magnesium replacement dose when magnesium replacement given for level less than 1.6  *Next morning after magnesium dose.     Repeat Magnesium Replacement if necessary.    03/24/17 0302    Unscheduled  Potassium  (Potassium Replacement - \"High\" - Replacement for all levels less than 4.1 mmol/L - UU,UR,UA,RH,SH,PH,WY )  CONDITIONAL (SPECIFY),   Routine     Comments:  Obtain Potassium Level for these conditions:  *IF no potassium result within 24 hrs before initiation of order set, draw potassium level with next lab collect.    *2 HOURS AFTER last IV potassium replacement dose and 4 hours after an oral replacement " dose when potassium replacement given for level less than 3.4.  *Next morning after potassium dose.     Repeat Potassium Replacement if necessary.    03/24/17 0302         Imaging Results - 3 Days      MR Hip Left w/o Contrast [[id number]]  Resulted: 03/23/17 1648, Result status: Final result    Ordering provider: Husam Berkowitz MD  03/22/17 2048 Resulted by: Doug Stephens MD    Performed: 03/22/17 2203 - 03/22/17 2236 Resulting lab: RADIOLOGY RESULTS    Narrative:       MR HIP LEFT WITHOUT CONTRAST  3/22/2017 10:36 PM    HISTORY:  Left hip pain without significant trauma. Evaluate for  occult fracture.    TECHNIQUE:  Multiplanar, multisequence without contrast.    COMPARISON: None.    FINDINGS:   Osseous and Cartilaginous Structures:  No acute-appearing bony  abnormalities. Mild-to-moderate loss of articular cartilage in the  weightbearing hips bilaterally consistent with early degenerative  change. No evidence for osteonecrosis.    Acetabular Labrum: Small cystic structures around the superior  acetabulum bilaterally likely indicating underlying labral tears. If  clinically indicated, this could be better evaluated with MR  arthrogram.    Hip joint space:  No significant joint effusion.     Trochanteric and Iliopsoas Bursae: No fluid collection in the  trochanteric or iliopsoas bursae.    Common Hamstring Tendon: No evidence of tear or significant  tendinosis.    Additional Findings: There is abnormal edema in the lateral left  gluteus medius muscle consistent with at least grade 1 muscle strain.  There is a tiny focus of fluid signal intensity underneath the distal  right gluteus minimus tendon at the insertion on the greater  trochanter consistent with bursitis (image 24 of series 701 and image  17 of series 601). Distal gluteal tendinous insertions bilaterally are  otherwise normal.    Internal Pelvic Structures: Unremarkable.      Impression:       IMPRESSION:   1. No acute bony  abnormality.  2. Mild bilateral hip degenerative change.  3. Probable small cystic changes adjacent to the superior acetabular  labrum bilaterally indicative of underlying labral pathology.  4. At least grade 1 muscle strain involving the lateral aspect of the  left gluteus medius muscle. This is likely responsible for the  patient's current symptoms.  5. Minimal bursitis at the distal right gluteus minimus tendinous  insertion.    SHAMA HUNT MD      MR Lumbar Spine w/o Contrast [[id number]]  Resulted: 03/22/17 1733, Result status: Final result    Ordering provider: Husam Berkowitz MD  03/22/17 1052 Resulted by: Suhas Hernandez MD    Performed: 03/22/17 1526 - 03/22/17 1546 Resulting lab: RADIOLOGY RESULTS    Narrative:       MR LUMBAR SPINE WITHOUT CONTRAST 3/22/2017 3:46 PM     HISTORY: Posterior iliac crest/SI area pain, low back pain.    TECHNIQUE: Sagittal T1 and STIR. Sagittal T2 and axial dual-echo T2.     FINDINGS: Five lumbar vertebrae are assumed. Multilevel degenerative  disc disease changes, greatest on the right at T11-12. There is grade  1 degenerative anterolisthesis at L4-5, L5-S1. Slight scoliosis.     Findings by specific level:    There is facet degenerative change as follows: Mild right T11-12, mild  right T12-L1, mild bilateral L1-2, minimal bilateral L2-3, mild  bilateral L3-4, moderate-prominent right and moderate left L4-5,  moderate right and mild left L5-S1. Based upon sagittal imaging there  is right asymmetric disc bulging at T11-12 without cord compression.    There is disc bulging at L1-2, L2-3, L3-4, L4-5, L5-S1. Multilevel  ligamentum flavum thickening.   These findings combine to result in central stenosis as follows: Mild  L1-2, minimal L2-3 and L3-4, mild-moderate L4-5.   There is right foraminal stenosis as follows: Mild-moderate L5-S1,  moderate L4-5, mild-moderate L1-2, mild-moderate T11-12.   There is left foraminal stenosis as follows: Mild-moderate  L5-S1,  moderate to moderate-severe L4-5, mild-moderate L3-4 and L1-2. The  remaining lumbar neural foramina appear adequate.       Impression:       IMPRESSION:  1. Multilevel degenerative disc and facet disease.  2. L1-2: Mild central stenosis.  3. L2-3: Minimal central stenosis.  4. L3-4: Minimal central stenosis.  5. L4-5: Grade 1 spondylolisthesis. Mild-moderate central stenosis.  Moderate right and moderate to moderate-severe left foraminal  stenosis.  6. L5-S1: Grade 1 spondylolisthesis.          MARY BRANDON MD      XR Chest Port 1 View [[id number]]  Resulted: 17 0704, Result status: Final result    Ordering provider: Kobe Ledezma MD  17 0230 Resulted by: Mauro Freeman MD    Performed: 17 0325 - 17 0335 Resulting lab: RADIOLOGY RESULTS    Narrative:       AP PORTABLE CHEST X-RAY  3/22/2017 6:38 AM     INDICATION: Verify PICC placement.    COMPARISON: 3/12/2017.      Impression:       IMPRESSION: There is a right PICC with tip in SVC. This appears to be  a new line compared to the previous x-ray as the course of the line in  the axilla is slightly different. Otherwise no change.    MAURO FREEMAN MD      Testing Performed By     Lab - Abbreviation Name Director Address Valid Date Range    104 - Rad Rslts RADIOLOGY RESULTS Unknown Unknown 05 1553 - Present               ECG/EMG Results      Echocardiogram Complete [[id number]]  Resulted: 17 0940, Result status: Edited Result - FINAL    Ordering provider: Kobe Ledezma MD  17 0107 Resulted by: Rambo Jansen MD    Performed: 17 0930 - 17 1010 Resulting lab: RADIOLOGY RESULTS    Narrative:       [id number]  [id number]  [id number]  [id number]^[id number]^KOBE^LARRY           [facility]  Echocardiography Laboratory  [address] [place] [id number]        Name: KE LIZ  MRN: [id number]  : 1945  Study Date: 2017 09:40 AM  Age: 72 yrs  Gender:  Female  Patient Location: [facility]  Reason For Study: Other, Please Specify in Comments  Ordering Physician: KOBE BROOKS  Referring Physician: MARGUERITE DESAI  Performed By: Nando Reynolds RDCS     BSA: 1.8 m2  Height: 66 in  Weight: 166 lb  HR: 68  BP: 139/75 mmHg  _____________________________________________________________________________  __        Procedure  Complete Portable Echo Adult.  _____________________________________________________________________________  __        Interpretation Summary     The visual ejection fraction is estimated at 55-60%.  Left ventricular systolic function is normal.  The ascending aorta is Borderline dilated.  _____________________________________________________________________________  __        Left Ventricle  The left ventricle is normal in size. There is borderline concentric left  ventricular hypertrophy. The visual ejection fraction is estimated at 55-60%.  Left ventricular systolic function is normal. Grade I or early diastolic  dysfunction. E by E prime ratio is between 8 and 15, which is indeterminate  for assessment of left ventricular filling pressures. A false chord is noted  (normal variant).     Right Ventricle  The right ventricle is normal in size and function.     Atria  Normal left atrial size. Right atrial size is normal. There is no color  Doppler evidence of an atrial shunt.     Mitral Valve  There is trace mitral regurgitation.        Tricuspid Valve  There is mild (1+) tricuspid regurgitation. The right ventricular systolic  pressure is approximated at 22.3 mmHg plus the right atrial pressure.     Aortic Valve  The aortic valve is trileaflet. There is mild trileaflet aortic sclerosis. No  aortic regurgitation is present. No hemodynamically significant valvular  aortic stenosis.     Pulmonic Valve  There is trace pulmonic valvular regurgitation.     Vessels  The ascending aorta is Borderline dilated. The IVC is normal in size and  reactivity with  respiration, suggesting normal central venous pressure.     Pericardium  No pericardial thickening.        Rhythm  The rhythm was normal sinus.  _____________________________________________________________________________  __  MMode/2D Measurements & Calculations  IVSd: 0.93 cm     LVIDd: 4.0 cm  LVIDs: 2.9 cm  LVPWd: 1.3 cm  FS: 26.8 %  EDV(Teich): 67.9 ml  ESV(Teich): 31.9 ml  LV mass(C)d: 145.6 grams  Ao root diam: 3.6 cm  LA dimension: 2.4 cm  asc Aorta Diam: 3.4 cm  LA/Ao: 0.67  LA Volume (BP): 32.0 ml  LA Volume Index (BP): 17.3 ml/m2           Doppler Measurements & Calculations  MV E max laura: 55.9 cm/sec  MV A max laura: 81.4 cm/sec  MV E/A: 0.69  MV dec time: 0.25 sec  PA acc time: 0.12 sec  TR max laura: 236.0 cm/sec  TR max P.3 mmHg  Lateral E/e': 6.7  Medial E/e': 10.6           _____________________________________________________________________________  __           Report approved by: Brooks Chen 2017 12:25 PM       1    Type Source Collected On     17 0940          View Image (below)              Encounter-Level Documents:     There are no encounter-level documents.      Order-Level Documents:     There are no order-level documents.

## 2017-03-21 NOTE — IP AVS SNAPSHOT
[facility]    [address] [place] [address]., [address]    [place] [address]    Phone:  [phone]                                       After Visit Summary   3/21/2017    María Henderson    MRN: [id number]           After Visit Summary Signature Page     I have received my discharge instructions, and my questions have been answered. I have discussed any challenges I see with this plan with the nurse or doctor.    ..........................................................................................................................................  Patient/Patient Representative Signature      ..........................................................................................................................................  Patient Representative Print Name and Relationship to Patient    ..................................................               ................................................  Date                                            Time    ..........................................................................................................................................  Reviewed by Signature/Title    ...................................................              ..............................................  Date                                                            Time

## 2017-03-22 ENCOUNTER — APPOINTMENT (OUTPATIENT)
Dept: MRI IMAGING | Facility: CLINIC | Age: 72
End: 2017-03-22
Attending: ORTHOPAEDIC SURGERY
Payer: OTHER MISCELLANEOUS

## 2017-03-22 ENCOUNTER — APPOINTMENT (OUTPATIENT)
Dept: GENERAL RADIOLOGY | Facility: CLINIC | Age: 72
End: 2017-03-22
Attending: HOSPITALIST
Payer: OTHER MISCELLANEOUS

## 2017-03-22 PROBLEM — M25.552 LEFT HIP PAIN: Status: ACTIVE | Noted: 2017-03-22

## 2017-03-22 LAB
MAGNESIUM SERPL-MCNC: 2.2 MG/DL (ref 1.6–2.3)
T4 FREE SERPL-MCNC: 1.13 NG/DL (ref 0.76–1.46)
TSH SERPL DL<=0.005 MIU/L-ACNC: 4.66 MU/L (ref 0.4–4)

## 2017-03-22 PROCEDURE — 96376 TX/PRO/DX INJ SAME DRUG ADON: CPT

## 2017-03-22 PROCEDURE — 25000132 ZZH RX MED GY IP 250 OP 250 PS 637: Performed by: HOSPITALIST

## 2017-03-22 PROCEDURE — 99214 OFFICE O/P EST MOD 30 MIN: CPT | Performed by: INTERNAL MEDICINE

## 2017-03-22 PROCEDURE — 25000132 ZZH RX MED GY IP 250 OP 250 PS 637: Performed by: INTERNAL MEDICINE

## 2017-03-22 PROCEDURE — 73721 MRI JNT OF LWR EXTRE W/O DYE: CPT | Mod: LT

## 2017-03-22 PROCEDURE — G0378 HOSPITAL OBSERVATION PER HR: HCPCS

## 2017-03-22 PROCEDURE — 40000257 ZZH STATISTIC CONSULT NO CHARGE VASC ACCESS

## 2017-03-22 PROCEDURE — 25000128 H RX IP 250 OP 636: Performed by: EMERGENCY MEDICINE

## 2017-03-22 PROCEDURE — 96361 HYDRATE IV INFUSION ADD-ON: CPT

## 2017-03-22 PROCEDURE — 40000239 ZZH STATISTIC VAT ROUNDS

## 2017-03-22 PROCEDURE — 99207 ZZC APP CREDIT; MD BILLING SHARED VISIT: CPT | Performed by: HOSPITALIST

## 2017-03-22 PROCEDURE — 99220 ZZC INITIAL OBSERVATION CARE,LEVL III: CPT | Performed by: HOSPITALIST

## 2017-03-22 PROCEDURE — 96374 THER/PROPH/DIAG INJ IV PUSH: CPT

## 2017-03-22 PROCEDURE — 96375 TX/PRO/DX INJ NEW DRUG ADDON: CPT

## 2017-03-22 PROCEDURE — 25000128 H RX IP 250 OP 636: Performed by: HOSPITALIST

## 2017-03-22 PROCEDURE — 72148 MRI LUMBAR SPINE W/O DYE: CPT

## 2017-03-22 PROCEDURE — 40000940 XR CHEST PORT 1 VW

## 2017-03-22 RX ORDER — NALOXONE HYDROCHLORIDE 0.4 MG/ML
.1-.4 INJECTION, SOLUTION INTRAMUSCULAR; INTRAVENOUS; SUBCUTANEOUS
Status: DISCONTINUED | OUTPATIENT
Start: 2017-03-22 | End: 2017-03-22

## 2017-03-22 RX ORDER — POTASSIUM CHLORIDE 1500 MG/1
20-40 TABLET, EXTENDED RELEASE ORAL
Status: DISCONTINUED | OUTPATIENT
Start: 2017-03-22 | End: 2017-03-24

## 2017-03-22 RX ORDER — ONDANSETRON 2 MG/ML
4 INJECTION INTRAMUSCULAR; INTRAVENOUS EVERY 6 HOURS PRN
Status: DISCONTINUED | OUTPATIENT
Start: 2017-03-22 | End: 2017-03-25 | Stop reason: HOSPADM

## 2017-03-22 RX ORDER — POLYETHYLENE GLYCOL 3350 17 G/17G
1 POWDER, FOR SOLUTION ORAL EVERY OTHER DAY
Status: ON HOLD | COMMUNITY
End: 2017-04-24

## 2017-03-22 RX ORDER — L. ACIDOPHILUS/BIFID. ANIMALIS 2B CELL
1 CAPSULE ORAL DAILY
Status: DISCONTINUED | OUTPATIENT
Start: 2017-03-22 | End: 2017-03-22

## 2017-03-22 RX ORDER — VALSARTAN AND HYDROCHLOROTHIAZIDE 80; 12.5 MG/1; MG/1
1 TABLET, FILM COATED ORAL DAILY
Status: DISCONTINUED | OUTPATIENT
Start: 2017-03-22 | End: 2017-03-25 | Stop reason: HOSPADM

## 2017-03-22 RX ORDER — NALOXONE HYDROCHLORIDE 0.4 MG/ML
.1-.4 INJECTION, SOLUTION INTRAMUSCULAR; INTRAVENOUS; SUBCUTANEOUS
Status: DISCONTINUED | OUTPATIENT
Start: 2017-03-22 | End: 2017-03-24

## 2017-03-22 RX ORDER — MAGNESIUM SULFATE HEPTAHYDRATE 40 MG/ML
4 INJECTION, SOLUTION INTRAVENOUS EVERY 4 HOURS PRN
Status: DISCONTINUED | OUTPATIENT
Start: 2017-03-22 | End: 2017-03-24

## 2017-03-22 RX ORDER — ONDANSETRON 4 MG/1
4 TABLET, ORALLY DISINTEGRATING ORAL EVERY 6 HOURS PRN
Status: DISCONTINUED | OUTPATIENT
Start: 2017-03-22 | End: 2017-03-25 | Stop reason: HOSPADM

## 2017-03-22 RX ORDER — HYDROMORPHONE HYDROCHLORIDE 1 MG/ML
1 INJECTION, SOLUTION INTRAMUSCULAR; INTRAVENOUS; SUBCUTANEOUS
Status: DISCONTINUED | OUTPATIENT
Start: 2017-03-22 | End: 2017-03-22

## 2017-03-22 RX ORDER — ACETAMINOPHEN 650 MG/1
650 SUPPOSITORY RECTAL EVERY 4 HOURS PRN
Status: DISCONTINUED | OUTPATIENT
Start: 2017-03-22 | End: 2017-03-25 | Stop reason: HOSPADM

## 2017-03-22 RX ORDER — POTASSIUM CHLORIDE 29.8 MG/ML
20 INJECTION INTRAVENOUS
Status: DISCONTINUED | OUTPATIENT
Start: 2017-03-22 | End: 2017-03-24

## 2017-03-22 RX ORDER — ACETAMINOPHEN 325 MG/1
650 TABLET ORAL EVERY 4 HOURS PRN
Status: DISCONTINUED | OUTPATIENT
Start: 2017-03-22 | End: 2017-03-25 | Stop reason: HOSPADM

## 2017-03-22 RX ORDER — SODIUM CHLORIDE 9 MG/ML
INJECTION, SOLUTION INTRAVENOUS CONTINUOUS
Status: DISCONTINUED | OUTPATIENT
Start: 2017-03-22 | End: 2017-03-22

## 2017-03-22 RX ORDER — CEFTRIAXONE 2 G/1
2 INJECTION, POWDER, FOR SOLUTION INTRAMUSCULAR; INTRAVENOUS EVERY 24 HOURS
Status: DISCONTINUED | OUTPATIENT
Start: 2017-03-22 | End: 2017-03-25 | Stop reason: HOSPADM

## 2017-03-22 RX ORDER — POLYETHYLENE GLYCOL 3350 17 G/17G
17 POWDER, FOR SOLUTION ORAL DAILY
Status: DISCONTINUED | OUTPATIENT
Start: 2017-03-22 | End: 2017-03-25 | Stop reason: HOSPADM

## 2017-03-22 RX ORDER — HYDROMORPHONE HYDROCHLORIDE 1 MG/ML
0.5 INJECTION, SOLUTION INTRAMUSCULAR; INTRAVENOUS; SUBCUTANEOUS EVERY 4 HOURS PRN
Status: DISCONTINUED | OUTPATIENT
Start: 2017-03-22 | End: 2017-03-25 | Stop reason: HOSPADM

## 2017-03-22 RX ORDER — POTASSIUM CHLORIDE 1.5 G/1.58G
20-40 POWDER, FOR SOLUTION ORAL
Status: DISCONTINUED | OUTPATIENT
Start: 2017-03-22 | End: 2017-03-24

## 2017-03-22 RX ORDER — ALBUTEROL SULFATE 90 UG/1
2 AEROSOL, METERED RESPIRATORY (INHALATION) EVERY 6 HOURS PRN
Status: DISCONTINUED | OUTPATIENT
Start: 2017-03-22 | End: 2017-03-25 | Stop reason: HOSPADM

## 2017-03-22 RX ORDER — METOPROLOL TARTRATE 25 MG/1
25 TABLET, FILM COATED ORAL 2 TIMES DAILY
Status: DISCONTINUED | OUTPATIENT
Start: 2017-03-22 | End: 2017-03-25 | Stop reason: HOSPADM

## 2017-03-22 RX ORDER — ASPIRIN 81 MG/1
81 TABLET, CHEWABLE ORAL DAILY
Status: DISCONTINUED | OUTPATIENT
Start: 2017-03-22 | End: 2017-03-25 | Stop reason: HOSPADM

## 2017-03-22 RX ORDER — POTASSIUM CHLORIDE 7.45 MG/ML
10 INJECTION INTRAVENOUS
Status: DISCONTINUED | OUTPATIENT
Start: 2017-03-22 | End: 2017-03-24

## 2017-03-22 RX ADMIN — POLYETHYLENE GLYCOL 3350 17 G: 17 POWDER, FOR SOLUTION ORAL at 08:48

## 2017-03-22 RX ADMIN — SODIUM CHLORIDE: 9 INJECTION, SOLUTION INTRAVENOUS at 06:56

## 2017-03-22 RX ADMIN — HYDROMORPHONE HYDROCHLORIDE 0.5 MG: 1 INJECTION, SOLUTION INTRAMUSCULAR; INTRAVENOUS; SUBCUTANEOUS at 19:50

## 2017-03-22 RX ADMIN — METOPROLOL TARTRATE 25 MG: 25 TABLET, FILM COATED ORAL at 21:18

## 2017-03-22 RX ADMIN — HYDROMORPHONE HYDROCHLORIDE 1 MG: 1 INJECTION, SOLUTION INTRAMUSCULAR; INTRAVENOUS; SUBCUTANEOUS at 00:10

## 2017-03-22 RX ADMIN — VALSARTAN AND HYDROCHLOROTHIAZIDE 1 TABLET: 80; 12.5 TABLET, FILM COATED ORAL at 08:48

## 2017-03-22 RX ADMIN — ASPIRIN 81 MG 81 MG: 81 TABLET ORAL at 08:48

## 2017-03-22 RX ADMIN — SODIUM CHLORIDE: 9 INJECTION, SOLUTION INTRAVENOUS at 14:00

## 2017-03-22 RX ADMIN — CEFTRIAXONE 2 G: 2 INJECTION, POWDER, FOR SOLUTION INTRAMUSCULAR; INTRAVENOUS at 11:52

## 2017-03-22 RX ADMIN — HYDROMORPHONE HYDROCHLORIDE 0.5 MG: 1 INJECTION, SOLUTION INTRAMUSCULAR; INTRAVENOUS; SUBCUTANEOUS at 06:54

## 2017-03-22 RX ADMIN — HYDROMORPHONE HYDROCHLORIDE 0.5 MG: 1 INJECTION, SOLUTION INTRAMUSCULAR; INTRAVENOUS; SUBCUTANEOUS at 14:58

## 2017-03-22 RX ADMIN — METOPROLOL TARTRATE 25 MG: 25 TABLET, FILM COATED ORAL at 11:01

## 2017-03-22 ASSESSMENT — ENCOUNTER SYMPTOMS
ARTHRALGIAS: 1
NUMBNESS: 0
ABDOMINAL PAIN: 0
NAUSEA: 0
LIGHT-HEADEDNESS: 0
WEAKNESS: 0
DIZZINESS: 0
DIARRHEA: 0
FEVER: 0
PALPITATIONS: 1
VOMITING: 0
SHORTNESS OF BREATH: 0
COUGH: 0

## 2017-03-22 NOTE — PLAN OF CARE
Problem: Goal Outcome Summary  Goal: Goal Outcome Summary  Outcome: No Change  Continues to c/o pain in L) hip, currently waiting to go down to MRI. CMS per baseline and VSS. No c/o palpitations and tele remains in SR with some PVCs. Started metorolol per EP MD order. Will continue dilaudid for him pain as needed.

## 2017-03-22 NOTE — H&P
DATE OF ADMISSION:  03/22/2017      PRIMARY CARE PHYSICIAN:  Thad Puri MD      CHIEF COMPLAINT:  Left hip pain.      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  I reviewed her admission and discharge summaries from last month and consult notes and also discussed with the patient and her  as well as Dr. Mesa who evaluated patient in the ER for further information.      According to the patient, she stood up around 9:30 last evening to go to the bed and as she turned the walker to the left, she felt up a pop in her left hip and it started hurting.  Pain is about 6/10 as long as she is still and it worsens to 8/10 with any movement.  It is a constant and nonradiating.  There is no back pain and no knee, calf or ankle pain.  Patient was able to manage to go to the bedroom and she laid down there for a while but the pain was constant and subsequently she could not stand up which made her come to the ER.      She denies any fall or other injuries.  No fever, no bowel or bladder incontinence or retention.  Patient also reports 4 episodes of palpitation lasting about 45 minutes since 12/27/2016.  Denies dizziness, chest pain, shortness of breath or palpitation.  She reports a fainting spell when it happened the first time and the heart rate was up to 170.  This was during December when she was admitted for sepsis due to group B strep bacteremia and left ankle infection.      REVIEW OF SYSTEMS:  All 10-point systems reviewed and is negative other than mentioned in HPI.     This patient was evaluated by Dr. Mesa in the ER.  She was in SVT with heart rate of  160 which terminated after the patient tried to the Valsalva.      PAST MEDICAL HISTORY:   1.  Hypertension, benign.   2.  Hyperlipidemia   3.  Group B streptococcus bacteremia/sepsis and left ankle infection.   4.  Infected prosthesis (right total knee replacement status post explantation of the right total knee with antibiotic spacer placement).      PAST SURGICAL HISTORY:   1.  Tonsillectomy   2.  Right total knee arthroplasty.   3.  Explantation of the right total knee with antibiotic spacer placement.   4.  Rotator cuff surgery, left.   5.  Irrigation and debridement of the left ankle with arthrodesis subsequently.   6.  Bunionectomy.   7.  Hernia repair.      FAMILY HISTORY:  Reviewed and is noncontributory to her presentation.      SOCIAL HISTORY:  Never smoked.  Does not drink alcohol.  Lives with her .      ALLERGIES:  Penicillin, oxycodone, sulfa drugs, Valium, Vicodin, iodine solution.  Patient tolerates cephalosporin though.      HOME MEDICATIONS:  Med reconciliation is pending, but it appears that patient takes:   1.  Ceftriaxone 2 grams IV daily.   2.  Hydromorphone 2-4 mg by mouth every 4 hours as needed.   3.  MiraLax 17 g by mouth daily.   4.  Calcium 600 mg by mouth daily.   5.  Probiotic 1 tab by mouth daily.   6.  Albuterol 2 puffs inhaled every 6 hours as needed.   7.  Pyridoxine 1 tablet by mouth daily.   8.  Dakin's topically to the left ankle.   9.  Lasix 20-40 mg by mouth daily.   10.  Cholecalciferol oral 400 units by mouth daily.   11.  Potassium 595 mg by mouth every morning.   12.  Diovan HCT 80/12.5 mg by mouth daily.   13.  Aspirin 81 mg by mouth daily.   14.  Multivitamin 1 tab by mouth daily.   15.  Simvastatin 40 mg by mouth daily.      PHYSICAL EXAMINATION:   GENERAL:  Patient is alert, awake, oriented, does not appear to be in distress.   VITAL SIGNS:  Blood pressure 118/62, heart rate 79, temperature 98.3, respirations 18, pulse ox 95 on room air.   HEENT:  LORIN VALDERRAMA.   Oral mucosa moist.   NECK:  Supple.   LUNGS:  Bilateral equal air entry, clear to auscultation.   CARDIOVASCULAR:  S1, S2 regular, no tachycardia.   ABDOMEN:  Soft, nontender, bowel sounds active.   MUSCULOSKELETAL:  No edema.  A surgical scar over the right knee has healed.  Knee appears mildly swollen.  Left ankle fusion noted, has healed wound over the ankle joint on the dorsal aspect without any drainage, ulceration or open wound.  She is tender around the left hip joint, but it is diffuse and does not appear to have a specific bony tenderness.  Spine is nontender.  She is able to raise her left leg to 90 degrees without significant worsening of pain.  Sensation is normal above ankle but is diminished ankle and distally which is chronic, though.   NEUROLOGIC:  Alert and oriented.  Cranial nerves II-XII intact.      LABORATORY DATA:  CMP shows mildly elevated alkaline phosphatase and low albumin otherwise normal.  Troponin is negative.        CBC is normal.        X-rays of pelvis shows degenerative changes in the lumber spine and pubic symphysis but no acute fracture noted.      A 12-lead EKG was reviewed by me, which shows SVT, heart rate 160.  Subsequent EKG shows sinus rate control with PVCs.        ASSESSMENT AND PLAN:  María Henderson is a 72-year-old woman with a past medical history significant for hypertension, hyperlipidemia, gastroesophageal reflux disease, osteoarthritis with prior infected right knee prosthesis which was explanted and antibiotic spacer was devised and maintained on IV Rocephin, presented to the emergency room with left hip pain.   1.  Left hip pain, musculoskeletal: No trauma but it happened after patient stood up and attempted to turn around while using her walker.  No fall or other injuries.  She is nonweightbearing on her right leg since her knee surgery a month ago.  X-ray pelvis in the emergency room does not reveal any fracture.  She is tender around her hip joint but no  specific tender point.  No skin bruise or hematoma noted.  She could have muscle sprain versus minor hairline fracture not noticeable on x-ray.  I will hold off on further imaging, will put her on bed rest overnight and I have requested Orthopedic Surgery consult.  Defer further imaging CT versus MRI or clinical followup to Orthopedic Surgery.   a.  Repeat hemoglobin in the morning to see if it drops to indicate any soft tissue hemorrhage.   b.  I have ordered hydromorphone 0.5 mg intravenously every 4 hours as needed for pain.     2.  Supraventricular tachycardia, paroxysmal.  Patient does have at least 4 episodes of palpitation that she recalls since 12/2016.  Initial episode was during hospitalization in the setting of sepsis when her heart rate was up to 172 and patient reports she had a syncopal episode.  Her echocardiogram from December shows normal ejection fraction and without regional wall motion abnormality.  It seems thyroid was not tested.     a.  She will be monitored on telemetry.     b.  Episode in the emergency room terminated with Valsalva maneuver.  Will encourage if it recurs and if continues, will try adenosine.     c.  Given her recurrent episodes, will consult Cardiology.   d.  TSH and 2D echocardiogram ordered.     3.  Infected prosthesis right knee.  Patient initially had group B streptococcal sepsis in 12/2016, left ankle was thought to be the source, was treated with Rocephin.  Patient had arthrodesis of the left ankle.  Subsequently, developed infection of the prosthesis on right knee.  She underwent explantation of the right total knee and an antibiotic spacer was placed.  She is followed by Dr. Berkowitz and Dr. Johnson.  Dr. Berkowitz from Orthopedic Surgery and Dr. Johnson from Infectious Disease.  Followed up with Dr. Berkowitz last week and the stitches were removed.  Her surgical incision has healed well over the right knee.  There is minimal swelling of the knee.  She is  nonweightbearing on right knee, uses walker, otherwise.     a.  She has a PICC line in her right arm which appears normal and was replaced last week, according to the patient, due to abnormal position.     b.  I will continue her prior to admission Rocephin.     c.  Will discuss with infectious disease in morning regarding her course of antibiotics.     4.  Hypertension and hyperlipidemia.  Prior to admission Diovan HCT resumed.     a.  Will hold Lasix at this time.     b.  Follow intake , output and daily weight.     5.  Deep venous thrombosis prophylaxis.  Expect short stay.  Will use pressure compression device but since she is not physically very active, if she stays in hospital beyond 24 hours, will use heparin or Lovenox for deep vein thrombosis prophylaxis.     6.  Code status:  Full code.         KOBE BROOKS MD             D: 2017 01:49   T: 2017 03:00   MT: TD      Name:     KE LIZ   MRN:      [id number]        Account:      [id number]   :      1945           Admitted:     [id number]      Document: [id number]       cc: Thad Puri MD

## 2017-03-22 NOTE — ED NOTES
Bed: ED12  Expected date:   Expected time:   Means of arrival:   Comments:  [id number] female hip pain recent knee surgery

## 2017-03-22 NOTE — PLAN OF CARE
Problem: Goal Outcome Summary  Goal: Goal Outcome Summary  Outcome: No Change  Pt a/o, VSS on RA. Tele SR w/ PVC's, HR 70's-80's. Denies pain. BR, voiding per bedpan. Recent right knee surgery, spacer in place. Receiving IV Rocephin daily. Right knee immobilizer and left orthotic shoe in place. Right knee incision intact. Baseline numbness to left foot (d/t multiple surgeries). New left hip pain preceding admission, Xray negative. RUE PICC, awaiting CXR results to verify placement. Plan for orthopedic and cardiology consults and echocardiogram today.

## 2017-03-22 NOTE — CONSULTS
CARDIOVASCULAR CONSULTATION       DATE OF SERVICE:  03/22/2017      REASON FOR CONSULTATION:  Evaluation of paroxysmal supraventricular tachycardia.      HISTORY OF PRESENT ILLNESS:  Ms. María Henderson is a delightful 72-year-old lady with a history of hypertension, GERD and osteoarthritis who has had several orthopedic surgeries done (19 surgeons in the left ankle, recent right knee replacement), who presents with symptomatic SVT.      The patient informs me she has had several orthopedic surgeries done.  Most recently she had a right knee replacement done which unfortunately got infected.  She had the hardware removed and currently is on antibiotic therapy at home (PICC line in place).  They have hopes to complete antibiotic and redo knee replacement in the next couple of weeks.        At this time she came to the ER because she was having hip pain.  In the ED she was found to have SVT which self-terminated.      When inquired about SVT, she informs me since 12/27 she has had 4 episodes (one per month).  The episodes are associated with significant palpitations, shortness of breath.      EKG was reviewed.  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  Echocardiogram obtained on 12/31 showed EF of 50% with borderline septal hypertrophy.  RV was moderately dilated.  No significant valve disease was noticed.  EKG after conversion revealed normal sinus rhythm with right atrial enlargement and frequent PACs.      ASSESSMENT AND PLAN:  Symptomatic paroxysmal SVT.  We discussed options including doing nothing, start pharmacotherapy or pursue catheter ablation procedure.  She seems to be very symptomatic and has frequent episodes.  I explained the catheter ablation procedure in detail.  She understands there is a 1%  to 2% risk of complications associated with the procedure.  Since she seems to have ongoing infection, I favor starting pharmacotherapy at this time. Once the infection process is resolved we will consider ablation.  Of  "course if she fails pharmacotherapy, will pursue catheter ablation procedure.      I recommend starting metoprolol 25 mg b.i.d., and she will follow up with me in clinic at [place] on 04/26 at 1:15 p.m.       PHYSICAL EXAM:  Vitals: /75 (BP Location: Left arm)  Temp 98  F (36.7  C) (Oral)  Resp 18  Ht 1.676 m (5' 6\")  Wt 75.3 kg (166 lb 0.1 oz)  SpO2 94%  BMI 26.79 kg/m2      Intake/Output Summary (Last 24 hours) at 03/23/17 1046  Last data filed at 03/23/17 1000   Gross per 24 hour   Intake              590 ml   Output             1425 ml   Net             -835 ml     Vitals:    03/22/17 0122 03/23/17 0616   Weight: 75.5 kg (166 lb 7.2 oz) 75.3 kg (166 lb 0.1 oz)       Constitutional:  AAO x3.  Pt is in NAD.  HEAD: Normocephalic.  SKIN: Skin normal color, texture and turgor with no lesions or eruptions.  Eyes: PERRL, EOMI.  ENT:  Supple, normal JVP. No lymphadenopathy or thyroid enlargement.  Chest:  CTAB.  Cardiac:  RRR, normal  S1 and S2.  No murmurs rubs or gallop.  Abdomen:  Normal BS.  Soft, non-tender and non-distended.  No rebound or guarding.    Extremities:  Pedious pulses palpable B/L.  No LE edema noticed.   Neurological: Strength and sensation grossly symmetric and intact throughout.       Review of Systems:   As per subjective, otherwise 5 systems reviewed and negative.    CURRENT MEDICATIONS:    aspirin chewable tablet 81 mg  81 mg Oral Daily     cefTRIAXone  2 g Intravenous Q24H     polyethylene glycol  17 g Oral Daily     valsartan-hydrochlorothiazide  1 tablet Oral Daily     metoprolol  25 mg Oral BID     PRN Meds: albuterol, HYDROmorphone, naloxone, acetaminophen, acetaminophen, ondansetron **OR** ondansetron, potassium chloride, potassium chloride, potassium chloride, potassium chloride with lidocaine, potassium chloride, magnesium sulfate, tramadol    ALLERGIES     Allergies   Allergen Reactions     Pcn [Penicillin V Potassium] Anaphylaxis     Oxycodone      Sulfa Drugs      " Valium [Diazepam] Other (See Comments)     unconsciousness     Vicodin [Hydrocodone-Acetaminophen]      Patient unsure if allergy     Iodine Solution [Povidone Iodine] Rash       PAST MEDICAL HISTORY:  Past Medical History:   Diagnosis Date     Allergic rhinitis      Gastro-oesophageal reflux disease      Heart murmur     ?murmur     Hyperlipidemia      Hypertension      Numbness and tingling     numbness left distal medial tibia     Osteoarthrosis        PAST SURGICAL HISTORY:  Past Surgical History:   Procedure Laterality Date     7 foot and ankle        ARTHROPLASTY REVISION ANKLE  3/10/2014    Procedure: ARTHROPLASTY REVISION ANKLE;  REVISION LEFT TOTAL ANKLE  WITH CONVERSION TO  IM LAURIE FUSION WITH FEMORAL HEAD ALLOGRAFT (C-ARM);  Surgeon: Ramirez Fang MD;  Location: [facility]     ASPIRATION NEEDLE KNEE Right 12/27/2016    Procedure: ASPIRATION NEEDLE KNEE;  Surgeon: Jian Fisher MD;  Location:  OR     BUNIONECTOMY JONAS  10/24/2011    Procedure:BUNIONECTOMY JONAS; Left Foot Bunionette Repair (C-Arm); Surgeon:RAMIREZ FANG; Location: [facility]     HERNIA REPAIR       IRRIGATION AND DEBRIDEMENT FOOT, COMBINED Left 12/27/2016    Procedure: COMBINED IRRIGATION AND DEBRIDEMENT FOOT;  Surgeon: Jina Fisher MD;  Location:  OR     ORTHOPEDIC SURGERY      RTKA     ORTHOPEDIC SURGERY Left     ROTATOR CUFF     REMOVE HARDWARE ARTHROPLASTY KNEE, IRRIG & JOSE, PLACE ANTIBIOTIC CEMENT SPACER, COMBINED Right 2/20/2017    Procedure: COMBINED REMOVE HARDWARE ARTHROPLASTY KNEE, IRRIGATION AND DEBRIDEMENT, PLACE ANTIBIOTIC CEMENT BEADS / SPACER;  Surgeon: Husam Berkowitz MD;  Location:  OR     TONSILLECTOMY       total knee arthroplasy         FAMILY HISTORY:  No family history on file.    SOCIAL HISTORY:  Social History     Social History     Marital status:      Spouse name: N/A     Number of children: N/A     Years of education: N/A     Social History Main Topics      Smoking status: Never Smoker     Smokeless tobacco: Not on file     Alcohol use No     Drug use: No     Sexual activity: Yes     Other Topics Concern     Not on file     Social History Narrative       Review of Systems:  Complete review of system is otherwise negative with the exception of what was described above.     Recent Lab Results:    Recent Labs  Lab 03/23/17  0615 03/21/17  2238 03/21/17  0815 03/21/17  0615   WBC 6.6 9.8 7.5 Canceled, Test credited Specimen not received   HGB 11.4* 12.8 12.3 Canceled, Test credited Specimen not received   MCV 87 87 88 Canceled, Test credited Specimen not received    359 387 Canceled, Test credited Specimen not received    142  --  Canceled, Test credited Specimen not received   POTASSIUM 3.8 3.7  --  Canceled, Test credited Specimen not received   CHLORIDE 105 104  --  Canceled, Test credited Specimen not received   CO2 30 29  --  Canceled, Test credited Specimen not received   BUN 18 27  --  Canceled, Test credited Specimen not received   CR 0.75 0.83 0.70 Canceled, Test credited Specimen not received   ANIONGAP 7 9  --  Canceled, Test credited Specimen not received   MATHEW 9.3 9.3  --  Canceled, Test credited Specimen not received   GLC 91 192*  --  Canceled, Test credited Specimen not received   ALBUMIN  --  3.1*  --  Canceled, Test credited Specimen not received   PROTTOTAL  --  7.0  --  Canceled, Test credited Specimen not received   BILITOTAL  --  0.2  --  Canceled, Test credited Specimen not received   ALKPHOS  --  173*  --  Canceled, Test credited Specimen not received   ALT  --  41  --  Canceled, Test credited Specimen not received   AST  --  29 32 Canceled, Test credited Specimen not received   TROPI  --  <0.015  --  Canceled, Test credited Specimen not received             WILLIE CHISHOLM MD             D: 03/22/2017 10:26   T: 03/22/2017 10:39   MT: cw      Name:     KE LIZ   MRN:      [id number]        Account:       [id number]    :      1945           Consult Date:  2017      Document: [id number]

## 2017-03-22 NOTE — PROGRESS NOTES
Met with patient gave her Observation brochure and explanation of this.  She has been home from TCU living with her significant other.  Has PICC and home health care RN for IV antibiotic .  She was told she may need to return to a TCU due to left hip pain.  MRI to be done. Awaiting plan for discharge.

## 2017-03-22 NOTE — PROGRESS NOTES
Full consult dictated.  In brief, 73 yo F who p/w Hip pain and was found to have SVT (terminated).  She has ongoing knee infection.      Plan:  - Start Metoprolol  - F/u in clinic with me 04/26 at 1:15 PM.  - Consider ablation if failed medical therapy.    Tyrone Boyd MD

## 2017-03-22 NOTE — PROGRESS NOTES
[facility]  Hospitalist Progress Note        Steve Robles, DO  03/22/2017        Interval History:      Patient states that she is awaiting ortho eval. Discussed plan of care, verbalized understanding.          Assessment and Plan:        María Henderson is a 72-year-old woman with a past medical history significant for hypertension, hyperlipidemia, gastroesophageal reflux disease, osteoarthritis with prior infected right knee prosthesis which was explanted and antibiotic spacer was devised and maintained on IV Rocephin, presented to the emergency room with left hip pain.     Left hip pain, musculoskeletal. No trauma but it happened after patient stood up and attempted to turn around while using her walker. No fall or other injuries. She is nonweightbearing on her right leg since her knee surgery a month ago. X-ray pelvis in the emergency room does not reveal any fracture. She is tender around her hip joint but no specific tender point. No skin bruise or hematoma noted. She could have muscle sprain versus minor hairline fracture not noticeable on x-ray.   - Orthopedic Surgery consult.    - Defer further imaging CT versus MRI or clinical followup to Orthopedic Surgery.    - Pain control.     Supraventricular tachycardia, paroxysmal. Patient does have at least 4 episodes of palpitation that she recalls since 12/2016. Initial episode was during hospitalization in the setting of sepsis when her heart rate was up to 172 and patient reports she had a syncopal episode. Her echocardiogram from December shows normal ejection fraction and without regional wall motion abnormality. It seems thyroid was not tested.    - Tele.    - Cards/EP consulted.    - TSH and echocardiogram.     Infected prosthesis right knee. Patient initially had group B streptococcal sepsis in 12/2016, left ankle was thought to be the source, was treated with Rocephin. Patient had arthrodesis of the left ankle. Subsequently,  "developed infection of the prosthesis on right knee. She underwent explantation of the right total knee and an antibiotic spacer was placed. She is followed by Dr. Berkowitz and Dr. Johnson. Dr. Berkowitz from Orthopedic Surgery and Dr. Johnson from Infectious Disease.    - PICC line in her right arm which appears normal and was replaced last week, according to the patient, due to abnormal position.    - Continue her prior to admission Rocephin.    - Consider ID consult.     Hypertension and hyperlipidemia. Prior to admission Diovan HCT resumed.    - Hold Lasix at this time.     Deep venous thrombosis prophylaxis. pressure compression device    Code: Full code.     Disposition: Pending course.                    Physical Exam:      Heart Rate: 78    Blood pressure 134/76, temperature 98  F (36.7  C), temperature source Oral, resp. rate 19, height 1.676 m (5' 6\"), weight 75.5 kg (166 lb 7.2 oz), SpO2 97 %.    Vitals:    17 0122   Weight: 75.5 kg (166 lb 7.2 oz)       Vital Sign Ranges  Temperature Temp  Av.2  F (36.8  C)  Min: 98  F (36.7  C)  Max: 98.3  F (36.8  C)   Blood pressure Systolic (24hrs), Av , Min:115 , Max:140        Diastolic (24hrs), Av, Min:62, Max:96      Pulse No Data Recorded   Respirations Resp  Av.6  Min: 10  Max: 24   Pulse oximetry SpO2  Av.9 %  Min: 94 %  Max: 97 %     Vital Signs with Ranges  Temp:  [98  F (36.7  C)-98.3  F (36.8  C)] 98  F (36.7  C)  Heart Rate:  [] 78  Resp:  [10-24] 19  BP: (115-140)/(62-96) 134/76  SpO2:  [94 %-97 %] 97 %    I/O Last 3 Shifts:        I/O past 24 hours:   No intake or output data in the 24 hours ending 17 0858    GENERAL: Alert and oriented. NAD. Conversational, appropriate.   HEENT: Normocephalic. EOMI. No icterus or injection. Nares normal.   LUNGS: Clear to auscultation. No dyspnea at rest.   HEART: Regular rate. Extremities perfused.   ABDOMEN: Soft, nontender, and nondistended. Positive bowel sounds. "   EXTREMITIES: No LE edema noted.   NEUROLOGIC: Moves extremities x4. Hip pain. Right knee with brace. Distal CMS in tact.          Prior to Admission Medications:        Prescriptions Prior to Admission   Medication Sig Dispense Refill Last Dose     polyethylene glycol (MIRALAX/GLYCOLAX) Packet Take 1 packet by mouth every other day   3/20/2017     cefTRIAXone (ROCEPHIN) 1 GM vial Inject 2 g (2,000 mg) into the vein daily CBC with differential, creatinine, SGOT weekly while on this medication to be faxed to Dr. Douglas office. 600 mL 0 3/21/2017 at 1200     HYDROmorphone (DILAUDID) 2 MG tablet Take 1-2 tablets (2-4 mg) by mouth every 4 hours as needed for moderate to severe pain 70 tablet 0  at prn     CALCIUM PO Take 600 mg by mouth daily   3/21/2017 at Unknown time     Probiotic Product (TRUBIOTICS PO) Take 1 tablet by mouth daily   3/21/2017 at Unknown time     albuterol (PROAIR HFA/PROVENTIL HFA/VENTOLIN HFA) 108 (90 BASE) MCG/ACT Inhaler Inhale 2 puffs into the lungs every 6 hours as needed for shortness of breath / dyspnea or wheezing    at prn     Pyridoxine HCl (VITAMIN B6 PO) Take 1 tablet by mouth daily   3/21/2017 at Unknown time     DAKINS EX Externally apply topically daily   3/21/2017 at Unknown time     Furosemide (LASIX PO) Take 20 mg by mouth daily    3/21/2017 at Unknown time     Cholecalciferol (VITAMIN D3 PO) Take 400 Units by mouth daily   3/21/2017 at Unknown time     POTASSIUM PO Take 595 mg by mouth every morning    3/21/2017 at Unknown time     valsartan-hydrochlorothiazide (DIOVAN-HCT) 80-12.5 MG per tablet Take 1 tablet by mouth daily   3/21/2017 at Unknown time     ASPIRIN PO Take 81 mg by mouth daily    3/21/2017 at Unknown time     multivitamin, therapeutic with minerals (THERA-VIT-M) TABS Take 1 tablet by mouth daily.   3/21/2017 at Unknown time     simvastatin (ZOCOR) 40 MG tablet Take 40 mg by mouth every morning    3/21/2017 at Unknown time     order for DME Equipment being ordered:  Walker Wheels () and Walker ()  Treatment Diagnosis: impaired gait 1 each 0      order for DME Equipment being ordered: Walker Wheels () and Walker ()  Treatment Diagnosis: difficulty with gait 1 each 0             Medications:        Current Facility-Administered Medications   Medication Last Rate     NaCl 75 mL/hr at 03/22/17 0656     Current Facility-Administered Medications   Medication Dose Route Frequency     aspirin chewable tablet 81 mg  81 mg Oral Daily     cefTRIAXone  2 g Intravenous Q24H     polyethylene glycol  17 g Oral Daily     valsartan-hydrochlorothiazide  1 tablet Oral Daily     Current Facility-Administered Medications   Medication Dose Route Frequency     HYDROmorphone (PF)  1 mg Intravenous Q15 Min PRN     albuterol  2 puff Inhalation Q6H PRN     HYDROmorphone  0.5 mg Intravenous Q4H PRN     naloxone  0.1-0.4 mg Intravenous Q2 Min PRN     acetaminophen  650 mg Oral Q4H PRN     acetaminophen  650 mg Rectal Q4H PRN     ondansetron  4 mg Oral Q6H PRN    Or     ondansetron  4 mg Intravenous Q6H PRN     potassium chloride  20-40 mEq Oral Q2H PRN     potassium chloride  20-40 mEq Oral or Feeding Tube Q2H PRN     potassium chloride  10 mEq Intravenous Q1H PRN     potassium chloride with lidocaine  10 mEq Intravenous Q1H PRN     potassium chloride  20 mEq Intravenous Q1H PRN     magnesium sulfate  4 g Intravenous Q4H PRN            Data:      Lab data reviewed.     Recent Labs  Lab 03/21/17  2238 03/21/17  0815   HGB 12.8 12.3   MCV 87 88    387     --    POTASSIUM 3.7  --    CHLORIDE 104  --    CO2 29  --    BUN 27  --    CR 0.83 0.70   ANIONGAP 9  --    MATHEW 9.3  --    *  --    TROPI <0.015  --            Imaging:      Imaging data reviewed.     Dr. Steve Robles D.O.  [facility]ist  Pager [phone]

## 2017-03-22 NOTE — PROGRESS NOTES
[facility]  Progress Note  [facility]    Pt had several visitors and was waiting for an MRI.  She isn't sure what is causing the pain in her hip and right now, she would like to get that figured out.  There don't seem to be any SH needs right now.  I romero top back tomorrow after she has some test results back.          Alanna Bateman  Chaplain Resident  Pager [phone]

## 2017-03-22 NOTE — ED PROVIDER NOTES
"  History     Chief Complaint:  Palpitaions and Hip Pain    HPI   María Henderson is a 72 year old female on 2 g Ceftriaxone daily for recent right knee infection with PICC line in place who presents with left hip pain and palpitations. The patient had a right knee surgery on 2/20 to have an infected knee prosthesis removed and has a spacer in place. She is not ambulatory on her right leg due to this surgery. The patient reports that she stood up from her wheelchair last night around 2130 when her left hip \"popped\". She states she has been unable to bear weight on her left leg now due to pain and has been unable to get around. The patient states she called EMS tonight because she was unable to get around. On arrival to the ED, she reports that she has pain in her left hip. She states the pain increased if she tries to roll over over ambulate. She denies any pain in her left leg or any numbness or weakness. No trauma or injury.    The patient also reports that she had her 4th episode of tachycardia this evening. She states she has had 3 episodes of palpitations since December 2016 t but has never been evaluated for them. They normally last for about 45 minutes and then go away on their own. She states that this episode started shortly after 1900 this evening and were present when she got to the ED. This episode lasted longer than usual. The patient denies any chest pain, shortness of breath, abdominal pain, vomiting, diarrhea, or any recent fever cough, or cold.     Allergies:  Penicillin  Oxycodone  Sulfa  Valium  Vicodin  Iodine      Medications:    cefTRIAXone (ROCEPHIN) 1 GM vial  HYDROmorphone (DILAUDID) 2 MG tablet  polyethylene glycol (MIRALAX/GLYCOLAX) Packet  CALCIUM PO  Probiotic Product (TRUBIOTICS PO)  albuterol (PROAIR HFA/PROVENTIL HFA/VENTOLIN HFA) 108 (90 BASE) MCG/ACT Inhaler  Pyridoxine HCl (VITAMIN B6 PO)  DAKINS EX  Furosemide (LASIX PO)  Cholecalciferol (VITAMIN D3 PO)  POTASSIUM " "PO  valsartan-hydrochlorothiazide (DIOVAN-HCT) 80-12.5 MG per tablet  ASPIRIN PO  multivitamin, therapeutic with minerals (THERA-VIT-M) TABS  simvastatin (ZOCOR) 40 MG tablet    Past Medical History:    GERD  Heart murmur  Hyperlipidemia  Hypertension  Numbness and tingling  Osteoarthritis  Septic left knee joint     Past Surgical History:    Tonsillectomy  Foot and ankle surgery x7  Total knee arthroplasty  Hernia repair  Arthroplasty revision ankle  Aspiration needle knee  Bunionectomy  Rotator cuff repair  Remove hardware arthroplasty knee - 2/20/17    Family History:    History reviewed. No pertinent family history.     Social History:  Smoking status: No  Alcohol use: No  Marital Status:   [5]     Review of Systems   Constitutional: Negative for fever.   HENT: Negative for congestion.    Respiratory: Negative for cough and shortness of breath.    Cardiovascular: Positive for palpitations. Negative for chest pain and leg swelling.   Gastrointestinal: Negative for abdominal pain, diarrhea, nausea and vomiting.   Musculoskeletal: Positive for arthralgias and gait problem.   Neurological: Negative for dizziness, weakness, light-headedness and numbness.   All other systems reviewed and are negative.      Physical Exam     Patient Vitals for the past 24 hrs:   BP Temp Temp src Heart Rate Resp SpO2 Height   03/22/17 0033 124/78 - - 82 18 95 % -   03/22/17 0000 140/81 - - 87 20 96 % -   03/21/17 2245 (!) 139/95 - - - - - -   03/21/17 2236 - - - 96 10 96 % -   03/21/17 2233 - - - 161 16 97 % -   03/21/17 2231 (!) 124/96 - - - 16 96 % -   03/21/17 2226 115/78 98.3  F (36.8  C) Oral 160 - 96 % 1.676 m (5' 6\")       Physical Exam  Nursing note and vitals reviewed.  Constitutional:   Awake alert  HENT:   Pharynx normal, tms normal, atraumatic  Mouth/Throat:  Oropharynx is clear and moist.   Eyes:    Conjunctivae normal and EOM are normal. Pupils are equal, round, and reactive to light.   Neck:    Normal range of " motion. Neck supple. No tracheal deviation present.   Cardiovascular: Normal rate, regular rhythm, normal heart sounds and intact distal pulses.    Pulmonary/Chest:  Effort normal and breath sounds normal. No respiratory distress. No wheezes. No rales. No tenderness.   Abdominal:   Soft. The patient exhibits no mass. There is no tenderness. There is no rebound and no guarding.   Musculoskeletal:  Left leg no shortening or rotation. Tender left lateral hip, anterior superior iliac spine area up in pelvis. Can move the right hip. Normal range of motion. No edema.   Lymphadenopathy:  No cervical adenopathy.   Neurological:  Alert and oriented to person, place, and time. No cranial nerve deficit. Normal muscle tone.   Skin:    Skin is warm and dry. Right knee incision clean and dry.   Psychiatric:   Normal mood and affect.     Emergency Department Course   ECG (22:28:22):  Rate 160 bpm. AZ interval *. QRS duration 102. QT/QTc 292/476. P-R-T axes * 56 -11. Supraventricular tachycardia. Incomplete right bundle branch block. Inferior infarct, age undetermined. Abnormal ECG   Interpreted by Moreno Mesa MD.    Repeat ECG (22:42:20):  Rate 93 bpm. AZ interval 150. QRS duration 96. QT/QTc 346/430. P-R-T axes 63 31 27. Sinus rhythm with occasional premature ventricular complexes. Otherwise normal ECG   Interpreted at 2243 by Moreno Mesa MD.    Imaging:  Radiographic findings were communicated with the patient who voiced understanding of the findings.    X-ray Pelvis, 1-2 views:  Degenerative changes lower lumbar spine and pubic  symphysis. Otherwise negative. No acute fracture is identified.  Result per radiology.     Laboratory:  Troponin: <0.015  CBC: WNL (WBC 9.8, HGB 12.8, )   CMP: Glucose 192(H), Albumin 3.1(L0, Alkphos 173(H), o/w WNL (Creatinine 0.83)    Interventions:  NS 1L IV Bolus  0010: Dilaudid 1 mg IV    Emergency Department Course:  The patient arrived in the emergency department via  EMS.  Past medical records, nursing notes, and vitals reviewed.  2241: I performed an exam of the patient and obtained history, as documented above.  IV inserted and blood drawn. The patient was placed on continuous cardiac monitoring and pulse oximetry.  ECG ordered, results above.     Nurse had her do valsalva maneuver and slowed heart rate from 160 to 90.  Repeat ECG ordered, results above.     The patient was sent for a pelvis x-ray while in the emergency department, findings above.  2359: I rechecked the patient. Explained findings to the patient.    0010: I spoke to Dr. Ledezma of the hospitalist service who accepts the patient for admission.     Findings and plan explained to the Patient who consents to admission.   Discussed the patient with Dr. Ledezma, who will admit the patient to a [facility] bed for further monitoring, evaluation, and treatment.     Impression & Plan      Medical Decision Makin year old female who recently had her right infected knee prosthesis removed and a spacer put in. She had streptococcal infection and is on long term daily Rocephin through a right arm PICC line. She is non weight bearing on the right leg of course. Last night she twisted trying to get into bed and felt something pop in her left hip. She has had severe pain and is unable to weight bear on the left hip now since last night. In addition, she has had what sounds like 4 episodes of tachycardia since 2016. She has never sought medical attention for this. Usually these last 30-45 minutes. She had a similar tachycardia episode that started about 7pm tonight. She was in SVT rate 160 on admission to the ED here tonight. Nursing staff had her do valsalva and converted her SVT. Cardiac work up including ECG, troponin and laboratory studies was unremarkable. X-ray of the left hip is negative. Probable soft tissue injury but she is unable to walk on either leg so she will be admitted. Dr. Ledezma will admit.      Diagnosis:    ICD-10-CM   1. Hip pain M25.559     Disposition: Admitted    Kristin Desai  3/21/2017    EMERGENCY DEPARTMENT    I, Kristin Lloyd, am serving as a scribe at 10:41 PM on 3/21/2017 to document services personally performed by Moreno Mesa MD based on my observations and the provider's statements to me.        Moreno Mesa MD  03/22/17 0102

## 2017-03-23 ENCOUNTER — APPOINTMENT (OUTPATIENT)
Dept: CARDIOLOGY | Facility: CLINIC | Age: 72
End: 2017-03-23
Attending: HOSPITALIST
Payer: OTHER MISCELLANEOUS

## 2017-03-23 ENCOUNTER — APPOINTMENT (OUTPATIENT)
Dept: PHYSICAL THERAPY | Facility: CLINIC | Age: 72
End: 2017-03-23
Attending: ORTHOPAEDIC SURGERY
Payer: OTHER MISCELLANEOUS

## 2017-03-23 LAB
ALBUMIN SERPL-MCNC: NORMAL G/DL (ref 3.4–5)
ALP SERPL-CCNC: NORMAL U/L (ref 40–150)
ALT SERPL W P-5'-P-CCNC: NORMAL U/L (ref 0–50)
ANION GAP SERPL CALCULATED.3IONS-SCNC: 7 MMOL/L (ref 3–14)
ANION GAP SERPL CALCULATED.3IONS-SCNC: NORMAL MMOL/L (ref 6–17)
AST SERPL W P-5'-P-CCNC: NORMAL U/L (ref 0–45)
BILIRUB SERPL-MCNC: NORMAL MG/DL (ref 0.2–1.3)
BUN SERPL-MCNC: 18 MG/DL (ref 7–30)
BUN SERPL-MCNC: NORMAL MG/DL (ref 7–30)
CALCIUM SERPL-MCNC: 9.3 MG/DL (ref 8.5–10.1)
CALCIUM SERPL-MCNC: NORMAL MG/DL (ref 8.5–10.1)
CHLORIDE SERPL-SCNC: 105 MMOL/L (ref 94–109)
CHLORIDE SERPL-SCNC: NORMAL MMOL/L (ref 94–109)
CO2 SERPL-SCNC: 30 MMOL/L (ref 20–32)
CO2 SERPL-SCNC: NORMAL MMOL/L (ref 20–32)
CREAT SERPL-MCNC: 0.75 MG/DL (ref 0.52–1.04)
CREAT SERPL-MCNC: NORMAL MG/DL (ref 0.52–1.04)
DIFFERENTIAL METHOD BLD: NORMAL
ERYTHROCYTE [DISTWIDTH] IN BLOOD BY AUTOMATED COUNT: 15.9 % (ref 10–15)
ERYTHROCYTE [DISTWIDTH] IN BLOOD BY AUTOMATED COUNT: NORMAL % (ref 10–15)
GFR SERPL CREATININE-BSD FRML MDRD: 76 ML/MIN/1.7M2
GFR SERPL CREATININE-BSD FRML MDRD: NORMAL ML/MIN/1.7M2
GLUCOSE SERPL-MCNC: 91 MG/DL (ref 70–99)
GLUCOSE SERPL-MCNC: NORMAL MG/DL (ref 70–99)
HCT VFR BLD AUTO: 36.7 % (ref 35–47)
HCT VFR BLD AUTO: NORMAL % (ref 35–47)
HGB BLD-MCNC: 11.4 G/DL (ref 11.7–15.7)
HGB BLD-MCNC: NORMAL G/DL (ref 11.7–15.7)
MCH RBC QN AUTO: 27.1 PG (ref 26.5–33)
MCH RBC QN AUTO: NORMAL PG (ref 26.5–33)
MCHC RBC AUTO-ENTMCNC: 31.1 G/DL (ref 31.5–36.5)
MCHC RBC AUTO-ENTMCNC: NORMAL G/DL (ref 31.5–36.5)
MCV RBC AUTO: 87 FL (ref 78–100)
MCV RBC AUTO: NORMAL FL (ref 78–100)
PLATELET # BLD AUTO: 314 10E9/L (ref 150–450)
PLATELET # BLD AUTO: NORMAL 10E9/L (ref 150–450)
POTASSIUM SERPL-SCNC: 3.8 MMOL/L (ref 3.4–5.3)
POTASSIUM SERPL-SCNC: NORMAL MMOL/L (ref 3.4–5.3)
PROT SERPL-MCNC: NORMAL G/DL (ref 6.8–8.8)
RBC # BLD AUTO: 4.21 10E12/L (ref 3.8–5.2)
RBC # BLD AUTO: NORMAL 10E12/L (ref 3.8–5.2)
SODIUM SERPL-SCNC: 142 MMOL/L (ref 133–144)
SODIUM SERPL-SCNC: NORMAL MMOL/L (ref 133–144)
TROPONIN I SERPL-MCNC: NORMAL UG/L (ref 0–0.04)
WBC # BLD AUTO: 6.6 10E9/L (ref 4–11)
WBC # BLD AUTO: NORMAL 10E9/L (ref 4–11)

## 2017-03-23 PROCEDURE — 97161 PT EVAL LOW COMPLEX 20 MIN: CPT | Mod: GP | Performed by: PHYSICAL THERAPIST

## 2017-03-23 PROCEDURE — 96376 TX/PRO/DX INJ SAME DRUG ADON: CPT

## 2017-03-23 PROCEDURE — 25000128 H RX IP 250 OP 636: Performed by: HOSPITALIST

## 2017-03-23 PROCEDURE — G0378 HOSPITAL OBSERVATION PER HR: HCPCS

## 2017-03-23 PROCEDURE — 80048 BASIC METABOLIC PNL TOTAL CA: CPT | Performed by: HOSPITALIST

## 2017-03-23 PROCEDURE — 40000239 ZZH STATISTIC VAT ROUNDS

## 2017-03-23 PROCEDURE — 25000132 ZZH RX MED GY IP 250 OP 250 PS 637: Performed by: HOSPITALIST

## 2017-03-23 PROCEDURE — 97116 GAIT TRAINING THERAPY: CPT | Mod: GP | Performed by: PHYSICAL THERAPIST

## 2017-03-23 PROCEDURE — 93306 TTE W/DOPPLER COMPLETE: CPT | Mod: 26 | Performed by: INTERNAL MEDICINE

## 2017-03-23 PROCEDURE — 99226 ZZC SUBSEQUENT OBSERVATION CARE,LEVEL III: CPT | Performed by: HOSPITALIST

## 2017-03-23 PROCEDURE — 93306 TTE W/DOPPLER COMPLETE: CPT

## 2017-03-23 PROCEDURE — 40000193 ZZH STATISTIC PT WARD VISIT: Performed by: PHYSICAL THERAPIST

## 2017-03-23 PROCEDURE — 85027 COMPLETE CBC AUTOMATED: CPT | Performed by: HOSPITALIST

## 2017-03-23 PROCEDURE — 99214 OFFICE O/P EST MOD 30 MIN: CPT | Mod: 25 | Performed by: INTERNAL MEDICINE

## 2017-03-23 PROCEDURE — 25000132 ZZH RX MED GY IP 250 OP 250 PS 637: Performed by: INTERNAL MEDICINE

## 2017-03-23 RX ADMIN — METOPROLOL TARTRATE 25 MG: 25 TABLET, FILM COATED ORAL at 09:16

## 2017-03-23 RX ADMIN — ASPIRIN 81 MG 81 MG: 81 TABLET ORAL at 09:17

## 2017-03-23 RX ADMIN — CEFTRIAXONE 2 G: 2 INJECTION, POWDER, FOR SOLUTION INTRAMUSCULAR; INTRAVENOUS at 11:54

## 2017-03-23 RX ADMIN — METOPROLOL TARTRATE 25 MG: 25 TABLET, FILM COATED ORAL at 20:36

## 2017-03-23 RX ADMIN — VALSARTAN AND HYDROCHLOROTHIAZIDE 1 TABLET: 80; 12.5 TABLET, FILM COATED ORAL at 09:17

## 2017-03-23 RX ADMIN — TRAMADOL HYDROCHLORIDE 25 MG: 50 TABLET ORAL at 19:49

## 2017-03-23 RX ADMIN — TRAMADOL HYDROCHLORIDE 25 MG: 50 TABLET ORAL at 04:34

## 2017-03-23 RX ADMIN — TRAMADOL HYDROCHLORIDE 25 MG: 50 TABLET ORAL at 11:54

## 2017-03-23 RX ADMIN — TRAMADOL HYDROCHLORIDE 25 MG: 50 TABLET ORAL at 01:57

## 2017-03-23 NOTE — PROGRESS NOTES
03/23/17 1433   Quick Adds   Type of Visit Initial PT Evaluation   Living Environment   Lives With significant other   Living Arrangements house   Home Accessibility stairs to enter home;stairs within home   Number of Stairs to Enter Home 1   Number of Stairs Within Home 14  (To basement. )   Stair Railings at Home (Yes)   Transportation Available ( has provided since surgery. )   Living Environment Comment Patient has been home from TCU for about 2 weeks. Was managing at home until having increased left hip pain and racing heart. Patients w/c doesn't fit in the doors at home so patient has to amb into the bedroom. Able to maintain NWB with ww.    Self-Care   Dominant Hand right   Usual Activity Tolerance good  (Prior to infection and right knee surgery. )   Current Activity Tolerance fair   Regular Exercise no   Equipment Currently Used at Home shower chair   Functional Level Prior   Ambulation 4-->completely dependent   Transferring 1-->assistive equipment   Toileting 1-->assistive equipment   Bathing 1-->assistive equipment   Dressing 0-->independent   Eating 0-->independent   Communication 0-->understands/communicates without difficulty   Swallowing 0-->swallows foods/liquids without difficulty   Cognition 0 - no cognition issues reported   Fall history within last six months no   Which of the above functional risks had a recent onset or change? none   Prior Functional Level Comment Wears cam boot on the left due to multiple surgeries. Patient was getting assist of  since discharge from TCU.    General Information   Onset of Illness/Injury or Date of Surgery - Date 03/21/17   Referring Physician Husam Berkowitz   Patient/Family Goals Statement To go to TCU.    Pertinent History of Current Problem (include personal factors and/or comorbidities that impact the POC) María Henderson is a 72-year-old woman with a past medical history significant for hypertension, hyperlipidemia, gastroesophageal  reflux disease, osteoarthritis with prior infected right knee prosthesis which was explanted and antibiotic spacer was devised and maintained on IV Rocephin, presented to the emergency room with left hip pain. Per othopedic MD, x-rays negative.    Precautions/Limitations fall precautions   Weight-Bearing Status - LUE full weight-bearing   Weight-Bearing Status - RUE full weight-bearing   Weight-Bearing Status - LLE full weight-bearing  (Has cam boot on from previous surgeries. )   Weight-Bearing Status - RLE nonweight-bearing   Cognitive Status Examination   Orientation orientation to person, place and time   Level of Consciousness alert   Follows Commands and Answers Questions 100% of the time   Personal Safety and Judgment intact   Memory intact   Pain Assessment   Patient Currently in Pain Yes, see Vital Sign flowsheet  (Left hip only with sit to stand. )   Range of Motion (ROM)   ROM Comment Left ankle fused. Right knee with knee immobilizer on.    Strength   Strength Comments UE strength 5/5, Bilateral LE strength functional for transfers and amb. Fatigues quickly though due to extra energy needed for NWBing status.    Bed Mobility   Bed Mobility Comments Independent   Transfer Skills   Transfer Comments Sit to stand with suprevision and extra time to get tu full standing position from bed. Less pain and les time needed to stand from w/c.    Gait   Gait Comments Gait with ww 5 feet in room with CGA. Additional 40 feet in daily documentation.    Balance   Balance Comments Good static standing balance with support of walker. Good balance with gait and able to maintain NWBing without difficulty.    Sensory Examination   Sensory Perception no deficits were identified   Coordination   Coordination no deficits were identified   Muscle Tone   Muscle Tone no deficits were identified   Clinical Impression   Criteria for Skilled Therapeutic Intervention yes, treatment indicated   PT Diagnosis Impaired functional  endurance and independence.    Influenced by the following impairments Pain in left hip. NWBing on right LE   Functional limitations due to impairments Needs extra time for transfers and fatigues due to NWBing status.    Clinical Presentation Stable/Uncomplicated   Clinical Presentation Rationale Functional ability prior to admission and currently.    Clinical Decision Making (Complexity) Low complexity   Therapy Frequency` (One time only. )   Predicted Duration of Therapy Intervention (days/wks) (Deferred to TCU. )   Anticipated Equipment Needs at Discharge (Has a ww and a w/c already. )   Anticipated Discharge Disposition Transitional Care Facility   Risk & Benefits of therapy have been explained Yes   Patient, Family & other staff in agreement with plan of care Yes   Total Evaluation Time   Total Evaluation Time (Minutes) 18

## 2017-03-23 NOTE — PLAN OF CARE
Problem: Goal Outcome Summary  Goal: Goal Outcome Summary  Outcome: No Change  A&Ox4.  VSS on RA.  Up with A1 BSC; using the bedpan at times also.  NWB on right leg.  CMS unchanged.  Dilaudid given x 1 for left hip and right knee pain with some improvement.  Offered Tramadol but patient declined.  PICC to [facility].  Tolerating regular diet; appetite good.  Right knee remains in immobilizer; dressing on right knee c/d/i.  MRI Spine completed; MRI hip in process.  Tele SR.  Continue to monitor.

## 2017-03-23 NOTE — PROGRESS NOTES
[facility]  Hospitalist Progress Note        Steve Robles, DO  03/23/2017        Interval History:      Patient states that she is improved today.          Assessment and Plan:        María Henderson is a 72-year-old woman with a past medical history significant for hypertension, hyperlipidemia, gastroesophageal reflux disease, osteoarthritis with prior infected right knee prosthesis which was explanted and antibiotic spacer was devised and maintained on IV Rocephin, presented to the emergency room with left hip pain.      Left hip pain, musculoskeletal. No trauma but it happened after patient stood up and attempted to turn around while using her walker. No fall or other injuries. She is nonweightbearing on her right leg since her knee surgery a month ago. X-ray pelvis in the emergency room does not reveal any fracture. She is tender around her hip joint but no specific tender point. No skin bruise or hematoma noted. She could have muscle sprain versus minor hairline fracture not noticeable on x-ray.  - Orthopedic Surgery consult.   - MRI completed.   - Pain control.      Supraventricular tachycardia, paroxysmal. Patient does have at least 4 episodes of palpitation that she recalls since 12/2016. Initial episode was during hospitalization in the setting of sepsis when her heart rate was up to 172 and patient reports she had a syncopal episode. Her echocardiogram from December shows normal ejection fraction and without regional wall motion abnormality. It seems thyroid was not tested.   - Tele.   - Cards/EP consulted.   - TSH at 4.66.    - echocardiogram pending.       Infected prosthesis right knee. Patient initially had group B streptococcal sepsis in 12/2016, left ankle was thought to be the source, was treated with Rocephin. Patient had arthrodesis of the left ankle. Subsequently, developed infection of the prosthesis on right knee. She underwent explantation of the right total knee  "and an antibiotic spacer was placed. She is followed by Dr. Berkowitz and Dr. Johnson. Dr. Berkowitz from Orthopedic Surgery and Dr. Johnson from Infectious Disease.   - PICC line in her right arm which appears normal and was replaced last week, according to the patient, due to abnormal position.   - Continue her prior to admission Rocephin.   - Consider ID consult.      Hypertension and hyperlipidemia. Prior to admission Diovan HCT resumed.   - Hold Lasix at this time.      Deep venous thrombosis prophylaxis. pressure compression device     Code: Full code.      Disposition: Discharge today or tomorrow pending course.                    Physical Exam:      Heart Rate: 61    Blood pressure 139/75, temperature 98  F (36.7  C), temperature source Oral, resp. rate 18, height 1.676 m (5' 6\"), weight 75.3 kg (166 lb 0.1 oz), SpO2 94 %.    Vitals:    17 0122 17 0616   Weight: 75.5 kg (166 lb 7.2 oz) 75.3 kg (166 lb 0.1 oz)       Vital Sign Ranges  Temperature Temp  Av.1  F (36.7  C)  Min: 98  F (36.7  C)  Max: 98.2  F (36.8  C)   Blood pressure Systolic (24hrs), Av , Min:114 , Max:145        Diastolic (24hrs), Av, Min:62, Max:82      Pulse No Data Recorded   Respirations Resp  Av.6  Min: 16  Max: 19   Pulse oximetry SpO2  Av %  Min: 94 %  Max: 97 %     Vital Signs with Ranges  Temp:  [98  F (36.7  C)-98.2  F (36.8  C)] 98  F (36.7  C)  Heart Rate:  [61-78] 61  Resp:  [16-19] 18  BP: (114-145)/(62-82) 139/75  SpO2:  [94 %-97 %] 94 %    I/O Last 3 Shifts:   I/O last 3 completed shifts:  In: 480 [P.O.:480]  Out: 1425 [Urine:1425]    I/O past 24 hours:     Intake/Output Summary (Last 24 hours) at 17 0841  Last data filed at 17 0256   Gross per 24 hour   Intake              480 ml   Output             1425 ml   Net             -945 ml     GENERAL: Alert and oriented. NAD. Conversational, appropriate.   HEENT: Normocephalic. EOMI. No icterus or injection. Nares normal.   LUNGS: " Clear to auscultation. No dyspnea at rest.   HEART: Regular rate. Extremities perfused.   ABDOMEN: Soft, nontender, and nondistended. Positive bowel sounds.   EXTREMITIES: No LE edema noted.   NEUROLOGIC: Moves extremities x4. Hip pain improved. Right knee with brace. Distal CMS in tact.          Prior to Admission Medications:        Prescriptions Prior to Admission   Medication Sig Dispense Refill Last Dose     polyethylene glycol (MIRALAX/GLYCOLAX) Packet Take 1 packet by mouth every other day   3/20/2017     cefTRIAXone (ROCEPHIN) 1 GM vial Inject 2 g (2,000 mg) into the vein daily CBC with differential, creatinine, SGOT weekly while on this medication to be faxed to Dr. Douglas office. 600 mL 0 3/21/2017 at 1200     HYDROmorphone (DILAUDID) 2 MG tablet Take 1-2 tablets (2-4 mg) by mouth every 4 hours as needed for moderate to severe pain 70 tablet 0  at prn     CALCIUM PO Take 600 mg by mouth daily   3/21/2017 at Unknown time     Probiotic Product (TRUBIOTICS PO) Take 1 tablet by mouth daily   3/21/2017 at Unknown time     albuterol (PROAIR HFA/PROVENTIL HFA/VENTOLIN HFA) 108 (90 BASE) MCG/ACT Inhaler Inhale 2 puffs into the lungs every 6 hours as needed for shortness of breath / dyspnea or wheezing    at prn     Pyridoxine HCl (VITAMIN B6 PO) Take 1 tablet by mouth daily   3/21/2017 at Unknown time     DAKINS EX Externally apply topically daily   3/21/2017 at Unknown time     Furosemide (LASIX PO) Take 20 mg by mouth daily    3/21/2017 at Unknown time     Cholecalciferol (VITAMIN D3 PO) Take 400 Units by mouth daily   3/21/2017 at Unknown time     POTASSIUM PO Take 595 mg by mouth every morning    3/21/2017 at Unknown time     valsartan-hydrochlorothiazide (DIOVAN-HCT) 80-12.5 MG per tablet Take 1 tablet by mouth daily   3/21/2017 at Unknown time     ASPIRIN PO Take 81 mg by mouth daily    3/21/2017 at Unknown time     multivitamin, therapeutic with minerals (THERA-VIT-M) TABS Take 1 tablet by mouth daily.    3/21/2017 at Unknown time     simvastatin (ZOCOR) 40 MG tablet Take 40 mg by mouth every morning    3/21/2017 at Unknown time     order for DME Equipment being ordered: Walker Wheels () and Walker ()  Treatment Diagnosis: impaired gait 1 each 0      order for DME Equipment being ordered: Walker Wheels () and Walker ()  Treatment Diagnosis: difficulty with gait 1 each 0             Medications:        Current Facility-Administered Medications   Medication Last Rate     Current Facility-Administered Medications   Medication Dose Route Frequency     aspirin chewable tablet 81 mg  81 mg Oral Daily     cefTRIAXone  2 g Intravenous Q24H     polyethylene glycol  17 g Oral Daily     valsartan-hydrochlorothiazide  1 tablet Oral Daily     metoprolol  25 mg Oral BID     Current Facility-Administered Medications   Medication Dose Route Frequency     albuterol  2 puff Inhalation Q6H PRN     HYDROmorphone  0.5 mg Intravenous Q4H PRN     naloxone  0.1-0.4 mg Intravenous Q2 Min PRN     acetaminophen  650 mg Oral Q4H PRN     acetaminophen  650 mg Rectal Q4H PRN     ondansetron  4 mg Oral Q6H PRN    Or     ondansetron  4 mg Intravenous Q6H PRN     potassium chloride  20-40 mEq Oral Q2H PRN     potassium chloride  20-40 mEq Oral or Feeding Tube Q2H PRN     potassium chloride  10 mEq Intravenous Q1H PRN     potassium chloride with lidocaine  10 mEq Intravenous Q1H PRN     potassium chloride  20 mEq Intravenous Q1H PRN     magnesium sulfate  4 g Intravenous Q4H PRN     tramadol  25-50 mg Oral Q6H PRN            Data:      Lab data reviewed.     Recent Labs  Lab 03/23/17  0615 03/21/17  2238 03/21/17  0815 03/21/17  0615   HGB 11.4* 12.8 12.3 Canceled, Test credited Specimen not received   MCV 87 87 88 Canceled, Test credited Specimen not received    359 387 Canceled, Test credited Specimen not received    142  --  Canceled, Test credited Specimen not received   POTASSIUM 3.8 3.7  --  Canceled, Test  credited Specimen not received   CHLORIDE 105 104  --  Canceled, Test credited Specimen not received   CO2 30 29  --  Canceled, Test credited Specimen not received   BUN 18 27  --  Canceled, Test credited Specimen not received   CR 0.75 0.83 0.70 Canceled, Test credited Specimen not received   ANIONGAP 7 9  --  Canceled, Test credited Specimen not received   MATHEW 9.3 9.3  --  Canceled, Test credited Specimen not received   GLC 91 192*  --  Canceled, Test credited Specimen not received   TROPI  --  <0.015  --  Canceled, Test credited Specimen not received           Imaging:      Imaging data reviewed.     DANIA HoustonO.  [facility] Care System[facility]  Pager [phone]

## 2017-03-23 NOTE — PROGRESS NOTES
3/23/2017    08:30    I do not see any fractures in the hip or pelvis--radiology has not read this yet.    She has pretty severe lumbar disc degeneration.    Mobilize with PT.

## 2017-03-23 NOTE — PROGRESS NOTES
"Cardiology Progress Note          Assessment and Plan:   Ms. María Henderson is a delightful 72-year-old lady with a history of hypertension, GERD and osteoarthritis who has had several orthopedic surgeries done (19 orthopedic surgeries: on the left ankle, foot, and most recent right knee replacement which is infected and was explanted). Please see consult note for full hx.  She came to the ED because she was having hip pain and palpitations. In the ED she was found to have SVT with rate ~172 bpm which self-terminated. EP was consulted for her SVT    1. 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 B/P stable  Echo completed this morning-results pending  Pt reports that she's had 4 \"episodes\" since  like she experienced upon admission to the ED.    EP signing off, but please with any questions or concerns.   Pt is scheduled to see  on  at 1:15 pm in [place].    Jeanne Lloyd, CNP        Interval History:   Pt doing well. No further SVT noted and tolerating the metoprolol. VSSA, tele sinus 60's         Medications:       aspirin chewable tablet 81 mg  81 mg Oral Daily     cefTRIAXone  2 g Intravenous Q24H     polyethylene glycol  17 g Oral Daily     valsartan-hydrochlorothiazide  1 tablet Oral Daily     metoprolol  25 mg Oral BID             Review of Systems: If done, described below  The Review of Systems is negative other than noted in the HPI             Physical Exam:   Blood pressure 139/75, temperature 98  F (36.7  C), temperature source Oral, resp. rate 18, height 1.676 m (5' 6\"), weight 75.3 kg (166 lb 0.1 oz), SpO2 94 %.        Vital Sign Ranges  Temperature Temp  Av.1  F (36.7  C)  Min: 98  F (36.7  C)  Max: 98.2  F (36.8  C)   Blood pressure Systolic (24hrs), Av , Min:114 , Max:145        Diastolic (24hrs), Av, Min:62, Max:82      Pulse No Data Recorded   Respirations Resp  Av.3  Min: 16  Max: 18 "   Pulse oximetry SpO2  Av.5 %  Min: 94 %  Max: 95 %         Intake/Output Summary (Last 24 hours) at 17 1002  Last data filed at 17 0256   Gross per 24 hour   Intake              240 ml   Output             1425 ml   Net            -1185 ml       Constitutional:   in no apparent distress     Lungs:   symmetric, clear to auscultation     Cardiovascular:   Normal apical impulse, regular rate and rhythm, normal S1 and S2, no S3 or S4, and no murmur noted     Extremities and Back:   No edema, right knee brace on and left foot               Data:     Lab Results   Component Value Date    WBC 6.6 2017    HGB 11.4 (L) 2017    HCT 36.7 2017     2017     2017    POTASSIUM 3.8 2017    CHLORIDE 105 2017    CO2 30 2017    BUN 18 2017    CR 0.75 2017    GLC 91 2017    SED 30 2017    DD 0.3 10/25/2011    TROPI <0.015 2017    AST 29 2017    ALT 41 2017    ALKPHOS 173 (H) 2017    BILITOTAL 0.2 2017    INR 1.29 (H) 2016

## 2017-03-23 NOTE — PLAN OF CARE
Problem: Goal Outcome Summary  Goal: Goal Outcome Summary  PT-Patient seen for initial eval and treatment provided. Patient was in TCU until about 2 weeks ago due to having an infection in right knee and having TKA removed and a spacer put in. Patient had been managing at home with a w/c and a walker with her husbands assist but currently has increased left hip pain and that limits her ability to be independent. Currently recommend disch to TCU to progress her strength and independence and decrease right hip pain so she can return home. Will defer further PT to TCU.

## 2017-03-23 NOTE — PROGRESS NOTES
Care Transition Initial Assessment - JENNY  Reason For Consult: discharge planning  Met with: PATIENT and significant other Osman  Active Problems:    Left hip pain         DATA  Lives With: significant other  Living Arrangements: house  Description of Support System: Involved, Supportive  Who is your support system?: Significant Other  Support Assessment: Adequate family and caregiver support. Osman help as needed. Pt has been using [employer] for home RN/PT/OT. Contact is Domenica, p) [phone] f) [phone]  Identified issues/concerns regarding health management: PT recommends TCU. JENNY met with pt and Osman while pt was working with PT. Pt agreed returning to TCU best. She left [facility] 2/24/17. Pt and Osman agree to referrals to [facility]. Pt would like a private room. SW informed both that a private room is typically an additional extra fee per day not covered by insurance. Pt reports she has worker comp insurance that pays all of her bills, including TCU. SW told her a private room charge could not be guaranteed to be covered. Her workers comp  is No Cesar, [phone]. Voicemail left for her. Pt reports she does not work on Fridays. Referrals sent via [employer].        Quality Of Family Relationships: involved, supportive    ASSESSMENT  Cognitive Status:  Appears alert and oriented. Making her own decisions. Significant other supportive.  Concerns to be addressed: DC planning.      PLAN  Financial costs for the patient includes: Discussed.  Patient given options and choices for discharge: Yes.  Patient/family is agreeable to the plan? Yes.  Patient Goals and Preferences: TCU.  Patient anticipates discharging to:  TCU.    BRENDA Joaquin  [id number]      Addendum: [facility] is full on Friday and [facility] will most likely also be full. [facility] still assessing. SW spoke with No. She states she will authorize the stay. TCU can call her. On  Friday they can call her co-worker, Pratibha Dc, from 8-11am at [phone].

## 2017-03-23 NOTE — PLAN OF CARE
Problem: Goal Outcome Summary  Goal: Goal Outcome Summary  Outcome: No Change  A&Ox4. VSS on RA, and in SR.  Up with A1 BSC; using the bedpan at times also. NWB on right leg. CMS unchanged. Tramadol given x2 for left hip and right knee pain. Right knee remains in immobilizer; dressing on right knee c/d/i. Continue to monitor.

## 2017-03-23 NOTE — PLAN OF CARE
Problem: Goal Outcome Summary  Goal: Goal Outcome Summary  Pt VSS, had 2 BMs, soft formed. Transfers max assist of 1 w/ walker and gait belt. NWB on R foot.  Pt c/o L hip jones, received PRN 25mg ultram, reported pain decreased.  Pt tolerated IV rocephin, c/o slight itching felt through neck and upper extremities for hour following administration. No visible rash noted.  New therapy orders issued today, PT appointment this afternoon.

## 2017-03-24 ENCOUNTER — APPOINTMENT (OUTPATIENT)
Dept: CARDIOLOGY | Facility: CLINIC | Age: 72
End: 2017-03-24
Attending: INTERNAL MEDICINE
Payer: OTHER MISCELLANEOUS

## 2017-03-24 LAB
ANION GAP SERPL CALCULATED.3IONS-SCNC: 6 MMOL/L (ref 3–14)
BUN SERPL-MCNC: 18 MG/DL (ref 7–30)
CALCIUM SERPL-MCNC: 9.1 MG/DL (ref 8.5–10.1)
CHLORIDE SERPL-SCNC: 106 MMOL/L (ref 94–109)
CO2 SERPL-SCNC: 30 MMOL/L (ref 20–32)
CREAT SERPL-MCNC: 0.63 MG/DL (ref 0.52–1.04)
GFR SERPL CREATININE-BSD FRML MDRD: NORMAL ML/MIN/1.7M2
GLUCOSE SERPL-MCNC: 95 MG/DL (ref 70–99)
INTERPRETATION ECG - MUSE: NORMAL
MAGNESIUM SERPL-MCNC: 2.2 MG/DL (ref 1.6–2.3)
POTASSIUM SERPL-SCNC: 3.9 MMOL/L (ref 3.4–5.3)
SODIUM SERPL-SCNC: 142 MMOL/L (ref 133–144)

## 2017-03-24 PROCEDURE — G0378 HOSPITAL OBSERVATION PER HR: HCPCS

## 2017-03-24 PROCEDURE — 93653 COMPRE EP EVAL TX SVT: CPT

## 2017-03-24 PROCEDURE — 99213 OFFICE O/P EST LOW 20 MIN: CPT | Mod: 25 | Performed by: INTERNAL MEDICINE

## 2017-03-24 PROCEDURE — 93613 INTRACARDIAC EPHYS 3D MAPG: CPT

## 2017-03-24 PROCEDURE — 25000134 H RX MED IP 250 OP 636 PS 250: Performed by: INTERNAL MEDICINE

## 2017-03-24 PROCEDURE — 96375 TX/PRO/DX INJ NEW DRUG ADDON: CPT | Mod: XU

## 2017-03-24 PROCEDURE — 96374 THER/PROPH/DIAG INJ IV PUSH: CPT | Mod: XU

## 2017-03-24 PROCEDURE — 40000065 ZZH STATISTIC EKG NON-CHARGEABLE

## 2017-03-24 PROCEDURE — 93621 COMP EP EVL L PAC&REC C SINS: CPT | Mod: 26 | Performed by: INTERNAL MEDICINE

## 2017-03-24 PROCEDURE — 93621 COMP EP EVL L PAC&REC C SINS: CPT

## 2017-03-24 PROCEDURE — 25000128 H RX IP 250 OP 636: Performed by: INTERNAL MEDICINE

## 2017-03-24 PROCEDURE — 4A0234Z MEASUREMENT OF CARDIAC ELECTRICAL ACTIVITY, PERCUTANEOUS APPROACH: ICD-10-PCS | Performed by: INTERNAL MEDICINE

## 2017-03-24 PROCEDURE — 02K83ZZ MAP CONDUCTION MECHANISM, PERCUTANEOUS APPROACH: ICD-10-PCS | Performed by: INTERNAL MEDICINE

## 2017-03-24 PROCEDURE — 93623 PRGRMD STIMJ&PACG IV RX NFS: CPT | Mod: 26 | Performed by: INTERNAL MEDICINE

## 2017-03-24 PROCEDURE — 99153 MOD SED SAME PHYS/QHP EA: CPT

## 2017-03-24 PROCEDURE — 93010 ELECTROCARDIOGRAM REPORT: CPT | Performed by: INTERNAL MEDICINE

## 2017-03-24 PROCEDURE — 25000132 ZZH RX MED GY IP 250 OP 250 PS 637: Performed by: HOSPITALIST

## 2017-03-24 PROCEDURE — 99226 ZZC SUBSEQUENT OBSERVATION CARE,LEVEL III: CPT | Performed by: HOSPITALIST

## 2017-03-24 PROCEDURE — 93005 ELECTROCARDIOGRAM TRACING: CPT

## 2017-03-24 PROCEDURE — 93653 COMPRE EP EVAL TX SVT: CPT | Performed by: INTERNAL MEDICINE

## 2017-03-24 PROCEDURE — 25000132 ZZH RX MED GY IP 250 OP 250 PS 637: Performed by: INTERNAL MEDICINE

## 2017-03-24 PROCEDURE — 93613 INTRACARDIAC EPHYS 3D MAPG: CPT | Performed by: INTERNAL MEDICINE

## 2017-03-24 PROCEDURE — 25000128 H RX IP 250 OP 636: Performed by: HOSPITALIST

## 2017-03-24 PROCEDURE — 99152 MOD SED SAME PHYS/QHP 5/>YRS: CPT

## 2017-03-24 PROCEDURE — 96376 TX/PRO/DX INJ SAME DRUG ADON: CPT

## 2017-03-24 PROCEDURE — 83735 ASSAY OF MAGNESIUM: CPT | Performed by: HOSPITALIST

## 2017-03-24 PROCEDURE — 4A023FZ MEASUREMENT OF CARDIAC RHYTHM, PERCUTANEOUS APPROACH: ICD-10-PCS | Performed by: INTERNAL MEDICINE

## 2017-03-24 PROCEDURE — 80048 BASIC METABOLIC PNL TOTAL CA: CPT | Performed by: HOSPITALIST

## 2017-03-24 PROCEDURE — 25000125 ZZHC RX 250: Performed by: INTERNAL MEDICINE

## 2017-03-24 PROCEDURE — 99153 MOD SED SAME PHYS/QHP EA: CPT | Mod: 59 | Performed by: INTERNAL MEDICINE

## 2017-03-24 PROCEDURE — 40000239 ZZH STATISTIC VAT ROUNDS

## 2017-03-24 PROCEDURE — 02563ZZ DESTRUCTION OF RIGHT ATRIUM, PERCUTANEOUS APPROACH: ICD-10-PCS | Performed by: INTERNAL MEDICINE

## 2017-03-24 PROCEDURE — 99152 MOD SED SAME PHYS/QHP 5/>YRS: CPT | Mod: 59 | Performed by: INTERNAL MEDICINE

## 2017-03-24 RX ORDER — ONDANSETRON 2 MG/ML
4 INJECTION INTRAMUSCULAR; INTRAVENOUS EVERY 4 HOURS PRN
Status: DISCONTINUED | OUTPATIENT
Start: 2017-03-24 | End: 2017-03-24 | Stop reason: HOSPADM

## 2017-03-24 RX ORDER — LIDOCAINE HYDROCHLORIDE 10 MG/ML
10-30 INJECTION, SOLUTION EPIDURAL; INFILTRATION; INTRACAUDAL; PERINEURAL
Status: COMPLETED | OUTPATIENT
Start: 2017-03-24 | End: 2017-03-24

## 2017-03-24 RX ORDER — BUPIVACAINE HYDROCHLORIDE 2.5 MG/ML
10-30 INJECTION, SOLUTION EPIDURAL; INFILTRATION; INTRACAUDAL
Status: DISCONTINUED | OUTPATIENT
Start: 2017-03-24 | End: 2017-03-24 | Stop reason: HOSPADM

## 2017-03-24 RX ORDER — PROTAMINE SULFATE 10 MG/ML
1-5 INJECTION, SOLUTION INTRAVENOUS
Status: DISCONTINUED | OUTPATIENT
Start: 2017-03-24 | End: 2017-03-24 | Stop reason: HOSPADM

## 2017-03-24 RX ORDER — ADENOSINE 3 MG/ML
6-12 INJECTION, SOLUTION INTRAVENOUS EVERY 5 MIN PRN
Status: DISCONTINUED | OUTPATIENT
Start: 2017-03-24 | End: 2017-03-24 | Stop reason: HOSPADM

## 2017-03-24 RX ORDER — DOBUTAMINE HYDROCHLORIDE 200 MG/100ML
5-40 INJECTION INTRAVENOUS CONTINUOUS PRN
Status: DISCONTINUED | OUTPATIENT
Start: 2017-03-24 | End: 2017-03-24 | Stop reason: HOSPADM

## 2017-03-24 RX ORDER — POTASSIUM CHLORIDE 1500 MG/1
20-40 TABLET, EXTENDED RELEASE ORAL
Status: DISCONTINUED | OUTPATIENT
Start: 2017-03-24 | End: 2017-03-25 | Stop reason: HOSPADM

## 2017-03-24 RX ORDER — IBUTILIDE FUMARATE 1 MG/10ML
1 INJECTION, SOLUTION INTRAVENOUS
Status: DISCONTINUED | OUTPATIENT
Start: 2017-03-24 | End: 2017-03-24 | Stop reason: HOSPADM

## 2017-03-24 RX ORDER — LIDOCAINE HYDROCHLORIDE 10 MG/ML
10-30 INJECTION, SOLUTION EPIDURAL; INFILTRATION; INTRACAUDAL; PERINEURAL
Status: DISCONTINUED | OUTPATIENT
Start: 2017-03-24 | End: 2017-03-24 | Stop reason: HOSPADM

## 2017-03-24 RX ORDER — PROMETHAZINE HYDROCHLORIDE 25 MG/ML
6.25-25 INJECTION, SOLUTION INTRAMUSCULAR; INTRAVENOUS EVERY 4 HOURS PRN
Status: DISCONTINUED | OUTPATIENT
Start: 2017-03-24 | End: 2017-03-24 | Stop reason: HOSPADM

## 2017-03-24 RX ORDER — MAGNESIUM SULFATE HEPTAHYDRATE 40 MG/ML
4 INJECTION, SOLUTION INTRAVENOUS EVERY 4 HOURS PRN
Status: DISCONTINUED | OUTPATIENT
Start: 2017-03-24 | End: 2017-03-25 | Stop reason: HOSPADM

## 2017-03-24 RX ORDER — PROTAMINE SULFATE 10 MG/ML
5-40 INJECTION, SOLUTION INTRAVENOUS EVERY 10 MIN PRN
Status: DISCONTINUED | OUTPATIENT
Start: 2017-03-24 | End: 2017-03-24 | Stop reason: HOSPADM

## 2017-03-24 RX ORDER — SODIUM CHLORIDE 450 MG/100ML
INJECTION, SOLUTION INTRAVENOUS CONTINUOUS
Status: DISCONTINUED | OUTPATIENT
Start: 2017-03-24 | End: 2017-03-24 | Stop reason: HOSPADM

## 2017-03-24 RX ORDER — KETOROLAC TROMETHAMINE 30 MG/ML
15 INJECTION, SOLUTION INTRAMUSCULAR; INTRAVENOUS
Status: DISCONTINUED | OUTPATIENT
Start: 2017-03-24 | End: 2017-03-24 | Stop reason: HOSPADM

## 2017-03-24 RX ORDER — FLUMAZENIL 0.1 MG/ML
0.2 INJECTION, SOLUTION INTRAVENOUS
Status: DISCONTINUED | OUTPATIENT
Start: 2017-03-24 | End: 2017-03-24 | Stop reason: HOSPADM

## 2017-03-24 RX ORDER — LIDOCAINE 40 MG/G
CREAM TOPICAL
Status: DISCONTINUED | OUTPATIENT
Start: 2017-03-24 | End: 2017-03-25 | Stop reason: HOSPADM

## 2017-03-24 RX ORDER — POTASSIUM CHLORIDE 1.5 G/1.58G
20-40 POWDER, FOR SOLUTION ORAL
Status: DISCONTINUED | OUTPATIENT
Start: 2017-03-24 | End: 2017-03-25 | Stop reason: HOSPADM

## 2017-03-24 RX ORDER — POTASSIUM CHLORIDE 29.8 MG/ML
20 INJECTION INTRAVENOUS
Status: DISCONTINUED | OUTPATIENT
Start: 2017-03-24 | End: 2017-03-25 | Stop reason: HOSPADM

## 2017-03-24 RX ORDER — NALOXONE HYDROCHLORIDE 0.4 MG/ML
0.4 INJECTION, SOLUTION INTRAMUSCULAR; INTRAVENOUS; SUBCUTANEOUS EVERY 5 MIN PRN
Status: DISCONTINUED | OUTPATIENT
Start: 2017-03-24 | End: 2017-03-24 | Stop reason: HOSPADM

## 2017-03-24 RX ORDER — MORPHINE SULFATE 2 MG/ML
1-2 INJECTION, SOLUTION INTRAMUSCULAR; INTRAVENOUS EVERY 5 MIN PRN
Status: DISCONTINUED | OUTPATIENT
Start: 2017-03-24 | End: 2017-03-24 | Stop reason: HOSPADM

## 2017-03-24 RX ORDER — HEPARIN SODIUM 1000 [USP'U]/ML
1000-10000 INJECTION, SOLUTION INTRAVENOUS; SUBCUTANEOUS EVERY 5 MIN PRN
Status: DISCONTINUED | OUTPATIENT
Start: 2017-03-24 | End: 2017-03-24 | Stop reason: HOSPADM

## 2017-03-24 RX ORDER — LORAZEPAM 2 MG/ML
.5-2 INJECTION INTRAMUSCULAR EVERY 10 MIN PRN
Status: DISCONTINUED | OUTPATIENT
Start: 2017-03-24 | End: 2017-03-24 | Stop reason: HOSPADM

## 2017-03-24 RX ORDER — LIDOCAINE HYDROCHLORIDE AND EPINEPHRINE 10; 10 MG/ML; UG/ML
10-30 INJECTION, SOLUTION INFILTRATION; PERINEURAL
Status: DISCONTINUED | OUTPATIENT
Start: 2017-03-24 | End: 2017-03-24 | Stop reason: HOSPADM

## 2017-03-24 RX ORDER — DIPHENHYDRAMINE HYDROCHLORIDE 50 MG/ML
25-50 INJECTION INTRAMUSCULAR; INTRAVENOUS
Status: DISCONTINUED | OUTPATIENT
Start: 2017-03-24 | End: 2017-03-24 | Stop reason: HOSPADM

## 2017-03-24 RX ORDER — FUROSEMIDE 10 MG/ML
20-100 INJECTION INTRAMUSCULAR; INTRAVENOUS
Status: DISCONTINUED | OUTPATIENT
Start: 2017-03-24 | End: 2017-03-24 | Stop reason: HOSPADM

## 2017-03-24 RX ORDER — FENTANYL CITRATE 50 UG/ML
25-50 INJECTION, SOLUTION INTRAMUSCULAR; INTRAVENOUS
Status: DISCONTINUED | OUTPATIENT
Start: 2017-03-24 | End: 2017-03-24 | Stop reason: HOSPADM

## 2017-03-24 RX ORDER — ADENOSINE 3 MG/ML
12 INJECTION, SOLUTION INTRAVENOUS ONCE
Status: COMPLETED | OUTPATIENT
Start: 2017-03-24 | End: 2017-03-24

## 2017-03-24 RX ORDER — IBUTILIDE FUMARATE 1 MG/10ML
0.01 INJECTION, SOLUTION INTRAVENOUS
Status: DISCONTINUED | OUTPATIENT
Start: 2017-03-24 | End: 2017-03-24 | Stop reason: HOSPADM

## 2017-03-24 RX ORDER — ADENOSINE 3 MG/ML
6 INJECTION, SOLUTION INTRAVENOUS ONCE
Status: DISCONTINUED | OUTPATIENT
Start: 2017-03-24 | End: 2017-03-24

## 2017-03-24 RX ORDER — NALOXONE HYDROCHLORIDE 0.4 MG/ML
.1-.4 INJECTION, SOLUTION INTRAMUSCULAR; INTRAVENOUS; SUBCUTANEOUS
Status: DISCONTINUED | OUTPATIENT
Start: 2017-03-24 | End: 2017-03-25 | Stop reason: HOSPADM

## 2017-03-24 RX ORDER — POTASSIUM CHLORIDE 7.45 MG/ML
10 INJECTION INTRAVENOUS
Status: DISCONTINUED | OUTPATIENT
Start: 2017-03-24 | End: 2017-03-25 | Stop reason: HOSPADM

## 2017-03-24 RX ORDER — LIDOCAINE 40 MG/G
CREAM TOPICAL
Status: DISCONTINUED | OUTPATIENT
Start: 2017-03-24 | End: 2017-03-24 | Stop reason: HOSPADM

## 2017-03-24 RX ADMIN — METOPROLOL TARTRATE 25 MG: 25 TABLET, FILM COATED ORAL at 09:03

## 2017-03-24 RX ADMIN — MIDAZOLAM HYDROCHLORIDE 0.5 MG: 1 INJECTION, SOLUTION INTRAMUSCULAR; INTRAVENOUS at 14:30

## 2017-03-24 RX ADMIN — METOPROLOL TARTRATE 25 MG: 25 TABLET, FILM COATED ORAL at 20:52

## 2017-03-24 RX ADMIN — ASPIRIN 81 MG 81 MG: 81 TABLET ORAL at 09:03

## 2017-03-24 RX ADMIN — Medication 3 MCG/MIN: at 15:02

## 2017-03-24 RX ADMIN — Medication 3 MCG/MIN: at 15:13

## 2017-03-24 RX ADMIN — VALSARTAN AND HYDROCHLOROTHIAZIDE 1 TABLET: 80; 12.5 TABLET, FILM COATED ORAL at 09:03

## 2017-03-24 RX ADMIN — CEFTRIAXONE 2 G: 2 INJECTION, POWDER, FOR SOLUTION INTRAMUSCULAR; INTRAVENOUS at 12:31

## 2017-03-24 RX ADMIN — MIDAZOLAM HYDROCHLORIDE 1 MG: 1 INJECTION, SOLUTION INTRAMUSCULAR; INTRAVENOUS at 14:42

## 2017-03-24 RX ADMIN — LIDOCAINE HYDROCHLORIDE 100 MG: 10 INJECTION, SOLUTION EPIDURAL; INFILTRATION; INTRACAUDAL; PERINEURAL at 14:30

## 2017-03-24 RX ADMIN — HYDROMORPHONE HYDROCHLORIDE 0.5 MG: 1 INJECTION, SOLUTION INTRAMUSCULAR; INTRAVENOUS; SUBCUTANEOUS at 00:12

## 2017-03-24 RX ADMIN — MIDAZOLAM HYDROCHLORIDE 0.5 MG: 1 INJECTION, SOLUTION INTRAMUSCULAR; INTRAVENOUS at 14:35

## 2017-03-24 RX ADMIN — FENTANYL CITRATE 50 MCG: 50 INJECTION, SOLUTION INTRAMUSCULAR; INTRAVENOUS at 14:42

## 2017-03-24 RX ADMIN — FENTANYL CITRATE 25 MCG: 50 INJECTION, SOLUTION INTRAMUSCULAR; INTRAVENOUS at 14:30

## 2017-03-24 RX ADMIN — FENTANYL CITRATE 25 MCG: 50 INJECTION, SOLUTION INTRAMUSCULAR; INTRAVENOUS at 14:55

## 2017-03-24 RX ADMIN — MIDAZOLAM HYDROCHLORIDE 1 MG: 1 INJECTION, SOLUTION INTRAMUSCULAR; INTRAVENOUS at 14:55

## 2017-03-24 RX ADMIN — POTASSIUM CHLORIDE 20 MEQ: 1500 TABLET, EXTENDED RELEASE ORAL at 09:04

## 2017-03-24 RX ADMIN — ADENOSINE 6 MG: 3 INJECTION, SOLUTION INTRAVENOUS at 02:53

## 2017-03-24 RX ADMIN — HYDROMORPHONE HYDROCHLORIDE 0.5 MG: 1 INJECTION, SOLUTION INTRAMUSCULAR; INTRAVENOUS; SUBCUTANEOUS at 23:04

## 2017-03-24 RX ADMIN — ADENOSINE 6 MG: 3 INJECTION, SOLUTION INTRAVENOUS at 02:51

## 2017-03-24 NOTE — PROGRESS NOTES
D: SW following for DC planning.   I: [facility] will have a bed for pt on Saturday. Pt updated and in agreement.   P: SW will follow.     BRENDA Joaquin  [id number]

## 2017-03-24 NOTE — PROGRESS NOTES
"Cardiology Progress Note          Assessment and Plan:   Ms. María Henderson is a delightful 72-year-old lady with a history of SVT (first occurrence in 2016) hypertension, GERD and osteoarthritis who has had several orthopedic surgeries done (19 orthopedic surgeries: on the left ankle, foot, and most recent right knee replacement which is infected and was explanted and is now on a course of IV abx).      1. 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 In the ED she was found to have SVT with rate ~172 bpm which self-terminated.   -Notes 5 symptomatic episodes since December.  -Metoprolol 25 mg bid started this admission  -Had another episode last PM terminated with adenosine 6 mg x 2  -Echo shows normal cardiac function    Discussed option of ablation with patient.  Limited of further BB titration given resting bradycardia (HR in the 50's).  She is hesitant at this time given her other medical problems.  Will be going to a TCU.  Would like to discuss further with her significant other today.  Will check back.  Keep NPO for now.    Pt is scheduled to see  on  at 1:15 pm in [place].                   Interval History:   Comfortable at rest, felt palpitations lat night with SVT              Review of Systems:   As per subjective, otherwise 5 systems reviewed and negative.           Physical Exam:   Blood pressure 141/74, pulse 66, temperature 98.7  F (37.1  C), temperature source Oral, resp. rate 17, height 1.676 m (5' 6\"), weight 74.2 kg (163 lb 9.3 oz), SpO2 95 %.      Vital Sign Ranges  Temperature Temp  Av.3  F (36.8  C)  Min: 98  F (36.7  C)  Max: 98.7  F (37.1  C)   Blood pressure Systolic (24hrs), Av , Min:101 , Max:162        Diastolic (24hrs), Av, Min:67, Max:106      Pulse Pulse  Av.7  Min: 66  Max: 147   Respirations Resp  Av.2  Min: 16  Max: 17   Pulse oximetry SpO2  Av %  Min: 93 %  Max: 95 %     "     Intake/Output Summary (Last 24 hours) at 03/24/17 1100  Last data filed at 03/24/17 1000   Gross per 24 hour   Intake              725 ml   Output              275 ml   Net              450 ml       Wt Readings from Last 2 Encounters:   03/24/17 74.2 kg (163 lb 9.3 oz)   02/20/17 75.3 kg (166 lb)      Constitutional:   NAD   Skin:   Warm and dry   Head:   Nontraumatic   Lungs:   CTAB    Cardiovascular:   S1, S2, RRR   Abdomen:   Benign   Extremities and Back:   R knee brace, no edema   Neurological:   Grossly nonfocal            Medications:   Reviewed             Data:     Results for orders placed or performed during the hospital encounter of 03/21/17 (from the past 24 hour(s))   EKG 12-lead, tracing only   Result Value Ref Range    Interpretation ECG Click View Image link to view waveform and result    Basic metabolic panel   Result Value Ref Range    Sodium 142 133 - 144 mmol/L    Potassium 3.9 3.4 - 5.3 mmol/L    Chloride 106 94 - 109 mmol/L    Carbon Dioxide 30 20 - 32 mmol/L    Anion Gap 6 3 - 14 mmol/L    Glucose 95 70 - 99 mg/dL    Urea Nitrogen 18 7 - 30 mg/dL    Creatinine 0.63 0.52 - 1.04 mg/dL    GFR Estimate >90  Non  GFR Calc   >60 mL/min/1.7m2    GFR Estimate If Black >90   GFR Calc   >60 mL/min/1.7m2    Calcium 9.1 8.5 - 10.1 mg/dL   Magnesium (Add on or recollect)   Result Value Ref Range    Magnesium 2.2 1.6 - 2.3 mg/dL

## 2017-03-24 NOTE — PROGRESS NOTES
3/24/2017  16:00    María Henderson    Her low back is feeling better. To [facility] tomorrow.    I will see her in the office 4/12/2017.

## 2017-03-24 NOTE — PLAN OF CARE
Problem: Goal Outcome Summary  Goal: Goal Outcome Summary  Outcome: No Change  Patient A&Ox4.  Up with 1 assistance with walker and gait belt, non- wt. Bearing right foot/leg.  Vitals stable, HR stable throughout shift 60's-70's NSR, period of SVT during night.  Denies pain.  Ice packs to right knee. Potassium 3.9, on high protocol, replaced.  IV Rocephin given.  Plan for ablation this afternoon.  Continue to monitor.

## 2017-03-24 NOTE — PROGRESS NOTES
[facility]  Hospitalist Progress Note        Steve Robles, DO  03/24/2017        Interval History:      Patient with repeat episode of SVT overnight.          Assessment and Plan:        María Henderson is a 72-year-old woman with a past medical history significant for hypertension, hyperlipidemia, gastroesophageal reflux disease, osteoarthritis with prior infected right knee prosthesis which was explanted and antibiotic spacer was devised and maintained on IV Rocephin, presented to the emergency room with left hip pain.       Left hip pain, musculoskeletal. No trauma but it happened after patient stood up and attempted to turn around while using her walker. No fall or other injuries. She is nonweightbearing on her right leg since her knee surgery a month ago. X-ray pelvis in the emergency room does not reveal any fracture. She is tender around her hip joint but no specific tender point. No skin bruise or hematoma noted. She could have muscle sprain versus minor hairline fracture not noticeable on x-ray.   - Orthopedic Surgery consulted   - MRI completed.    - Pain control.    - Therapies.       Supraventricular tachycardia, paroxysmal. Patient does have at least 4 episodes of palpitation that she recalls since 12/2016. Initial episode was during hospitalization in the setting of sepsis when her heart rate was up to 172 and patient reports she had a syncopal episode. Her echocardiogram from December shows normal ejection fraction and without regional wall motion abnormality. It seems thyroid was not tested. TSH at 4.66.    - Tele.    - Cards/EP consulted again.    - Required adenosine on evening of 3/23.    - Ablation planned for 3/24.    - echocardiogram completed.        Infected prosthesis right knee. Patient initially had group B streptococcal sepsis in 12/2016, left ankle was thought to be the source, was treated with Rocephin. Patient had arthrodesis of the left ankle. Subsequently,  "developed infection of the prosthesis on right knee. She underwent explantation of the right total knee and an antibiotic spacer was placed. She is followed by Dr. Berkowitz and Dr. Johnson. Dr. Berkowitz from Orthopedic Surgery and Dr. Johnson from Infectious Disease.    - PICC line in her right arm which appears normal and was replaced last week, according to the patient, due to abnormal position.    - Continue her prior to admission Rocephin.    - Consider ID consult.       Hypertension and hyperlipidemia. Prior to admission Diovan HCT resumed.    - Hold Lasix at this time.       Deep venous thrombosis prophylaxis. pressure compression device      Code: Full code.       Disposition: Plan for ablation today due to recurrent SVT. [facility] home with bed available in next 1-2 days per report.                    Physical Exam:      Heart Rate: 135    Blood pressure 101/88, pulse 71, temperature 98.2  F (36.8  C), temperature source Oral, resp. rate 16, height 1.676 m (5' 6\"), weight 74.2 kg (163 lb 9.3 oz), SpO2 93 %.    Vitals:    17 0122 17 0616 17 0500   Weight: 75.5 kg (166 lb 7.2 oz) 75.3 kg (166 lb 0.1 oz) 74.2 kg (163 lb 9.3 oz)       Vital Sign Ranges  Temperature Temp  Av.1  F (36.7  C)  Min: 98  F (36.7  C)  Max: 98.2  F (36.8  C)   Blood pressure Systolic (24hrs), Av , Min:101 , Max:162        Diastolic (24hrs), Av, Min:67, Max:106      Pulse Pulse  Av.4  Min: 68  Max: 147   Respirations Resp  Av  Min: 16  Max: 16   Pulse oximetry SpO2  Av.5 %  Min: 93 %  Max: 94 %     Vital Signs with Ranges  Temp:  [98  F (36.7  C)-98.2  F (36.8  C)] 98.2  F (36.8  C)  Pulse:  [] 71  Heart Rate:  [] 135  Resp:  [16] 16  BP: (101-162)/() 101/88  SpO2:  [93 %-94 %] 93 %    I/O Last 3 Shifts:   I/O last 3 completed shifts:  In: 825 [P.O.:725; I.V.:100]  Out: 275 [Urine:275]    I/O past 24 hours:     Intake/Output Summary (Last 24 hours) at 17 0722  Last " data filed at 03/24/17 0300   Gross per 24 hour   Intake              825 ml   Output              275 ml   Net              550 ml     GENERAL: Alert and oriented. NAD. Conversational, appropriate.   HEENT: Normocephalic. EOMI. No icterus or injection. Nares normal.   LUNGS: Clear to auscultation. No dyspnea at rest.   HEART: Regular rate. Extremities perfused.   ABDOMEN: Soft, nontender, and nondistended. Positive bowel sounds.   EXTREMITIES: No LE edema noted.   NEUROLOGIC: Moves extremities x4. Hip pain improved. Right knee with brace. Distal CMS in tact.          Prior to Admission Medications:        Prescriptions Prior to Admission   Medication Sig Dispense Refill Last Dose     polyethylene glycol (MIRALAX/GLYCOLAX) Packet Take 1 packet by mouth every other day   3/20/2017     cefTRIAXone (ROCEPHIN) 1 GM vial Inject 2 g (2,000 mg) into the vein daily CBC with differential, creatinine, SGOT weekly while on this medication to be faxed to Dr. Douglas office. 600 mL 0 3/21/2017 at 1200     HYDROmorphone (DILAUDID) 2 MG tablet Take 1-2 tablets (2-4 mg) by mouth every 4 hours as needed for moderate to severe pain 70 tablet 0  at prn     CALCIUM PO Take 600 mg by mouth daily   3/21/2017 at Unknown time     Probiotic Product (TRUBIOTICS PO) Take 1 tablet by mouth daily   3/21/2017 at Unknown time     albuterol (PROAIR HFA/PROVENTIL HFA/VENTOLIN HFA) 108 (90 BASE) MCG/ACT Inhaler Inhale 2 puffs into the lungs every 6 hours as needed for shortness of breath / dyspnea or wheezing    at prn     Pyridoxine HCl (VITAMIN B6 PO) Take 1 tablet by mouth daily   3/21/2017 at Unknown time     DAKINS EX Externally apply topically daily   3/21/2017 at Unknown time     Furosemide (LASIX PO) Take 20 mg by mouth daily    3/21/2017 at Unknown time     Cholecalciferol (VITAMIN D3 PO) Take 400 Units by mouth daily   3/21/2017 at Unknown time     POTASSIUM PO Take 595 mg by mouth every morning    3/21/2017 at Unknown time      valsartan-hydrochlorothiazide (DIOVAN-HCT) 80-12.5 MG per tablet Take 1 tablet by mouth daily   3/21/2017 at Unknown time     ASPIRIN PO Take 81 mg by mouth daily    3/21/2017 at Unknown time     multivitamin, therapeutic with minerals (THERA-VIT-M) TABS Take 1 tablet by mouth daily.   3/21/2017 at Unknown time     simvastatin (ZOCOR) 40 MG tablet Take 40 mg by mouth every morning    3/21/2017 at Unknown time     order for DME Equipment being ordered: Walker Wheels () and Walker ()  Treatment Diagnosis: impaired gait 1 each 0      order for DME Equipment being ordered: Walker Wheels () and Walker ()  Treatment Diagnosis: difficulty with gait 1 each 0             Medications:        Current Facility-Administered Medications   Medication Last Rate     Current Facility-Administered Medications   Medication Dose Route Frequency     aspirin chewable tablet 81 mg  81 mg Oral Daily     cefTRIAXone  2 g Intravenous Q24H     polyethylene glycol  17 g Oral Daily     valsartan-hydrochlorothiazide  1 tablet Oral Daily     metoprolol  25 mg Oral BID     Current Facility-Administered Medications   Medication Dose Route Frequency     metoprolol  5 mg Intravenous Q4H PRN     magnesium sulfate  2 g Intravenous Daily PRN     magnesium sulfate  4 g Intravenous Q4H PRN     potassium chloride  20-40 mEq Oral Q2H PRN     potassium chloride  20-40 mEq Oral or Feeding Tube Q2H PRN     potassium chloride  10 mEq Intravenous Q1H PRN     potassium chloride with lidocaine  10 mEq Intravenous Q1H PRN     potassium chloride  20 mEq Intravenous Q1H PRN     albuterol  2 puff Inhalation Q6H PRN     HYDROmorphone  0.5 mg Intravenous Q4H PRN     naloxone  0.1-0.4 mg Intravenous Q2 Min PRN     acetaminophen  650 mg Oral Q4H PRN     acetaminophen  650 mg Rectal Q4H PRN     ondansetron  4 mg Oral Q6H PRN    Or     ondansetron  4 mg Intravenous Q6H PRN     tramadol  25-50 mg Oral Q6H PRN            Data:      Lab data reviewed.      Recent Labs  Lab 03/24/17  0610 03/23/17  0615 03/21/17  2238 03/21/17  0815 03/21/17  0615   HGB  --  11.4* 12.8 12.3 Canceled, Test credited Specimen not received   MCV  --  87 87 88 Canceled, Test credited Specimen not received   PLT  --  314 359 387 Canceled, Test credited Specimen not received    142 142  --  Canceled, Test credited Specimen not received   POTASSIUM 3.9 3.8 3.7  --  Canceled, Test credited Specimen not received   CHLORIDE 106 105 104  --  Canceled, Test credited Specimen not received   CO2 30 30 29  --  Canceled, Test credited Specimen not received   BUN 18 18 27  --  Canceled, Test credited Specimen not received   CR 0.63 0.75 0.83 0.70 Canceled, Test credited Specimen not received   ANIONGAP 6 7 9  --  Canceled, Test credited Specimen not received   MATHEW 9.1 9.3 9.3  --  Canceled, Test credited Specimen not received   GLC 95 91 192*  --  Canceled, Test credited Specimen not received   TROPI  --   --  <0.015  --  Canceled, Test credited Specimen not received           Imaging:      Imaging data reviewed.     DANIA HoustonO.  [facility] Hospitalist  Pager [phone]

## 2017-03-24 NOTE — PROVIDER NOTIFICATION
Patient's tele showing SVT peak . Pt asymptomatic. Paged hospitalist, received order for adenosine IV. Flying squad called to administer. Will continue to monitor.

## 2017-03-24 NOTE — PROGRESS NOTES
"  Hospitalist x-cover note    Notified of sustained SVT with HR 130s-140s. She attempted a vagal maneuver with no improvement. She has been asymptomatic.     BP (!) 121/93  Temp 98.2  F (36.8  C) (Oral)  Resp 16  Ht 1.676 m (5' 6\")  Wt 75.3 kg (166 lb 0.1 oz)  SpO2 93%  BMI 26.79 kg/m2    Constitutional: Appears comfortable, NAD  Respiratory: Breathing non-labored. Lungs CTAB - no wheezes, crackles, or rhonchi  Cardiovascular: Heart tachycardic, regular  Neuro: Alert and appropriate      A/P: Patient with paroxsymal SVT, suggestive of AVNRT - per Dr. Boyd    - IV Adenosine was pushed at bedside. Required total 12 mg (6 mg IV x2) and is now back in sinus rhythm with occasional PVCs  - Potassium and Magnesium ordered for later this AM      MARY ANNE Bonilla  Hospitalist  [phone]    "

## 2017-03-24 NOTE — PLAN OF CARE
Problem: Goal Outcome Summary  Goal: Goal Outcome Summary  Outcome: No Change  Pt AOx4. C/O left hip pain, PRN dilaudid administered. Pt heart rhythm SVT, peak 140, hospitalist notified, adenosine push administered x2, pt now SR with occ PVC. Order for magnesium draw this am. Right leg in immobilizer, nwb. Will continue to monitor.

## 2017-03-24 NOTE — PLAN OF CARE
Problem: Goal Outcome Summary  Goal: Goal Outcome Summary  Outcome: No Change  A&Ox4.  Up with A1 walker/gait belt.  NWB on right leg.  VSS on RA.  Tramadol given x 1 for hip pain with some improvement.  Tolerating regular diet.  Right leg remains in immobilizer.  Tele SR with occ PVC.  Maybe discharge tomorrow; continue to monitor.

## 2017-03-25 VITALS
SYSTOLIC BLOOD PRESSURE: 141 MMHG | HEIGHT: 66 IN | WEIGHT: 167.77 LBS | HEART RATE: 68 BPM | OXYGEN SATURATION: 95 % | BODY MASS INDEX: 26.96 KG/M2 | DIASTOLIC BLOOD PRESSURE: 69 MMHG | TEMPERATURE: 99.1 F | RESPIRATION RATE: 16 BRPM

## 2017-03-25 LAB
MAGNESIUM SERPL-MCNC: 2.2 MG/DL (ref 1.6–2.3)
POTASSIUM SERPL-SCNC: 4.1 MMOL/L (ref 3.4–5.3)

## 2017-03-25 PROCEDURE — 25000128 H RX IP 250 OP 636: Performed by: HOSPITALIST

## 2017-03-25 PROCEDURE — 99213 OFFICE O/P EST LOW 20 MIN: CPT | Performed by: INTERNAL MEDICINE

## 2017-03-25 PROCEDURE — 84132 ASSAY OF SERUM POTASSIUM: CPT | Performed by: HOSPITALIST

## 2017-03-25 PROCEDURE — G0378 HOSPITAL OBSERVATION PER HR: HCPCS

## 2017-03-25 PROCEDURE — 99207 ZZC CDG-CODE CATEGORY CHANGED: CPT | Performed by: INTERNAL MEDICINE

## 2017-03-25 PROCEDURE — 25000132 ZZH RX MED GY IP 250 OP 250 PS 637: Performed by: INTERNAL MEDICINE

## 2017-03-25 PROCEDURE — 25000132 ZZH RX MED GY IP 250 OP 250 PS 637: Performed by: HOSPITALIST

## 2017-03-25 PROCEDURE — 99217 ZZC OBSERVATION CARE DISCHARGE: CPT | Performed by: INTERNAL MEDICINE

## 2017-03-25 PROCEDURE — 83735 ASSAY OF MAGNESIUM: CPT | Performed by: HOSPITALIST

## 2017-03-25 PROCEDURE — 40000239 ZZH STATISTIC VAT ROUNDS

## 2017-03-25 RX ORDER — HYDROMORPHONE HYDROCHLORIDE 2 MG/1
2-4 TABLET ORAL
Status: DISCONTINUED | OUTPATIENT
Start: 2017-03-25 | End: 2017-03-25 | Stop reason: HOSPADM

## 2017-03-25 RX ORDER — HYDROMORPHONE HYDROCHLORIDE 2 MG/1
2-4 TABLET ORAL EVERY 4 HOURS PRN
Qty: 70 TABLET | Refills: 0 | Status: ON HOLD | DISCHARGE
Start: 2017-03-25 | End: 2017-04-24

## 2017-03-25 RX ORDER — ACETAMINOPHEN 325 MG/1
650 TABLET ORAL EVERY 4 HOURS PRN
Qty: 100 TABLET | Status: ON HOLD | DISCHARGE
Start: 2017-03-25 | End: 2017-04-24

## 2017-03-25 RX ORDER — METOPROLOL TARTRATE 25 MG/1
25 TABLET, FILM COATED ORAL 2 TIMES DAILY
Qty: 60 TABLET | DISCHARGE
Start: 2017-03-25 | End: 2017-05-09

## 2017-03-25 RX ADMIN — METOPROLOL TARTRATE 25 MG: 25 TABLET, FILM COATED ORAL at 08:38

## 2017-03-25 RX ADMIN — CEFTRIAXONE 2 G: 2 INJECTION, POWDER, FOR SOLUTION INTRAMUSCULAR; INTRAVENOUS at 12:50

## 2017-03-25 RX ADMIN — VALSARTAN AND HYDROCHLOROTHIAZIDE 1 TABLET: 80; 12.5 TABLET, FILM COATED ORAL at 11:30

## 2017-03-25 RX ADMIN — ASPIRIN 81 MG 81 MG: 81 TABLET ORAL at 08:38

## 2017-03-25 RX ADMIN — POLYETHYLENE GLYCOL 3350 17 G: 17 POWDER, FOR SOLUTION ORAL at 08:38

## 2017-03-25 RX ADMIN — HYDROMORPHONE HYDROCHLORIDE 0.5 MG: 1 INJECTION, SOLUTION INTRAMUSCULAR; INTRAVENOUS; SUBCUTANEOUS at 05:22

## 2017-03-25 RX ADMIN — HYDROMORPHONE HYDROCHLORIDE 4 MG: 2 TABLET ORAL at 15:59

## 2017-03-25 ASSESSMENT — PAIN DESCRIPTION - DESCRIPTORS: DESCRIPTORS: ACHING

## 2017-03-25 NOTE — PROGRESS NOTES
SW:  D:  Received discharge orders for patient.  Bed available at [facility] for today.  Patient is asking for transport to be arranged.  [facility]  Patient informed of the plan and in agreement to the plan.  Call placed to update [facility] and faxed the orders and the PAS.    PAS-RR    D: Per DHS regulation, SW completed and submitted PAS-RR to [place] [employer] Direct Connect via the Senior LinkAge Line.  PAS-RR confirmation # is : [id number].    I: SW spoke with patient and they are aware a PAS-RR has been submitted.  SW reviewed with patient that they may be contacted for a follow up appointment within 10 days of hospital discharge if their SNF stay is < 30 days.  Contact information for Senior LinkAge Line was also provided.    A: Patient verbalized understanding.    P: Further questions may be directed to [facility] LinkAge Line at #[phone], option #4 for PAS-RR staff.

## 2017-03-25 NOTE — PROGRESS NOTES
[facility]  Hospitalist Progress Note  Lewis Crockett MD 03/25/2017             Assessment and Plan:          María Henderson is a 72-year-old woman with a past medical history significant for hypertension, hyperlipidemia, gastroesophageal reflux disease, osteoarthritis with prior infected right knee prosthesis which was explanted and antibiotic spacer was devised and maintained on IV Rocephin, presented to the emergency room with left hip pain.       Left hip pain, musculoskeletal. No trauma but it happened after patient stood up and attempted to turn around while using her walker. No fall or other injuries. She is nonweightbearing on her right leg since her knee surgery a month ago. X-ray pelvis in the emergency room does not reveal any fracture. She is tender around her hip joint but no specific tender point. No skin bruise or hematoma noted. She could have muscle sprain versus minor hairline fracture not noticeable on x-ray.  - Orthopedic Surgery consulted  - MRI completed.   - Pain control.   - Therapies.       Supraventricular tachycardia, paroxysmal. Patient does have at least 4 episodes of palpitation that she recalls since 12/2016. Initial episode was during hospitalization in the setting of sepsis when her heart rate was up to 172 and patient reports she had a syncopal episode. Her echocardiogram from December shows normal ejection fraction and without regional wall motion abnormality. It seems thyroid was not tested. TSH at 4.66.   - Cards/EP consulted    - Required adenosine on evening of 3/23.   - Ablation planned done 3/24.   - echocardiogram completed.       Infected prosthesis right knee. Patient initially had group B streptococcal sepsis in 12/2016, left ankle was thought to be the source, was treated with Rocephin. Patient had arthrodesis of the left ankle. Subsequently, developed infection of the prosthesis on right knee. She underwent explantation of the right total knee  "and an antibiotic spacer was placed. She is followed by Dr. Brekowitz and Dr. Johnson. Dr. Berkowitz from Orthopedic Surgery and Dr. Johnson from Infectious Disease.   - PICC line in her right arm which appears normal and was replaced last week, according to the patient, due to abnormal position.   - Continue her prior to admission Rocephin. Planned till 4/2/17  - Consider ID consult.       Hypertension and hyperlipidemia. Prior to admission Diovan HCT resumed.   - Hold Lasix at this time.       Deep venous thrombosis prophylaxis. pressure compression device      Code: Full code.       Disposition: [facility] with bed available today        Interval History (Subjective):      Feels well. No palpitations. No fever. Improved pain left hip                   Physical Exam:      Last Vital Signs:  BP (P) 126/73 (BP Location: Left arm)  Pulse 66  Temp (P) 98.2  F (36.8  C) (Oral)  Resp (P) 18  Ht 1.676 m (5' 6\")  Wt 76.1 kg (167 lb 12.3 oz)  SpO2 (P) 96%  BMI 27.08 kg/m2      Intake/Output Summary (Last 24 hours) at 03/25/17 0803  Last data filed at 03/25/17 0506   Gross per 24 hour   Intake              650 ml   Output              900 ml   Net             -250 ml       Constitutional: Awake, alert, cooperative, no apparent distress   Respiratory: Clear to auscultation bilaterally, no crackles or wheezing   Cardiovascular: Regular rate and rhythm, normal S1 and S2, and no murmur noted   Abdomen: Normal bowel sounds, soft, non-distended, non-tender   Skin: No rashes, no cyanosis, dry to touch   Neuro: Alert and oriented x3, no weakness, numbness, memory loss   Extremities: No edema, normal range of motion   Other(s):        All other systems: Negative          Medications:      All current medications were reviewed with changes reflected in problem list.         Data:      All new lab and imaging data was reviewed.   Labs:    Recent Labs  Lab 03/23/17  0615 03/21/17  2238 03/21/17  0815   WBC 6.6 9.8 7.5   HGB " 11.4* 12.8 12.3   HCT 36.7 40.6 40.2   MCV 87 87 88    359 387       Recent Labs  Lab 03/25/17  0515 03/24/17  0610 03/23/17  0615 03/21/17  2238 03/21/17  0815 03/21/17  0615   NA  --  142 142 142  --  Canceled, Test credited Specimen not received   POTASSIUM 4.1 3.9 3.8 3.7  --  Canceled, Test credited Specimen not received   CHLORIDE  --  106 105 104  --  Canceled, Test credited Specimen not received   CO2  --  30 30 29  --  Canceled, Test credited Specimen not received   ANIONGAP  --  6 7 9  --  Canceled, Test credited Specimen not received   GLC  --  95 91 192*  --  Canceled, Test credited Specimen not received   BUN  --  18 18 27  --  Canceled, Test credited Specimen not received   CR  --  0.63 0.75 0.83 0.70 Canceled, Test credited Specimen not received   GFRESTIMATED  --  >90Non  GFR Calc 76 68 82 Canceled, Test credited Specimen not received   GFRESTBLACK  --  >90African American GFR Calc >90African American GFR Calc 82 >90African American GFR Calc Canceled, Test credited Specimen not received   MATHEW  --  9.1 9.3 9.3  --  Canceled, Test credited Specimen not received   MAG 2.2 2.2  --  2.2  --   --    PROTTOTAL  --   --   --  7.0  --  Canceled, Test credited Specimen not received   ALBUMIN  --   --   --  3.1*  --  Canceled, Test credited Specimen not received   BILITOTAL  --   --   --  0.2  --  Canceled, Test credited Specimen not received   ALKPHOS  --   --   --  173*  --  Canceled, Test credited Specimen not received   AST  --   --   --  29 32 Canceled, Test credited Specimen not received   ALT  --   --   --  41  --  Canceled, Test credited Specimen not received      Imaging:   No results found for this or any previous visit (from the past 24 hour(s)).

## 2017-03-25 NOTE — PROGRESS NOTES
"Cardiac Electrophysiology Progress Note          Assessment and Plan:     Left hip pain    Psvt, 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  No arrhythmia overnight. Ok to be discharged from our perspective.                 Interval History:   no complaints and doing well; no cp, sob, n/v/d, or abd pain.              Review of Systems:   As per subjective, otherwise 5 systems reviewed and negative.           Physical Exam:   Blood pressure 132/70, pulse 68, temperature 98.2  F (36.8  C), temperature source Oral, resp. rate 18, height 1.676 m (5' 6\"), weight 76.1 kg (167 lb 12.3 oz), SpO2 96 %.          Intake/Output Summary (Last 24 hours) at 03/25/17 1030  Last data filed at 03/25/17 0847   Gross per 24 hour   Intake              640 ml   Output              900 ml   Net             -260 ml       Constitutional:   NAD   Skin:   Warm and dry   Head:   Nontraumatic   Neck:   Supple, symmetrical, trachea midline, no adenopathy, thyroid symmetric, not enlarged and no tenderness, skin normal   Lungs:   normal   Cardiovascular:   Normal apical impulse, regular rate and rhythm, normal S1 and S2, no S3 or S4, and no murmur noted    Abdomen:   Benign   Extremities and Back:   No edema, no right groin hematoma   Neurological:   Grossly nonfocal            Medications:       sodium chloride (PF)  3 mL Intracatheter Q8H     aspirin chewable tablet 81 mg  81 mg Oral Daily     cefTRIAXone  2 g Intravenous Q24H     polyethylene glycol  17 g Oral Daily     valsartan-hydrochlorothiazide  1 tablet Oral Daily     metoprolol  25 mg Oral BID     Admission on 03/12/2017, Discharged on 03/12/2017   Component Date Value Ref Range Status     WBC 03/12/2017 8.2  4.0 - 11.0 10e9/L Final     RBC Count 03/12/2017 4.22  3.8 - 5.2 10e12/L Final     Hemoglobin 03/12/2017 11.5* 11.7 - 15.7 g/dL Final     Hematocrit 03/12/2017 38.5  35.0 - 47.0 % Final     MCV 03/12/2017 91  78 - 100 fl Final     MCH 03/12/2017 27.3  26.5 - 33.0 pg Final     MCHC " 03/12/2017 29.9* 31.5 - 36.5 g/dL Final     RDW 03/12/2017 15.1* 10.0 - 15.0 % Final     Platelet Count 03/12/2017 368  150 - 450 10e9/L Final     Diff Method 03/12/2017 Automated Method   Final     % Neutrophils 03/12/2017 63.8  % Final     % Lymphocytes 03/12/2017 24.5  % Final     % Monocytes 03/12/2017 8.3  % Final     % Eosinophils 03/12/2017 2.2  % Final     % Basophils 03/12/2017 0.7  % Final     % Immature Granulocytes 03/12/2017 0.5  % Final     Nucleated RBCs 03/12/2017 0  0 /100 Final     Absolute Neutrophil 03/12/2017 5.3  1.6 - 8.3 10e9/L Final     Absolute Lymphocytes 03/12/2017 2.0  0.8 - 5.3 10e9/L Final     Absolute Monocytes 03/12/2017 0.7  0.0 - 1.3 10e9/L Final     Absolute Eosinophils 03/12/2017 0.2  0.0 - 0.7 10e9/L Final     Absolute Basophils 03/12/2017 0.1  0.0 - 0.2 10e9/L Final     Abs Immature Granulocytes 03/12/2017 0.0  0 - 0.4 10e9/L Final     Absolute Nucleated RBC 03/12/2017 0.0   Final     Sodium 03/12/2017 140  133 - 144 mmol/L Final     Potassium 03/12/2017 4.2  3.4 - 5.3 mmol/L Final    Specimen slightly hemolyzed, potassium may be falsely elevated     Chloride 03/12/2017 103  94 - 109 mmol/L Final     Carbon Dioxide 03/12/2017 28  20 - 32 mmol/L Final     Anion Gap 03/12/2017 9  3 - 14 mmol/L Final     Glucose 03/12/2017 93  70 - 99 mg/dL Final     Urea Nitrogen 03/12/2017 20  7 - 30 mg/dL Final     Creatinine 03/12/2017 0.65  0.52 - 1.04 mg/dL Final     GFR Estimate 03/12/2017 89  >60 mL/min/1.7m2 Final    Non  GFR Calc     GFR Estimate If Black 03/12/2017   >60 mL/min/1.7m2 Final                    Value:>90   GFR Calc       Calcium 03/12/2017 9.5  8.5 - 10.1 mg/dL Final     Magnesium 03/12/2017 2.2  1.6 - 2.3 mg/dL Final                  Joaquim Ruano MD

## 2017-03-25 NOTE — DISCHARGE SUMMARY
[facility] Discharge Summary    María Henderson MRN# [id number]   Age: 72 year old YOB: 1945     Date of Admission:  3/21/2017  Date of Discharge::  3/25/2017   Admitting Physician:  Alyson Ledezma MD  Discharge Physician:  Lewis Crockett MD    Home clinic: Dr Thad Puri          Admission Diagnoses:   Hip pain [M25.559]            Discharge Diagnosis:   Patient Active Problem List   Diagnosis     Syncope     Hypertension     Bradycardia     Hyperlipidemia LDL goal <130     S/P foot surgery      Pain in joint, ankle and foot     Sprain of neck     Sprain and strain of shoulder and upper arm     Elbow strain     Wrist strain     S/P ankle fusion     Cellulitis     Sepsis (H)     Septic joint of left knee joint (H)     Left hip pain     AVNRT status post ablation   Left hip pain - no fracture           Procedures:   Invasive procedures: ablation of abnormal electrical heart pathway   All laboratory and imaging data in the past 24 hours reviewed  Lab Results   Component Value Date    WBC 6.6 03/23/2017    WBC 9.8 03/21/2017    WBC 7.5 03/21/2017    HGB 11.4 (L) 03/23/2017    HGB 12.8 03/21/2017    HGB 12.3 03/21/2017    HCT 36.7 03/23/2017    HCT 40.6 03/21/2017    HCT 40.2 03/21/2017     03/23/2017     03/21/2017     03/21/2017     03/24/2017     03/23/2017     03/21/2017    POTASSIUM 4.1 03/25/2017    POTASSIUM 3.9 03/24/2017    POTASSIUM 3.8 03/23/2017    CHLORIDE 106 03/24/2017    CHLORIDE 105 03/23/2017    CHLORIDE 104 03/21/2017    CO2 30 03/24/2017    CO2 30 03/23/2017    CO2 29 03/21/2017    BUN 18 03/24/2017    BUN 18 03/23/2017    BUN 27 03/21/2017    CR 0.63 03/24/2017    CR 0.75 03/23/2017    CR 0.83 03/21/2017    GLC 95 03/24/2017    GLC 91 03/23/2017     (H) 03/21/2017    SED 30 02/01/2017    SED 30 01/25/2017    SED 41 (H) 01/18/2017    DD 0.3 10/25/2011    TROPI <0.015 03/21/2017    TROPI Canceled, Test credited   Specimen  not received   2017    TROPI <0.012 10/25/2011    AST 29 2017    AST 32 2017    AST Canceled, Test credited   Specimen not received   2017    ALT 41 2017    ALT Canceled, Test credited   Specimen not received   2017    ALT 50 2016    ALKPHOS 173 (H) 2017    ALKPHOS Canceled, Test credited   Specimen not received   2017    ALKPHOS 83 2016    BILITOTAL 0.2 2017    BILITOTAL Canceled, Test credited   Specimen not received   2017    BILITOTAL 0.8 2016    INR 1.29 (H) 2016    INR 0.91 2013    INR 1.03 2005     Imaging performed:   Chest x-rayAP PORTABLE CHEST X-RAY  3/22/2017 6:38 AM      INDICATION: Verify PICC placement.     COMPARISON: 3/12/2017.         IMPRESSION: There is a right PICC with tip in SVC. This appears to be  a new line compared to the previous x-ray as the course of the line in  the axilla is slightly different. Otherwise no change.     MAURO FREEMAN MD       Cardiology procedures perfromed:   [facility]  [address] [place] [address]        Name: KE LIZ  MRN: [id number]  : 1945  Study Date: 2017 09:40 AM  Age: 72 yrs  Gender: Female  Patient [facility]  Reason For Study: Other, Please Specify in Comments  Ordering Physician: KOBE BROOKS  Referring Physician: MARGUERITE DESAI  Performed By: Nando Reynolds RDCS     BSA: 1.8 m2  Height: 66 in  Weight: 166 lb  HR: 68  BP: 139/75 mmHg  _____________________________________________________________________________  __        Procedure  Complete Portable Echo Adult.  _____________________________________________________________________________  __        Interpretation Summary     The visual ejection fraction is estimated at 55-60%.  Left ventricular systolic function is normal.  The ascending aorta is Borderline dilated.        MR LUMBAR SPINE WITHOUT CONTRAST  3/22/2017 3:46 PM      HISTORY: Posterior iliac crest/SI area pain, low back pain.     TECHNIQUE: Sagittal T1 and STIR. Sagittal T2 and axial dual-echo T2.      FINDINGS: Five lumbar vertebrae are assumed. Multilevel degenerative  disc disease changes, greatest on the right at T11-12. There is grade  1 degenerative anterolisthesis at L4-5, L5-S1. Slight scoliosis.      Findings by specific level:     There is facet degenerative change as follows: Mild right T11-12, mild  right T12-L1, mild bilateral L1-2, minimal bilateral L2-3, mild  bilateral L3-4, moderate-prominent right and moderate left L4-5,  moderate right and mild left L5-S1. Based upon sagittal imaging there  is right asymmetric disc bulging at T11-12 without cord compression.     There is disc bulging at L1-2, L2-3, L3-4, L4-5, L5-S1. Multilevel  ligamentum flavum thickening.   These findings combine to result in central stenosis as follows: Mild  L1-2, minimal L2-3 and L3-4, mild-moderate L4-5.   There is right foraminal stenosis as follows: Mild-moderate L5-S1,  moderate L4-5, mild-moderate L1-2, mild-moderate T11-12.   There is left foraminal stenosis as follows: Mild-moderate L5-S1,  moderate to moderate-severe L4-5, mild-moderate L3-4 and L1-2. The  remaining lumbar neural foramina appear adequate.          IMPRESSION:  1. Multilevel degenerative disc and facet disease.  2. L1-2: Mild central stenosis.  3. L2-3: Minimal central stenosis.  4. L3-4: Minimal central stenosis.  5. L4-5: Grade 1 spondylolisthesis. Mild-moderate central stenosis.  Moderate right and moderate to moderate-severe left foraminal  stenosis.  6. L5-S1: Grade 1 spondylolisthesis.         MARY BRANDON MD  MR HIP LEFT WITHOUT CONTRAST  3/22/2017 10:36 PM     HISTORY:  Left hip pain without significant trauma. Evaluate for  occult fracture.     TECHNIQUE:  Multiplanar, multisequence without contrast.     COMPARISON: None.     FINDINGS:   Osseous and Cartilaginous Structures:  No  acute-appearing bony  abnormalities. Mild-to-moderate loss of articular cartilage in the  weightbearing hips bilaterally consistent with early degenerative  change. No evidence for osteonecrosis.     Acetabular Labrum: Small cystic structures around the superior  acetabulum bilaterally likely indicating underlying labral tears. If  clinically indicated, this could be better evaluated with MR  arthrogram.     Hip joint space:  No significant joint effusion.      Trochanteric and Iliopsoas Bursae: No fluid collection in the  trochanteric or iliopsoas bursae.     Common Hamstring Tendon: No evidence of tear or significant  tendinosis.     Additional Findings: There is abnormal edema in the lateral left  gluteus medius muscle consistent with at least grade 1 muscle strain.  There is a tiny focus of fluid signal intensity underneath the distal  right gluteus minimus tendon at the insertion on the greater  trochanter consistent with bursitis (image 24 of series 701 and image  17 of series 601). Distal gluteal tendinous insertions bilaterally are  otherwise normal.     Internal Pelvic Structures: Unremarkable.         IMPRESSION:   1. No acute bony abnormality.  2. Mild bilateral hip degenerative change.  3. Probable small cystic changes adjacent to the superior acetabular  labrum bilaterally indicative of underlying labral pathology.  4. At least grade 1 muscle strain involving the lateral aspect of the  left gluteus medius muscle. This is likely responsible for the  patient's current symptoms.  5. Minimal bursitis at the distal right gluteus minimus tendinous  insertion.     SHAMA HUNT MD         Medications Prior to Admission:     Prescriptions Prior to Admission   Medication Sig Dispense Refill Last Dose     polyethylene glycol (MIRALAX/GLYCOLAX) Packet Take 1 packet by mouth every other day   3/20/2017     cefTRIAXone (ROCEPHIN) 1 GM vial Inject 2 g (2,000 mg) into the vein daily CBC with differential,  creatinine, SGOT weekly while on this medication to be faxed to Dr. Douglas office. 600 mL 0 3/21/2017 at 1200     CALCIUM PO Take 600 mg by mouth daily   3/21/2017 at Unknown time     Probiotic Product (TRUBIOTICS PO) Take 1 tablet by mouth daily   3/21/2017 at Unknown time     albuterol (PROAIR HFA/PROVENTIL HFA/VENTOLIN HFA) 108 (90 BASE) MCG/ACT Inhaler Inhale 2 puffs into the lungs every 6 hours as needed for shortness of breath / dyspnea or wheezing    at prn     Pyridoxine HCl (VITAMIN B6 PO) Take 1 tablet by mouth daily   3/21/2017 at Unknown time     DAKINS EX Externally apply topically daily   3/21/2017 at Unknown time     Furosemide (LASIX PO) Take 20 mg by mouth daily    3/21/2017 at Unknown time     Cholecalciferol (VITAMIN D3 PO) Take 400 Units by mouth daily   3/21/2017 at Unknown time     POTASSIUM PO Take 595 mg by mouth every morning    3/21/2017 at Unknown time     valsartan-hydrochlorothiazide (DIOVAN-HCT) 80-12.5 MG per tablet Take 1 tablet by mouth daily   3/21/2017 at Unknown time     ASPIRIN PO Take 81 mg by mouth daily    3/21/2017 at Unknown time     multivitamin, therapeutic with minerals (THERA-VIT-M) TABS Take 1 tablet by mouth daily.   3/21/2017 at Unknown time     simvastatin (ZOCOR) 40 MG tablet Take 40 mg by mouth every morning    3/21/2017 at Unknown time     order for DME Equipment being ordered: Walker Wheels () and Walker ()  Treatment Diagnosis: impaired gait 1 each 0      order for DME Equipment being ordered: Walker Wheels () and Walker ()  Treatment Diagnosis: difficulty with gait 1 each 0              Discharge Medications:     Current Discharge Medication List      START taking these medications    Details   acetaminophen (TYLENOL) 325 MG tablet Take 2 tablets (650 mg) by mouth every 4 hours as needed for mild pain  Qty: 100 tablet    Associated Diagnoses: Streptococcal arthritis of left knee (H)         CONTINUE these medications which have CHANGED     Details   HYDROmorphone (DILAUDID) 2 MG tablet Take 1-2 tablets (2-4 mg) by mouth every 4 hours as needed for moderate to severe pain  Qty: 70 tablet, Refills: 0    Associated Diagnoses: Streptococcal arthritis of left knee (H)         CONTINUE these medications which have NOT CHANGED    Details   polyethylene glycol (MIRALAX/GLYCOLAX) Packet Take 1 packet by mouth every other day      cefTRIAXone (ROCEPHIN) 1 GM vial Inject 2 g (2,000 mg) into the vein daily CBC with differential, creatinine, SGOT weekly while on this medication to be faxed to Dr. Douglas office.  Qty: 600 mL, Refills: 0    Associated Diagnoses: Streptococcal arthritis of left knee (H)      CALCIUM PO Take 600 mg by mouth daily      Probiotic Product (TRUBIOTICS PO) Take 1 tablet by mouth daily      albuterol (PROAIR HFA/PROVENTIL HFA/VENTOLIN HFA) 108 (90 BASE) MCG/ACT Inhaler Inhale 2 puffs into the lungs every 6 hours as needed for shortness of breath / dyspnea or wheezing      Pyridoxine HCl (VITAMIN B6 PO) Take 1 tablet by mouth daily      DAKINS EX Externally apply topically daily      Furosemide (LASIX PO) Take 20 mg by mouth daily       Cholecalciferol (VITAMIN D3 PO) Take 400 Units by mouth daily      POTASSIUM PO Take 595 mg by mouth every morning       valsartan-hydrochlorothiazide (DIOVAN-HCT) 80-12.5 MG per tablet Take 1 tablet by mouth daily      ASPIRIN PO Take 81 mg by mouth daily       multivitamin, therapeutic with minerals (THERA-VIT-M) TABS Take 1 tablet by mouth daily.      simvastatin (ZOCOR) 40 MG tablet Take 40 mg by mouth every morning       !! order for DME Equipment being ordered: Walker Wheels () and Walker ()  Treatment Diagnosis: impaired gait  Qty: 1 each, Refills: 0    Associated Diagnoses: Sepsis, due to unspecified organism (H)      !! order for DME Equipment being ordered: Walker Wheels () and Walker ()  Treatment Diagnosis: difficulty with gait  Qty: 1 each, Refills: 0    Associated Diagnoses:  Pain in joint, ankle and foot, left       !! - Potential duplicate medications found. Please discuss with provider.                Consultations:   Consultation during this admission received from cardiology and orthopedics   Cardiac Electrophysiology Progress Note               Assessment and Plan:      Left hip pain    Psvt, 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  No arrhythmia overnight. Ok to be discharged from our perspective.              Brief History of Illness:      María Henderson is a 72-year-old woman with a past medical history significant for hypertension, hyperlipidemia, gastroesophageal reflux disease, osteoarthritis with prior infected right knee prosthesis which was explanted and antibiotic spacer was devised and maintained on IV Rocephin, presented to the emergency room with left hip pain. Admitted to inpatient treatment.          Hospital Course:          Left hip pain, musculoskeletal. No trauma but it happened after patient stood up and attempted to turn around while using her walker. No fall or other injuries. She is nonweightbearing on her right leg since her knee surgery a month ago. X-ray pelvis in the emergency room does not reveal any fracture. She is tender around her hip joint but no specific tender point. No skin bruise or hematoma noted. She likely has muscle sprain   - Orthopedic Surgery consulted- pain management advised   - MRI completed. Negative for fractures. Has DDD and OA of the LS spine   - Pain control.   - Therapies.       Supraventricular tachycardia, paroxysmal. Patient does have at least 4 episodes of palpitation that she recalls since 12/2016. Initial episode was during hospitalization in the setting of sepsis when her heart rate was up to 172 and patient reports she had a syncopal episode. Her echocardiogram from December shows normal ejection fraction and without regional wall motion abnormality. It seems thyroid was not tested. TSH at 4.66.   - Cards/EP  consulted , ablation has been done   - Required adenosine on evening of 3/23.   - echocardiogram completed.       Infected prosthesis right knee. Patient initially had group B streptococcal sepsis in 12/2016, left ankle was thought to be the source, was treated with Rocephin. Patient had arthrodesis of the left ankle. Subsequently, developed infection of the prosthesis on right knee. She underwent explantation of the right total knee and an antibiotic spacer was placed. She is followed by Dr. Berkowitz and Dr. Johnson. Dr. Berkowitz from Orthopedic Surgery and Dr. Johnson from Infectious Disease.   - PICC line in her right arm which appears normal and was replaced last week, according to the patient, due to abnormal position.   - Continue her prior to admission Rocephin. Planned till 4/2/17        Hypertension and hyperlipidemia. Prior to admission Diovan HCT resumed.             Discharge Instructions and Follow-Up:   Discharge diet: Regular   Discharge activity: Activity as tolerated   Discharge follow-up: Follow up with primary care provider in 7 days           Discharge Disposition:   Discharged to nursing home      Attestation:  I have reviewed today's vital signs, notes, medications, labs and imaging.  Amount of time performed on this discharge summary: 20 minutes.    Lewis Crockett MD

## 2017-03-25 NOTE — PROGRESS NOTES
Orders clarified with cardiology - will discharge on Metoprolol 25mg BID. Updated dc med rec.    Yael Reveles DO  Internal Medicine - Hospitalist  3/25/2017  6:19 PM

## 2017-03-25 NOTE — PLAN OF CARE
Problem: Goal Outcome Summary  Goal: Goal Outcome Summary  Outcome: No Change  A&OX4. Denies chest pain and SOB. Complained for mild rt knee pain, declined taking pain meds. NWB rt leg. Immobilizer on. Vitals stable. TELE shows SR with PVCs. Up with 1 and walker. PICC line intact and patent on rt arm. D/C to [facility] TCU at 6pm.

## 2017-03-25 NOTE — PLAN OF CARE
Problem: Goal Outcome Summary  Goal: Goal Outcome Summary  Outcome: No Change  Pt A/O, VSS on RA. Tele: SR. Pt denies pain/SOB. Right groin site WDL, CMS intact. Plan for d/c to TCU today.

## 2017-03-25 NOTE — DISCHARGE INSTRUCTIONS
Patient will discharge to [facility] today via [facility] wheelchair at 18:00 today.  [facility]'s phone number is [phone].

## 2017-03-25 NOTE — PLAN OF CARE
Problem: Goal Outcome Summary  Goal: Goal Outcome Summary  Outcome: No Change  1900: pt arrived from cath lab around 1600 after having ablation for SVT. Pt A&Ox4; hypertensive but stable. Tele shows SR with PVCs. Denies any chest pain or sob. R groin WNL; no hematoma or bruit/ Scant serousangunious drainage on bandaid. CMS intact, off bedrest at 2000. Voided x1 using bedpan. Tolerated dinner. Will continue to monitor.

## 2017-03-26 VITALS
WEIGHT: 175.5 LBS | RESPIRATION RATE: 18 BRPM | BODY MASS INDEX: 28.21 KG/M2 | TEMPERATURE: 97.6 F | SYSTOLIC BLOOD PRESSURE: 139 MMHG | OXYGEN SATURATION: 96 % | HEART RATE: 65 BPM | DIASTOLIC BLOOD PRESSURE: 84 MMHG | HEIGHT: 66 IN

## 2017-03-26 NOTE — PROGRESS NOTES
"[facility]  PRIMARY CARE PROVIDER AND CLINIC:  [facility] [address] / [place]  Chief Complaint   Patient presents with     Hospital F/U       HPI:    María Henderson is a 72 year old  (1945),past medical history significant for hypertension, hyperlipidemia, gastroesophageal reflux disease, osteoarthritis with prior infected right knee prosthesis which was explanted and antibiotic spacer was devised and maintained on IV Rocephin, presented to the emergency room with left hip pain.admitted to the [facility] from [facility].  Hospital stay 3/21/17 through 3/25/17.  Acute left hip pain workup of left hip pain negative for acute findings, \"likely muscle strain\" , Patient noted to have DDD and OA of LS spine   Post op joint infection Right knee: NWB s/p explantation of RTKA and ABX spacer placed, Dr Berkowitz, ID Dr. Johnson, continues on Rocephin per picc which was PTA,   Supraventricular tachycardia: Echo from December shows normal EF without regional wall motion abnormalities, thyroid 4.66, card consulted, patient ablated, required adenosine on evening of 3/23, Echo stable      Admitted to this facility for  rehab, medical management and nursing care. Current issues are:  On exam today patient states left hip pain is improving, \"feeling much better\" she is using ice for pain control which is helpful, continues to have right knee pain rated 5/10 states controlled with current pain medications, denies fever, chills, cough, congestion, SOB, N/V/D or constipation, states she is sleeping well at night no other concerns at this time.     Last 3 BPs: 153/75, 133/70, 118/85.  Admission Weight: 175.5lbs    CODE STATUS/ADVANCE DIRECTIVES DISCUSSION:   CPR/Full code   Patient's living condition: lives with spouse    ALLERGIES:Pcn [penicillin v potassium]; Oxycodone; Sulfa drugs; Valium [diazepam]; Vicodin " [hydrocodone-acetaminophen]; and Iodine solution [povidone iodine]  PAST MEDICAL HISTORY:  has a past medical history of Allergic rhinitis; Gastro-oesophageal reflux disease; Heart murmur; Hyperlipidemia; Hypertension; Numbness and tingling; and Osteoarthrosis. She also has no past medical history of PONV (postoperative nausea and vomiting) or Spinal headache.  PAST SURGICAL HISTORY:  has a past surgical history that includes Tonsillectomy; 7 foot and ankle ; total knee arthroplasy; hernia repair; Arthroplasty Revision Ankle (3/10/2014); Aspiration needle knee (Right, 12/27/2016); Bunionectomy vero (10/24/2011); Irrigation and debridement foot, combined (Left, 12/27/2016); orthopedic surgery; orthopedic surgery (Left); and Remove Hardware Arthroplasty Knee, I&D, (Right, 2/20/2017).  FAMILY HISTORY: family history is not on file.  SOCIAL HISTORY:  reports that she has never smoked. She does not have any smokeless tobacco history on file. She reports that she does not drink alcohol or use illicit drugs.    Post Discharge Medication Reconciliation Status: discharge medications reconciled, continue medications without change.  Current Outpatient Prescriptions   Medication Sig Dispense Refill     acetaminophen (TYLENOL) 325 MG tablet Take 2 tablets (650 mg) by mouth every 4 hours as needed for mild pain 100 tablet      HYDROmorphone (DILAUDID) 2 MG tablet Take 1-2 tablets (2-4 mg) by mouth every 4 hours as needed for moderate to severe pain 70 tablet 0     metoprolol (LOPRESSOR) 25 MG tablet Take 1 tablet (25 mg) by mouth 2 times daily 60 tablet      polyethylene glycol (MIRALAX/GLYCOLAX) Packet Take 1 packet by mouth every other day       cefTRIAXone (ROCEPHIN) 1 GM vial Inject 2 g (2,000 mg) into the vein daily CBC with differential, creatinine, SGOT weekly while on this medication to be faxed to Dr. Douglas office. 600 mL 0     CALCIUM PO Take 600 mg by mouth daily       Probiotic Product (TRUBIOTICS PO) Take 1 tablet  "by mouth daily       albuterol (PROAIR HFA/PROVENTIL HFA/VENTOLIN HFA) 108 (90 BASE) MCG/ACT Inhaler Inhale 2 puffs into the lungs every 6 hours as needed for shortness of breath / dyspnea or wheezing       Pyridoxine HCl (VITAMIN B6 PO) Take 1 tablet by mouth daily       DAKINS EX Externally apply topically daily       order for DME Equipment being ordered: Walker Wheels () and Walker ()  Treatment Diagnosis: impaired gait 1 each 0     order for DME Equipment being ordered: Walker Wheels () and Walker ()  Treatment Diagnosis: difficulty with gait 1 each 0     Furosemide (LASIX PO) Take 20 mg by mouth daily        Cholecalciferol (VITAMIN D3 PO) Take 400 Units by mouth daily       POTASSIUM PO Take 595 mg by mouth every morning        valsartan-hydrochlorothiazide (DIOVAN-HCT) 80-12.5 MG per tablet Take 1 tablet by mouth daily       ASPIRIN PO Take 81 mg by mouth daily        multivitamin, therapeutic with minerals (THERA-VIT-M) TABS Take 1 tablet by mouth daily.       simvastatin (ZOCOR) 40 MG tablet Take 40 mg by mouth every morning          ROS:  10 point ROS of systems including Constitutional, Eyes, Respiratory, Cardiovascular, Gastroenterology, Genitourinary, Integumentary, Muscularskeletal, Psychiatric were all negative except for pertinent positives noted in my HPI.    Exam:  /84  Pulse 65  Temp 97.6  F (36.4  C)  Resp 18  Ht 5' 6\" (1.676 m)  Wt 175 lb 8 oz (79.6 kg)  SpO2 96%  BMI 28.33 kg/m2  GENERAL APPEARANCE:  Alert, in no distress  ENT:  Mouth and posterior oropharynx normal, moist mucous membranes, normal hearing acuity  EYES:  EOM, conjunctivae, lids, pupils and irises normal, PERRL  RESP:  respiratory effort and palpation of chest normal, lungs clear to auscultation , no respiratory distress  CV:  Palpation and auscultation of heart done , regular rate and rhythm, no murmur, rub, or gallop, peripheral edema 1+ in LLE  ABDOMEN:  normal bowel sounds, soft, nontender, " no hepatosplenomegaly or other masses  M/S:   Examination of:   right upper extremity, left upper extremity and left lower extremity  Inspection, ROM, stability and muscle strength normal and right knee immobilizer, NWB  SKIN:  Inspection of skin and subcutaneous tissue baseline, did not visualize right knee incision  NEURO:   Cranial nerves 2-12 are normal tested and grossly at patient's baseline, speech WNL    Lab/Diagnostic data:  CBC RESULTS:   Recent Labs   Lab Test  03/23/17 0615  03/21/17 2238   WBC  6.6  9.8   RBC  4.21  4.69   HGB  11.4*  12.8   HCT  36.7  40.6   MCV  87  87   MCH  27.1  27.3   MCHC  31.1*  31.5   RDW  15.9*  15.7*   PLT  314  359       Last Basic Metabolic Panel:  Recent Labs   Lab Test  03/25/17   0515  03/24/17   0610  03/23/17   0615   NA   --   142  142   POTASSIUM  4.1  3.9  3.8   CHLORIDE   --   106  105   MATHEW   --   9.1  9.3   CO2   --   30  30   BUN   --   18  18   CR   --   0.63  0.75   GLC   --   95  91       Liver Function Studies -   Lab Test  03/21/17 2238 03/21/17   0815   PROTTOTAL  7.0   --    ALBUMIN  3.1*   --    BILITOTAL  0.2   --    ALKPHOS  173*   --    AST  29  32   ALT  41   --        TSH   Date Value Ref Range Status   03/21/2017 4.66 (H) 0.40 - 4.00 mU/L Final     ASSESSMENT/PLAN:  Left hip pain  Acute: PT and OT for strengthening, dilaudid 2-4mg q 4 hours prn, tylenol 650mg q 4 hours prn, ICE prn, WBAT left hip    Infected prosthetic knee joint, subsequent encounter  Acute/ongoing: s/p explantation to RTKA and ABx spacer Dr. Berkowitz on   Continue recephin 2grams QD, ID Dr. Douglas, weekly labs CBC with diff, creat, SGOT    Physical deconditioning  Acute: PT and OT for strengthening    Paroxysmal supraventricular tachycardia (H)  Acute: s/p ablation during hospitalization  Vitals daily and prn, BMP follow, continue metoprolol 25mg BID, ASA 81mg QD, no anticoagulation    Essential hypertension  Chronic: vitals daily and prn, BMP follow, continue metoprolol  25mg BID, lasix 20mg QD, valsartan/HCTZ 80/12.5mg QD       Orders:  BMP and Hgb in AM    Information reviewed:  Medications, vital signs, orders, nursing notes, problem list, hospital information. Total time spent with patient visit was 45 min including patient visit and review of past records. Greater than 50% of total time spent with counseling and coordinating care.    Electronically signed by:  Tonya Lynn Haase, APRN CNP

## 2017-03-27 ENCOUNTER — CARE COORDINATION (OUTPATIENT)
Dept: CARDIOLOGY | Facility: CLINIC | Age: 72
End: 2017-03-27

## 2017-03-27 ENCOUNTER — NURSING HOME VISIT (OUTPATIENT)
Dept: GERIATRICS | Facility: CLINIC | Age: 72
End: 2017-03-27
Payer: OTHER MISCELLANEOUS

## 2017-03-27 DIAGNOSIS — T84.59XD INFECTED PROSTHETIC KNEE JOINT, SUBSEQUENT ENCOUNTER: ICD-10-CM

## 2017-03-27 DIAGNOSIS — Z96.659 INFECTED PROSTHETIC KNEE JOINT, SUBSEQUENT ENCOUNTER: ICD-10-CM

## 2017-03-27 DIAGNOSIS — I47.10 PAROXYSMAL SUPRAVENTRICULAR TACHYCARDIA (H): ICD-10-CM

## 2017-03-27 DIAGNOSIS — I10 ESSENTIAL HYPERTENSION: ICD-10-CM

## 2017-03-27 DIAGNOSIS — R53.81 PHYSICAL DECONDITIONING: ICD-10-CM

## 2017-03-27 DIAGNOSIS — M25.552 LEFT HIP PAIN: Primary | ICD-10-CM

## 2017-03-27 PROCEDURE — 99207 ZZC CDG-CORRECTLY CODED, REVIEWED AND AGREE: CPT | Performed by: NURSE PRACTITIONER

## 2017-03-27 PROCEDURE — 99310 SBSQ NF CARE HIGH MDM 45: CPT | Performed by: NURSE PRACTITIONER

## 2017-03-27 NOTE — PROGRESS NOTES
RN called [facility] and spoke with Care Coordinator (Aquilino) to confirm patients f/u apt. RN confirmed with care coordinator that patient has scheduled f/u apt on 4/16/17 with Dr. Boyd. RN advised care coordinator to call clinic with any cardiac related questions/concerns prior to apt on 4/29/17.

## 2017-03-28 ENCOUNTER — NURSING HOME VISIT (OUTPATIENT)
Dept: GERIATRICS | Facility: CLINIC | Age: 72
End: 2017-03-28
Payer: OTHER MISCELLANEOUS

## 2017-03-28 ENCOUNTER — TRANSFERRED RECORDS (OUTPATIENT)
Dept: HEALTH INFORMATION MANAGEMENT | Facility: CLINIC | Age: 72
End: 2017-03-28

## 2017-03-28 DIAGNOSIS — I47.10 PAROXYSMAL SUPRAVENTRICULAR TACHYCARDIA (H): ICD-10-CM

## 2017-03-28 DIAGNOSIS — Z96.659 INFECTED PROSTHETIC KNEE JOINT, SUBSEQUENT ENCOUNTER: ICD-10-CM

## 2017-03-28 DIAGNOSIS — T84.59XD INFECTED PROSTHETIC KNEE JOINT, SUBSEQUENT ENCOUNTER: ICD-10-CM

## 2017-03-28 DIAGNOSIS — I10 ESSENTIAL HYPERTENSION: ICD-10-CM

## 2017-03-28 DIAGNOSIS — M25.552 LEFT HIP PAIN: Primary | ICD-10-CM

## 2017-03-28 LAB
BUN SERPL-MCNC: 22 MG/DL (ref 9–26)
CALCIUM SERPL-MCNC: 9.4 MG/DL (ref 8.4–10.2)
CHLORIDE SERPLBLD-SCNC: 103 MMOL/L (ref 98–109)
CO2 SERPL-SCNC: 31 MMOL/L (ref 22–31)
CREAT SERPL-MCNC: 0.65 MG/DL (ref 0.55–1.02)
GFR SERPL CREATININE-BSD FRML MDRD: >60 ML/MIN/1.73M2
GLUCOSE SERPL-MCNC: 88 MG/DL (ref 70–100)
HEMOGLOBIN: 11.6 G/DL (ref 11.8–15.5)
POTASSIUM SERPL-SCNC: 3.9 MMOL/L (ref 3.5–5.2)
SODIUM SERPL-SCNC: 141 MMOL/L (ref 136–145)

## 2017-03-28 PROCEDURE — 99305 1ST NF CARE MODERATE MDM 35: CPT | Performed by: INTERNAL MEDICINE

## 2017-03-28 PROCEDURE — 99207 ZZC CDG-CORRECTLY CODED, REVIEWED AND AGREE: CPT | Performed by: INTERNAL MEDICINE

## 2017-03-28 NOTE — MR AVS SNAPSHOT
"              After Visit Summary   3/28/2017    María Henderson    MRN: [id number]           Patient Information     Date Of Birth          1945        Visit Information        Provider Department      3/28/2017 1:20 PM Ian Lugo MD [facility]        Today's Diagnoses     Left hip pain    -  1    Infected prosthetic knee joint, subsequent encounter        Paroxysmal supraventricular tachycardia (H)        Essential hypertension           Follow-ups after your visit        Your next 10 appointments already scheduled     Apr 26, 2017  1:15 PM CDT   [facility] EP RETURN with Tyrone Boyd MD   [facility] AT [place] ([facility])    [address]  [facility] [address] [phone]              Who to contact     If you have questions or need follow up information about today's clinic visit or your schedule please contact G[facility] directly at [phone].  Normal or non-critical lab and imaging results will be communicated to you by MyChart, letter or phone within 4 business days after the clinic has received the results. If you do not hear from us within 7 days, please contact the clinic through [facility] or phone. If you have a critical or abnormal lab result, we will notify you by phone as soon as possible.  Submit refill requests through [facility] or call your pharmacy and they will forward the refill request to us. Please allow 3 business days for your refill to be completed.          Additional Information About Your Visit        MyChart Information     [facility] lets you send messages to your doctor, view your test results, renew your prescriptions, schedule appointments and more. To sign up, go to [email or web address] . Click on \"Log in\" on the left side of the screen, which will take you to the Welcome page. Then click on \"Sign up Now\" on the right side of the page.     You will be asked to enter " the access code listed below, as well as some personal information. Please follow the directions to create your username and password.     Your access code is: [email or web address]  Expires: 3/30/2017  9:57 AM     Your access code will  in 90 days. If you need help or a new code, please call your [place] clinic or [phone].        Care EveryWhere ID     This is your Care EveryWhere ID. This could be used by other organizations to access your [place] medical records  [id number]         Blood Pressure from Last 3 Encounters:   17 139/84   17 141/69   17 155/68    Weight from Last 3 Encounters:   17 175 lb 8 oz (79.6 kg)   17 167 lb 12.3 oz (76.1 kg)   17 166 lb (75.3 kg)              Today, you had the following     No orders found for display       Primary Care Provider Office Phone # Fax #    [facility]ad Puri [phone]       [facility] [address]  [place] [address]        Thank you!     Thank you for choosing [facility]  for your care. Our goal is always to provide you with excellent care. Hearing back from our patients is one way we can continue to improve our services. Please take a few minutes to complete the written survey that you may receive in the mail after your visit with us. Thank you!             Your Updated Medication List - Protect others around you: Learn how to safely use, store and throw away your medicines at www.disposemymeds.org.          This list is accurate as of: 3/28/17  1:32 PM.  Always use your most recent med list.                   Brand Name Dispense Instructions for use    acetaminophen 325 MG tablet    TYLENOL    100 tablet    Take 2 tablets (650 mg) by mouth every 4 hours as needed for mild pain       albuterol 108 (90 BASE) MCG/ACT Inhaler    PROAIR HFA/PROVENTIL HFA/VENTOLIN HFA     Inhale 2 puffs into the lungs every 6 hours as needed for shortness of breath / dyspnea or  wheezing       ASPIRIN PO      Take 81 mg by mouth daily       CALCIUM PO      Take 600 mg by mouth daily       cefTRIAXone 1 GM vial    ROCEPHIN    600 mL    Inject 2 g (2,000 mg) into the vein daily CBC with differential, creatinine, SGOT weekly while on this medication to be faxed to Dr. Douglas office.       DAKINS EX      Externally apply topically daily       HYDROmorphone 2 MG tablet    DILAUDID    70 tablet    Take 1-2 tablets (2-4 mg) by mouth every 4 hours as needed for moderate to severe pain       LASIX PO      Take 20 mg by mouth daily       metoprolol 25 MG tablet    LOPRESSOR    60 tablet    Take 1 tablet (25 mg) by mouth 2 times daily       multivitamin, therapeutic with minerals Tabs tablet      Take 1 tablet by mouth daily.       * order for DME     1 each    Equipment being ordered: Walker Wheels () and Walker () Treatment Diagnosis: difficulty with gait       * order for DME     1 each    Equipment being ordered: Walker Wheels () and Walker () Treatment Diagnosis: impaired gait       polyethylene glycol Packet    MIRALAX/GLYCOLAX     Take 1 packet by mouth every other day       POTASSIUM PO      Take 595 mg by mouth every morning       TRUBIOTICS PO      Take 1 tablet by mouth daily       valsartan-hydrochlorothiazide 80-12.5 MG per tablet    DIOVAN-HCT     Take 1 tablet by mouth daily       VITAMIN B6 PO      Take 1 tablet by mouth daily       VITAMIN D3 PO      Take 400 Units by mouth daily       ZOCOR 40 MG tablet   Generic drug:  simvastatin      Take 40 mg by mouth every morning       * Notice:  This list has 2 medication(s) that are the same as other medications prescribed for you. Read the directions carefully, and ask your doctor or other care provider to review them with you.

## 2017-03-28 NOTE — PROGRESS NOTES
[facility]  PRIMARY CARE PROVIDER AND CLINIC:  [facility] [address] / [place]      Pt was seen by Dr Lugo on 3/28/17 for an initial TCU visit.    HPI:    María Henderson is a 72 year old  (1945) with a history of an infected R TKA, currently in midst of course of Rocephin, who was hospitalized at [facility] from 3/21-3/25/17 for the management of L hip pain.    Hip pain felt most likely to reflect a muscle strain.    Course complicated by an episode of recurrent SVT, for which the Pt underwent a successful AV node ablation.      Admitted to this facility for  rehab, medical management and nursing care.    Pt notes improving L hip pain  R knee pain is controlled.  She is NWB R LE  She denies chest pain, SOB, palpitations, fevers, chills, diarrhea      CODE STATUS/ADVANCE DIRECTIVES DISCUSSION:   CPR/Full code   Patient's living condition: lives with spouse    ALLERGIES:Pcn [penicillin v potassium]; Oxycodone; Sulfa drugs; Valium [diazepam]; Vicodin [hydrocodone-acetaminophen]; and Iodine solution [povidone iodine]  PAST MEDICAL HISTORY:  has a past medical history of Allergic rhinitis; Gastro-oesophageal reflux disease; Heart murmur; Hyperlipidemia; Hypertension; Numbness and tingling; and Osteoarthrosis. She also has no past medical history of PONV (postoperative nausea and vomiting) or Spinal headache.  PAST SURGICAL HISTORY:  has a past surgical history that includes Tonsillectomy; 7 foot and ankle ; total knee arthroplasy; hernia repair; Arthroplasty Revision Ankle (3/10/2014); Aspiration needle knee (Right, 12/27/2016); Bunionectomy vero (10/24/2011); Irrigation and debridement foot, combined (Left, 12/27/2016); orthopedic surgery; orthopedic surgery (Left); and Remove Hardware Arthroplasty Knee, I&D, (Right, 2/20/2017).  FAMILY HISTORY: family history is not on file.  SOCIAL HISTORY:  reports that she has never smoked. She does not have  any smokeless tobacco history on file. She reports that she does not drink alcohol or use illicit drugs.    Post Discharge Medication Reconciliation Status:     .  Current Outpatient Prescriptions   Medication Sig Dispense Refill     acetaminophen (TYLENOL) 325 MG tablet Take 2 tablets (650 mg) by mouth every 4 hours as needed for mild pain 100 tablet      HYDROmorphone (DILAUDID) 2 MG tablet Take 1-2 tablets (2-4 mg) by mouth every 4 hours as needed for moderate to severe pain 70 tablet 0     metoprolol (LOPRESSOR) 25 MG tablet Take 1 tablet (25 mg) by mouth 2 times daily 60 tablet      polyethylene glycol (MIRALAX/GLYCOLAX) Packet Take 1 packet by mouth every other day       cefTRIAXone (ROCEPHIN) 1 GM vial Inject 2 g (2,000 mg) into the vein daily CBC with differential, creatinine, SGOT weekly while on this medication to be faxed to Dr. Douglas office. 600 mL 0     CALCIUM PO Take 600 mg by mouth daily       Probiotic Product (TRUBIOTICS PO) Take 1 tablet by mouth daily       albuterol (PROAIR HFA/PROVENTIL HFA/VENTOLIN HFA) 108 (90 BASE) MCG/ACT Inhaler Inhale 2 puffs into the lungs every 6 hours as needed for shortness of breath / dyspnea or wheezing       Pyridoxine HCl (VITAMIN B6 PO) Take 1 tablet by mouth daily       DAKINS EX Externally apply topically daily       order for DME Equipment being ordered: Walker Wheels () and Walker ()  Treatment Diagnosis: impaired gait 1 each 0     order for DME Equipment being ordered: Walker Wheels () and Walker ()  Treatment Diagnosis: difficulty with gait 1 each 0     Furosemide (LASIX PO) Take 20 mg by mouth daily        Cholecalciferol (VITAMIN D3 PO) Take 400 Units by mouth daily       POTASSIUM PO Take 595 mg by mouth every morning        valsartan-hydrochlorothiazide (DIOVAN-HCT) 80-12.5 MG per tablet Take 1 tablet by mouth daily       ASPIRIN PO Take 81 mg by mouth daily        multivitamin, therapeutic with minerals (THERA-VIT-M) TABS Take 1  tablet by mouth daily.       simvastatin (ZOCOR) 40 MG tablet Take 40 mg by mouth every morning          ROS:  10 point ROS neg except as noted above.    Exam:    GENERAL APPEARANCE:  Sleeping, easily awakened, appears well  ENT:  Mouth and posterior oropharynx normal, moist mucous membranes, normal hearing acuity  EYES:  EOM, conjunctivae, lids, pupils and irises normal, PERRL  RESP:  Lungs clear  CV:  RRR no M  ABDOMEN:  Soft, non-tender  M/S:   No L LE edema,  right knee is in an  Immobilizer, no calf edema or tenderness. No pain with ROM L hip. Gait not assessed  SKIN:  Inspection of skin and subcutaneous tissue baseline, I did not visualize right knee incision  NEURO:   Cranial nerves 2-12 are normal tested and grossly at patient's baseline, speech WNL    Lab/Diagnostic data:  CBC RESULTS:   Recent Labs   Lab Test  03/23/17 0615  03/21/17 2238   WBC  6.6  9.8   RBC  4.21  4.69   HGB  11.4*  12.8   HCT  36.7  40.6   MCV  87  87   MCH  27.1  27.3   MCHC  31.1*  31.5   RDW  15.9*  15.7*   PLT  314  359       Last Basic Metabolic Panel:  Recent Labs   Lab Test  03/25/17   0515  03/24/17   0610  03/23/17   0615   NA   --   142  142   POTASSIUM  4.1  3.9  3.8   CHLORIDE   --   106  105   MATHEW   --   9.1  9.3   CO2   --   30  30   BUN   --   18  18   CR   --   0.63  0.75   GLC   --   95  91       Liver Function Studies -   Lab Test  03/21/17 2238 03/21/17   0815   PROTTOTAL  7.0   --    ALBUMIN  3.1*   --    BILITOTAL  0.2   --    ALKPHOS  173*   --    AST  29  32   ALT  41   --        TSH   Date Value Ref Range Status   03/21/2017 4.66 (H) 0.40 - 4.00 mU/L Final           ASSESSMENT/PLAN:      Left hip pain  Likely muscular, improving  Acute: PT and OT for strengthening, dilaudid 2-4mg q 4 hours prn, tylenol 650mg q 4 hours prn, ICE prn, WBAT left hip    Infected prosthetic knee joint, subsequent encounter  Acute/ongoing: s/p explantation to RTKA and ABx spacer Dr. Berkowitz on   Continue recephin 2grams QD, ID  Dr. Douglas, weekly labs CBC with diff, creat, SGOT      Paroxysmal supraventricular tachycardia (H)  Acute: s/p ablation during hospitalization  No clinically evident recurrence  Plan continue vitals daily and prn,c  ontinue metoprolol 25mg BID, ASA 81mg QD    Essential hypertension  Chronic, stable  Plan vitals daily and prn, continue metoprolol 25mg BID, lasix 20mg QD, valsartan/HCTZ 80/12.5mg QD. Monitor BMP       Ian Lugo MD

## 2017-04-03 ENCOUNTER — TRANSFERRED RECORDS (OUTPATIENT)
Dept: HEALTH INFORMATION MANAGEMENT | Facility: CLINIC | Age: 72
End: 2017-04-03

## 2017-04-03 LAB
DIFFERENTIAL: ABNORMAL
ERYTHROCYTE [DISTWIDTH] IN BLOOD BY AUTOMATED COUNT: 16.1 % (ref 11–15)
HCT VFR BLD AUTO: 43 % (ref 35–46)
HEMOGLOBIN: 13.3 G/DL (ref 11.8–15.5)
MCV RBC AUTO: 86.5 FL (ref 80–100)
PLATELET # BLD AUTO: 328 K/CMM (ref 140–450)
RBC # BLD AUTO: 4.97 M/CMM (ref 3.7–5.2)
WBC # BLD AUTO: 7.9 K/CMM (ref 3.8–11)

## 2017-04-04 ENCOUNTER — TRANSFERRED RECORDS (OUTPATIENT)
Dept: HEALTH INFORMATION MANAGEMENT | Facility: CLINIC | Age: 72
End: 2017-04-04

## 2017-04-06 ENCOUNTER — DISCHARGE SUMMARY NURSING HOME (OUTPATIENT)
Dept: GERIATRICS | Facility: CLINIC | Age: 72
End: 2017-04-06
Payer: OTHER MISCELLANEOUS

## 2017-04-06 VITALS
HEART RATE: 52 BPM | BODY MASS INDEX: 26.39 KG/M2 | TEMPERATURE: 97.8 F | WEIGHT: 163.5 LBS | OXYGEN SATURATION: 96 % | RESPIRATION RATE: 18 BRPM | DIASTOLIC BLOOD PRESSURE: 79 MMHG | SYSTOLIC BLOOD PRESSURE: 147 MMHG

## 2017-04-06 DIAGNOSIS — M00.262 STREPTOCOCCAL ARTHRITIS OF LEFT KNEE (H): ICD-10-CM

## 2017-04-06 DIAGNOSIS — I47.10 SVT (SUPRAVENTRICULAR TACHYCARDIA) (H): ICD-10-CM

## 2017-04-06 DIAGNOSIS — M25.552 LEFT HIP PAIN: Primary | ICD-10-CM

## 2017-04-06 DIAGNOSIS — I10 ESSENTIAL HYPERTENSION: ICD-10-CM

## 2017-04-06 PROCEDURE — 99207 ZZC CDG-CORRECTLY CODED, REVIEWED AND AGREE: CPT | Performed by: NURSE PRACTITIONER

## 2017-04-06 PROCEDURE — 99316 NF DSCHRG MGMT 30 MIN+: CPT | Performed by: NURSE PRACTITIONER

## 2017-04-06 NOTE — PROGRESS NOTES
[facility] DISCHARGE SUMMARY    PATIENT'S NAME: María Henderson  YOB: 1945  MEDICAL RECORD NUMBER:  [id number]    PRIMARY CARE PROVIDER AND CLINIC RESPONSIBLE AFTER TRANSFER: [facility] [address] / [place]    CODE STATUS/ADVANCE DIRECTIVES DISCUSSION:   CPR/Full code        Allergies   Allergen Reactions     Pcn [Penicillin V Potassium] Anaphylaxis     Oxycodone      Sulfa Drugs      Valium [Diazepam] Other (See Comments)     unconsciousness     Vicodin [Hydrocodone-Acetaminophen]      Patient unsure if allergy     Iodine Solution [Povidone Iodine] Rash       TRANSFERRING PROVIDERS: DOMINIQUE Dempsey CNP, Ian Lugo MD  DATE OF SNF ADMISSION:  March / 25 / 2017  DATE OF SNF (anticipated) DISCHARGE: April / 08 / 2017  DISCHARGE DISPOSITION: Non-[facility] Provider   Nursing Facility: [facility] stay 03/21/2017 to 03/25/2017.     Condition on Discharge:  Improving.  Function:  independent  Cognitive Scores: intact    Equipment: walker    DISCHARGE DIAGNOSIS:   1. Left hip pain    2. Streptococcal arthritis of right knee (H)    3. SVT (supraventricular tachycardia) (H)    4. Essential hypertension        [facility] Course:    Left hip pain  Patient admitted to TCU from hospital due to left buttocks pain.   She felt better within a few days.   Streptococcal arthritis of right knee (H)  NWB s/p explantation of RTKA and ABX spacer placed by Dr Berkowitz. ID- Dr. Johnson, continued Rocephin per picc which was PTA. Antibiotics completed on 4/2. PICC pulled.   Patient has been afebrile. She is getting around TCU with walker.   SVT (supraventricular tachycardia) (H)  During hospitalization patient underwent an ablation due to  and Chest palpitations.  Metoprolol 25mg BId was started on 3/21.     Essential hypertension  BP's: 147/79, 140/88, 141/71, 135/70      PAST MEDICAL  HISTORY:  has a past medical history of Allergic rhinitis; Gastro-oesophageal reflux disease; Heart murmur; Hyperlipidemia; Hypertension; Numbness and tingling; and Osteoarthrosis. She also has no past medical history of PONV (postoperative nausea and vomiting) or Spinal headache.    DISCHARGE MEDICATIONS:  Current Outpatient Prescriptions   Medication Sig Dispense Refill     acetaminophen (TYLENOL) 325 MG tablet Take 2 tablets (650 mg) by mouth every 4 hours as needed for mild pain 100 tablet      HYDROmorphone (DILAUDID) 2 MG tablet Take 1-2 tablets (2-4 mg) by mouth every 4 hours as needed for moderate to severe pain 70 tablet 0     metoprolol (LOPRESSOR) 25 MG tablet Take 1 tablet (25 mg) by mouth 2 times daily 60 tablet      polyethylene glycol (MIRALAX/GLYCOLAX) Packet Take 1 packet by mouth every other day       CALCIUM PO Take 600 mg by mouth daily       Probiotic Product (TRUBIOTICS PO) Take 1 tablet by mouth daily       albuterol (PROAIR HFA/PROVENTIL HFA/VENTOLIN HFA) 108 (90 BASE) MCG/ACT Inhaler Inhale 2 puffs into the lungs every 6 hours as needed for shortness of breath / dyspnea or wheezing       order for DME Equipment being ordered: Walker Wheels () and Walker ()  Treatment Diagnosis: impaired gait 1 each 0     order for DME Equipment being ordered: Walker Wheels () and Walker ()  Treatment Diagnosis: difficulty with gait 1 each 0     Furosemide (LASIX PO) Take 20 mg by mouth daily        Cholecalciferol (VITAMIN D3 PO) Take 400 Units by mouth daily       POTASSIUM PO Take 595 mg by mouth every morning        valsartan-hydrochlorothiazide (DIOVAN-HCT) 80-12.5 MG per tablet Take 1 tablet by mouth daily       ASPIRIN PO Take 81 mg by mouth daily        multivitamin, therapeutic with minerals (THERA-VIT-M) TABS Take 1 tablet by mouth daily.       simvastatin (ZOCOR) 40 MG tablet Take 40 mg by mouth every morning          MEDICATION CHANGES/RATIONALE:   none  Controlled medications  sent with patient: Script for Dilaudid medication for 20 tabs and 0 refills given to patient at dischage to have them fill at their out patient pharmacy     ROS:    4 point ROS including Respiratory, CV, GI and , other than that noted in the HPI,  is negative    Physical Exam:   Vitals: /79  Pulse 52  Temp 97.8  F (36.6  C)  Resp 18  Wt 163 lb 8 oz (74.2 kg)  SpO2 96%  BMI 26.39 kg/m2  BMI= Body mass index is 26.39 kg/(m^2).    GENERAL APPEARANCE:  Alert, in no distress  ENT:  Mouth and posterior oropharynx normal, moist mucous membranes, hearing acuity adequate   EYES:  EOM, conjunctivae, lids, pupils and irises normal  NECK:  No adenopathy,masses or thyromegaly  RESP:  respiratory effort and palpation of chest normal, no respiratory distress, Lung sounds clear  CV:  Palpation and auscultation of heart done , rate and rhythm reg, no murmur, no rub or gallop, Edema none  ABDOMEN:  normal bowel sounds, soft, nontender, no hepatosplenomegaly or other masses  M/S:   Gait and station NWB to RLE, Digits and nails normal   SKIN:  Inspection/Palpation of skin and subcutaneous tissue sugrical site not assessed.   NEURO: 2-12 in normal limits and at patient's baseline  PSYCH:  insight and judgement, memory intact , affect and mood normal      DISCHARGE PLAN:  no home care  Patient instructed to follow-up with:  PCP in 7 days      [facility] scheduled appointments:  Future Appointments  Date Time Provider Department Center   4/26/2017 1:15 PM Tyrone Boyd MD [facility] CLIN       MTM referral needed and placed by this provider: No    Pending labs: none  SNF labs none  CBC RESULTS:   Recent Labs   Lab Test 04/03/17 03/28/17 03/23/17   0615  03/21/17   2238   WBC  7.9   --   6.6  9.8   RBC  4.97   --   4.21  4.69   HGB  13.3  11.6*  11.4*  12.8   HCT  43.0   --   36.7  40.6   MCV  86.5   --   87  87   MCH   --    --   27.1  27.3   MCHC   --    --   31.1*  31.5   RDW  16.1*   --   15.9*  15.7*    PLT  328   --   314  359       Last Basic Metabolic Panel:  Recent Labs   Lab Test 03/28/17 03/25/17   0515  03/24/17   0610   NA  141   --   142   POTASSIUM  3.9  4.1  3.9   CHLORIDE  103   --   106   MATHEW  9.4   --   9.1   CO2  31   --   30   BUN  22   --   18   CR  0.65   --   0.63   GLC  88   --   95       Liver Function Studies -   Recent Labs   Lab Test  03/21/17 2238  03/21/17   0815  03/21/17   0615   PROTTOTAL  7.0   --   Canceled, Test credited   Specimen not received     ALBUMIN  3.1*   --   Canceled, Test credited   Specimen not received     BILITOTAL  0.2   --   Canceled, Test credited   Specimen not received     ALKPHOS  173*   --   Canceled, Test credited   Specimen not received     AST  29  32  Canceled, Test credited   Specimen not received     ALT  41   --   Canceled, Test credited   Specimen not received         TSH   Date Value Ref Range Status   03/21/2017 4.66 (H) 0.40 - 4.00 mU/L Final   ]    Discharge Treatments:immobilizer to right knee    TOTAL DISCHARGE TIME:   Greater than 30 minutes  Electronically signed by:  DOMINIQUE Dempsey CNP

## 2017-04-10 ENCOUNTER — CARE COORDINATION (OUTPATIENT)
Dept: CASE MANAGEMENT | Facility: CLINIC | Age: 72
End: 2017-04-10

## 2017-04-10 NOTE — PROGRESS NOTES
Clinic Care Coordination Contact  OUTREACH    Referral Information:  Referral Source: IP/TCU Report  Reason for Contact: TCU discharge  Care Conference: No     Universal Utilization:   ED Visits in last year: 1  Hospital visits in last year: 2  Last PCP appointment: 03/21/17  Missed Appointments: 0  Concerns: none at this time  Multiple Providers or Specialists: Ortho and PCP    Clinical Concerns:  Current Medical Concerns:   DATE OF SNF ADMISSION: March / 25 / 2017  DATE OF SNF (anticipated) DISCHARGE: April / 08 / 2017  DISCHARGE DISPOSITION: Non-[facility] Provider   Nursing Facility: [facility] stay 03/21/2017 to 03/25/2017.      DISCHARGE DIAGNOSIS:   1. Left hip pain    2. Streptococcal arthritis of right knee (H)    3. SVT (supraventricular tachycardia) (H)    4. Essential hypertension    Education Provided to patient: Reviewed discharge instructions     Functional Status:  Mobility Status: Independent  Equipment Currently Used at Home: shower chair, walker, standard, wheelchair    Psychosocial:  Current living arrangement:: I live in a private home with spouse  Financial/Insurance: ***  ***     Resources and Interventions:  Current Resources:  ;          Advanced Care Plans/Directives on file:: Yes        Goals:                  Barriers: ***  Strengths: ***  Patient/Caregiver understanding: ***  Frequency of Care Coordination:  (N/A)  Upcoming appointment: 04/13/17 (tbd)     Plan: ***

## 2017-04-21 ENCOUNTER — HOSPITAL ENCOUNTER (OUTPATIENT)
Dept: LAB | Facility: CLINIC | Age: 72
Discharge: HOME | End: 2017-04-21
Attending: ORTHOPAEDIC SURGERY | Admitting: ORTHOPAEDIC SURGERY
Payer: OTHER MISCELLANEOUS

## 2017-04-21 DIAGNOSIS — Z47.89 AFTERCARE FOLLOWING SURGERY OF THE MUSCULOSKELETAL SYSTEM: Primary | ICD-10-CM

## 2017-04-21 DIAGNOSIS — Z96.659 KNEE JOINT REPLACEMENT STATUS: ICD-10-CM

## 2017-04-21 DIAGNOSIS — Z47.89 AFTERCARE FOLLOWING SURGERY OF THE MUSCULOSKELETAL SYSTEM: ICD-10-CM

## 2017-04-21 DIAGNOSIS — Z01.83 ENCOUNTER FOR BLOOD TYPING: ICD-10-CM

## 2017-04-21 LAB
ABO + RH BLD: NORMAL
ABO + RH BLD: NORMAL
BLD GP AB SCN SERPL QL: NORMAL
BLD PROD TYP BPU: NORMAL
BLOOD BANK CMNT PATIENT-IMP: NORMAL
NUM BPU REQUESTED: 4
SPECIMEN EXP DATE BLD: NORMAL

## 2017-04-21 PROCEDURE — 36415 COLL VENOUS BLD VENIPUNCTURE: CPT | Performed by: ORTHOPAEDIC SURGERY

## 2017-04-21 PROCEDURE — 86901 BLOOD TYPING SEROLOGIC RH(D): CPT | Performed by: ORTHOPAEDIC SURGERY

## 2017-04-21 PROCEDURE — 86923 COMPATIBILITY TEST ELECTRIC: CPT | Performed by: ORTHOPAEDIC SURGERY

## 2017-04-21 PROCEDURE — 86900 BLOOD TYPING SEROLOGIC ABO: CPT | Performed by: ORTHOPAEDIC SURGERY

## 2017-04-21 PROCEDURE — 86850 RBC ANTIBODY SCREEN: CPT | Performed by: ORTHOPAEDIC SURGERY

## 2017-04-24 ENCOUNTER — ANESTHESIA (OUTPATIENT)
Dept: SURGERY | Facility: CLINIC | Age: 72
DRG: 467 | End: 2017-04-24
Payer: OTHER MISCELLANEOUS

## 2017-04-24 ENCOUNTER — APPOINTMENT (OUTPATIENT)
Dept: GENERAL RADIOLOGY | Facility: CLINIC | Age: 72
DRG: 467 | End: 2017-04-24
Attending: ORTHOPAEDIC SURGERY
Payer: OTHER MISCELLANEOUS

## 2017-04-24 ENCOUNTER — SURGERY (OUTPATIENT)
Age: 72
End: 2017-04-24

## 2017-04-24 ENCOUNTER — HOSPITAL ENCOUNTER (INPATIENT)
Facility: CLINIC | Age: 72
LOS: 3 days | Discharge: HOME | DRG: 467 | End: 2017-04-27
Attending: ORTHOPAEDIC SURGERY | Admitting: ORTHOPAEDIC SURGERY
Payer: OTHER MISCELLANEOUS

## 2017-04-24 ENCOUNTER — ANESTHESIA EVENT (OUTPATIENT)
Dept: SURGERY | Facility: CLINIC | Age: 72
DRG: 467 | End: 2017-04-24
Payer: OTHER MISCELLANEOUS

## 2017-04-24 DIAGNOSIS — Z96.652 STATUS POST TOTAL LEFT KNEE REPLACEMENT: Primary | ICD-10-CM

## 2017-04-24 PROBLEM — Z96.659 KNEE JOINT REPLACEMENT STATUS: Status: ACTIVE | Noted: 2017-04-24

## 2017-04-24 LAB
ABO + RH BLD: NORMAL
ABO + RH BLD: NORMAL
BLD GP AB SCN SERPL QL: NORMAL
BLD PROD TYP BPU: NORMAL
BLD UNIT ID BPU: 0
BLOOD BANK CMNT PATIENT-IMP: NORMAL
BLOOD PRODUCT CODE: NORMAL
BPU ID: NORMAL
CREAT SERPL-MCNC: 0.69 MG/DL (ref 0.52–1.04)
GFR SERPL CREATININE-BSD FRML MDRD: 84 ML/MIN/1.7M2
GRAM STN SPEC: NORMAL
HGB BLD-MCNC: 13.4 G/DL (ref 11.7–15.7)
INR PPP: 0.94 (ref 0.86–1.14)
MICRO REPORT STATUS: NORMAL
NUM BPU REQUESTED: 2
PLATELET # BLD AUTO: 272 10E9/L (ref 150–450)
POTASSIUM SERPL-SCNC: 3.8 MMOL/L (ref 3.4–5.3)
SPECIMEN EXP DATE BLD: NORMAL
SPECIMEN SOURCE: NORMAL
TRANSFUSION STATUS PATIENT QL: NORMAL

## 2017-04-24 PROCEDURE — 25800025 ZZH RX 258: Performed by: ANESTHESIOLOGY

## 2017-04-24 PROCEDURE — 25000128 H RX IP 250 OP 636: Performed by: ORTHOPAEDIC SURGERY

## 2017-04-24 PROCEDURE — 87205 SMEAR GRAM STAIN: CPT | Performed by: ORTHOPAEDIC SURGERY

## 2017-04-24 PROCEDURE — C1776 JOINT DEVICE (IMPLANTABLE): HCPCS | Performed by: ORTHOPAEDIC SURGERY

## 2017-04-24 PROCEDURE — 27210794 ZZH OR GENERAL SUPPLY STERILE: Performed by: ORTHOPAEDIC SURGERY

## 2017-04-24 PROCEDURE — 37000009 ZZH ANESTHESIA TECHNICAL FEE, EACH ADDTL 15 MIN: Performed by: ORTHOPAEDIC SURGERY

## 2017-04-24 PROCEDURE — 85049 AUTOMATED PLATELET COUNT: CPT | Performed by: ANESTHESIOLOGY

## 2017-04-24 PROCEDURE — 86901 BLOOD TYPING SEROLOGIC RH(D): CPT | Performed by: ANESTHESIOLOGY

## 2017-04-24 PROCEDURE — 84132 ASSAY OF SERUM POTASSIUM: CPT | Performed by: ANESTHESIOLOGY

## 2017-04-24 PROCEDURE — 36000058 ZZH SURGERY LEVEL 3 EA 15 ADDTL MIN: Performed by: ORTHOPAEDIC SURGERY

## 2017-04-24 PROCEDURE — 85610 PROTHROMBIN TIME: CPT | Performed by: ANESTHESIOLOGY

## 2017-04-24 PROCEDURE — 40000986 XR KNEE PORT RT 1/2 VW: Mod: RT

## 2017-04-24 PROCEDURE — 25800025 ZZH RX 258: Performed by: ORTHOPAEDIC SURGERY

## 2017-04-24 PROCEDURE — 37000008 ZZH ANESTHESIA TECHNICAL FEE, 1ST 30 MIN: Performed by: ORTHOPAEDIC SURGERY

## 2017-04-24 PROCEDURE — 27210995 ZZH RX 272: Performed by: ORTHOPAEDIC SURGERY

## 2017-04-24 PROCEDURE — 25000125 ZZHC RX 250: Performed by: ANESTHESIOLOGY

## 2017-04-24 PROCEDURE — 71000012 ZZH RECOVERY PHASE 1 LEVEL 1 FIRST HR: Performed by: ORTHOPAEDIC SURGERY

## 2017-04-24 PROCEDURE — 82565 ASSAY OF CREATININE: CPT | Performed by: ANESTHESIOLOGY

## 2017-04-24 PROCEDURE — 27810169 ZZH OR IMPLANT GENERAL: Performed by: ORTHOPAEDIC SURGERY

## 2017-04-24 PROCEDURE — 25000125 ZZHC RX 250: Performed by: ORTHOPAEDIC SURGERY

## 2017-04-24 PROCEDURE — 27110028 ZZH OR GENERAL SUPPLY NON-STERILE: Performed by: ORTHOPAEDIC SURGERY

## 2017-04-24 PROCEDURE — 40000171 ZZH STATISTIC PRE-PROCEDURE ASSESSMENT III: Performed by: ORTHOPAEDIC SURGERY

## 2017-04-24 PROCEDURE — 0SRC0J9 REPLACEMENT OF RIGHT KNEE JOINT WITH SYNTHETIC SUBSTITUTE, CEMENTED, OPEN APPROACH: ICD-10-PCS | Performed by: ORTHOPAEDIC SURGERY

## 2017-04-24 PROCEDURE — 0SPC08Z REMOVAL OF SPACER FROM RIGHT KNEE JOINT, OPEN APPROACH: ICD-10-PCS | Performed by: ORTHOPAEDIC SURGERY

## 2017-04-24 PROCEDURE — 36415 COLL VENOUS BLD VENIPUNCTURE: CPT | Performed by: ANESTHESIOLOGY

## 2017-04-24 PROCEDURE — 86923 COMPATIBILITY TEST ELECTRIC: CPT | Performed by: ANESTHESIOLOGY

## 2017-04-24 PROCEDURE — 25000128 H RX IP 250 OP 636: Performed by: ANESTHESIOLOGY

## 2017-04-24 PROCEDURE — 87075 CULTR BACTERIA EXCEPT BLOOD: CPT | Performed by: ORTHOPAEDIC SURGERY

## 2017-04-24 PROCEDURE — 12000007 ZZH R&B INTERMEDIATE

## 2017-04-24 PROCEDURE — 36000056 ZZH SURGERY LEVEL 3 1ST 30 MIN: Performed by: ORTHOPAEDIC SURGERY

## 2017-04-24 PROCEDURE — 86850 RBC ANTIBODY SCREEN: CPT | Performed by: ANESTHESIOLOGY

## 2017-04-24 PROCEDURE — S0077 INJECTION, CLINDAMYCIN PHOSP: HCPCS | Performed by: ORTHOPAEDIC SURGERY

## 2017-04-24 PROCEDURE — 25000125 ZZHC RX 250: Performed by: NURSE ANESTHETIST, CERTIFIED REGISTERED

## 2017-04-24 PROCEDURE — 93010 ELECTROCARDIOGRAM REPORT: CPT | Performed by: INTERNAL MEDICINE

## 2017-04-24 PROCEDURE — 93005 ELECTROCARDIOGRAM TRACING: CPT

## 2017-04-24 PROCEDURE — 25000132 ZZH RX MED GY IP 250 OP 250 PS 637: Performed by: ORTHOPAEDIC SURGERY

## 2017-04-24 PROCEDURE — 86900 BLOOD TYPING SEROLOGIC ABO: CPT | Performed by: ANESTHESIOLOGY

## 2017-04-24 PROCEDURE — 25000128 H RX IP 250 OP 636: Performed by: NURSE ANESTHETIST, CERTIFIED REGISTERED

## 2017-04-24 PROCEDURE — C1713 ANCHOR/SCREW BN/BN,TIS/BN: HCPCS | Performed by: ORTHOPAEDIC SURGERY

## 2017-04-24 PROCEDURE — 85018 HEMOGLOBIN: CPT | Performed by: ANESTHESIOLOGY

## 2017-04-24 PROCEDURE — 87070 CULTURE OTHR SPECIMN AEROBIC: CPT | Performed by: ORTHOPAEDIC SURGERY

## 2017-04-24 DEVICE — BONE CEMENT PALACOS W/GENTAMICIN 00-1113-140-01: Type: IMPLANTABLE DEVICE | Site: KNEE | Status: FUNCTIONAL

## 2017-04-24 DEVICE — IMPLANTABLE DEVICE: Type: IMPLANTABLE DEVICE | Site: KNEE | Status: FUNCTIONAL

## 2017-04-24 RX ORDER — PROPOFOL 10 MG/ML
INJECTION, EMULSION INTRAVENOUS CONTINUOUS PRN
Status: DISCONTINUED | OUTPATIENT
Start: 2017-04-24 | End: 2017-04-24

## 2017-04-24 RX ORDER — HYDROMORPHONE HYDROCHLORIDE 1 MG/ML
.3-.5 INJECTION, SOLUTION INTRAMUSCULAR; INTRAVENOUS; SUBCUTANEOUS EVERY 5 MIN PRN
Status: DISCONTINUED | OUTPATIENT
Start: 2017-04-24 | End: 2017-04-24 | Stop reason: HOSPADM

## 2017-04-24 RX ORDER — ACETAMINOPHEN 325 MG/1
975 TABLET ORAL EVERY 8 HOURS
Status: COMPLETED | OUTPATIENT
Start: 2017-04-24 | End: 2017-04-27

## 2017-04-24 RX ORDER — ACETAMINOPHEN 500 MG
1000 TABLET ORAL ONCE
Status: COMPLETED | OUTPATIENT
Start: 2017-04-24 | End: 2017-04-24

## 2017-04-24 RX ORDER — SODIUM CHLORIDE, SODIUM LACTATE, POTASSIUM CHLORIDE, CALCIUM CHLORIDE 600; 310; 30; 20 MG/100ML; MG/100ML; MG/100ML; MG/100ML
INJECTION, SOLUTION INTRAVENOUS CONTINUOUS
Status: DISCONTINUED | OUTPATIENT
Start: 2017-04-24 | End: 2017-04-24 | Stop reason: HOSPADM

## 2017-04-24 RX ORDER — FENTANYL CITRATE 50 UG/ML
25-50 INJECTION, SOLUTION INTRAMUSCULAR; INTRAVENOUS
Status: DISCONTINUED | OUTPATIENT
Start: 2017-04-24 | End: 2017-04-24 | Stop reason: HOSPADM

## 2017-04-24 RX ORDER — HYDROMORPHONE HYDROCHLORIDE 1 MG/ML
.3-.5 INJECTION, SOLUTION INTRAMUSCULAR; INTRAVENOUS; SUBCUTANEOUS
Status: DISCONTINUED | OUTPATIENT
Start: 2017-04-24 | End: 2017-04-27 | Stop reason: HOSPADM

## 2017-04-24 RX ORDER — BUPIVACAINE HYDROCHLORIDE 7.5 MG/ML
INJECTION, SOLUTION INTRASPINAL PRN
Status: DISCONTINUED | OUTPATIENT
Start: 2017-04-24 | End: 2017-04-24

## 2017-04-24 RX ORDER — FENTANYL CITRATE 50 UG/ML
INJECTION, SOLUTION INTRAMUSCULAR; INTRAVENOUS PRN
Status: DISCONTINUED | OUTPATIENT
Start: 2017-04-24 | End: 2017-04-24

## 2017-04-24 RX ORDER — ONDANSETRON 4 MG/1
4 TABLET, ORALLY DISINTEGRATING ORAL EVERY 30 MIN PRN
Status: DISCONTINUED | OUTPATIENT
Start: 2017-04-24 | End: 2017-04-24 | Stop reason: HOSPADM

## 2017-04-24 RX ORDER — DEXTROSE MONOHYDRATE, SODIUM CHLORIDE, AND POTASSIUM CHLORIDE 50; 1.49; 4.5 G/1000ML; G/1000ML; G/1000ML
INJECTION, SOLUTION INTRAVENOUS CONTINUOUS
Status: DISCONTINUED | OUTPATIENT
Start: 2017-04-24 | End: 2017-04-27 | Stop reason: HOSPADM

## 2017-04-24 RX ORDER — ONDANSETRON 2 MG/ML
4 INJECTION INTRAMUSCULAR; INTRAVENOUS EVERY 30 MIN PRN
Status: DISCONTINUED | OUTPATIENT
Start: 2017-04-24 | End: 2017-04-24 | Stop reason: HOSPADM

## 2017-04-24 RX ORDER — CLINDAMYCIN PHOSPHATE 900 MG/50ML
900 INJECTION, SOLUTION INTRAVENOUS
Status: COMPLETED | OUTPATIENT
Start: 2017-04-24 | End: 2017-04-24

## 2017-04-24 RX ORDER — ONDANSETRON 4 MG/1
4 TABLET, ORALLY DISINTEGRATING ORAL EVERY 6 HOURS PRN
Status: DISCONTINUED | OUTPATIENT
Start: 2017-04-24 | End: 2017-04-27 | Stop reason: HOSPADM

## 2017-04-24 RX ORDER — CLINDAMYCIN PHOSPHATE 900 MG/50ML
900 INJECTION, SOLUTION INTRAVENOUS SEE ADMIN INSTRUCTIONS
Status: DISCONTINUED | OUTPATIENT
Start: 2017-04-24 | End: 2017-04-24 | Stop reason: HOSPADM

## 2017-04-24 RX ORDER — LIDOCAINE 40 MG/G
CREAM TOPICAL
Status: DISCONTINUED | OUTPATIENT
Start: 2017-04-24 | End: 2017-04-27 | Stop reason: HOSPADM

## 2017-04-24 RX ORDER — SIMVASTATIN 40 MG
40 TABLET ORAL EVERY EVENING
Status: DISCONTINUED | OUTPATIENT
Start: 2017-04-24 | End: 2017-04-27 | Stop reason: HOSPADM

## 2017-04-24 RX ORDER — VALSARTAN AND HYDROCHLOROTHIAZIDE 80; 12.5 MG/1; MG/1
1 TABLET, FILM COATED ORAL DAILY
Status: DISCONTINUED | OUTPATIENT
Start: 2017-04-25 | End: 2017-04-27 | Stop reason: HOSPADM

## 2017-04-24 RX ORDER — PROPOFOL 10 MG/ML
INJECTION, EMULSION INTRAVENOUS PRN
Status: DISCONTINUED | OUTPATIENT
Start: 2017-04-24 | End: 2017-04-24

## 2017-04-24 RX ORDER — ONDANSETRON 2 MG/ML
4 INJECTION INTRAMUSCULAR; INTRAVENOUS EVERY 6 HOURS PRN
Status: DISCONTINUED | OUTPATIENT
Start: 2017-04-24 | End: 2017-04-27 | Stop reason: HOSPADM

## 2017-04-24 RX ORDER — METOCLOPRAMIDE HYDROCHLORIDE 5 MG/ML
5 INJECTION INTRAMUSCULAR; INTRAVENOUS EVERY 6 HOURS PRN
Status: DISCONTINUED | OUTPATIENT
Start: 2017-04-24 | End: 2017-04-27 | Stop reason: HOSPADM

## 2017-04-24 RX ORDER — ACETAMINOPHEN 325 MG/1
650 TABLET ORAL EVERY 4 HOURS PRN
Status: DISCONTINUED | OUTPATIENT
Start: 2017-04-27 | End: 2017-04-27 | Stop reason: HOSPADM

## 2017-04-24 RX ORDER — EPHEDRINE SULFATE 50 MG/ML
INJECTION, SOLUTION INTRAMUSCULAR; INTRAVENOUS; SUBCUTANEOUS PRN
Status: DISCONTINUED | OUTPATIENT
Start: 2017-04-24 | End: 2017-04-24

## 2017-04-24 RX ORDER — DIPHENHYDRAMINE HCL 12.5MG/5ML
12.5 LIQUID (ML) ORAL EVERY 6 HOURS PRN
Status: DISCONTINUED | OUTPATIENT
Start: 2017-04-24 | End: 2017-04-27 | Stop reason: HOSPADM

## 2017-04-24 RX ORDER — DEXAMETHASONE SODIUM PHOSPHATE 4 MG/ML
INJECTION, SOLUTION INTRA-ARTICULAR; INTRALESIONAL; INTRAMUSCULAR; INTRAVENOUS; SOFT TISSUE PRN
Status: DISCONTINUED | OUTPATIENT
Start: 2017-04-24 | End: 2017-04-24

## 2017-04-24 RX ORDER — FUROSEMIDE 20 MG
20 TABLET ORAL DAILY
Status: DISCONTINUED | OUTPATIENT
Start: 2017-04-24 | End: 2017-04-27 | Stop reason: HOSPADM

## 2017-04-24 RX ORDER — HYDROMORPHONE HYDROCHLORIDE 2 MG/1
2-4 TABLET ORAL EVERY 4 HOURS PRN
Status: DISCONTINUED | OUTPATIENT
Start: 2017-04-24 | End: 2017-04-27 | Stop reason: HOSPADM

## 2017-04-24 RX ORDER — MAGNESIUM HYDROXIDE 1200 MG/15ML
LIQUID ORAL PRN
Status: DISCONTINUED | OUTPATIENT
Start: 2017-04-24 | End: 2017-04-24 | Stop reason: HOSPADM

## 2017-04-24 RX ORDER — METOPROLOL TARTRATE 25 MG/1
25 TABLET, FILM COATED ORAL 2 TIMES DAILY
Status: DISCONTINUED | OUTPATIENT
Start: 2017-04-24 | End: 2017-04-27 | Stop reason: HOSPADM

## 2017-04-24 RX ORDER — METOCLOPRAMIDE 5 MG/1
5 TABLET ORAL EVERY 6 HOURS PRN
Status: DISCONTINUED | OUTPATIENT
Start: 2017-04-24 | End: 2017-04-27 | Stop reason: HOSPADM

## 2017-04-24 RX ORDER — MEPERIDINE HYDROCHLORIDE 25 MG/ML
12.5 INJECTION INTRAMUSCULAR; INTRAVENOUS; SUBCUTANEOUS EVERY 5 MIN PRN
Status: DISCONTINUED | OUTPATIENT
Start: 2017-04-24 | End: 2017-04-24 | Stop reason: HOSPADM

## 2017-04-24 RX ORDER — GLYCOPYRROLATE 0.2 MG/ML
INJECTION, SOLUTION INTRAMUSCULAR; INTRAVENOUS PRN
Status: DISCONTINUED | OUTPATIENT
Start: 2017-04-24 | End: 2017-04-24

## 2017-04-24 RX ORDER — NALOXONE HYDROCHLORIDE 0.4 MG/ML
.1-.4 INJECTION, SOLUTION INTRAMUSCULAR; INTRAVENOUS; SUBCUTANEOUS
Status: DISCONTINUED | OUTPATIENT
Start: 2017-04-24 | End: 2017-04-27 | Stop reason: HOSPADM

## 2017-04-24 RX ORDER — CLINDAMYCIN PHOSPHATE 900 MG/50ML
900 INJECTION, SOLUTION INTRAVENOUS EVERY 8 HOURS
Status: COMPLETED | OUTPATIENT
Start: 2017-04-24 | End: 2017-04-25

## 2017-04-24 RX ORDER — AMOXICILLIN 250 MG
1-2 CAPSULE ORAL 2 TIMES DAILY
Status: DISCONTINUED | OUTPATIENT
Start: 2017-04-24 | End: 2017-04-27 | Stop reason: HOSPADM

## 2017-04-24 RX ORDER — ASPIRIN 81 MG/1
81 TABLET, CHEWABLE ORAL DAILY
Status: DISCONTINUED | OUTPATIENT
Start: 2017-04-24 | End: 2017-04-27 | Stop reason: HOSPADM

## 2017-04-24 RX ORDER — PROCHLORPERAZINE MALEATE 5 MG
5 TABLET ORAL EVERY 6 HOURS PRN
Status: DISCONTINUED | OUTPATIENT
Start: 2017-04-24 | End: 2017-04-27 | Stop reason: HOSPADM

## 2017-04-24 RX ORDER — ONDANSETRON 2 MG/ML
INJECTION INTRAMUSCULAR; INTRAVENOUS PRN
Status: DISCONTINUED | OUTPATIENT
Start: 2017-04-24 | End: 2017-04-24

## 2017-04-24 RX ORDER — DIPHENHYDRAMINE HYDROCHLORIDE 50 MG/ML
12.5 INJECTION INTRAMUSCULAR; INTRAVENOUS EVERY 6 HOURS PRN
Status: DISCONTINUED | OUTPATIENT
Start: 2017-04-24 | End: 2017-04-27 | Stop reason: HOSPADM

## 2017-04-24 RX ADMIN — SODIUM CHLORIDE 1000 ML: 0.9 IRRIGANT IRRIGATION at 13:13

## 2017-04-24 RX ADMIN — LIDOCAINE HYDROCHLORIDE 0.3 ML: 10 INJECTION, SOLUTION EPIDURAL; INFILTRATION; INTRACAUDAL; PERINEURAL at 11:05

## 2017-04-24 RX ADMIN — MIDAZOLAM HYDROCHLORIDE 1 MG: 1 INJECTION, SOLUTION INTRAMUSCULAR; INTRAVENOUS at 12:26

## 2017-04-24 RX ADMIN — MIDAZOLAM HYDROCHLORIDE 2 MG: 1 INJECTION, SOLUTION INTRAMUSCULAR; INTRAVENOUS at 10:54

## 2017-04-24 RX ADMIN — METOPROLOL TARTRATE 25 MG: 25 TABLET, FILM COATED ORAL at 20:12

## 2017-04-24 RX ADMIN — PHENYLEPHRINE HYDROCHLORIDE 100 MCG: 10 INJECTION, SOLUTION INTRAMUSCULAR; INTRAVENOUS; SUBCUTANEOUS at 12:45

## 2017-04-24 RX ADMIN — MIDAZOLAM HYDROCHLORIDE 1 MG: 1 INJECTION, SOLUTION INTRAMUSCULAR; INTRAVENOUS at 12:29

## 2017-04-24 RX ADMIN — GENTAMICIN SULFATE 1000 ML: 40 INJECTION, SOLUTION INTRAMUSCULAR; INTRAVENOUS at 13:14

## 2017-04-24 RX ADMIN — ONDANSETRON 4 MG: 2 INJECTION INTRAMUSCULAR; INTRAVENOUS at 14:00

## 2017-04-24 RX ADMIN — Medication 10 MG: at 12:32

## 2017-04-24 RX ADMIN — HYDROMORPHONE HYDROCHLORIDE 0.5 MG: 1 INJECTION, SOLUTION INTRAMUSCULAR; INTRAVENOUS; SUBCUTANEOUS at 20:23

## 2017-04-24 RX ADMIN — ACETAMINOPHEN 975 MG: 325 TABLET, FILM COATED ORAL at 16:46

## 2017-04-24 RX ADMIN — GLYCOPYRROLATE 0.1 MG: 0.2 INJECTION, SOLUTION INTRAMUSCULAR; INTRAVENOUS at 13:21

## 2017-04-24 RX ADMIN — PROPOFOL 100 MCG/KG/MIN: 10 INJECTION, EMULSION INTRAVENOUS at 12:31

## 2017-04-24 RX ADMIN — PHENYLEPHRINE HYDROCHLORIDE 50 MCG: 10 INJECTION, SOLUTION INTRAMUSCULAR; INTRAVENOUS; SUBCUTANEOUS at 13:34

## 2017-04-24 RX ADMIN — BUPIVACAINE HYDROCHLORIDE IN DEXTROSE 13.5 MG: 7.5 INJECTION, SOLUTION SUBARACHNOID at 12:28

## 2017-04-24 RX ADMIN — FENTANYL CITRATE 50 MCG: 50 INJECTION, SOLUTION INTRAMUSCULAR; INTRAVENOUS at 10:54

## 2017-04-24 RX ADMIN — FUROSEMIDE 20 MG: 20 TABLET ORAL at 16:46

## 2017-04-24 RX ADMIN — GLYCOPYRROLATE 0.2 MG: 0.2 INJECTION, SOLUTION INTRAMUSCULAR; INTRAVENOUS at 12:49

## 2017-04-24 RX ADMIN — PROPOFOL 10 MG: 10 INJECTION, EMULSION INTRAVENOUS at 13:09

## 2017-04-24 RX ADMIN — PHENYLEPHRINE HYDROCHLORIDE 50 MCG: 10 INJECTION, SOLUTION INTRAMUSCULAR; INTRAVENOUS; SUBCUTANEOUS at 13:45

## 2017-04-24 RX ADMIN — ASPIRIN 81 MG 81 MG: 81 TABLET ORAL at 16:46

## 2017-04-24 RX ADMIN — ROPIVACAINE HYDROCHLORIDE 40 ML GIVEN: 5 INJECTION, SOLUTION EPIDURAL; INFILTRATION; PERINEURAL at 11:03

## 2017-04-24 RX ADMIN — ACETAMINOPHEN 975 MG: 325 TABLET, FILM COATED ORAL at 23:42

## 2017-04-24 RX ADMIN — PHENYLEPHRINE HYDROCHLORIDE 50 MCG: 10 INJECTION, SOLUTION INTRAMUSCULAR; INTRAVENOUS; SUBCUTANEOUS at 13:23

## 2017-04-24 RX ADMIN — TRANEXAMIC ACID 1 G: 100 INJECTION, SOLUTION INTRAVENOUS at 12:47

## 2017-04-24 RX ADMIN — GLYCOPYRROLATE 0.1 MG: 0.2 INJECTION, SOLUTION INTRAMUSCULAR; INTRAVENOUS at 13:19

## 2017-04-24 RX ADMIN — PHENYLEPHRINE HYDROCHLORIDE 50 MCG: 10 INJECTION, SOLUTION INTRAMUSCULAR; INTRAVENOUS; SUBCUTANEOUS at 13:54

## 2017-04-24 RX ADMIN — FENTANYL CITRATE 50 MCG: 50 INJECTION, SOLUTION INTRAMUSCULAR; INTRAVENOUS at 13:09

## 2017-04-24 RX ADMIN — SODIUM CHLORIDE, POTASSIUM CHLORIDE, SODIUM LACTATE AND CALCIUM CHLORIDE: 600; 310; 30; 20 INJECTION, SOLUTION INTRAVENOUS at 13:26

## 2017-04-24 RX ADMIN — FENTANYL CITRATE 50 MCG: 50 INJECTION, SOLUTION INTRAMUSCULAR; INTRAVENOUS at 12:26

## 2017-04-24 RX ADMIN — HYDROMORPHONE HYDROCHLORIDE 0.5 MG: 1 INJECTION, SOLUTION INTRAMUSCULAR; INTRAVENOUS; SUBCUTANEOUS at 23:10

## 2017-04-24 RX ADMIN — ACETAMINOPHEN 1000 MG: 500 TABLET, FILM COATED ORAL at 11:03

## 2017-04-24 RX ADMIN — SODIUM CHLORIDE, POTASSIUM CHLORIDE, SODIUM LACTATE AND CALCIUM CHLORIDE: 600; 310; 30; 20 INJECTION, SOLUTION INTRAVENOUS at 12:15

## 2017-04-24 RX ADMIN — Medication 10 MG: at 12:45

## 2017-04-24 RX ADMIN — HYDROMORPHONE HYDROCHLORIDE 4 MG: 2 TABLET ORAL at 17:32

## 2017-04-24 RX ADMIN — CLINDAMYCIN PHOSPHATE 900 MG: 18 INJECTION, SOLUTION INTRAVENOUS at 20:13

## 2017-04-24 RX ADMIN — POTASSIUM CHLORIDE, DEXTROSE MONOHYDRATE AND SODIUM CHLORIDE: 150; 5; 450 INJECTION, SOLUTION INTRAVENOUS at 16:46

## 2017-04-24 RX ADMIN — HYDROMORPHONE HYDROCHLORIDE 4 MG: 2 TABLET ORAL at 21:33

## 2017-04-24 RX ADMIN — CLINDAMYCIN PHOSPHATE 900 MG: 18 INJECTION, SOLUTION INTRAVENOUS at 12:43

## 2017-04-24 RX ADMIN — DEXAMETHASONE SODIUM PHOSPHATE 4 MG: 4 INJECTION, SOLUTION INTRA-ARTICULAR; INTRALESIONAL; INTRAMUSCULAR; INTRAVENOUS; SOFT TISSUE at 13:16

## 2017-04-24 RX ADMIN — SIMVASTATIN 40 MG: 40 TABLET, FILM COATED ORAL at 20:12

## 2017-04-24 NOTE — ANESTHESIA PROCEDURE NOTES
Peripheral nerve/Neuraxial procedure note : intrathecal  Pre-Procedure  Performed by WILIAN MIRANDA  Location: OR      Pre-Anesthestic Checklist: patient identified, IV checked, risks and benefits discussed, informed consent, monitors and equipment checked and pre-op evaluation    Timeout  Correct Patient: Yes   Correct Procedure: Yes   Correct Site: Yes   Correct Laterality: N/A   Correct Position: Yes   Site Marked: N/A   .   Procedure Documentation    .    Procedure:    Intrathecal.  Insertion Site:L3-4  (left paramedian approach)      Patient Prep;povidone-iodine 7.5% surgical scrub.  .  Needle: (). # of attempts: 1 and 3. # of redirects:. Spinal Needle: Mckenzie-Keenan 24 G. 3.5 in.  . . .     Assessment/Narrative  Paresthesias: No.  .  .  clear CSF fluid removed . Comments:  Subarachnoid Block  1.8 ml 0.75% bupivacaine with dextrose and 100 mcg epinephrine

## 2017-04-24 NOTE — IP AVS SNAPSHOT
[address] [facility]    [address] [place] [address]    Phone:  [phone]                                       After Visit Summary   4/24/2017    María Henderson    MRN: [id number]           After Visit Summary Signature Page     I have received my discharge instructions, and my questions have been answered. I have discussed any challenges I see with this plan with the nurse or doctor.    ..........................................................................................................................................  Patient/Patient Representative Signature      ..........................................................................................................................................  Patient Representative Print Name and Relationship to Patient    ..................................................               ................................................  Date                                            Time    ..........................................................................................................................................  Reviewed by Signature/Title    ...................................................              ..............................................  Date                                                            Time

## 2017-04-24 NOTE — ANESTHESIA CARE TRANSFER NOTE
Patient: María Henderson    Procedure(s):  REMOVAL ANTIBIOTIC SPACER RIGHT KNEE REIMPLANT TOTAL JOINT COMPONENT TO RIGHT KNEE  - Wound Class: II-Clean Contaminated   - Wound Class: I-Clean    Diagnosis: STATUS POST RIGHT TOTAL KNEE INFECTION   Diagnosis Additional Information: No value filed.    Anesthesia Type:   Spinal, Periph. Nerve Block for postop pain     Note:  Airway :Face Mask  Patient transferred to:PACU  Comments: To PACU, awake, comfortable, and stable.      Vitals: (Last set prior to Anesthesia Care Transfer)    CRNA VITALS  4/24/2017 1355 - 4/24/2017 1433      4/24/2017             Pulse: 71    SpO2: 95 %    Resp Rate (set): 10                Electronically Signed By: DOMINIQUE Khan CRNA  April 24, 2017  2:33 PM

## 2017-04-24 NOTE — IP AVS SNAPSHOT
MRN:[id number]                      After Visit Summary   4/24/2017    María Henderson    MRN: [id number]           Thank you!     Thank you for choosing [facility] for your care. Our goal is always to provide you with excellent care. Hearing back from our patients is one way we can continue to improve our services. Please take a few minutes to complete the written survey that you may receive in the mail after you visit with us. Thank you!        Patient Information     Date Of Birth          1945        Designated Caregiver       Most Recent Value    Caregiver    Will someone help with your care after discharge? yes    Name of designated caregiver see demographic    Phone number of caregiver see demographic    Caregiver address see demographic      About your hospital stay     You were admitted on:  April 24, 2017 You last received care in the:  [facility] [address] [facility]    You were discharged on:  April 27, 2017        Reason for your hospital stay       Reimplantation left total knee replacement                  Who to Call     For medical emergencies, please call 911.  For non-urgent questions about your medical care, please call your primary care provider or clinic, [phone]  For questions related to your surgery, please call your surgery clinic        Attending Provider     Provider Specialty    Husam Berkowitz MD Orthopedics       Primary Care Provider Office Phone # Fax #    Thad Puri [phone]       [facility] [address]  [place] [address]        After Care Instructions     Activity       Your activity upon discharge: activity as tolerated-use a walker, and do not bear weight without her knee immobilizer in place. Do not bend your knee greater than 60 .            Diet       Follow this diet upon discharge: Orders Placed This Encounter      Advance Diet as Tolerated: Regular Diet Adult             "Wound care and dressings       Instructions to care for your wound at home: daily dressing changes.                  Follow-up Appointments     Follow Up and recommended labs and tests       Your Physician has ordered Home Care. You have chosen to use:  [facility]  Please call them with any questions.  [phone]            Follow-up and recommended labs and tests        Follow up with me,  Husam Berkowitz, within 2 weeks. to evaluate after surgery.  No follow up labs or test are needed.                  Your next 10 appointments already scheduled     May 17, 2017 11:15 AM CDT   [facility] EP RETURN with Joaquim Billings MD   [facility] [place] ([facility])    [address] [place] [id number] [phone]              Additional Services     Home Care PT Referral for Hospital Discharge       PT to eval and treat    Your provider has ordered home care - physical therapy. If you have not been contacted within 2 days of your discharge please call the department phone number listed on the top of this document.    Knee range of motion 0-60 , did not bend the knee greater than 60 . She must wear her knee immobilizer to bear weight                  Further instructions from your care team       TOTAL KNEE REPLACEMENT TAKE HOME INSTRUCTIONS  Your surgeon will answer any questions about your progress. General guidelines for your care are listed below. Your surgeon may give additional instructions for your care at home. Please follow them carefully    Activity Level  1. Physical activity may be resumed gradually according to your comfort level and your surgeon s instructions. Follow your exercise program as instructed by your therapist. Do exercises at least twice a day. you may ice your knee after exercising.  2. Complete exercises two hours before bedtime to minimize the effect pain may have on sleep.  Refer to pages 19-22 of you \"Total Knee " "Replacement\" booklet for details  3. Do not wear your knee immobilizer unless your doctor has specifically told you to continue it.    Good Health Practices  1. Maintain an adequate fluid intake and eat a well balanced diet.  2. Be sure to include the basic food groups, such as dairy products, meat/fish, vegetables, and fruit. Each of these foods contribute to your wound healing and increasing your strength.  3. Surgery, decreased activity and pain medication all contribute to a decrease in bowel activity that can result in constipation. It is recommended that you increase your liquid intake, add fiber to your diet, increase activity, and decrease pain medication use. If you have any problems, notify your physician.  4. Wear your anti-embolism stockings day and night until seen by your surgeon if you were issued these at discharge.  (Not all patients will have anti-embolism stockings) Remove twice a day for one hour at a time. You may hand wash and air dry your stockings.  5. Notify your dentist of your total knee surgery and call your dentist one week before a dental appointment for antibiotics.    Incision/Dressing Care  1. Keep incision clean and dry.  2. Cover incision if you are still having drainage.    Things to Watch For  1. Check incision daily for increased redness, tenderness, swelling, or drainage along the incision line. If these occur, please notify your doctor. Also, call if you develop a fever above 101 .  2. Please notify your doctor if you experience any calf pain and/or if you have surgical pain not relieved by the pain medication prescribed by your doctor.      Pending Results     Date and Time Order Name Status Description    4/24/2017 1259 Fluid Culture Aerobic Bacterial Preliminary     4/24/2017 1259 Anaerobic bacterial culture Preliminary             Statement of Approval     Ordered          04/26/17 0753  I have reviewed and agree with all the recommendations and orders detailed in this " "document.  EFFECTIVE NOW     Approved and electronically signed by:  Husam Berkowitz MD             Admission Information     Date & Time Department Dept. Phone    2017 [facility] [phone]      Your Vitals Were     Blood Pressure Pulse Temperature Respirations Height Weight    128/66 (BP Location: Left arm) 73 98.7  F (37.1  C) (Oral) 18 1.676 m (5' 6\") 75.3 kg (166 lb)    Pulse Oximetry BMI (Body Mass Index)                94% 26.79 kg/m2          [employer] Information     [employer] lets you send messages to your doctor, view your test results, renew your prescriptions, schedule appointments and more. To sign up, go to [email or web address] . Click on \"Log in\" on the left side of the screen, which will take you to the Welcome page. Then click on \"Sign up Now\" on the right side of the page.     You will be asked to enter the access code listed below, as well as some personal information. Please follow the directions to create your username and password.     Your access code is: [email or web address]  Expires: 2017  7:22 AM     Your access code will  in 90 days. If you need help or a new code, please call your [facility] clinic or [phone].        Care EveryWhere ID     This is your Care EveryWhere ID. This could be used by other organizations to access your [facility] medical records  [id number]           Review of your medicines      START taking        Dose / Directions    enoxaparin 40 MG/0.4ML injection   Commonly known as:  LOVENOX   Used for:  Status post total left knee replacement        Dose:  40 mg   Inject 0.4 mLs (40 mg) Subcutaneous every 24 hours for 14 days   Quantity:  5.6 mL   Refills:  0       HYDROmorphone 2 MG tablet   Commonly known as:  DILAUDID   Used for:  Status post total left knee replacement        Dose:  2-4 mg   Take 1-2 tablets (2-4 mg) by mouth every 4 hours as needed for moderate to severe pain   Quantity:  60 tablet   Refills:  0 "       polyethylene glycol Packet   Commonly known as:  MIRALAX/GLYCOLAX   Used for:  Status post total left knee replacement        Dose:  17 g   Take 17 g by mouth daily   Quantity:  12 packet   Refills:  0         CONTINUE these medicines which have NOT CHANGED        Dose / Directions    ASPIRIN PO        Dose:  81 mg   Take 81 mg by mouth daily   Refills:  0       CALCIUM PO   Notes to Patient:  Resume at discharge        Dose:  600 mg   Take 600 mg by mouth daily   Refills:  0       LASIX PO        Dose:  20 mg   Take 20 mg by mouth daily   Refills:  0       metoprolol 25 MG tablet   Commonly known as:  LOPRESSOR   Used for:  Paroxysmal supraventricular tachycardia (H)        Dose:  25 mg   Take 1 tablet (25 mg) by mouth 2 times daily   Quantity:  60 tablet   Refills:  0       multivitamin, therapeutic with minerals Tabs tablet   Notes to Patient:  Resume at discharge        Dose:  1 tablet   Take 1 tablet by mouth daily.   Refills:  0       * order for DME   Used for:  Pain in joint, ankle and foot, left        Equipment being ordered: Walker Wheels () and Walker () Treatment Diagnosis: difficulty with gait   Quantity:  1 each   Refills:  0       * order for DME   Used for:  Sepsis, due to unspecified organism (H)        Equipment being ordered: Walker Wheels () and Walker () Treatment Diagnosis: impaired gait   Quantity:  1 each   Refills:  0       OSTEO BI-FLEX REGULAR STRENGTH PO   Notes to Patient:  Resume at discharge        Dose:  1 tablet   Take 1 tablet by mouth daily   Refills:  0       POTASSIUM PO   Notes to Patient:  Resume at discharge        Dose:  595 mg   Take 595 mg by mouth every morning   Refills:  0       TRUBIOTICS PO   Notes to Patient:  Resume at discharge        Dose:  1 tablet   Take 1 tablet by mouth daily   Refills:  0       TYLENOL PO        Dose:  1300 mg   Take 1,300 mg by mouth At Bedtime   Refills:  0       valsartan-hydrochlorothiazide 80-12.5 MG per tablet    Commonly known as:  DIOVAN-HCT        Dose:  1 tablet   Take 1 tablet by mouth daily   Refills:  0       VITAMIN D3 PO   Notes to Patient:  Resume at discharge        Dose:  400 Units   Take 400 Units by mouth daily   Refills:  0       ZOCOR 40 MG tablet   Generic drug:  simvastatin        Dose:  40 mg   Take 40 mg by mouth every evening   Refills:  0       * Notice:  This list has 2 medication(s) that are the same as other medications prescribed for you. Read the directions carefully, and ask your doctor or other care provider to review them with you.         Where to get your medicines      These medications were sent to [facility] [address] [place] [address] - [address] AT [place] [address] [address]  [address], [facility] [address]     Phone:  [phone]     enoxaparin 40 MG/0.4ML injection    polyethylene glycol Packet         Some of these will need a paper prescription and others can be bought over the counter. Ask your nurse if you have questions.     Bring a paper prescription for each of these medications     HYDROmorphone 2 MG tablet                Protect others around you: Learn how to safely use, store and throw away your medicines at www.disposemymeds.org.             Medication List: This is a list of all your medications and when to take them. Check marks below indicate your daily home schedule. Keep this list as a reference.      Medications           Morning Afternoon Evening Bedtime As Needed    ASPIRIN PO   Take 81 mg by mouth daily   Last time this was given:  81 mg on 4/27/2017  8:30 AM   Next Dose Due:  4/28/17                                   CALCIUM PO   Take 600 mg by mouth daily   Notes to Patient:  Resume at discharge                                enoxaparin 40 MG/0.4ML injection   Commonly known as:  LOVENOX   Inject 0.4 mLs (40 mg) Subcutaneous every 24 hours for 14 days   Last time this was given:  40 mg on 4/27/2017 11:06 AM   Next Dose Due:   4/28/17                                   HYDROmorphone 2 MG tablet   Commonly known as:  DILAUDID   Take 1-2 tablets (2-4 mg) by mouth every 4 hours as needed for moderate to severe pain   Last time this was given:  4 mg on 4/27/2017  8:30 AM                                   LASIX PO   Take 20 mg by mouth daily   Last time this was given:  20 mg on 4/27/2017  8:30 AM   Next Dose Due:  4/28/17                                   metoprolol 25 MG tablet   Commonly known as:  LOPRESSOR   Take 1 tablet (25 mg) by mouth 2 times daily   Last time this was given:  25 mg on 4/27/2017  8:30 AM   Next Dose Due:  4/27/17                                   multivitamin, therapeutic with minerals Tabs tablet   Take 1 tablet by mouth daily.   Notes to Patient:  Resume at discharge                                * order for DME   Equipment being ordered: Walker Wheels () and Walker () Treatment Diagnosis: difficulty with gait                                * order for DME   Equipment being ordered: Walker Wheels () and Walker () Treatment Diagnosis: impaired gait                                OSTEO BI-FLEX REGULAR STRENGTH PO   Take 1 tablet by mouth daily   Notes to Patient:  Resume at discharge                                polyethylene glycol Packet   Commonly known as:  MIRALAX/GLYCOLAX   Take 17 g by mouth daily   Last time this was given:  17 g on 4/27/2017  8:30 AM   Next Dose Due:  4/28/17                                   POTASSIUM PO   Take 595 mg by mouth every morning   Notes to Patient:  Resume at discharge                                TRUBIOTICS PO   Take 1 tablet by mouth daily   Notes to Patient:  Resume at discharge                                TYLENOL PO   Take 1,300 mg by mouth At Bedtime   Last time this was given:  975 mg on 4/27/2017  8:29 AM   Next Dose Due:  4/27/17                                   valsartan-hydrochlorothiazide 80-12.5 MG per tablet   Commonly known as:  DIOVAN-HCT    Take 1 tablet by mouth daily   Last time this was given:  1 tablet on 4/27/2017  8:30 AM   Next Dose Due:  4/28/17                                   VITAMIN D3 PO   Take 400 Units by mouth daily   Notes to Patient:  Resume at discharge                                ZOCOR 40 MG tablet   Take 40 mg by mouth every evening   Last time this was given:  40 mg on 4/26/2017  9:06 PM   Generic drug:  simvastatin   Next Dose Due:  4/27/17                                   * Notice:  This list has 2 medication(s) that are the same as other medications prescribed for you. Read the directions carefully, and ask your doctor or other care provider to review them with you.

## 2017-04-24 NOTE — IP AVS SNAPSHOT
[place] [address] [facility]: [phone]            Medication Administration Report for María Henderson as of 04/27/17 1219   Legend:    Given Hold Not Given Due Canceled Entry Other Actions    Time Time (Time) Time  Time-Action       Inactive    Active    Linked        Medications 04/21/17 04/22/17 04/23/17 04/24/17 04/25/17 04/26/17 04/27/17    - MEDICATION INSTRUCTIONS -  Freq: CONTINUOUS PRN Route: XX  Start: 04/24/17 1557   Admin Instructions: - All continuous nerve block medications must be preservative free  - All orders must be compounded in preservative free Normal Saline               acetaminophen (TYLENOL) tablet 650 mg  Dose: 650 mg Freq: EVERY 4 HOURS PRN Route: PO  PRN Reason: other  PRN Comment: surgical pain  Start: 04/27/17 0000   Admin Instructions: May give first dose 4 hours after last dose scheduled of acetaminophen.  Maximum acetaminophen dose from all sources = 75 mg/kg/day not to exceed 4 grams/day.               Anticoagulation allowed by Anesthesia Provider   Freq: CONTINUOUS PRN Route: XX  Start: 04/24/17 1557   Admin Instructions: Surgeon or Primary service may order anticoagulation or pharmacy consult for anticoagulation if therapy is indicated.               aspirin chewable tablet 81 mg  Dose: 81 mg Freq: DAILY Route: PO  Start: 04/24/17 1600       1646 (81 mg)-Given        0806 (81 mg)-Given        0845 (81 mg)-Given        0830 (81 mg)-Given           benzocaine-menthol (CHLORASEPTIC) 6-10 MG lozenge 1-2 lozenge  Dose: 1-2 lozenge Freq: EVERY 1 HOUR PRN Route: BU  PRN Reason: sore throat  PRN Comment: sore throat without fever  Start: 04/24/17 1557              dextrose 5% and 0.45% NaCl + KCl 20 mEq/L infusion  Rate: 125 mL/hr Freq: CONTINUOUS Route: IV  Start: 04/24/17 1600   Admin Instructions: Change to saline lock when PO well tolerated.        1646 ( )-New Bag        0138 ( )-New Bag       1419 ( )-New Bag             diphenhydrAMINE  (BENADRYL) solution 12.5 mg  Dose: 12.5 mg Freq: EVERY 6 HOURS PRN Route: PO  PRN Reason: itching  Start: 04/24/17 1557   Admin Instructions: Caution to be used when administering multiple CNS depressing meds within a short time frame.         0538 (12.5 mg)-Given       2107 (12.5 mg)-Given        1105 (12.5 mg)-Given           Or  diphenhydrAMINE (BENADRYL) injection 12.5 mg  Dose: 12.5 mg Freq: EVERY 6 HOURS PRN Route: IV  PRN Reason: itching  PRN Comment: Only give if patient unable to take PO.  Start: 04/24/17 1557   Admin Instructions: Caution to be used when administering multiple CNS depressing meds within a short time frame.                                    enoxaparin (LOVENOX) injection 40 mg  Dose: 40 mg Freq: EVERY 24 HOURS Route: SC  Start: 04/25/17 1200   Admin Instructions: Check to make sure start date/time is 12-24 hours post op unless documented complication, AND no sooner than 22 hours post op if spinal anesthesia used.   Continue until discharge to home. HOLD if platelet count falls below 50% of baseline or less than 100,000/ L and notify provider.         1248 (40 mg)-Given        1146 (40 mg)-Given        1106 (40 mg)-Given           furosemide (LASIX) tablet 20 mg  Dose: 20 mg Freq: DAILY Route: PO  Start: 04/24/17 1600       1646 (20 mg)-Given        (0952)-Not Given [C]        0845 (20 mg)-Given        0830 (20 mg)-Given           HYDROmorphone (DILAUDID) tablet 2-4 mg  Dose: 2-4 mg Freq: EVERY 4 HOURS PRN Route: PO  PRN Reason: moderate to severe pain  Start: 04/24/17 1557   Admin Instructions: Hold while on PCA or with regular IV opioid dosing.  IF CrCl is UNKNOWN start at lowest end of dosing range.        1732 (4 mg)-Given       2133 (4 mg)-Given        0131 (4 mg)-Given       0531 (4 mg)-Given       0949 (4 mg)-Given       1409 (4 mg)-Given       1841 (4 mg)-Given       2229 (4 mg)-Given        0323 (4 mg)-Given       0729 (4 mg)-Given       1146 (4 mg)-Given       1551 (4  "mg)-Given       2105 (4 mg)-Given        0301 (4 mg)-Given       0830 (4 mg)-Given           HYDROmorphone (PF) (DILAUDID) injection 0.3-0.5 mg  Dose: 0.3-0.5 mg Freq: EVERY 2 HOURS PRN Route: IV  PRN Reason: severe pain  PRN Comment: or if patient unable to take PO  Start: 04/24/17 1557   Admin Instructions: Hold while on PCA.        2023 (0.5 mg)-Given       2310 (0.5 mg)-Given              lidocaine (LMX4) cream  Freq: EVERY 1 HOUR PRN Route: Top  PRN Reason: pain  PRN Comment: with VAD insertion or accessing implanted port.  Start: 04/24/17 1557   Admin Instructions: Do NOT give if patient has a history of allergy to any local anesthetic or any \"shahab\" product.   Apply 30 minutes prior to VAD insertion or port access.  MAX Dose:  2.5 g (  of 5 g tube)               lidocaine 1 % 1 mL  Dose: 1 mL Freq: EVERY 1 HOUR PRN Route: OTHER  PRN Comment: mild pain with VAD insertion or accessing implanted port  Start: 04/24/17 1557   Admin Instructions: Do NOT give if patient has a history of allergy to any local anesthetic or any \"shahab\" product. MAX dose 1 mL subcutaneous OR intradermal in divided doses.               metoclopramide (REGLAN) tablet 5 mg  Dose: 5 mg Freq: EVERY 6 HOURS PRN Route: PO  PRN Reasons: nausea,vomiting  Start: 04/24/17 1557   Admin Instructions: This is Step 3 of nausea and vomiting management.  Give if nausea not resolved 15 minutes after giving prochlorperazine (COMPAZINE).  If nausea not resolved in 15-30 minutes, Notify provider.              Or  metoclopramide (REGLAN) injection 5 mg  Dose: 5 mg Freq: EVERY 6 HOURS PRN Route: IV  PRN Reasons: nausea,vomiting  Start: 04/24/17 1557   Admin Instructions: This is Step 3 of nausea and vomiting management.  Give if nausea not resolved 15 minutes after giving prochlorperazine (COMPAZINE).  If nausea not resolved in 15-30 minutes, Notify provider.  Irritant.               metoprolol (LOPRESSOR) tablet 25 mg  Dose: 25 mg Freq: 2 TIMES DAILY Route: " PO  Start: 04/24/17 2100 2012 (25 mg)-Given        (0952)-Not Given [C]       2050 (25 mg)-Given        0845 (25 mg)-Given       2106 (25 mg)-Given        0830 (25 mg)-Given       [ ] 2100           naloxone (NARCAN) injection 0.1-0.4 mg  Dose: 0.1-0.4 mg Freq: EVERY 2 MIN PRN Route: IV  PRN Reason: opioid reversal  Start: 04/24/17 1557   Admin Instructions: For respiratory rate LESS than or EQUAL to 8.  Partial reversal dose:  0.1 mg titrated q 2 minutes for Analgesia Side Effects Monitoring Sedation Level of 3 (frequently drowsy, arousable, drifts to sleep during conversation).Full reversal dose:  0.4 mg bolus for Analgesia Side Effects Monitoring Sedation Level of 4 (somnolent, minimal or no response to stimulation).               naloxone (NARCAN) injection 0.1-0.4 mg  Dose: 0.1-0.4 mg Freq: EVERY 2 MIN PRN Route: IV  PRN Reason: opioid reversal  Start: 04/24/17 1557   Admin Instructions: For respiratory rate LESS than or EQUAL to 8.  Partial reversal dose:  0.1 mg titrated q 2 minutes for Analgesia Side Effects Monitoring Sedation Level of 3 (frequently drowsy, arousable, drifts to sleep during conversation).Full reversal dose:  0.4 mg bolus for Analgesia Side Effects Monitoring Sedation Level of 4 (somnolent, minimal or no response to stimulation).               No Anticoagulation unless approved by Anesthesia Provider.  Freq: CONTINUOUS PRN Route: XX  Start: 04/24/17 1557   Admin Instructions: .               ondansetron (ZOFRAN-ODT) ODT tab 4 mg  Dose: 4 mg Freq: EVERY 6 HOURS PRN Route: PO  PRN Reason: nausea  Start: 04/24/17 1557   Admin Instructions: This is Step 1 of nausea and vomiting management.  If nausea not resolved in 15 minutes, go to Step 2 prochlorperazine (COMPAZINE). Do not push through foil backing. Peel back foil and gently remove. Place on tongue immediately. Administration with liquid unnecessary              Or  ondansetron (ZOFRAN) injection 4 mg  Dose: 4 mg Freq: EVERY 6 HOURS  PRN Route: IV  PRN Reasons: nausea,vomiting  Start: 04/24/17 1557   Admin Instructions: This is Step 1 of nausea and vomiting management.  If nausea not resolved in 15 minutes, go to Step 2 prochlorperazine (COMPAZINE).  Irritant.               polyethylene glycol (MIRALAX/GLYCOLAX) Packet 17 g  Dose: 17 g Freq: DAILY Route: PO  Start: 04/25/17 0945   Admin Instructions: 1 Packet = 17 grams. Mixed prescribed dose in 8 ounces of water. Follow with 8 oz. of water.         0953 (17 g)-Given        0844 (17 g)-Given        0830 (17 g)-Given           prochlorperazine (COMPAZINE) injection 5 mg  Dose: 5 mg Freq: EVERY 6 HOURS PRN Route: IV  PRN Reasons: nausea,vomiting  Start: 04/24/17 1557   Admin Instructions: This is Step 2 of nausea and vomiting management.   If nausea not resolved in 15 minutes, give metoclopramide (REGLAN) if ordered (step 3 of nausea and vomiting management)              Or  prochlorperazine (COMPAZINE) tablet 5 mg  Dose: 5 mg Freq: EVERY 6 HOURS PRN Route: PO  PRN Reasons: nausea,vomiting  Start: 04/24/17 1557   Admin Instructions: This is Step 2 of nausea and vomiting management.   If nausea not resolved in 15 minutes, give metoclopramide (REGLAN) if ordered (step 3 of nausea and vomiting management)               senna-docusate (SENOKOT-S;PERICOLACE) 8.6-50 MG per tablet 1-2 tablet  Dose: 1-2 tablet Freq: 2 TIMES DAILY Route: PO  Start: 04/24/17 2100   Admin Instructions: Start with 1 tablet PO BID, If no bowel movement in 24 hours, increase to 2 tablets PO BID.  Hold for loose stools.        (2012)-Not Given        (0805)-Not Given       (2050)-Not Given        (0844)-Not Given       (2107)-Not Given        (0830)-Not Given       [ ] 2100           simvastatin (ZOCOR) tablet 40 mg  Dose: 40 mg Freq: EVERY EVENING Route: PO  Start: 04/24/17 2000 2012 (40 mg)-Given        2050 (40 mg)-Given        2106 (40 mg)-Given        [ ] 2000           sodium chloride (PF) 0.9% PF flush 3 mL  Dose: 3  mL Freq: EVERY 8 HOURS Route: IK  Start: 04/24/17 1600   Admin Instructions: And Q1H PRN, to lock peripheral IV dormant line.        (1638)-Not Given       (2302)-Not Given        (0933)-Not Given       (1608)-Not Given        0008 (3 mL)-Given       0844 (3 mL)-Given       1552 (3 mL)-Given        0000 (3 mL)-Given       (0830)-Not Given       [ ] 1600           sodium chloride (PF) 0.9% PF flush 3 mL  Dose: 3 mL Freq: EVERY 1 HOUR PRN Route: IK  PRN Reason: line flush  PRN Comment: for peripheral IV flush post IV meds  Start: 04/24/17 1557              valsartan-hydrochlorothiazide (DIOVAN-HCT) 80-12.5 MG per tablet 1 tablet  Dose: 1 tablet Freq: DAILY Route: PO  Start: 04/25/17 0900        (0953)-Not Given [C]        0844 (1 tablet)-Given        0830 (1 tablet)-Given          Completed Medications  Medications 04/21/17 04/22/17 04/23/17 04/24/17 04/25/17 04/26/17 04/27/17         Dose: 1,000 mL Freq: ONCE Route: IV  Last Dose: 1,000 mL (04/25/17 0949)  Start: 04/25/17 0945   End: 04/25/17 1149        0949 (1,000 mL)-New Bag               Dose: 975 mg Freq: EVERY 8 HOURS Route: PO  Start: 04/24/17 1600   End: 04/27/17 0829   Admin Instructions: Do not use if patient has an active opioid/acetaminophen analgesic order for pain  Maximum acetaminophen dose from all sources = 75 mg/kg/day not to exceed 4 grams/day.        1646 (975 mg)-Given       2342 (975 mg)-Given        0805 (975 mg)-Given       1709 (975 mg)-Given        0007 (975 mg)-Given       0844 (975 mg)-Given       1551 (975 mg)-Given        0035 (975 mg)-Given       0829 (975 mg)-Given             Dose: 900 mg Freq: EVERY 8 HOURS Route: IV  Indications of Use: SURGICAL PROPHYLAXIS  Last Dose: 900 mg (04/25/17 0531)  Start: 04/24/17 2100   End: 04/25/17 0631   Admin Instructions: First post-op dose due 8 hours after intra-op dose, see eMAR.          2013 (900 mg)-New Bag        0531 (900 mg)-New Bag               Dose: 900 mg Freq: PRE-OP/PRE-PROCEDURE  Route: IV  Indications of Use: SURGICAL PROPHYLAXIS  Start: 04/24/17 1004   End: 04/24/17 1313   Admin Instructions: Give first dose within 1 hour PRIOR to incision.        1243 (900 mg)-Given                Dose: 1 g Freq: ONCE Route: IV  Start: 04/24/17 1015   End: 04/24/17 1247   Admin Instructions: Each 1 gram to be infused over 10 minutes.        1104 (1 g)-Handoff       1247 (1 g)-New Bag             Discontinued Medications  Medications 04/21/17 04/22/17 04/23/17 04/24/17 04/25/17 04/26/17 04/27/17         Dose: 900 mg Freq: SEE ADMIN INSTRUCTIONS Route: IV  Indications of Use: SURGICAL PROPHYLAXIS  Start: 04/24/17 1004   End: 04/24/17 1430   Admin Instructions: Intra-Op Dose.  Give every 6 hours while patient in surgery, starting 6 hours after pre-op dose.  DO NOT GIVE intra-op dose if CrCl less than 10 mL/min (on dialysis).  If CrCL less than 50 mL/min, double the time interval between doses.        1430-Med Discontinued            Freq: PRN  Start: 04/24/17 1314   End: 04/24/17 1552       1314 (1,000 mL)-Given [C]       1552-Med Discontinued            Dose: 25-50 mcg Freq: EVERY 2 MIN PRN Route: IV  PRN Reason: other  PRN Comment: acute pain  Start: 04/24/17 1443   End: 04/24/17 1552   Admin Instructions: MAX cumulative dose = 250 mcg.    Use Fentanyl initially, as a short acting agent for acute pain control.  If insufficient, or a longer acting agent is needed, begin Morphine or Hydromorphone if ordered.        1552-Med Discontinued            Dose: 25-50 mcg Freq: PRE-OP/PRE-PROCEDURE Route: IV  Start: 04/24/17 1003   End: 04/24/17 1430   Admin Instructions: Give in pre-op        1054 (50 mcg)-Given       1430-Med Discontinued            Dose: 0.3-0.5 mg Freq: EVERY 5 MIN PRN Route: IV  PRN Reason: other  PRN Comment: acute pain.  May administer if Respiratory Rate is greater than 10  Start: 04/24/17 1443   End: 04/24/17 1552   Admin Instructions: If fentanyl is also ordered, use HYDROmorphone if pain  "control insufficient with fentanyl or a longer acting agent is needed.   Max cumulative dose = 2 mg        1552-Med Discontinued            Rate: 100 mL/hr Freq: CONTINUOUS Route: IV  Start: 04/24/17 1015   End: 04/24/17 1430       1215 ( )-New Bag       1326 ( )-New Bag       1416 ( )-Anesthesia Volume Adjustment       1430-Med Discontinued            Rate: 25 mL/hr Freq: CONTINUOUS Route: IV  Start: 04/24/17 1015   End: 04/24/17 1430   Admin Instructions: IF patient NOT on dialysis.               1430-Med Discontinued            Dose: 1 mL Freq: EVERY 1 HOUR PRN Route: OTHER  PRN Comment: mild pain with VAD insertion or accessing implanted port  Start: 04/24/17 1004   End: 04/24/17 1430   Admin Instructions: Do NOT give if patient has a history of allergy to any local anesthetic or any \"shahab\" product. MAX dose 1 mL subcutaneous OR intradermal in divided doses.        1105 (0.3 mL)-Given       1430-Med Discontinued            Dose: 12.5 mg Freq: EVERY 5 MIN PRN Route: IV  PRN Comment: post anesthesia shivering if Respiratory Rate greater than 10  Start: 04/24/17 1443   End: 04/24/17 1552       1552-Med Discontinued            Dose: 4 mg Freq: EVERY 30 MIN PRN Route: PO  PRN Reason: nausea  Start: 04/24/17 1443   End: 04/24/17 1552   Admin Instructions: MAX total dose = 8 mg, including OR dosing. If not resolved in 15 minutes, then go to step 2 (Prochlorperazine if ordered).        1552-Med Discontinued         Or    Dose: 4 mg Freq: EVERY 30 MIN PRN Route: IV  PRN Reason: nausea  Start: 04/24/17 1443   End: 04/24/17 1552   Admin Instructions: MAX total dose = 8 mg, including OR dosing. If not resolved in 15 minutes, then go to step 2 (Prochlorperazine if ordered).  Irritant.        1552-Med Discontinued            Dose: 5 mg Freq: EVERY 6 HOURS PRN Route: IV  PRN Reasons: nausea,vomiting  Start: 04/24/17 1443   End: 04/24/17 1552   Admin Instructions: This is Step 2 of the nausea and vomiting protocol.   If nausea " "not resolved in 15 minutes, give Metoclopramide if ordered (step 3 of nausea and vomiting protocol)          1552-Med Discontinued            Freq: Elastomeric Pump Route: Top  Start: 04/24/17 1115   End: 04/25/17 1425   Admin Instructions: Type of Catheter: Femoral  Max rate: 14 mL/hr for single pump device  Location:  right  Instructions:  Nurse may increase Nerve Block Catheter rate by 2 mL/hr Q 30 min to indicated maximum if patient complains of pain at surgical site. and Nurse may decrease  Nerve Block Catheter rate by 2 mL/hr Q 30 min to indicated minimum if patient complains of extremity feeling \"Too numb\".        1519 ( )-New Bag       2230 ( )-Rate/Dose Change              2343 ( )-Rate/Dose Verify               0801 ( )-Rate/Dose Verify              1425-Med Discontinued           Dose: 3 mL Freq: EVERY 1 HOUR PRN Route: IK  PRN Reason: line flush  PRN Comment: for peripheral IV flush post IV meds  Start: 04/24/17 1004   End: 04/24/17 1430       1430-Med Discontinued            Freq: PRN  Start: 04/24/17 1313   End: 04/24/17 1552       1313 (1,000 mL)-Given [C]       1552-Med Discontinued       Medications 04/21/17 04/22/17 04/23/17 04/24/17 04/25/17 04/26/17 04/27/17        "

## 2017-04-24 NOTE — IP AVS SNAPSHOT
"[place] [address] [facility]: [phone]                                              INTERAGENCY TRANSFER FORM - PHYSICIAN ORDERS   2017                    Hospital Admission Date: 2017  KE LIZ   : 1945  Sex: Female        Attending Provider: (none)    Allergies:  Pcn [Penicillin V Potassium], Oxycodone, Sulfa Drugs, Valium [Diazepam], Vicodin [Hydrocodone-acetaminophen], Iodine Solution [Povidone Iodine]    Infection:  None   Service:  ORTHOPEDICS    Ht:  1.676 m (5' 6\")   Wt:  75.3 kg (166 lb)   Admission Wt:  73.9 kg (163 lb)    BMI:  26.79 kg/m 2   BSA:  1.87 m 2            Patient PCP Information     Provider PCP Type    Thad Puri MD General      ED Clinical Impression     Diagnosis Description Comment Added By Time Added    Status post total left knee replacement [Z96.652] Status post total left knee replacement [Z96.652]  Husam Berkowitz MD 2017  7:47 AM      Hospital Problems as of 2017              Priority Class Noted POA    Knee joint replacement status Medium  2017 Yes      Non-Hospital Problems as of 2017              Priority Class Noted    S/P foot surgery    10/24/2011    Syncope   10/25/2011    Hypertension   10/25/2011    Bradycardia   10/25/2011    Hyperlipidemia LDL goal <130   10/25/2011    Pain in joint, ankle and foot   2012    Sprain of neck   2012    Sprain and strain of shoulder and upper arm   2012    Elbow strain   2012    Wrist strain   2012    S/P ankle fusion   3/10/2014    Cellulitis Medium  2016    Sepsis (H) Medium  2016    Septic joint of left knee joint (H) Medium  2017    Left hip pain Medium  3/22/2017    SVT (supraventricular tachycardia) (H) Medium  2017      Code Status History     Date Active Date Inactive Code Status Order ID Comments User Context    2017  7:52 AM  Full Code [id number]  Husam Berkowitz MD Outpatient    " 4/24/2017  3:57 PM 4/26/2017  7:52 AM Full Code [id number]  Husam Berkowitz MD Inpatient    3/25/2017 12:04 PM 4/24/2017  3:57 PM Full Code [id number]  Lewis Crockett MD Outpatient    3/22/2017  1:05 AM 3/25/2017 12:04 PM Full Code [id number]  Alyson Ledezma MD Inpatient    2/23/2017  7:32 AM 3/22/2017  1:05 AM Full Code [id number]  Husam Berkowitz MD Outpatient    2/22/2017  7:56 AM 2/23/2017  7:32 AM Full Code [id number]  Husam Berkowitz MD Outpatient    2/20/2017  3:26 PM 2/22/2017  7:56 AM Full Code [id number]  Husam Berkowitz MD Inpatient    1/2/2017  1:51 PM 2/20/2017  3:26 PM Full Code [id number]  Masoud Wallis DO Outpatient    12/26/2016 12:12 AM 12/27/2016  7:28 PM Full Code [id number]  Sujey Hu MD Inpatient    3/10/2014  1:59 PM 3/12/2014  3:57 PM Full Code [id number]  Hallie Musa RN Inpatient    10/25/2011  4:23 PM 10/26/2011  7:40 PM Full Code [id number]  Garrett Perez DO Inpatient         Medication Review      START taking        Dose / Directions Comments    enoxaparin 40 MG/0.4ML injection   Commonly known as:  LOVENOX   Used for:  Status post total left knee replacement        Dose:  40 mg   Inject 0.4 mLs (40 mg) Subcutaneous every 24 hours for 14 days   Quantity:  5.6 mL   Refills:  0        HYDROmorphone 2 MG tablet   Commonly known as:  DILAUDID   Used for:  Status post total left knee replacement        Dose:  2-4 mg   Take 1-2 tablets (2-4 mg) by mouth every 4 hours as needed for moderate to severe pain   Quantity:  60 tablet   Refills:  0        polyethylene glycol Packet   Commonly known as:  MIRALAX/GLYCOLAX   Used for:  Status post total left knee replacement        Dose:  17 g   Take 17 g by mouth daily   Quantity:  12 packet   Refills:  0          CONTINUE these medications which have NOT CHANGED        Dose / Directions Comments    ASPIRIN PO        Dose:  81 mg   Take 81 mg by mouth daily   Refills:  0         CALCIUM PO   Notes to Patient:  Resume at discharge        Dose:  600 mg   Take 600 mg by mouth daily   Refills:  0        LASIX PO        Dose:  20 mg   Take 20 mg by mouth daily   Refills:  0        metoprolol 25 MG tablet   Commonly known as:  LOPRESSOR   Used for:  Paroxysmal supraventricular tachycardia (H)        Dose:  25 mg   Take 1 tablet (25 mg) by mouth 2 times daily   Quantity:  60 tablet   Refills:  0        multivitamin, therapeutic with minerals Tabs tablet   Notes to Patient:  Resume at discharge        Dose:  1 tablet   Take 1 tablet by mouth daily.   Refills:  0        * order for DME   Used for:  Pain in joint, ankle and foot, left        Equipment being ordered: Walker Wheels () and Walker () Treatment Diagnosis: difficulty with gait   Quantity:  1 each   Refills:  0        * order for DME   Used for:  Sepsis, due to unspecified organism (H)        Equipment being ordered: Walker Wheels () and Walker () Treatment Diagnosis: impaired gait   Quantity:  1 each   Refills:  0        OSTEO BI-FLEX REGULAR STRENGTH PO   Notes to Patient:  Resume at discharge        Dose:  1 tablet   Take 1 tablet by mouth daily   Refills:  0        POTASSIUM PO   Notes to Patient:  Resume at discharge        Dose:  595 mg   Take 595 mg by mouth every morning   Refills:  0        TRUBIOTICS PO   Notes to Patient:  Resume at discharge        Dose:  1 tablet   Take 1 tablet by mouth daily   Refills:  0        TYLENOL PO        Dose:  1300 mg   Take 1,300 mg by mouth At Bedtime   Refills:  0        valsartan-hydrochlorothiazide 80-12.5 MG per tablet   Commonly known as:  DIOVAN-HCT        Dose:  1 tablet   Take 1 tablet by mouth daily   Refills:  0        VITAMIN D3 PO   Notes to Patient:  Resume at discharge        Dose:  400 Units   Take 400 Units by mouth daily   Refills:  0        ZOCOR 40 MG tablet   Generic drug:  simvastatin        Dose:  40 mg   Take 40 mg by mouth every evening   Refills:  0  "       * Notice:  This list has 2 medication(s) that are the same as other medications prescribed for you. Read the directions carefully, and ask your doctor or other care provider to review them with you.              Further instructions from your care team       TOTAL KNEE REPLACEMENT TAKE HOME INSTRUCTIONS  Your surgeon will answer any questions about your progress. General guidelines for your care are listed below. Your surgeon may give additional instructions for your care at home. Please follow them carefully    Activity Level  1. Physical activity may be resumed gradually according to your comfort level and your surgeon s instructions. Follow your exercise program as instructed by your therapist. Do exercises at least twice a day. you may ice your knee after exercising.  2. Complete exercises two hours before bedtime to minimize the effect pain may have on sleep.  Refer to pages 19-22 of you \"Total Knee Replacement\" booklet for details  3. Do not wear your knee immobilizer unless your doctor has specifically told you to continue it.    Good Health Practices  1. Maintain an adequate fluid intake and eat a well balanced diet.  2. Be sure to include the basic food groups, such as dairy products, meat/fish, vegetables, and fruit. Each of these foods contribute to your wound healing and increasing your strength.  3. Surgery, decreased activity and pain medication all contribute to a decrease in bowel activity that can result in constipation. It is recommended that you increase your liquid intake, add fiber to your diet, increase activity, and decrease pain medication use. If you have any problems, notify your physician.  4. Wear your anti-embolism stockings day and night until seen by your surgeon if you were issued these at discharge.  (Not all patients will have anti-embolism stockings) Remove twice a day for one hour at a time. You may hand wash and air dry your stockings.  5. Notify your dentist of your total " knee surgery and call your dentist one week before a dental appointment for antibiotics.    Incision/Dressing Care  1. Keep incision clean and dry.  2. Cover incision if you are still having drainage.    Things to Watch For  1. Check incision daily for increased redness, tenderness, swelling, or drainage along the incision line. If these occur, please notify your doctor. Also, call if you develop a fever above 101 .  2. Please notify your doctor if you experience any calf pain and/or if you have surgical pain not relieved by the pain medication prescribed by your doctor.      Summary of Visit     Reason for your hospital stay       Reimplantation left total knee replacement             After Care     Activity       Your activity upon discharge: activity as tolerated-use a walker, and do not bear weight without her knee immobilizer in place. Do not bend your knee greater than 60 .       Diet       Follow this diet upon discharge: Orders Placed This Encounter      Advance Diet as Tolerated: Regular Diet Adult       Wound care and dressings       Instructions to care for your wound at home: daily dressing changes.             Referrals     Home Care PT Referral for Hospital Discharge       PT to eval and treat    Your provider has ordered home care - physical therapy. If you have not been contacted within 2 days of your discharge please call the department phone number listed on the top of this document.    Knee range of motion 0-60 , did not bend the knee greater than 60 . She must wear her knee immobilizer to bear weight              MD face to face encounter       Documentation of Face to Face and Certification for Home Health Services    I certify that patient: Mraía Henderson is under my care and that I, or a nurse practitioner or physician's assistant working with me, had a face-to-face encounter that meets the physician face-to-face encounter requirements with this patient on: 4/26/2017.    This encounter  with the patient was in whole, or in part, for the following medical condition, which is the primary reason for home health care: reimplantation of total knee replacement.    I certify that, based on my findings, the following services are medically necessary home health services: Physical Therapy.    My clinical findings support the need for the above services because: Physical Therapy Services are needed to assess and treat the following functional impairments: decreased mobility secondary to complex knee surgery.    Further, I certify that my clinical findings support that this patient is homebound (i.e. absences from home require considerable and taxing effort and are for medical reasons or [facility] services or infrequently or of short duration when for other reasons) because: Leaving home is medically contraindicated for the following reason(s): Other physician ordered restriction: she cannot bend her knee or bear weight without any immobilizer placed...    Based on the above findings. I certify that this patient is confined to the home and needs intermittent skilled nursing care, physical therapy and/or speech therapy.  The patient is under my care, and I have initiated the establishment of the plan of care.  This patient will be followed by a physician who will periodically review the plan of care.  Physician/Provider to provide follow up care: Thad Puri    Attending hospital physician (the Medicare certified [place] provider): Husam Berkowitz  Physician Signature: See electronic signature associated with these discharge orders.  Date: 4/26/2017                  Your next 10 appointments already scheduled     May 17, 2017 11:15 AM T   [facility] EP RETURN with Joaquim Billings MD   [facility] PHYSICIANS [facility] ([facility])    [address] [place] [id number] [phone]              Follow-Up Appointment Instructions     Future  Labs/Procedures    Follow Up and recommended labs and tests     Comments:    Your Physician has ordered Home Care. You have chosen to use:  [facility]  Please call them with any questions.  [phone]    Follow-up and recommended labs and tests      Comments:    Follow up with Husam jay, within 2 weeks. to evaluate after surgery.  No follow up labs or test are needed.      Follow-Up Appointment Instructions     Follow Up and recommended labs and tests       Your Physician has ordered Home Care. You have chosen to use:  [facility]  Please call them with any questions.  [phone]       Follow-up and recommended labs and tests        Follow up with me,  Husam Berkowitz, within 2 weeks. to evaluate after surgery.  No follow up labs or test are needed.             Statement of Approval     Ordered          04/26/17 0753  I have reviewed and agree with all the recommendations and orders detailed in this document.  EFFECTIVE NOW     Approved and electronically signed by:  Husam Berkowitz MD

## 2017-04-24 NOTE — PROGRESS NOTES
Admission medication history interview status for the 4/24/2017  admission is complete. See EPIC admission navigator for prior to admission medications     Medication history source reliability:Good    Medication history interview source(s):Patient    Medication history resources (including written lists, pill bottles, clinic record):None    Primary pharmacy. [facility]    Additional medication history information not noted on PTA med list :None    Time spent in this activity: 35 minutes    Prior to Admission medications    Medication Sig Last Dose Taking? Auth Provider   Acetaminophen (TYLENOL PO) Take 1,300 mg by mouth At Bedtime 4/23/2017 at 2200 Yes Reported, Patient   Glucosamine-Chondroitin (OSTEO BI-FLEX REGULAR STRENGTH PO) Take 1 tablet by mouth daily 4/20/2017 at AM Yes Reported, Patient   metoprolol (LOPRESSOR) 25 MG tablet Take 1 tablet (25 mg) by mouth 2 times daily 4/24/2017 at 0500 Yes Yael Reveles,    CALCIUM PO Take 600 mg by mouth daily 4/23/2017 at AM Yes Reported, Patient   Probiotic Product (TRUBIOTICS PO) Take 1 tablet by mouth daily 4/23/2017 at AM Yes Reported, Patient   Furosemide (LASIX PO) Take 20 mg by mouth daily  4/3/2017 at AM Yes Unknown, Entered By History   Cholecalciferol (VITAMIN D3 PO) Take 400 Units by mouth daily 4/23/2017 at AM Yes Unknown, Entered By History   POTASSIUM PO Take 595 mg by mouth every morning  4/24/2017 at 0500 Yes Unknown, Entered By History   valsartan-hydrochlorothiazide (DIOVAN-HCT) 80-12.5 MG per tablet Take 1 tablet by mouth daily 4/24/2017 at 0500 Yes Reported, Patient   ASPIRIN PO Take 81 mg by mouth daily  4/20/2017 at AM Yes Reported, Patient   multivitamin, therapeutic with minerals (THERA-VIT-M) TABS Take 1 tablet by mouth daily. 4/20/2017 at AM Yes Unknown, Entered By History   simvastatin (ZOCOR) 40 MG tablet Take 40 mg by mouth every evening  4/23/2017 at 1800 Yes Reported, Patient   order for DME Equipment being ordered: Walker  Wheels () and Walker ()  Treatment Diagnosis: impaired gait   Jian Fisher MD   order for DME Equipment being ordered: Walker Wheels () and Walker ()  Treatment Diagnosis: difficulty with gait   Jian Fisher MD

## 2017-04-24 NOTE — BRIEF OP NOTE
[facility] Brief Operative Note    Pre-operative diagnosis: STATUS POST RIGHT TOTAL KNEE INFECTION    Post-operative diagnosis status post explantation infected right total knee      Procedure: Procedure(s):  REMOVAL ANTIBIOTIC SPACER RIGHT KNEE REIMPLANT TOTAL JOINT COMPONENT TO RIGHT KNEE  - Wound Class: II-Clean Contaminated   - Wound Class: I-Clean   Surgeon(s): Surgeon(s) and Role:     * Husam Berkowitz MD - Primary     * Kingsley Milton Jr. - Assisting   Estimated blood loss: * No values recorded between 4/24/2017 12:51 PM and 4/24/2017  2:19 PM *    Specimens:   ID Type Source Tests Collected by Time Destination   1 : right knee joint space fluid Fluid Other ANAEROBIC BACTERIAL CULTURE, FLUID CULTURE AEROBIC BACTERIAL, GRAM STAIN Husam Berkowitz MD 4/24/2017 12:59 PM       Findings: Please see full operative note.

## 2017-04-24 NOTE — ANESTHESIA PREPROCEDURE EVALUATION
Anesthesia Evaluation     . Pt has had prior anesthetic.     No history of anesthetic complications          ROS/MED HX    ENT/Pulmonary:      (-) sleep apnea   Neurologic:       Cardiovascular: Comment: H/o PSVT s/p ablation, stable    (+) hypertension----. : . . . :. valvular problems/murmurs .       METS/Exercise Tolerance:     Hematologic:         Musculoskeletal:         GI/Hepatic:     (+) GERD Asymptomatic on medication,       Renal/Genitourinary:         Endo:         Psychiatric:         Infectious Disease:         Malignancy:         Other: Comment: Versed and fentanyl and dilaudid ok per patient                    Physical Exam  Normal systems: dental    Airway   Mallampati: II  TM distance: >3 FB  Neck ROM: limited    Dental     Cardiovascular   Rhythm and rate: regular and normal      Pulmonary    breath sounds clear to auscultation                    Anesthesia Plan      History & Physical Review  History and physical reviewed and following examination; no interval change.    ASA Status:  2 .    NPO Status:  > 8 hours    Plan for Spinal and Periph. Nerve Block for postop pain   PONV prophylaxis:  Ondansetron (or other 5HT-3) and Dexamethasone or Solumedrol       Postoperative Care  Postoperative pain management:  Peripheral nerve block (Continuous).      Consents  Anesthetic plan, risks, benefits and alternatives discussed with:  Patient..                          .

## 2017-04-24 NOTE — IP AVS SNAPSHOT
` ` Patient Information     Patient Name Sex     María Henderson ([id number]) Female 1945       Room Bed    [facility] 5511-02      Patient Demographics     Address Phone E-mail Address    [address] [place] [address] [phone] (Home) *Preferred*  [phone] (Mobile) [email or web address]      Patient Ethnicity & Race     Ethnic Group Patient Race    American White      PCP and Center    Primary Care Provider  Phone Center     Thad Puri [phone] [facility] [address], [place] *        Emergency Contact(s)     Name Relation Home Work Mobile    Dyer, Leon Significant other [phone]      Documents on File        Status Date Received Description       Documents for the Patient    BERTHA Face Sheet Received but Refused Research () 09     Waiver - Payment  09     Privacy Notice - [place]  09     Insurance Card Received 10/25/11     External Medication Information Consent       Patient ID Received 10/24/11     Consent for Services - Hospital/Clinic Received () 10/24/11     Privacy Notice - [place] Received 10/24/11     Patient ID Received 10/25/11     CMS IM for Patient Signature       Consent for EHR Access  13 Copied from existing Consent for services - C/HOD collected on 10/24/2011    HIM YANELI Authorization  13 Chau Law    [facility] Specified Other       Consent for Services - Hospital/Clinic Received () 13     HIM YANELI Authorization  13     HIM YANELI Authorization  13     Patient ID Received 03/10/14     HIM YANELI Authorization  14     Physical Therapy Certification Sent 06/10/14 6/10/14 to 14    HIM YANELI Authorization  14     Consent for Services - Hospital/Clinic Received () 14     HIM YANELI Authorization  14     HIM YANELI Authorization  09/10/14     Business/Insurance/Care Coordination/Health Form - Patient  10/06/14 ADULT  REHABILITATION ATTENDANCE POLICY    Consent to Communicate  10/21/14 AUTHORIZATION TO DISCUSS PROTECTED HEALTH INFORMATION    HIM YANELI Authorization - File Only  10/22/14 AUTHORIZATION FOR E-MAIL COMMUNICATION    Consent for Services/Privacy Notice - Hospital/Clinic Received 12/25/16     HIM YANELI Authorization  01/04/17 Home Healthcare/[facility]    HIM YANELI Authorization  01/12/17     HIM YANELI Authorization  01/31/17     HIM YANELI Authorization  02/28/17     HIM YANELI Authorization  03/06/17     HIM YANELI Authorization  03/15/17     HIM YANELI Authorization  03/21/17     HIM YANELI Authorization  03/29/17     HIM YANELI Authorization  03/30/17     HIM YANELI Authorization  04/12/17     HIM YANELI Authorization  04/25/17     HIM YANELI Authorization  04/26/17        Documents for the Encounter    H/P - History and Physical  04/20/17 FAFP, HISTORY AND PHY 04/13/2017    CMS IM for Patient Signature Received 04/26/17 Ltr not given    Lab Result - [facility] Scan  04/20/17 LABS, FAFP      Admission Information     Attending Provider Admitting Provider Admission Type Admission Date/Time     Husam Berkowitz MD Elective 04/24/17  0907    Discharge Date Hospital Service Auth/Cert Status Service Area    04/27/17 Orthopedics Incomplete [facility]    Unit Room/Bed Admission Status       [facility] ORTHO SPEC UNIT 5511/5511-02 Discharged (Confirmed)       Admission     Complaint    STATUS POST RIGHT TOTAL KNEE INFECTION , Knee joint replacement status      Hospital Account     Name Acct ID Class Status Primary Coverage    María Henderson [id number] Inpatient Open WORK COMP - [facility] [employer]            Guarantor Account (for Hospital Account #[id number])     Name Relation to Pt Service Area Active? Acct Type    , [id number] Self FCS Yes Worker's Compensation    Address Phone          [address] [place] [address] [phone](H)  [phone](O)              Coverage Information (for Hospital  Account #[id number])     F/O Payor/Plan Precert #    WORK COMP/WC [facility] [employer]     Subscriber Subscriber #    Zahra Saavedra [id number]    Address Phone    [address] [address] [phone]

## 2017-04-24 NOTE — ANESTHESIA PROCEDURE NOTES
Peripheral nerve/Neuraxial procedure note : femoral  Pre-Procedure  Performed by WILIAN MIRANDA  Referred by MAIA  Location: pre-op      Pre-Anesthestic Checklist: patient identified, IV checked, site marked, risks and benefits discussed, informed consent, monitors and equipment checked, at physician/surgeon's request and post-op pain management    Timeout  Correct Patient: Yes   Correct Procedure: Yes   Correct Site: Yes   Correct Laterality: Yes   Correct Position: Yes   Site Marked: Yes   .   Procedure Documentation    .    Procedure:    Femoral.  Local skin infiltrated with 5 mL of 1% lidocaine.     Ultrasound used to identify targeted nerve, plexus, or vascular marker and placed a needle adjacent to it., Ultrasound was used to visualize the spread of the anesthetic in close proximity to the above stated nerve. A permanent image is entered into the patient's record.  Patient Prep;mask, sterile gloves, chlorhexidine gluconate and isopropyl alcohol, patient draped.  .  Needle: insulated, short bevel (17 G. 2 in). .  Spinal Needle: . . . Catheter: 20 G . .  Catheter threaded easily  .  .   .    Assessment/Narrative  Paresthesias: No.  .  The placement was negative for: blood aspirated, painful injection and site bleeding.  Bolus given via needle. No blood aspirated via catheter.   Secured via Tunneling.   Complications: none. Comments:  Continuous Femoral Nerve Block  40 ml 0.5% Ropivacaine with 1:400,000 Epinephrine

## 2017-04-24 NOTE — OP NOTE
DATE OF PROCEDURE:   04/24/2017      PREOPERATIVE DIAGNOSIS:  Status post explantation of infected right total knee.      POSTOPERATIVE DIAGNOSIS:  Status post explantation of infected right total knee.      PROCEDURES:   1.  Removal of antibiotic cement spacer.   2.  Reimplantation right total knee.      SURGEON:  Husam Berkowitz Jr, MD      ASSISTANT:   ELENI DeL eon      ANESTHESIA:  Nerve block plus spinal.      DESCRIPTION OF PROCEDURE:  María Henderson received a femoral nerve catheter in the preoperative area.  She is brought into the operating room and spinal anesthesia was induced.  Prophylactic IV antibiotics and tranexamic acid were administered.  A tourniquet was placed about the right thigh.  A Hancock catheter was placed.  She was positioned supine on the operating room table with a sandbag beneath the right buttock.  The right lower extremity was prepped and draped in customary fashion.  The limb was exsanguinated by elevation, and the tourniquet was inflated to 350 mmHg pressure.      Brief timeout was held to verify the procedure, and the laterality.      A longitudinal anterior knee incision was made through her previous longitudinal scar.  Dissection was carried down to the extensor mechanism.  Full-thickness flaps were raised only to the extent necessary.  The knee is entered via a medial parapatellar arthrotomy.  Subperiosteal dissection was carried down the anteromedial proximal tibial metaphysis.      It is immediately obvious, that in order to protect the patellar tendon attachment, we will need to perform a quadriceps plasty.  The lateral limb of that approach was made now.  This allows us to gradually flex the knee enough to remove her antibiotic-impregnated cement spacer.      The bony margins were carefully identified and exposed.  Attention was turned first to the femur.  The intramedullary canal was sounded with a Kocher clamp.  Serial reaming was then performed.  Ultimately, we  "choose a 17 x 120 mm femoral stem.  Based off of this, the cutting guide was placed, and pinned in place.  The distal femur was cut.  The cut dictates that we will use 10 mm distal augments.  Measurements indicate that the best size for the femur will be Biomet  70 mm component.  The cutting block is based off of the intramedullary guide and pinned in place.  Anterior, posterior, chamfer cuts were made.  This was done with a 10 mm augment on the back of the cutting block.      A 70 mm trial with a 120 x 17 stem was then impacted into place.  This was used to make the posterior augment cuts, and also to make the box cuts for a PS knee.  Ultimately, we fashioned the trial with a 70 mm size, medial and lateral 10 mm distal augments, and medial and lateral 5 mm posterior augments, 120 x 17 stem.  It fits very well.      Attention is then turned to the tibia.  Serial reaming is performed up to 13 mm.  The outrigger jig is then based off of the largest reamer, and used to cut a couple millimeters off the top of the tibia to get this back to healthy bone.  We elect to use the 2.5 mm offset, set at the \"10\" position.  The trial was then pinned in place, and the large proximal reamer is used.  We then assemble a trial with the 67 mm tibia, the 2.5 offset, and an 80 x 13 stem.  With both the femoral and tibial trials in place, we get the best combination of motion and stability with a 22 mm PS insert.  This gives us excellent fit and stability.  The flexion gap was assessed with the extensor mechanism and gently clipped in place to make sure that we have good stability and gap matching in both flexion and extension.      All trial components then removed.  Final irrigation and debridement of the bone was performed.  The components were assembled on the back table.  The femur is a Biomet [employer] 360 size 70 femur with a closed box, and a 120 x 17 stem.  As noted above, medial and lateral 5 mm posterior augments and medial " lateral 10 mm distal augments were also used.      The tibia is a 67, with a 2.5 offset and an 80 x 13 stem.  Both components were dry fit, and then cleaned.  The bones are carefully dried with suctioning and sponging.  Both of the above components were cemented in place without difficulty.  A trial insert was placed.  The knee was placed in full extension until the cement is thoroughly hardened.      Based on our preoperative planning, we elect not to resurface the patella.      After the cement is thoroughly cured, trial reductions were again performed.  We confirmed the 22 mm insert with a PS configuration.  This is placed and held with a locking clip.      Two medium Hemovacs were brought out via separate stab incisions.  The capsule was closed with 0 Ethibond figure-of-eight sutures.  We were able to close the entire quadriceps plasty.  With the capsule closed we can safely flex the knee approximately 70 degrees.      The subcutaneous tissues were closed with 2-0 Vicryl and the skin was reapproximated with skin clips.  A bulky sterile dressing, Ace wrap, knee immobilizer were applied.  The patient was taken in satisfactory condition.  Final counts are correct.         MIHAI CARVALHO JR, MD             D: 2017 14:28   T: 2017 18:49   MT: [id number]      Name:     KE LIZ   MRN:      [id number]        Account:        [id number]   :      1945           Procedure Date: 2017      Document: [id number]

## 2017-04-24 NOTE — ANESTHESIA POSTPROCEDURE EVALUATION
Patient: María Henderson    Procedure(s):  REMOVAL ANTIBIOTIC SPACER RIGHT KNEE REIMPLANT TOTAL JOINT COMPONENT TO RIGHT KNEE  - Wound Class: II-Clean Contaminated   - Wound Class: I-Clean    Diagnosis:STATUS POST RIGHT TOTAL KNEE INFECTION   Diagnosis Additional Information: No value filed.    Anesthesia Type:  Spinal, Periph. Nerve Block for postop pain    Note:  Anesthesia Post Evaluation    Patient location during evaluation: PACU  Patient participation: Able to fully participate in evaluation  Level of consciousness: awake  Pain management: adequate  Airway patency: patent  Cardiovascular status: acceptable  Respiratory status: acceptable  Hydration status: acceptable  PONV: none     Anesthetic complications: None          Last vitals:  Vitals:    04/24/17 1431 04/24/17 1440 04/24/17 1450   BP: 119/67 130/70 127/88   Resp: 15 15 12   Temp: 36  C (96.8  F)     SpO2: 99% 96% 96%         Electronically Signed By: Nilda Castro MD  April 24, 2017  3:11 PM

## 2017-04-25 ENCOUNTER — APPOINTMENT (OUTPATIENT)
Dept: PHYSICAL THERAPY | Facility: CLINIC | Age: 72
DRG: 467 | End: 2017-04-25
Attending: ORTHOPAEDIC SURGERY
Payer: OTHER MISCELLANEOUS

## 2017-04-25 LAB — HGB BLD-MCNC: 12.1 G/DL (ref 11.7–15.7)

## 2017-04-25 PROCEDURE — 97110 THERAPEUTIC EXERCISES: CPT | Mod: GP | Performed by: PHYSICAL THERAPIST

## 2017-04-25 PROCEDURE — 12000007 ZZH R&B INTERMEDIATE

## 2017-04-25 PROCEDURE — 25000132 ZZH RX MED GY IP 250 OP 250 PS 637: Performed by: ORTHOPAEDIC SURGERY

## 2017-04-25 PROCEDURE — 97530 THERAPEUTIC ACTIVITIES: CPT | Mod: GP | Performed by: PHYSICAL THERAPIST

## 2017-04-25 PROCEDURE — 97116 GAIT TRAINING THERAPY: CPT | Mod: GP | Performed by: PHYSICAL THERAPIST

## 2017-04-25 PROCEDURE — 40000193 ZZH STATISTIC PT WARD VISIT: Performed by: PHYSICAL THERAPIST

## 2017-04-25 PROCEDURE — 85018 HEMOGLOBIN: CPT | Performed by: ORTHOPAEDIC SURGERY

## 2017-04-25 PROCEDURE — 25000128 H RX IP 250 OP 636: Performed by: ORTHOPAEDIC SURGERY

## 2017-04-25 PROCEDURE — 25000125 ZZHC RX 250: Performed by: ORTHOPAEDIC SURGERY

## 2017-04-25 PROCEDURE — 25800025 ZZH RX 258: Performed by: ORTHOPAEDIC SURGERY

## 2017-04-25 PROCEDURE — S0077 INJECTION, CLINDAMYCIN PHOSP: HCPCS | Performed by: ORTHOPAEDIC SURGERY

## 2017-04-25 PROCEDURE — 36415 COLL VENOUS BLD VENIPUNCTURE: CPT | Performed by: ORTHOPAEDIC SURGERY

## 2017-04-25 PROCEDURE — 97162 PT EVAL MOD COMPLEX 30 MIN: CPT | Mod: GP | Performed by: PHYSICAL THERAPIST

## 2017-04-25 RX ORDER — POLYETHYLENE GLYCOL 3350 17 G/17G
17 POWDER, FOR SOLUTION ORAL DAILY
Status: DISCONTINUED | OUTPATIENT
Start: 2017-04-25 | End: 2017-04-27 | Stop reason: HOSPADM

## 2017-04-25 RX ADMIN — HYDROMORPHONE HYDROCHLORIDE 4 MG: 2 TABLET ORAL at 05:31

## 2017-04-25 RX ADMIN — ENOXAPARIN SODIUM 40 MG: 40 INJECTION SUBCUTANEOUS at 12:48

## 2017-04-25 RX ADMIN — DIPHENHYDRAMINE HYDROCHLORIDE 12.5 MG: 12.5 SOLUTION ORAL at 05:38

## 2017-04-25 RX ADMIN — HYDROMORPHONE HYDROCHLORIDE 4 MG: 2 TABLET ORAL at 09:49

## 2017-04-25 RX ADMIN — METOPROLOL TARTRATE 25 MG: 25 TABLET, FILM COATED ORAL at 20:50

## 2017-04-25 RX ADMIN — ACETAMINOPHEN 975 MG: 325 TABLET, FILM COATED ORAL at 08:05

## 2017-04-25 RX ADMIN — ACETAMINOPHEN 975 MG: 325 TABLET, FILM COATED ORAL at 17:09

## 2017-04-25 RX ADMIN — HYDROMORPHONE HYDROCHLORIDE 4 MG: 2 TABLET ORAL at 22:29

## 2017-04-25 RX ADMIN — POTASSIUM CHLORIDE, DEXTROSE MONOHYDRATE AND SODIUM CHLORIDE: 150; 5; 450 INJECTION, SOLUTION INTRAVENOUS at 14:19

## 2017-04-25 RX ADMIN — POTASSIUM CHLORIDE, DEXTROSE MONOHYDRATE AND SODIUM CHLORIDE: 150; 5; 450 INJECTION, SOLUTION INTRAVENOUS at 01:38

## 2017-04-25 RX ADMIN — HYDROMORPHONE HYDROCHLORIDE 4 MG: 2 TABLET ORAL at 18:41

## 2017-04-25 RX ADMIN — HYDROMORPHONE HYDROCHLORIDE 4 MG: 2 TABLET ORAL at 14:09

## 2017-04-25 RX ADMIN — SIMVASTATIN 40 MG: 40 TABLET, FILM COATED ORAL at 20:50

## 2017-04-25 RX ADMIN — HYDROMORPHONE HYDROCHLORIDE 4 MG: 2 TABLET ORAL at 01:31

## 2017-04-25 RX ADMIN — ASPIRIN 81 MG 81 MG: 81 TABLET ORAL at 08:06

## 2017-04-25 RX ADMIN — POLYETHYLENE GLYCOL 3350 17 G: 17 POWDER, FOR SOLUTION ORAL at 09:53

## 2017-04-25 RX ADMIN — DIPHENHYDRAMINE HYDROCHLORIDE 12.5 MG: 12.5 SOLUTION ORAL at 21:07

## 2017-04-25 RX ADMIN — SODIUM CHLORIDE 1000 ML: 9 INJECTION, SOLUTION INTRAVENOUS at 09:49

## 2017-04-25 RX ADMIN — CLINDAMYCIN PHOSPHATE 900 MG: 18 INJECTION, SOLUTION INTRAVENOUS at 05:31

## 2017-04-25 NOTE — PROGRESS NOTES
"[facility] Progress Note  [facility]    Visit w/ pt and her significant other.  Pt says that Fr. Pichardo visited this morning, per request placed by SH.  Pt is relieved to have this final surgery in her long process of healing after infection and sepsis.  She is also somewhat relieved that the doctor plans to discharge her to home instead of a TCU.  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  SH provided emotional support.    SH remains available upon request.  No plan at this time for f/u.                                                                                                                                           Halima Gage M.Div.  Chaplain Resident  Pager [phone]  "

## 2017-04-25 NOTE — PLAN OF CARE
Problem: Goal Outcome Summary  Goal: Goal Outcome Summary  Pt is a 72 year old female admitted for antibiotic spacer removal and new TKA placement. Pt lives in a rambler with one step to enter ( with a grab bar) with significant other. Pt has a tub but has all necessary equipment for independent ADLs with equipment as needed. Pt was able to do all ADLs independently using walkers, wheelchairs, grab bars etc.. Prior to infection, pt was independent with no AD. Pt has one step into home from garage with grab bar and significant other is able to assist.   Surgeon Discharge Plan: home with home PT     Current Functional Status: Currently the pt requires modA for right LE, Tal of 2 for sit to stand and transfer with walker due to apprehension weight bearing and having multiple lines. Pt tolerated upright posture well. Pt was wearing KI for weight bearing and pt is not flexing knee past 60 degrees. Pt was educated on limitations and demonstrated understanding.     Barriers to Plan/Home: Needs to be able to complete step into home. Pt needs to have some independence with mobility.

## 2017-04-25 NOTE — PROGRESS NOTES
04/25/17 1008   Quick Adds   Type of Visit Initial PT Evaluation   Living Environment   Lives With significant other   Living Arrangements house   Home Accessibility grab bars present (bathtub);grab bars present (toilet);stairs to enter home;tub/shower is not walk in   Number of Stairs to Enter Home 1   Transportation Available family or friend will provide   Living Environment Comment tub bench and grab bar on wall of garage for one step into home.   Self-Care   Usual Activity Tolerance good   Current Activity Tolerance fair   Regular Exercise yes   Activity/Exercise Type walking   Equipment Currently Used at Home bath bench;commode;walker, rolling;wheelchair   Activity/Exercise/Self-Care Comment independent with all ADLs. Prior to infection, no AD needed, but since infection using walker and wheelchair. Pt owns wheelchair, multiple walkers, commode, bed pan, grab bars, bed rail   Functional Level Prior   Ambulation 1-->assistive equipment   Transferring 1-->assistive equipment   Toileting 1-->assistive equipment   Bathing 1-->assistive equipment   Dressing 0-->independent   Eating 0-->independent   Communication 0-->understands/communicates without difficulty   Swallowing 0-->swallows foods/liquids without difficulty   Cognition 0 - no cognition issues reported   Fall history within last six months no   Which of the above functional risks had a recent onset or change? ambulation   General Information   Onset of Illness/Injury or Date of Surgery - Date 04/24/17   Referring Physician Dr. Berkowitz   Patient/Family Goals Statement Pt reports that she was going to go to TCU but will try to see if she can go home with assist and home PT   Pertinent History of Current Problem (include personal factors and/or comorbidities that impact the POC) Pt is a 72 year old female with infected right TKA. Pt is s/p antibiotic spacer removal and new TKA. Pt has history of left ankle fusion.   Precautions/Limitations fall  precautions;other (see comments)  (KI must be on for WBAT and no knee flexion greater than 60.)   Weight-Bearing Status - LLE full weight-bearing   Weight-Bearing Status - RLE weight-bearing as tolerated   General Observations Pt sitting up in bed. Wearing KI.   Cognitive Status Examination   Orientation orientation to person, place and time   Level of Consciousness alert   Follows Commands and Answers Questions 100% of the time   Personal Safety and Judgment intact   Memory intact   Pain Assessment   Patient Currently in Pain Yes, see Vital Sign flowsheet  (6/10)   Integumentary/Edema   Integumentary/Edema Comments right knee bandaged   Range of Motion (ROM)   ROM Comment left ankle limitations due to fusion. right knee limitations consistent with surgery and limited to 60 degrees flexion per order.   Strength   Strength Comments Strong isometrics, decreased antigravity strength right LE   Bed Mobility   Bed Mobility Comments Supine to sit: pt compensates using UE to assist right LE. ModA for right LE   Transfer Skills   Transfer Comments sit to stand: Tal of 2 to manage pt and lines., bed to chair: Tal of 2 using walker and wearing KI. See flowsheet   Gait   Gait Comments Pt took a couple of steps but is hesitant to weight bear on right LE.   Balance   Balance Comments requires AD   Sensory Examination   Sensory Perception Comments Decreased sensation left foot due to ankle fusion.   Coordination   Coordination no deficits were identified   Muscle Tone   Muscle Tone no deficits were identified   Modality Interventions   Planned Modality Interventions Cryotherapy   General Therapy Interventions   Planned Therapy Interventions bed mobility training;gait training;ROM;strengthening;transfer training;progressive activity/exercise   Clinical Impression   Criteria for Skilled Therapeutic Intervention yes, treatment indicated   PT Diagnosis Difficulty with gait   Influenced by the following impairments Pain, decreased  "ROM, limitations with flexion, need for KI, decreased strength   Functional limitations due to impairments increased assist for all functional mobility, limited flexion and need for KI with mobility.   Clinical Presentation Evolving/Changing   Clinical Presentation Rationale complex medical history of current problem, accessible home and has adaptive equipment and suppor.   Clinical Decision Making (Complexity) Moderate complexity   Therapy Frequency` 2 times/day   Predicted Duration of Therapy Intervention (days/wks) 3 days   Anticipated Discharge Disposition Home with Home Therapy   Risk & Benefits of therapy have been explained Yes   Patient, Family & other staff in agreement with plan of care Yes   [facility] TM \"6 Clicks\"   2016, Trustees of [facility], under license to [employer].  All rights reserved.   6 Clicks Short Forms Basic Mobility Inpatient Short Form   [facility]  \"6 Clicks\" V.2 Basic Mobility Inpatient Short Form   1. Turning from your back to your side while in a flat bed without using bedrails? 3 - A Little   2. Moving from lying on your back to sitting on the side of a flat bed without using bedrails? 2 - A Lot   3. Moving to and from a bed to a chair (including a wheelchair)? 2 - A Lot   4. Standing up from a chair using your arms (e.g., wheelchair, or bedside chair)? 3 - A Little   5. To walk in hospital room? 3 - A Little   6. Climbing 3-5 steps with a railing? 2 - A Lot   Basic Mobility Raw Score (Score out of 24.Lower scores equate to lower levels of function) 15   Total Evaluation Time   Total Evaluation Time (Minutes) 20     "

## 2017-04-25 NOTE — PROVIDER NOTIFICATION
Called Dr. Berkowitz with low BP 91/47 and scheduled BP meds and lasix. Ordered 1000 cc of bolus and to hold all BP medication and lasix.

## 2017-04-25 NOTE — PLAN OF CARE
Problem: Goal Outcome Summary  Goal: Goal Outcome Summary  Outcome: Improving  Patient sat at edge of bed with assist of 1; tolerated well and denied nausea or lightheadedness.  Pain managed with PO Dilaudid and IV Dilaudid.  VSS.  A/Ox4.  Dressing CDI.  CMS intact.  Hemovac patent.  C bloc @10mL/hr.

## 2017-04-25 NOTE — PROGRESS NOTES
María Henderson  2017  POD #1    Doing well.  Pain well-controlled.  Temperatures:  Current - Temp: 97.8  F (36.6  C); Max - Temp  Av.6  F (36.4  C)  Min: 96.8  F (36  C)  Max: 98.4  F (36.9  C)  Pulse range: Pulse  Av.3  Min: 59  Max: 69  Blood pressure range: Systolic (24hrs), Av , Min:111 , Max:175   ; Diastolic (24hrs), Av, Min:58, Max:91    CMS: intact  Labs:  Hemoglobin   Date Value Ref Range Status   2017 12.1 11.7 - 15.7 g/dL Final   ]  X-rays look great.    PLAN:  Continue physical therapy  Discharge plan: Home Health

## 2017-04-25 NOTE — PLAN OF CARE
Problem: Goal Outcome Summary  Goal: Goal Outcome Summary  Outcome: Improving  Low BP resolved with 1000 cc of bolus. Other vital signs stable. CMS intact. Up with 1 and walker. Discontinued c-block after PT. Pain well controlled with oral Dilaudid and tylenol. Dressing c/d/i. Voiding per BSC. Alert and orient x 4.

## 2017-04-26 ENCOUNTER — APPOINTMENT (OUTPATIENT)
Dept: PHYSICAL THERAPY | Facility: CLINIC | Age: 72
DRG: 467 | End: 2017-04-26
Attending: ORTHOPAEDIC SURGERY
Payer: OTHER MISCELLANEOUS

## 2017-04-26 ENCOUNTER — APPOINTMENT (OUTPATIENT)
Dept: OCCUPATIONAL THERAPY | Facility: CLINIC | Age: 72
DRG: 467 | End: 2017-04-26
Attending: ORTHOPAEDIC SURGERY
Payer: OTHER MISCELLANEOUS

## 2017-04-26 LAB
HGB BLD-MCNC: 12.2 G/DL (ref 11.7–15.7)
INTERPRETATION ECG - MUSE: NORMAL

## 2017-04-26 PROCEDURE — 36415 COLL VENOUS BLD VENIPUNCTURE: CPT | Performed by: ORTHOPAEDIC SURGERY

## 2017-04-26 PROCEDURE — 97116 GAIT TRAINING THERAPY: CPT | Mod: GP | Performed by: PHYSICAL THERAPIST

## 2017-04-26 PROCEDURE — 97165 OT EVAL LOW COMPLEX 30 MIN: CPT | Mod: GO

## 2017-04-26 PROCEDURE — 25000132 ZZH RX MED GY IP 250 OP 250 PS 637: Performed by: ORTHOPAEDIC SURGERY

## 2017-04-26 PROCEDURE — 12000007 ZZH R&B INTERMEDIATE

## 2017-04-26 PROCEDURE — 25000128 H RX IP 250 OP 636: Performed by: ORTHOPAEDIC SURGERY

## 2017-04-26 PROCEDURE — 97530 THERAPEUTIC ACTIVITIES: CPT | Mod: GO

## 2017-04-26 PROCEDURE — 40000193 ZZH STATISTIC PT WARD VISIT: Performed by: PHYSICAL THERAPIST

## 2017-04-26 PROCEDURE — 40000133 ZZH STATISTIC OT WARD VISIT

## 2017-04-26 PROCEDURE — 97535 SELF CARE MNGMENT TRAINING: CPT | Mod: GO

## 2017-04-26 PROCEDURE — 85018 HEMOGLOBIN: CPT | Performed by: ORTHOPAEDIC SURGERY

## 2017-04-26 PROCEDURE — 97110 THERAPEUTIC EXERCISES: CPT | Mod: GP | Performed by: PHYSICAL THERAPIST

## 2017-04-26 RX ORDER — POLYETHYLENE GLYCOL 3350 17 G/17G
17 POWDER, FOR SOLUTION ORAL DAILY
Qty: 12 PACKET | Refills: 0 | Status: ON HOLD | OUTPATIENT
Start: 2017-04-26 | End: 2019-10-07

## 2017-04-26 RX ORDER — HYDROMORPHONE HYDROCHLORIDE 2 MG/1
2-4 TABLET ORAL EVERY 4 HOURS PRN
Qty: 60 TABLET | Refills: 0 | Status: SHIPPED | OUTPATIENT
Start: 2017-04-26 | End: 2017-05-17

## 2017-04-26 RX ADMIN — ACETAMINOPHEN 975 MG: 325 TABLET, FILM COATED ORAL at 15:51

## 2017-04-26 RX ADMIN — ASPIRIN 81 MG 81 MG: 81 TABLET ORAL at 08:45

## 2017-04-26 RX ADMIN — HYDROMORPHONE HYDROCHLORIDE 4 MG: 2 TABLET ORAL at 15:51

## 2017-04-26 RX ADMIN — FUROSEMIDE 20 MG: 20 TABLET ORAL at 08:45

## 2017-04-26 RX ADMIN — SIMVASTATIN 40 MG: 40 TABLET, FILM COATED ORAL at 21:06

## 2017-04-26 RX ADMIN — HYDROMORPHONE HYDROCHLORIDE 4 MG: 2 TABLET ORAL at 07:29

## 2017-04-26 RX ADMIN — HYDROMORPHONE HYDROCHLORIDE 4 MG: 2 TABLET ORAL at 03:23

## 2017-04-26 RX ADMIN — HYDROMORPHONE HYDROCHLORIDE 4 MG: 2 TABLET ORAL at 11:46

## 2017-04-26 RX ADMIN — ACETAMINOPHEN 975 MG: 325 TABLET, FILM COATED ORAL at 08:44

## 2017-04-26 RX ADMIN — HYDROMORPHONE HYDROCHLORIDE 4 MG: 2 TABLET ORAL at 21:05

## 2017-04-26 RX ADMIN — DIPHENHYDRAMINE HYDROCHLORIDE 12.5 MG: 12.5 SOLUTION ORAL at 11:05

## 2017-04-26 RX ADMIN — METOPROLOL TARTRATE 25 MG: 25 TABLET, FILM COATED ORAL at 08:45

## 2017-04-26 RX ADMIN — ACETAMINOPHEN 975 MG: 325 TABLET, FILM COATED ORAL at 00:07

## 2017-04-26 RX ADMIN — POLYETHYLENE GLYCOL 3350 17 G: 17 POWDER, FOR SOLUTION ORAL at 08:44

## 2017-04-26 RX ADMIN — ENOXAPARIN SODIUM 40 MG: 40 INJECTION SUBCUTANEOUS at 11:46

## 2017-04-26 RX ADMIN — METOPROLOL TARTRATE 25 MG: 25 TABLET, FILM COATED ORAL at 21:06

## 2017-04-26 RX ADMIN — VALSARTAN AND HYDROCHLOROTHIAZIDE 1 TABLET: 80; 12.5 TABLET, FILM COATED ORAL at 08:44

## 2017-04-26 ASSESSMENT — ACTIVITIES OF DAILY LIVING (ADL): PREVIOUS_RESPONSIBILITIES: MEAL PREP;HOUSEKEEPING;LAUNDRY;MEDICATION MANAGEMENT;DRIVING

## 2017-04-26 NOTE — PLAN OF CARE
Problem: Goal Outcome Summary  Goal: Goal Outcome Summary  A&O. VSS. Up with A1, walker and knee immobilizer to [facility] Pain managed with Tylenol and PO dilaudid. CMS intact. Dressing CDI. Hemovac  intact. Voiding adequately. PRN Benadryl given for skin redness and  itching.

## 2017-04-26 NOTE — PLAN OF CARE
Problem: Goal Outcome Summary  Goal: Goal Outcome Summary  Outcome: Improving  Pt having good pain control. Up with 1/ww. Dressing changed. Plan home tomorrow.

## 2017-04-26 NOTE — PROGRESS NOTES
04/26/17 0734   Quick Adds   Type of Visit Initial Occupational Therapy Evaluation   Living Environment   Lives With significant other   Living Arrangements house   Home Accessibility grab bars present (bathtub);grab bars present (toilet);stairs to enter home;tub/shower is not walk in   Number of Stairs to Enter Home 1   Number of Stairs Within Home 14  (to basement )   Transportation Available family or friend will provide   Living Environment Comment Pt lives with significant other in house, 1 step to enter, all needs met on main level, tub/shower with extended tub bench, standard toilet with toilet rails   Self-Care   Dominant Hand right   Usual Activity Tolerance good   Current Activity Tolerance moderate   Regular Exercise yes   Activity/Exercise Type walking   Equipment Currently Used at Home bath bench;commode;walker, rolling;wheelchair   Activity/Exercise/Self-Care Comment independent with all ADLs. Prior to infection, no AD needed, but since infection using walker and wheelchair. Pt owns wheelchair, multiple walkers, commode, bed pan, grab bars, bed rail   Functional Level Prior   Ambulation 1-->assistive equipment   Transferring 1-->assistive equipment   Toileting 1-->assistive equipment   Bathing 1-->assistive equipment   Dressing 0-->independent   Eating 0-->independent   Communication 0-->understands/communicates without difficulty   Swallowing 0-->swallows foods/liquids without difficulty   Cognition 0 - no cognition issues reported   Fall history within last six months no   Which of the above functional risks had a recent onset or change? transferring;toileting;bathing;dressing   Prior Functional Level Comment Pt reports indep in all tasks at baseline, prior to initial surgeries. Has been mod I/indep with use of FWW/wc since recent explant. Has support of significant other for tasks as needed.    General Information   Onset of Illness/Injury or Date of Surgery - Date 04/24/17   Referring Physician  Husam Berkowitz MD   Patient/Family Goals Statement Pt's goal is to d/c home   Additional Occupational Profile Info/Pertinent History of Current Problem Per chart: Pt is a 72 year old female admitted for antibiotic spacer removal and new R TKA placement   Precautions/Limitations fall precautions;other (see comments)  (KI on RLE with weight bearing, no R knee flexion >60 degrees)   Weight-Bearing Status - RLE weight-bearing as tolerated  (with KI donned)   Cognitive Status Examination   Orientation orientation to person, place and time   Level of Consciousness alert   Able to Follow Commands WNL/WFL   Personal Safety (Cognitive) WNL/WFL   Memory intact   Visual Perception   Visual Perception Wears glasses  (at all times)   Sensory Examination   Sensory Comments Pt denies sensory deficits in BUEs   Pain Assessment   Patient Currently in Pain Yes, see Vital Sign flowsheet  (8-9/10 in R knee, RN recently administered pain meds)   Range of Motion (ROM)   ROM Comment WFL   Strength   Strength Comments WFL   Hand Strength   Hand Strength Comments WFL   Coordination   Upper Extremity Coordination No deficits were identified   Bed Mobility Skill: Sit to Supine   Level of [place]: Sit/Supine minimum assist (75% patients effort)   Physical Assist/Nonphysical Assist: Sit/Supine 1 person assist   Bed Mobility Skill: Supine to Sit   Level of [place]: Supine/Sit minimum assist (75% patients effort)   Physical Assist/Nonphysical Assist: Supine/Sit 1 person assist   Transfer Skill: Bed to Chair/Chair to Bed   Level of [place]: Bed to Chair contact guard   Physical Assist/Nonphysical Assist: Bed to Chair 1 person assist   Weight-Bearing Restrictions weight-bearing as tolerated   Assistive Device - Transfer Skill Bed to Chair Chair to Bed Rehab Eval standard walker   Transfer Skill: Sit to Stand   Level of [place]: Sit/Stand contact guard   Physical Assist/Nonphysical Assist: Sit/Stand 1 person assist    Transfer Skill: Sit to Stand weight-bearing as tolerated   Assistive Device for Transfer: Sit/Stand standard walker   Transfer Skill: Toilet Transfer   Level of [place]: Toilet contact guard   Physical Assist/Nonphysical Assist: Toilet 1 person assist   Weight-Bearing Restrictions: Toilet weight-bearing as tolerated   Assistive Device standard walker   Balance   Balance Comments SBA seated EOB, CGA while in stance FWW level   Upper Body Dressing   Level of [place]: Dress Upper Body stand-by assist   Physical Assist/Nonphysical Assist: Dress Upper Body 1 person assist   Lower Body Dressing   Level of [place]: Dress Lower Body moderate assist (50% patients effort)   Physical Assist/Nonphysical Assist: Dress Lower Body 1 person assist   Toileting   Level of [place]: Toilet minimum assist (75% patients effort)   Physical Assist/Nonphysical Assist: Toilet 1 person assist   Grooming   Level of [place]: Grooming contact guard   Physical Assist/Nonphysical Assist: Grooming 1 person assist   Instrumental Activities of Daily Living (IADL)   Previous Responsibilities meal prep;housekeeping;laundry;medication management;driving   IADL Comments Pt has support of significant other for IADLs   Activities of Daily Living Analysis   Impairments Contributing to Impaired Activities of Daily Living balance impaired;pain;post surgical precautions;strength decreased   ADL Comments Pt presents to OT below baseline level of functioning in all areas of self cares including bathing, dressing, grooming and toileting   General Therapy Interventions   Planned Therapy Interventions ADL retraining;IADL retraining;transfer training   Clinical Impression   Criteria for Skilled Therapeutic Interventions Met yes, treatment indicated   OT Diagnosis Impaired ADLs, IADLs and functional mobility tasks   Influenced by the following impairments Decreased strength and functional activity tolerance, impaired balance, pain,  "post-surgical precautions   Assessment of Occupational Performance 5 or more Performance Deficits   Identified Performance Deficits Bathing, dressing, grooming, toileting, transfers, ambulation, homemaking   Clinical Decision Making (Complexity) Low complexity   Therapy Frequency daily   Predicted Duration of Therapy Intervention (days/wks) 2 days   Anticipated Discharge Disposition Home with Assist;Home with Home Therapy   Risks and Benefits of Treatment have been explained. Yes   Patient, Family & other staff in agreement with plan of care Yes   Clinical Impression Comments Pt would benefit from skilled OT to maximize safety and indep in all ADLs, IADLs and functional mobility tasks due to current deficits impacting function    [facility] AM-[facility]  \"6 Clicks\" Daily Activity Inpatient Short Form   1. Putting on and taking off regular lower body clothing? 2 - A Lot   2. Bathing (including washing, rinsing, drying)? 3 - A Little   3. Toileting, which includes using toilet, bedpan or urinal? 3 - A Little   4. Putting on and taking off regular upper body clothing? 3 - A Little   5. Taking care of personal grooming such as brushing teeth? 3 - A Little   6. Eating meals? 4 - None   Daily Activity Raw Score (Score out of 24.Lower scores equate to lower levels of function) 18   Total Evaluation Time   Total Evaluation Time (Minutes) 10     "

## 2017-04-26 NOTE — PLAN OF CARE
Problem: Goal Outcome Summary  Goal: Goal Outcome Summary  OT: Order received, eval completed and treatment initiated. Pt is a 72 year old female admitted for antibiotic spacer removal and new R TKA placement. Pt lives with significant other in house, 1 step to enter, tub/shower with extended tub bench, toilet with rails. Prior to initial surgery, pt indep in all tasks with no AD, has been mod I with use of FWW and w/c for mobility and mod I in ADLs, IADL tasks since previous surgery.      Surgeon Discharge Plan: Home with      Current Functional Status: Pt completed bed mobility supine > sit EOB with min A for RLE management. Pt completed transfers/mobility to/from bathroom FWW level with KI donned and CGA for safety, pt guarded to WB through RLE, also c/o 8-9/10 pain in R knee. Pt required min A for toileting/LE dressing, CGA for grooming.      Barriers to Plan/Home: 1 stair

## 2017-04-26 NOTE — PLAN OF CARE
Problem: Goal Outcome Summary  Goal: Goal Outcome Summary  Outcome: Improving  Pt POD2 from RTK. A&Ox 4. VSS on 1L O2. Up with A2 and walker to commode. Pain controlled with PO pain meds. Dressings CDI. CMS intact. Has immobilizer, should not bend it past 60 degrees. Benadryl given x1 for red skin and itching. Numbness in L foot per patient baseline.

## 2017-04-26 NOTE — PROGRESS NOTES
María Henderson  2017  POD #2    Doing well.  Pain well-controlled.  Tolerating physical therapy and rehabilitation well. She is walking well with her knee immobilizer.  Temperatures:  Current - Temp: 97.9  F (36.6  C); Max - Temp  Av.8  F (36.6  C)  Min: 97.6  F (36.4  C)  Max: 98.1  F (36.7  C)  Pulse range: Pulse  Av  Min: 54  Max: 74  Blood pressure range: Systolic (24hrs), Av , Min:91 , Max:142   ; Diastolic (24hrs), Av, Min:47, Max:65    CMS: intact  Labs:  Hemoglobin   Date Value Ref Range Status   2017 12.2 11.7 - 15.7 g/dL Final   ]      PLAN:  Continue physical therapy  Discharge plan: Home Health tomorrow

## 2017-04-26 NOTE — DISCHARGE SUMMARY
Discharge Summary    María Henderson MRN# [id number]   YOB: 1945 Age: 72 year old     Date of Admission:  4/24/2017  Date of Discharge:  /27/2017  Admitting Physician:  Husam Berkowitz MD  Discharge Physician:  Husam Berkowitz MD     Primary Provider: Thad Puri          Admission Diagnoses:   Explanted infected left total knee replacement          Discharge Diagnosis:   Same           Surgical Procedure:   Reimplantation left total knee replacement           Secondary Diagnosis:     Patient Active Problem List   Diagnosis     Syncope     Hypertension     Bradycardia     Hyperlipidemia LDL goal <130     S/P foot surgery      Pain in joint, ankle and foot     Sprain of neck     Sprain and strain of shoulder and upper arm     Elbow strain     Wrist strain     S/P ankle fusion     Cellulitis     Sepsis (H)     Septic joint of left knee joint (H)     Left hip pain     SVT (supraventricular tachycardia) (H)     Knee joint replacement status              Discharge Disposition:   Admited to home care:   Agency: to be determined  Discharged to home           Medications Prior to Admission:     Prescriptions Prior to Admission   Medication Sig Dispense Refill Last Dose     Acetaminophen (TYLENOL PO) Take 1,300 mg by mouth At Bedtime   4/23/2017 at 2200     Glucosamine-Chondroitin (OSTEO BI-FLEX REGULAR STRENGTH PO) Take 1 tablet by mouth daily   4/20/2017 at AM     metoprolol (LOPRESSOR) 25 MG tablet Take 1 tablet (25 mg) by mouth 2 times daily 60 tablet  4/24/2017 at 0500     CALCIUM PO Take 600 mg by mouth daily   4/23/2017 at AM     Probiotic Product (TRUBIOTICS PO) Take 1 tablet by mouth daily   4/23/2017 at AM     Furosemide (LASIX PO) Take 20 mg by mouth daily    4/3/2017 at AM     Cholecalciferol (VITAMIN D3 PO) Take 400 Units by mouth daily   4/23/2017 at AM     POTASSIUM PO Take 595 mg by mouth every morning    4/24/2017 at 0500     valsartan-hydrochlorothiazide  (DIOVAN-HCT) 80-12.5 MG per tablet Take 1 tablet by mouth daily   4/24/2017 at 0500     ASPIRIN PO Take 81 mg by mouth daily    4/20/2017 at AM     multivitamin, therapeutic with minerals (THERA-VIT-M) TABS Take 1 tablet by mouth daily.   4/20/2017 at AM     simvastatin (ZOCOR) 40 MG tablet Take 40 mg by mouth every evening    4/23/2017 at 1800     order for DME Equipment being ordered: Walker Wheels () and Walker ()  Treatment Diagnosis: impaired gait 1 each 0 Taking     order for DME Equipment being ordered: Walker Wheels () and Walker ()  Treatment Diagnosis: difficulty with gait 1 each 0 Taking             Discharge Medications:     Current Discharge Medication List      START taking these medications    Details   HYDROmorphone (DILAUDID) 2 MG tablet Take 1-2 tablets (2-4 mg) by mouth every 4 hours as needed for moderate to severe pain  Qty: 60 tablet, Refills: 0    Associated Diagnoses: Status post total left knee replacement      enoxaparin (LOVENOX) 40 MG/0.4ML injection Inject 0.4 mLs (40 mg) Subcutaneous every 24 hours for 14 days  Qty: 5.6 mL, Refills: 0    Associated Diagnoses: Status post total left knee replacement      polyethylene glycol (MIRALAX/GLYCOLAX) Packet Take 17 g by mouth daily  Qty: 12 packet, Refills: 0    Associated Diagnoses: Status post total left knee replacement         CONTINUE these medications which have NOT CHANGED    Details   Acetaminophen (TYLENOL PO) Take 1,300 mg by mouth At Bedtime      Glucosamine-Chondroitin (OSTEO BI-FLEX REGULAR STRENGTH PO) Take 1 tablet by mouth daily      metoprolol (LOPRESSOR) 25 MG tablet Take 1 tablet (25 mg) by mouth 2 times daily  Qty: 60 tablet    Associated Diagnoses: Paroxysmal supraventricular tachycardia (H)      CALCIUM PO Take 600 mg by mouth daily      Probiotic Product (TRUBIOTICS PO) Take 1 tablet by mouth daily      Furosemide (LASIX PO) Take 20 mg by mouth daily       Cholecalciferol (VITAMIN D3 PO) Take 400 Units  by mouth daily      POTASSIUM PO Take 595 mg by mouth every morning       valsartan-hydrochlorothiazide (DIOVAN-HCT) 80-12.5 MG per tablet Take 1 tablet by mouth daily      ASPIRIN PO Take 81 mg by mouth daily       multivitamin, therapeutic with minerals (THERA-VIT-M) TABS Take 1 tablet by mouth daily.      simvastatin (ZOCOR) 40 MG tablet Take 40 mg by mouth every evening       !! order for DME Equipment being ordered: Walker Wheels () and Walker ()  Treatment Diagnosis: impaired gait  Qty: 1 each, Refills: 0    Associated Diagnoses: Sepsis, due to unspecified organism (H)      !! order for DME Equipment being ordered: Walker Wheels () and Walker ()  Treatment Diagnosis: difficulty with gait  Qty: 1 each, Refills: 0    Associated Diagnoses: Pain in joint, ankle and foot, left       !! - Potential duplicate medications found. Please discuss with provider.                Consultations:   No consultations were requested during this admission           Hospital Course:   The patient was admitted after the surgical procedure. The patient underwent an uneventful reimplantation of her left total knee replacement. Postoperatively, anticoagulation  with Lovenox was started. No transfusion was required. The patient will be admited to home care:   Agency: TBD  discharged to home. Home medications have been reconciled. Dilaudid 2 mg was prescribed for pain. Lovenox  will be prescribed.             Pending Results:   None           Discharge Instructions and Follow-Up:        Discharge activity: use a walker and a knee immobilizer when you walk   Discharge follow-up: Follow up with me in 2 weeks   Outpatient therapy: None    Home Care agency: TBD    Supplies and equipment: None        Wound care: dry dressing daily and as needed   Other instructions: None

## 2017-04-26 NOTE — PLAN OF CARE
Problem: Goal Outcome Summary  Goal: Goal Outcome Summary  Surgeon Discharge Plan: Home tomorrow with HHPT     Current Functional Status: Pain 3/10 at rest. Pt requires Tal for bed mobility, CGA for sit<>stand transfers to FWW. Pt amb 80' with FWW and CGA, WBAT with KI on RLE. Pt tolerated supine TKA ex moderately (flexion maintained <60 degrees throughout mobility and ex). R knee AAROM 10-50 degrees.     Barriers to Plan/Home: 1 stair, limited tolerance for amb, requires assist of 1 for mobility

## 2017-04-27 ENCOUNTER — APPOINTMENT (OUTPATIENT)
Dept: OCCUPATIONAL THERAPY | Facility: CLINIC | Age: 72
DRG: 467 | End: 2017-04-27
Attending: ORTHOPAEDIC SURGERY
Payer: OTHER MISCELLANEOUS

## 2017-04-27 ENCOUNTER — APPOINTMENT (OUTPATIENT)
Dept: PHYSICAL THERAPY | Facility: CLINIC | Age: 72
DRG: 467 | End: 2017-04-27
Attending: ORTHOPAEDIC SURGERY
Payer: OTHER MISCELLANEOUS

## 2017-04-27 VITALS
RESPIRATION RATE: 18 BRPM | HEIGHT: 66 IN | BODY MASS INDEX: 26.68 KG/M2 | WEIGHT: 166 LBS | SYSTOLIC BLOOD PRESSURE: 128 MMHG | OXYGEN SATURATION: 94 % | HEART RATE: 73 BPM | TEMPERATURE: 98.7 F | DIASTOLIC BLOOD PRESSURE: 66 MMHG

## 2017-04-27 LAB
CREAT SERPL-MCNC: 0.7 MG/DL (ref 0.52–1.04)
GFR SERPL CREATININE-BSD FRML MDRD: 82 ML/MIN/1.7M2
PLATELET # BLD AUTO: 224 10E9/L (ref 150–450)

## 2017-04-27 PROCEDURE — 40000193 ZZH STATISTIC PT WARD VISIT: Performed by: PHYSICAL THERAPY ASSISTANT

## 2017-04-27 PROCEDURE — 25000128 H RX IP 250 OP 636: Performed by: ORTHOPAEDIC SURGERY

## 2017-04-27 PROCEDURE — 97530 THERAPEUTIC ACTIVITIES: CPT | Mod: GP | Performed by: PHYSICAL THERAPY ASSISTANT

## 2017-04-27 PROCEDURE — 85049 AUTOMATED PLATELET COUNT: CPT | Performed by: ORTHOPAEDIC SURGERY

## 2017-04-27 PROCEDURE — 82565 ASSAY OF CREATININE: CPT | Performed by: ORTHOPAEDIC SURGERY

## 2017-04-27 PROCEDURE — 36415 COLL VENOUS BLD VENIPUNCTURE: CPT | Performed by: ORTHOPAEDIC SURGERY

## 2017-04-27 PROCEDURE — 40000133 ZZH STATISTIC OT WARD VISIT: Performed by: OCCUPATIONAL THERAPY ASSISTANT

## 2017-04-27 PROCEDURE — 97110 THERAPEUTIC EXERCISES: CPT | Mod: GP | Performed by: PHYSICAL THERAPY ASSISTANT

## 2017-04-27 PROCEDURE — 97116 GAIT TRAINING THERAPY: CPT | Mod: GP | Performed by: PHYSICAL THERAPY ASSISTANT

## 2017-04-27 PROCEDURE — 25000132 ZZH RX MED GY IP 250 OP 250 PS 637: Performed by: ORTHOPAEDIC SURGERY

## 2017-04-27 PROCEDURE — 97535 SELF CARE MNGMENT TRAINING: CPT | Mod: GO | Performed by: OCCUPATIONAL THERAPY ASSISTANT

## 2017-04-27 RX ADMIN — ACETAMINOPHEN 975 MG: 325 TABLET, FILM COATED ORAL at 00:35

## 2017-04-27 RX ADMIN — HYDROMORPHONE HYDROCHLORIDE 4 MG: 2 TABLET ORAL at 08:30

## 2017-04-27 RX ADMIN — ACETAMINOPHEN 975 MG: 325 TABLET, FILM COATED ORAL at 08:29

## 2017-04-27 RX ADMIN — VALSARTAN AND HYDROCHLOROTHIAZIDE 1 TABLET: 80; 12.5 TABLET, FILM COATED ORAL at 08:30

## 2017-04-27 RX ADMIN — METOPROLOL TARTRATE 25 MG: 25 TABLET, FILM COATED ORAL at 08:30

## 2017-04-27 RX ADMIN — ENOXAPARIN SODIUM 40 MG: 40 INJECTION SUBCUTANEOUS at 11:06

## 2017-04-27 RX ADMIN — HYDROMORPHONE HYDROCHLORIDE 4 MG: 2 TABLET ORAL at 03:01

## 2017-04-27 RX ADMIN — POLYETHYLENE GLYCOL 3350 17 G: 17 POWDER, FOR SOLUTION ORAL at 08:30

## 2017-04-27 RX ADMIN — FUROSEMIDE 20 MG: 20 TABLET ORAL at 08:30

## 2017-04-27 RX ADMIN — ASPIRIN 81 MG 81 MG: 81 TABLET ORAL at 08:30

## 2017-04-27 NOTE — PROGRESS NOTES
María Henderson  2017  POD #3    Doing well.  Pain well-controlled.  Tolerating physical therapy and rehabilitation well.  Temperatures:  Current - Temp: 98.1  F (36.7  C); Max - Temp  Av.1  F (36.7  C)  Min: 97.8  F (36.6  C)  Max: 98.5  F (36.9  C)  Pulse range: No Data Recorded  Blood pressure range: Systolic (24hrs), Av , Min:97 , Max:151   ; Diastolic (24hrs), Av, Min:47, Max:70    CMS: intact  Labs:none    PLAN:  Continue physical therapy  Discharge plan: Home Health today

## 2017-04-27 NOTE — PLAN OF CARE
Problem: Goal Outcome Summary  Goal: Goal Outcome Summary  Surgeon Discharge Plan: Home with home PT.  Agree with plan.       Current Functional Status: Pt performed bed mobility with min assist and increased time.  Sit to/from stand transfers with SBA.  Pt performed gait training 10 ft x 1 and 40 ft x 1 using wheeled walker with SBA.  KI worn during gait.  Knee AAROM is 5-55 degrees.     Barriers to Plan/Home: None.       Pt discharged home today with assist and OP PT.  PT goals not met.

## 2017-04-27 NOTE — PLAN OF CARE
Problem: Goal Outcome Summary  Goal: Goal Outcome Summary  Outcome: No Change  A&O. VSS. CMS intact. Dressing CDI. Pain managed with PRN PO dilaudid, Tylenol. Up with 1 to BR. Plan to D/C today.

## 2017-04-27 NOTE — PLAN OF CARE
Spoke with patient this morning to discuss discharge planning. Patient is discharging to home today with assistance of her significant other. Home Care Physical Therapy has been ordered. Patient has chosen [employer]. Her son Alex works for the company. Will fax orders to mariya Machado at [phone]. Phone [phone]. Alex her son was called to inform him of his mother's discharge. He explained Home Physical Therapy would start tomorrow. No further discharge needs at this time.

## 2017-04-27 NOTE — PLAN OF CARE
Problem: Goal Outcome Summary  Goal: Goal Outcome Summary  Outcome: Improving  0414-2417: Pt vital signs stable. Dressing CDI. Pt SL. Immobilizer on. Progressing per plan of care.

## 2017-04-27 NOTE — DISCHARGE INSTRUCTIONS
"TOTAL KNEE REPLACEMENT TAKE HOME INSTRUCTIONS  Your surgeon will answer any questions about your progress. General guidelines for your care are listed below. Your surgeon may give additional instructions for your care at home. Please follow them carefully    Activity Level  1. Physical activity may be resumed gradually according to your comfort level and your surgeon s instructions. Follow your exercise program as instructed by your therapist. Do exercises at least twice a day. you may ice your knee after exercising.  2. Complete exercises two hours before bedtime to minimize the effect pain may have on sleep.  Refer to pages 19-22 of you \"Total Knee Replacement\" booklet for details  3. Do not wear your knee immobilizer unless your doctor has specifically told you to continue it.    Good Health Practices  1. Maintain an adequate fluid intake and eat a well balanced diet.  2. Be sure to include the basic food groups, such as dairy products, meat/fish, vegetables, and fruit. Each of these foods contribute to your wound healing and increasing your strength.  3. Surgery, decreased activity and pain medication all contribute to a decrease in bowel activity that can result in constipation. It is recommended that you increase your liquid intake, add fiber to your diet, increase activity, and decrease pain medication use. If you have any problems, notify your physician.  4. Wear your anti-embolism stockings day and night until seen by your surgeon if you were issued these at discharge.  (Not all patients will have anti-embolism stockings) Remove twice a day for one hour at a time. You may hand wash and air dry your stockings.  5. Notify your dentist of your total knee surgery and call your dentist one week before a dental appointment for antibiotics.    Incision/Dressing Care  1. Keep incision clean and dry.  2. Cover incision if you are still having drainage.    Things to Watch For  1. Check incision daily for increased " redness, tenderness, swelling, or drainage along the incision line. If these occur, please notify your doctor. Also, call if you develop a fever above 101 .  2. Please notify your doctor if you experience any calf pain and/or if you have surgical pain not relieved by the pain medication prescribed by your doctor.

## 2017-04-27 NOTE — PLAN OF CARE
Problem: Goal Outcome Summary  Goal: Goal Outcome Summary  Outcome: Improving  Pt up with SBA. Dressing changed prior to d/c. Pt educated on lovenox injection and feels comfortable discharging home on this new medication. D/c instructions reviewed with patient, all questions answered.

## 2017-04-28 LAB
BLD PROD TYP BPU: NORMAL
BLD PROD TYP BPU: NORMAL
BLD UNIT ID BPU: 0
BLD UNIT ID BPU: 0
BLOOD PRODUCT CODE: NORMAL
BLOOD PRODUCT CODE: NORMAL
BPU ID: NORMAL
BPU ID: NORMAL
TRANSFUSION STATUS PATIENT QL: NORMAL

## 2017-04-29 LAB
BACTERIA SPEC CULT: NO GROWTH
MICRO REPORT STATUS: NORMAL
SPECIMEN SOURCE: NORMAL

## 2017-05-08 LAB
BACTERIA SPEC CULT: NORMAL
Lab: NORMAL
MICRO REPORT STATUS: NORMAL
SPECIMEN SOURCE: NORMAL

## 2017-05-09 DIAGNOSIS — I47.10 PAROXYSMAL SUPRAVENTRICULAR TACHYCARDIA (H): ICD-10-CM

## 2017-05-09 RX ORDER — METOPROLOL TARTRATE 25 MG/1
25 TABLET, FILM COATED ORAL 2 TIMES DAILY
Qty: 60 TABLET | Refills: 0 | Status: SHIPPED | OUTPATIENT
Start: 2017-05-09 | End: 2017-05-17

## 2017-05-09 NOTE — TELEPHONE ENCOUNTER
Patient called needs refill on metoprolol put of medication.   Coming in to see Dr Ruano on 5/17/17 s/p SVT ablation.  90 day refill sent to [facility] pharmacy.

## 2017-05-13 NOTE — PLAN OF CARE
Problem: Goal Outcome Summary  Goal: Goal Outcome Summary  Outcome: Improving  Vss, a&oX4. Cms intact, dressing cdi, hemovac patent. Pain controlled with po dilaudid. Due to void. PRN benadryl given for itching skin. Will continue to monitor.        Self

## 2017-05-17 ENCOUNTER — OFFICE VISIT (OUTPATIENT)
Dept: CARDIOLOGY | Facility: CLINIC | Age: 72
End: 2017-05-17
Payer: OTHER MISCELLANEOUS

## 2017-05-17 VITALS
HEIGHT: 66 IN | BODY MASS INDEX: 27.11 KG/M2 | SYSTOLIC BLOOD PRESSURE: 120 MMHG | DIASTOLIC BLOOD PRESSURE: 60 MMHG | HEART RATE: 56 BPM | WEIGHT: 168.7 LBS

## 2017-05-17 DIAGNOSIS — I47.10 SVT (SUPRAVENTRICULAR TACHYCARDIA) (H): Primary | ICD-10-CM

## 2017-05-17 PROCEDURE — 93000 ELECTROCARDIOGRAM COMPLETE: CPT | Performed by: INTERNAL MEDICINE

## 2017-05-17 PROCEDURE — 99213 OFFICE O/P EST LOW 20 MIN: CPT | Mod: 25 | Performed by: INTERNAL MEDICINE

## 2017-05-17 NOTE — PROGRESS NOTES
HPI and Plan:   See dictation  [id number]  Orders Placed This Encounter   Procedures     EKG 12-lead complete w/read - Clinics (performed today)       No orders of the defined types were placed in this encounter.      Medications Discontinued During This Encounter   Medication Reason     HYDROmorphone (DILAUDID) 2 MG tablet Therapy completed     metoprolol (LOPRESSOR) 25 MG tablet          Encounter Diagnosis   Name Primary?     SVT (supraventricular tachycardia) (H) Yes       CURRENT MEDICATIONS:  Current Outpatient Prescriptions   Medication Sig Dispense Refill     polyethylene glycol (MIRALAX/GLYCOLAX) Packet Take 17 g by mouth daily 12 packet 0     Acetaminophen (TYLENOL PO) Take 1,300 mg by mouth At Bedtime       Glucosamine-Chondroitin (OSTEO BI-FLEX REGULAR STRENGTH PO) Take 1 tablet by mouth daily       CALCIUM PO Take 600 mg by mouth daily       Probiotic Product (TRUBIOTICS PO) Take 1 tablet by mouth daily       order for DME Equipment being ordered: Walker Wheels () and Walker ()  Treatment Diagnosis: impaired gait 1 each 0     order for DME Equipment being ordered: Walker Wheels () and Walker ()  Treatment Diagnosis: difficulty with gait 1 each 0     Furosemide (LASIX PO) Take 20 mg by mouth daily        Cholecalciferol (VITAMIN D3 PO) Take 400 Units by mouth daily       POTASSIUM PO Take 595 mg by mouth every morning        valsartan-hydrochlorothiazide (DIOVAN-HCT) 80-12.5 MG per tablet Take 1 tablet by mouth daily       ASPIRIN PO Take 81 mg by mouth daily        multivitamin, therapeutic with minerals (THERA-VIT-M) TABS Take 1 tablet by mouth daily.       simvastatin (ZOCOR) 40 MG tablet Take 40 mg by mouth every evening          ALLERGIES     Allergies   Allergen Reactions     Pcn [Penicillin V Potassium] Anaphylaxis     Oxycodone Other (See Comments)     unconsciousness     Sulfa Drugs      Valium [Diazepam] Other (See Comments)     unconsciousness     Vicodin  [Hydrocodone-Acetaminophen]      Patient unsure if allergy     Iodine Solution [Povidone Iodine] Rash       PAST MEDICAL HISTORY:  Past Medical History:   Diagnosis Date     Allergic rhinitis      Bacteremia due to Streptococcus      Edema      Gastro-oesophageal reflux disease      Heart murmur     ?murmur     Hyperlipemia      Hyperlipidemia      Hypertension      Kidney stone      Numbness and tingling     numbness left distal medial tibia     Osteoarthritis      Osteoarthrosis      Osteopenia      Paroxysmal supraventricular tachycardia (H)      S/P ablation of atrial fibrillation        PAST SURGICAL HISTORY:  Past Surgical History:   Procedure Laterality Date     7 foot and ankle        ARTHROPLASTY REVISION ANKLE  3/10/2014    Procedure: ARTHROPLASTY REVISION ANKLE;  REVISION LEFT TOTAL ANKLE  WITH CONVERSION TO  IM LAURIE FUSION WITH FEMORAL HEAD ALLOGRAFT (C-ARM);  Surgeon: Ramirez Fang MD;  Location: [facility]     ASPIRATION NEEDLE KNEE Right 12/27/2016    Procedure: ASPIRATION NEEDLE KNEE;  Surgeon: Jian Fisher MD;  Location:  OR     BUNIONECTOMY JONAS  10/24/2011    Procedure:BUNIONECTOMY JONAS; Left Foot Bunionette Repair (C-Arm); Surgeon:RAMIREZ FANG; Location: [facility]     C TOTAL KNEE ARTHROPLASTY Right      COLONOSCOPY       H ABLATION SVT       HERNIA REPAIR       IRRIGATION AND DEBRIDEMENT FOOT, COMBINED Left 12/27/2016    Procedure: COMBINED IRRIGATION AND DEBRIDEMENT FOOT;  Surgeon: Jian Fisher MD;  Location:  OR     ORTHOPEDIC SURGERY      RTKA     ORTHOPEDIC SURGERY Left     ROTATOR CUFF     REMOVE ANTIBIOTIC CEMENT BEADS / SPACER KNEE N/A 4/24/2017    Procedure: REMOVE ANTIBIOTIC CEMENT BEADS / SPACER KNEE;  REMOVAL ANTIBIOTIC SPACER RIGHT KNEE REIMPLANT TOTAL JOINT COMPONENT TO RIGHT KNEE ;  Surgeon: Husam Berkowitz MD;  Location:  OR     REMOVE HARDWARE ARTHROPLASTY KNEE, IRRIG & JOSE, PLACE ANTIBIOTIC CEMENT SPACER, COMBINED Right 2/20/2017  "   Procedure: COMBINED REMOVE HARDWARE ARTHROPLASTY KNEE, IRRIGATION AND DEBRIDEMENT, PLACE ANTIBIOTIC CEMENT BEADS / SPACER;  Surgeon: Husam Berkowitz MD;  Location:  OR     TONSILLECTOMY       TONSILLECTOMY       total knee arthroplasy         FAMILY HISTORY:  No family history on file.    SOCIAL HISTORY:  Social History     Social History     Marital status:      Spouse name: N/A     Number of children: N/A     Years of education: N/A     Social History Main Topics     Smoking status: Never Smoker     Smokeless tobacco: Never Used     Alcohol use No     Drug use: No     Sexual activity: Yes     Other Topics Concern     None     Social History Narrative       Review of Systems:  Skin:  not assessed       Eyes:  not assessed      ENT:  not assessed      Respiratory:  Negative       Cardiovascular:  Negative      Gastroenterology: not assessed      Genitourinary:  not assessed      Musculoskeletal:  not assessed      Neurologic:  Positive for numbness or tingling of feet r/t sx   Psychiatric:  Positive for sleep disturbances    Heme/Lymph/Imm:  not assessed      Endocrine:  Negative        Physical Exam:  Vitals: /60  Pulse 56  Ht 1.676 m (5' 6\")  Wt 76.5 kg (168 lb 11.2 oz)  BMI 27.23 kg/m2    Constitutional:  cooperative, alert and oriented, well developed, well nourished, in no acute distress        Skin:  warm and dry to the touch, no apparent skin lesions or masses noted        Head:  normocephalic, no masses or lesions        Eyes:  pupils equal and round, conjunctivae and lids unremarkable, sclera white, no xanthalasma, EOMS intact, no nystagmus        ENT:  no pallor or cyanosis, dentition good        Neck:  carotid pulses are full and equal bilaterally, JVP normal, no carotid bruit, no thyromegaly        Chest:  normal breath sounds, clear to auscultation, normal A-P diameter, normal symmetry, normal respiratory excursion, no use of accessory muscles          Cardiac: regular " rhythm, normal S1/S2, no S3 or S4, apical impulse not displaced, no murmurs, gallops or rubs                  Abdomen:  abdomen soft, non-tender, BS normoactive, no mass, no HSM, no bruits        Vascular: pulses full and equal, no bruits auscultated                                        Extremities and Back:  no deformities, clubbing, cyanosis, erythema observed              Neurological:  affect appropriate, oriented to time, person and place              CC  No referring provider defined for this encounter.

## 2017-05-17 NOTE — MR AVS SNAPSHOT
"              After Visit Summary   2017    María Henderson    MRN: [id number]           Patient Information     Date Of Birth          1945        Visit Information        Provider Department      2017 11:15 AM Joaquim Ruano MD [facility]        Today's Diagnoses     SVT (supraventricular tachycardia) (H)    -  1       Follow-ups after your visit        Who to contact     If you have questions or need follow up information about today's clinic visit or your schedule please contact [facility] directly at [phone].  Normal or non-critical lab and imaging results will be communicated to you by Dimple Doughhart, letter or phone within 4 business days after the clinic has received the results. If you do not hear from us within 7 days, please contact the clinic through Dimple Doughhart or phone. If you have a critical or abnormal lab result, we will notify you by phone as soon as possible.  Submit refill requests through Green Momit or call your pharmacy and they will forward the refill request to us. Please allow 3 business days for your refill to be completed.          Additional Information About Your Visit        MyChart Information     Green Momit lets you send messages to your doctor, view your test results, renew your prescriptions, schedule appointments and more. To sign up, go to [email or web address] . Click on \"Log in\" on the left side of the screen, which will take you to the Welcome page. Then click on \"Sign up Now\" on the right side of the page.     You will be asked to enter the access code listed below, as well as some personal information. Please follow the directions to create your username and password.     Your access code is: [email or web address]  Expires: 2017  7:22 AM     Your access code will  in 90 days. If you need help or a new code, please call your [place] clinic or [phone].        Care EveryWhere " "ID     This is your Care EveryWhere ID. This could be used by other organizations to access your [place] medical records  [id number]        Your Vitals Were     Pulse Height BMI (Body Mass Index)             56 1.676 m (5' 6\") 27.23 kg/m2          Blood Pressure from Last 3 Encounters:   05/17/17 120/60   04/27/17 128/66   04/06/17 147/79    Weight from Last 3 Encounters:   05/17/17 76.5 kg (168 lb 11.2 oz)   04/24/17 75.3 kg (166 lb)   04/06/17 74.2 kg (163 lb 8 oz)              We Performed the Following     EKG 12-lead complete w/read - Clinics (performed today)        Primary Care Provider Office Phone # Fax #    Thad Puri [phone]       [facility] [address]  [place] [address]        Thank you!     Thank you for choosing [facility] [place]  for your care. Our goal is always to provide you with excellent care. Hearing back from our patients is one way we can continue to improve our services. Please take a few minutes to complete the written survey that you may receive in the mail after your visit with us. Thank you!             Your Updated Medication List - Protect others around you: Learn how to safely use, store and throw away your medicines at www.disposemymeds.org.          This list is accurate as of: 5/17/17 11:33 AM.  Always use your most recent med list.                   Brand Name Dispense Instructions for use    ASPIRIN PO      Take 81 mg by mouth daily       CALCIUM PO      Take 600 mg by mouth daily       LASIX PO      Take 20 mg by mouth daily       metoprolol 25 MG tablet    LOPRESSOR    60 tablet    Take 1 tablet (25 mg) by mouth 2 times daily       multivitamin, therapeutic with minerals Tabs tablet      Take 1 tablet by mouth daily.       * order for DME     1 each    Equipment being ordered: Walker Wheels () and Walker () Treatment Diagnosis: difficulty with gait       * order for DME     1 " each    Equipment being ordered: Walker Wheels () and Walker () Treatment Diagnosis: impaired gait       OSTEO BI-FLEX REGULAR STRENGTH PO      Take 1 tablet by mouth daily       polyethylene glycol Packet    MIRALAX/GLYCOLAX    12 packet    Take 17 g by mouth daily       POTASSIUM PO      Take 595 mg by mouth every morning       TRUBIOTICS PO      Take 1 tablet by mouth daily       TYLENOL PO      Take 1,300 mg by mouth At Bedtime       valsartan-hydrochlorothiazide 80-12.5 MG per tablet    DIOVAN-HCT     Take 1 tablet by mouth daily       VITAMIN D3 PO      Take 400 Units by mouth daily       ZOCOR 40 MG tablet   Generic drug:  simvastatin      Take 40 mg by mouth every evening       * Notice:  This list has 2 medication(s) that are the same as other medications prescribed for you. Read the directions carefully, and ask your doctor or other care provider to review them with you.

## 2017-05-17 NOTE — LETTER
May 17, 2017             Thad Puri MD   [facility]     [address] [place] [address]      RE: María Henderson   MRN: [id number]   : 1945      Dear Dr. Puri:      Thank you for allowing me to participate in the care of this very delightful patient.  As you know, Mrs. 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  She is here for followup.      Since then, she has been doing quite well, free of any palpitations.  She asked whether she can be off the metoprolol. I recommended that she would take 25 mg a day for 3 days and then stop.  I reassured the patient that this type of ablation has a very high success rate, even cure in the 95% range or more.  EKG today demonstrates sinus rhythm.  I wished her luck, and she can come back to see me on a p.r.n. basis.      Sincerely,      Joaquim Ruano MD

## 2017-05-18 NOTE — PROGRESS NOTES
May 17, 2017             Thad Puri MD   [facility]     [address] [place] [address]      RE: María Liz   MRN: [id number]   : 1945      Dear Dr. Puri:      Thank you for allowing me to participate in the care of this very delightful patient.  As you know, Mrs. 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  She is here for followup.      Since then, she has been doing quite well, free of any palpitations.  She asked whether she can be off the metoprolol. I recommended that she would take 25 mg a day for 3 days and then stop.  I reassured the patient that this type of ablation has a very high success rate, even cure in the 95% range or more.  EKG today demonstrates sinus rhythm.  I wished her luck, and she can come back to see me on a p.r.n. basis.      Sincerely,      MD RONALD Reina MD             D: 2017 11:55   T: 2017 09:47   MT: benoit      Name:     MARÍA LIZ   MRN:      [id number]        Account:      [id number]   :      1945           Service Date: 2017      Document: [id number]

## 2017-06-13 ENCOUNTER — HOSPITAL LABORATORY (OUTPATIENT)
Dept: OTHER | Facility: CLINIC | Age: 72
End: 2017-06-13

## 2017-06-15 LAB
BACTERIA SPEC CULT: NO GROWTH
Lab: NORMAL
MICRO REPORT STATUS: NORMAL
SPECIMEN SOURCE: NORMAL

## 2017-09-07 ENCOUNTER — RADIANT APPOINTMENT (OUTPATIENT)
Dept: GENERAL RADIOLOGY | Facility: CLINIC | Age: 72
End: 2017-09-07
Attending: ORTHOPAEDIC SURGERY
Payer: OTHER MISCELLANEOUS

## 2017-09-07 DIAGNOSIS — R52 PAIN: ICD-10-CM

## 2017-09-07 PROCEDURE — 73560 X-RAY EXAM OF KNEE 1 OR 2: CPT | Mod: TC

## 2018-03-06 ENCOUNTER — RADIANT APPOINTMENT (OUTPATIENT)
Dept: GENERAL RADIOLOGY | Facility: CLINIC | Age: 73
End: 2018-03-06
Attending: ORTHOPAEDIC SURGERY
Payer: OTHER MISCELLANEOUS

## 2018-03-06 DIAGNOSIS — R52 PAIN: ICD-10-CM

## 2018-03-06 DIAGNOSIS — Z47.89 AFTERCARE FOLLOWING SURGERY OF THE MUSCULOSKELETAL SYSTEM: Primary | ICD-10-CM

## 2018-03-06 LAB
CRP SERPL-MCNC: 3.1 MG/L (ref 0–8)
ERYTHROCYTE [DISTWIDTH] IN BLOOD BY AUTOMATED COUNT: 15.4 % (ref 10–15)
ERYTHROCYTE [SEDIMENTATION RATE] IN BLOOD BY WESTERGREN METHOD: 9 MM/H (ref 0–30)
HCT VFR BLD AUTO: 42.3 % (ref 35–47)
HGB BLD-MCNC: 13.2 G/DL (ref 11.7–15.7)
MCH RBC QN AUTO: 29.4 PG (ref 26.5–33)
MCHC RBC AUTO-ENTMCNC: 31.2 G/DL (ref 31.5–36.5)
MCV RBC AUTO: 94 FL (ref 78–100)
PLATELET # BLD AUTO: 332 10E9/L (ref 150–450)
RBC # BLD AUTO: 4.49 10E12/L (ref 3.8–5.2)
WBC # BLD AUTO: 6.7 10E9/L (ref 4–11)

## 2018-03-06 PROCEDURE — 73560 X-RAY EXAM OF KNEE 1 OR 2: CPT | Mod: RT

## 2018-03-06 PROCEDURE — 85027 COMPLETE CBC AUTOMATED: CPT | Performed by: INTERNAL MEDICINE

## 2018-03-06 PROCEDURE — 85652 RBC SED RATE AUTOMATED: CPT | Performed by: INTERNAL MEDICINE

## 2018-03-06 PROCEDURE — 86140 C-REACTIVE PROTEIN: CPT | Performed by: INTERNAL MEDICINE

## 2018-03-06 PROCEDURE — 36415 COLL VENOUS BLD VENIPUNCTURE: CPT | Performed by: INTERNAL MEDICINE

## 2019-09-24 ENCOUNTER — TRANSFERRED RECORDS (OUTPATIENT)
Dept: HEALTH INFORMATION MANAGEMENT | Facility: CLINIC | Age: 74
End: 2019-09-24

## 2019-09-25 LAB
CREAT SERPL-MCNC: 0.82 MG/DL (ref 0.57–1)
GFR SERPL CREATININE-BSD FRML MDRD: 71 ML/MIN/1.73
GLUCOSE SERPL-MCNC: 98 MG/DL (ref 65–99)
POTASSIUM SERPL-SCNC: 4 MMOL/L (ref 3.5–5.2)

## 2019-09-30 ENCOUNTER — TRANSFERRED RECORDS (OUTPATIENT)
Dept: HEALTH INFORMATION MANAGEMENT | Facility: CLINIC | Age: 74
End: 2019-09-30

## 2019-10-07 ENCOUNTER — APPOINTMENT (OUTPATIENT)
Dept: GENERAL RADIOLOGY | Facility: CLINIC | Age: 74
DRG: 483 | End: 2019-10-07
Attending: PHYSICIAN ASSISTANT
Payer: COMMERCIAL

## 2019-10-07 ENCOUNTER — ANESTHESIA EVENT (OUTPATIENT)
Dept: SURGERY | Facility: CLINIC | Age: 74
DRG: 483 | End: 2019-10-07
Payer: COMMERCIAL

## 2019-10-07 ENCOUNTER — ANESTHESIA (OUTPATIENT)
Dept: SURGERY | Facility: CLINIC | Age: 74
DRG: 483 | End: 2019-10-07
Payer: COMMERCIAL

## 2019-10-07 ENCOUNTER — HOSPITAL ENCOUNTER (INPATIENT)
Facility: CLINIC | Age: 74
LOS: 1 days | Discharge: HOME | DRG: 483 | End: 2019-10-08
Attending: ORTHOPAEDIC SURGERY | Admitting: ORTHOPAEDIC SURGERY
Payer: COMMERCIAL

## 2019-10-07 DIAGNOSIS — Z96.611 STATUS POST REVERSE TOTAL ARTHROPLASTY OF RIGHT SHOULDER: Primary | ICD-10-CM

## 2019-10-07 LAB
CREAT SERPL-MCNC: 0.84 MG/DL (ref 0.52–1.04)
GFR SERPL CREATININE-BSD FRML MDRD: 68 ML/MIN/{1.73_M2}
POTASSIUM SERPL-SCNC: 3.4 MMOL/L (ref 3.4–5.3)

## 2019-10-07 PROCEDURE — 25000566 ZZH SEVOFLURANE, EA 15 MIN: Performed by: ORTHOPAEDIC SURGERY

## 2019-10-07 PROCEDURE — 25000128 H RX IP 250 OP 636: Performed by: ORTHOPAEDIC SURGERY

## 2019-10-07 PROCEDURE — 25800030 ZZH RX IP 258 OP 636: Performed by: ANESTHESIOLOGY

## 2019-10-07 PROCEDURE — 40000171 ZZH STATISTIC PRE-PROCEDURE ASSESSMENT III: Performed by: ORTHOPAEDIC SURGERY

## 2019-10-07 PROCEDURE — C1776 JOINT DEVICE (IMPLANTABLE): HCPCS | Performed by: ORTHOPAEDIC SURGERY

## 2019-10-07 PROCEDURE — 37000008 ZZH ANESTHESIA TECHNICAL FEE, 1ST 30 MIN: Performed by: ORTHOPAEDIC SURGERY

## 2019-10-07 PROCEDURE — 12000000 ZZH R&B MED SURG/OB

## 2019-10-07 PROCEDURE — 25000125 ZZHC RX 250: Performed by: ORTHOPAEDIC SURGERY

## 2019-10-07 PROCEDURE — 82565 ASSAY OF CREATININE: CPT | Performed by: ANESTHESIOLOGY

## 2019-10-07 PROCEDURE — 25000128 H RX IP 250 OP 636: Performed by: ANESTHESIOLOGY

## 2019-10-07 PROCEDURE — 25000125 ZZHC RX 250: Performed by: PHYSICIAN ASSISTANT

## 2019-10-07 PROCEDURE — 27211024 ZZHC OR SUPPLY OTHER OPNP: Performed by: ORTHOPAEDIC SURGERY

## 2019-10-07 PROCEDURE — 25000132 ZZH RX MED GY IP 250 OP 250 PS 637: Performed by: ORTHOPAEDIC SURGERY

## 2019-10-07 PROCEDURE — 36000093 ZZH SURGERY LEVEL 4 1ST 30 MIN: Performed by: ORTHOPAEDIC SURGERY

## 2019-10-07 PROCEDURE — 71000012 ZZH RECOVERY PHASE 1 LEVEL 1 FIRST HR: Performed by: ORTHOPAEDIC SURGERY

## 2019-10-07 PROCEDURE — 25000128 H RX IP 250 OP 636: Performed by: NURSE ANESTHETIST, CERTIFIED REGISTERED

## 2019-10-07 PROCEDURE — 36000063 ZZH SURGERY LEVEL 4 EA 15 ADDTL MIN: Performed by: ORTHOPAEDIC SURGERY

## 2019-10-07 PROCEDURE — 25000128 H RX IP 250 OP 636: Performed by: PHYSICIAN ASSISTANT

## 2019-10-07 PROCEDURE — 84132 ASSAY OF SERUM POTASSIUM: CPT | Performed by: ANESTHESIOLOGY

## 2019-10-07 PROCEDURE — 25000132 ZZH RX MED GY IP 250 OP 250 PS 637: Performed by: PHYSICIAN ASSISTANT

## 2019-10-07 PROCEDURE — 27210794 ZZH OR GENERAL SUPPLY STERILE: Performed by: ORTHOPAEDIC SURGERY

## 2019-10-07 PROCEDURE — 36415 COLL VENOUS BLD VENIPUNCTURE: CPT | Performed by: ANESTHESIOLOGY

## 2019-10-07 PROCEDURE — C1713 ANCHOR/SCREW BN/BN,TIS/BN: HCPCS | Performed by: ORTHOPAEDIC SURGERY

## 2019-10-07 PROCEDURE — 40000985 XR SHOULDER RT PORT G/E 2 VW: Mod: RT

## 2019-10-07 PROCEDURE — 25800030 ZZH RX IP 258 OP 636: Performed by: NURSE ANESTHETIST, CERTIFIED REGISTERED

## 2019-10-07 PROCEDURE — 25000125 ZZHC RX 250: Performed by: NURSE ANESTHETIST, CERTIFIED REGISTERED

## 2019-10-07 PROCEDURE — 25800030 ZZH RX IP 258 OP 636: Performed by: PHYSICIAN ASSISTANT

## 2019-10-07 PROCEDURE — 25800030 ZZH RX IP 258 OP 636: Performed by: ORTHOPAEDIC SURGERY

## 2019-10-07 PROCEDURE — 0RRJ00Z REPLACEMENT OF RIGHT SHOULDER JOINT WITH REVERSE BALL AND SOCKET SYNTHETIC SUBSTITUTE, OPEN APPROACH: ICD-10-PCS | Performed by: ORTHOPAEDIC SURGERY

## 2019-10-07 PROCEDURE — 37000009 ZZH ANESTHESIA TECHNICAL FEE, EACH ADDTL 15 MIN: Performed by: ORTHOPAEDIC SURGERY

## 2019-10-07 DEVICE — IMP SCR LOCKING BIOM REV SHLDR 3.5 HEX 4.75X25MM 180552: Type: IMPLANTABLE DEVICE | Site: SHOULDER | Status: FUNCTIONAL

## 2019-10-07 DEVICE — IMP GLENOSPHERE BIOM REV SHLDR 36MM STD 115310: Type: IMPLANTABLE DEVICE | Site: SHOULDER | Status: FUNCTIONAL

## 2019-10-07 DEVICE — IMP SCR LOCKING BIOM REV SHLDR 3.5 HEX 4.75X20MM 180551: Type: IMPLANTABLE DEVICE | Site: SHOULDER | Status: FUNCTIONAL

## 2019-10-07 DEVICE — IMP SCR LOCKING BIOM REV SHLDR 3.5 HEX 4.75X15MM 180550: Type: IMPLANTABLE DEVICE | Site: SHOULDER | Status: FUNCTIONAL

## 2019-10-07 DEVICE — IMP BASEPLATE MINI GLENOSPHERE BIOM REV SHLDR 25MM 010000589: Type: IMPLANTABLE DEVICE | Site: SHOULDER | Status: FUNCTIONAL

## 2019-10-07 RX ORDER — NALOXONE HYDROCHLORIDE 0.4 MG/ML
.1-.4 INJECTION, SOLUTION INTRAMUSCULAR; INTRAVENOUS; SUBCUTANEOUS
Status: DISCONTINUED | OUTPATIENT
Start: 2019-10-07 | End: 2019-10-07

## 2019-10-07 RX ORDER — LIDOCAINE 40 MG/G
CREAM TOPICAL
Status: DISCONTINUED | OUTPATIENT
Start: 2019-10-07 | End: 2019-10-08 | Stop reason: HOSPADM

## 2019-10-07 RX ORDER — ONDANSETRON 4 MG/1
4 TABLET, ORALLY DISINTEGRATING ORAL EVERY 6 HOURS PRN
Status: DISCONTINUED | OUTPATIENT
Start: 2019-10-07 | End: 2019-10-08 | Stop reason: HOSPADM

## 2019-10-07 RX ORDER — FUROSEMIDE 20 MG
20-40 TABLET ORAL DAILY
Status: DISCONTINUED | OUTPATIENT
Start: 2019-10-07 | End: 2019-10-08 | Stop reason: HOSPADM

## 2019-10-07 RX ORDER — MAGNESIUM GLUCONATE 30 MG(550)
2 TABLET ORAL EVERY MORNING
COMMUNITY

## 2019-10-07 RX ORDER — PROPOFOL 10 MG/ML
INJECTION, EMULSION INTRAVENOUS PRN
Status: DISCONTINUED | OUTPATIENT
Start: 2019-10-07 | End: 2019-10-07

## 2019-10-07 RX ORDER — AMOXICILLIN 250 MG
1 CAPSULE ORAL 2 TIMES DAILY
Status: DISCONTINUED | OUTPATIENT
Start: 2019-10-07 | End: 2019-10-08 | Stop reason: HOSPADM

## 2019-10-07 RX ORDER — GLYCOPYRROLATE 0.2 MG/ML
INJECTION, SOLUTION INTRAMUSCULAR; INTRAVENOUS PRN
Status: DISCONTINUED | OUTPATIENT
Start: 2019-10-07 | End: 2019-10-07

## 2019-10-07 RX ORDER — CLINDAMYCIN HCL 300 MG
600 CAPSULE ORAL DAILY PRN
COMMUNITY

## 2019-10-07 RX ORDER — METOCLOPRAMIDE 5 MG/1
5 TABLET ORAL EVERY 6 HOURS PRN
Status: DISCONTINUED | OUTPATIENT
Start: 2019-10-07 | End: 2019-10-08 | Stop reason: HOSPADM

## 2019-10-07 RX ORDER — HYDROMORPHONE HYDROCHLORIDE 1 MG/ML
.3-.5 INJECTION, SOLUTION INTRAMUSCULAR; INTRAVENOUS; SUBCUTANEOUS EVERY 5 MIN PRN
Status: DISCONTINUED | OUTPATIENT
Start: 2019-10-07 | End: 2019-10-07 | Stop reason: HOSPADM

## 2019-10-07 RX ORDER — FENTANYL CITRATE 50 UG/ML
INJECTION, SOLUTION INTRAMUSCULAR; INTRAVENOUS PRN
Status: DISCONTINUED | OUTPATIENT
Start: 2019-10-07 | End: 2019-10-07

## 2019-10-07 RX ORDER — MULTIVITAMIN WITH IRON
100 TABLET ORAL DAILY
COMMUNITY
End: 2024-08-12

## 2019-10-07 RX ORDER — MEPERIDINE HYDROCHLORIDE 25 MG/ML
12.5 INJECTION INTRAMUSCULAR; INTRAVENOUS; SUBCUTANEOUS EVERY 5 MIN PRN
Status: DISCONTINUED | OUTPATIENT
Start: 2019-10-07 | End: 2019-10-07 | Stop reason: HOSPADM

## 2019-10-07 RX ORDER — SODIUM CHLORIDE, SODIUM LACTATE, POTASSIUM CHLORIDE, CALCIUM CHLORIDE 600; 310; 30; 20 MG/100ML; MG/100ML; MG/100ML; MG/100ML
INJECTION, SOLUTION INTRAVENOUS CONTINUOUS
Status: DISCONTINUED | OUTPATIENT
Start: 2019-10-07 | End: 2019-10-07 | Stop reason: HOSPADM

## 2019-10-07 RX ORDER — DEXAMETHASONE SODIUM PHOSPHATE 4 MG/ML
INJECTION, SOLUTION INTRA-ARTICULAR; INTRALESIONAL; INTRAMUSCULAR; INTRAVENOUS; SOFT TISSUE PRN
Status: DISCONTINUED | OUTPATIENT
Start: 2019-10-07 | End: 2019-10-07

## 2019-10-07 RX ORDER — ONDANSETRON 2 MG/ML
4 INJECTION INTRAMUSCULAR; INTRAVENOUS EVERY 6 HOURS PRN
Status: DISCONTINUED | OUTPATIENT
Start: 2019-10-07 | End: 2019-10-08 | Stop reason: HOSPADM

## 2019-10-07 RX ORDER — CLINDAMYCIN PHOSPHATE 900 MG/50ML
900 INJECTION, SOLUTION INTRAVENOUS
Status: COMPLETED | OUTPATIENT
Start: 2019-10-07 | End: 2019-10-07

## 2019-10-07 RX ORDER — FENTANYL CITRATE 50 UG/ML
25-50 INJECTION, SOLUTION INTRAMUSCULAR; INTRAVENOUS
Status: DISCONTINUED | OUTPATIENT
Start: 2019-10-07 | End: 2019-10-07 | Stop reason: HOSPADM

## 2019-10-07 RX ORDER — SODIUM CHLORIDE 9 MG/ML
INJECTION, SOLUTION INTRAVENOUS CONTINUOUS
Status: DISCONTINUED | OUTPATIENT
Start: 2019-10-07 | End: 2019-10-08 | Stop reason: HOSPADM

## 2019-10-07 RX ORDER — VALSARTAN AND HYDROCHLOROTHIAZIDE 80; 12.5 MG/1; MG/1
1 TABLET, FILM COATED ORAL EVERY MORNING
Status: DISCONTINUED | OUTPATIENT
Start: 2019-10-07 | End: 2019-10-08 | Stop reason: HOSPADM

## 2019-10-07 RX ORDER — ACETAMINOPHEN 325 MG/1
975 TABLET ORAL ONCE
Status: COMPLETED | OUTPATIENT
Start: 2019-10-07 | End: 2019-10-07

## 2019-10-07 RX ORDER — PROCHLORPERAZINE MALEATE 5 MG
5 TABLET ORAL EVERY 6 HOURS PRN
Status: DISCONTINUED | OUTPATIENT
Start: 2019-10-07 | End: 2019-10-08 | Stop reason: HOSPADM

## 2019-10-07 RX ORDER — ASPIRIN 81 MG/1
81 TABLET ORAL DAILY
COMMUNITY

## 2019-10-07 RX ORDER — ONDANSETRON 4 MG/1
4 TABLET, ORALLY DISINTEGRATING ORAL EVERY 30 MIN PRN
Status: DISCONTINUED | OUTPATIENT
Start: 2019-10-07 | End: 2019-10-07 | Stop reason: HOSPADM

## 2019-10-07 RX ORDER — FENTANYL CITRATE 50 UG/ML
50 INJECTION, SOLUTION INTRAMUSCULAR; INTRAVENOUS
Status: COMPLETED | OUTPATIENT
Start: 2019-10-07 | End: 2019-10-07

## 2019-10-07 RX ORDER — ONDANSETRON 2 MG/ML
4 INJECTION INTRAMUSCULAR; INTRAVENOUS EVERY 30 MIN PRN
Status: DISCONTINUED | OUTPATIENT
Start: 2019-10-07 | End: 2019-10-07 | Stop reason: HOSPADM

## 2019-10-07 RX ORDER — METOCLOPRAMIDE HYDROCHLORIDE 5 MG/ML
5 INJECTION INTRAMUSCULAR; INTRAVENOUS EVERY 6 HOURS PRN
Status: DISCONTINUED | OUTPATIENT
Start: 2019-10-07 | End: 2019-10-08 | Stop reason: HOSPADM

## 2019-10-07 RX ORDER — AMOXICILLIN 250 MG
2 CAPSULE ORAL 2 TIMES DAILY
Status: DISCONTINUED | OUTPATIENT
Start: 2019-10-07 | End: 2019-10-08 | Stop reason: HOSPADM

## 2019-10-07 RX ORDER — HYDROXYZINE HYDROCHLORIDE 10 MG/1
10 TABLET, FILM COATED ORAL EVERY 6 HOURS PRN
Status: DISCONTINUED | OUTPATIENT
Start: 2019-10-07 | End: 2019-10-08 | Stop reason: HOSPADM

## 2019-10-07 RX ORDER — NEOSTIGMINE METHYLSULFATE 1 MG/ML
VIAL (ML) INJECTION PRN
Status: DISCONTINUED | OUTPATIENT
Start: 2019-10-07 | End: 2019-10-07

## 2019-10-07 RX ORDER — ACETAMINOPHEN 500 MG
1000 TABLET ORAL AT BEDTIME
COMMUNITY

## 2019-10-07 RX ORDER — ACETAMINOPHEN 325 MG/1
975 TABLET ORAL EVERY 8 HOURS
Status: DISCONTINUED | OUTPATIENT
Start: 2019-10-07 | End: 2019-10-08 | Stop reason: HOSPADM

## 2019-10-07 RX ORDER — LIDOCAINE HYDROCHLORIDE 20 MG/ML
INJECTION, SOLUTION INFILTRATION; PERINEURAL PRN
Status: DISCONTINUED | OUTPATIENT
Start: 2019-10-07 | End: 2019-10-07

## 2019-10-07 RX ORDER — METHOCARBAMOL 500 MG/1
500 TABLET, FILM COATED ORAL 4 TIMES DAILY PRN
Status: DISCONTINUED | OUTPATIENT
Start: 2019-10-07 | End: 2019-10-08 | Stop reason: HOSPADM

## 2019-10-07 RX ORDER — ASCORBIC ACID 250 MG
250 TABLET,CHEWABLE ORAL DAILY
COMMUNITY
End: 2024-08-12

## 2019-10-07 RX ORDER — ACETAMINOPHEN 325 MG/1
650 TABLET ORAL EVERY 4 HOURS PRN
Status: DISCONTINUED | OUTPATIENT
Start: 2019-10-10 | End: 2019-10-08 | Stop reason: HOSPADM

## 2019-10-07 RX ORDER — ONDANSETRON 2 MG/ML
INJECTION INTRAMUSCULAR; INTRAVENOUS PRN
Status: DISCONTINUED | OUTPATIENT
Start: 2019-10-07 | End: 2019-10-07

## 2019-10-07 RX ORDER — SIMVASTATIN 40 MG
40 TABLET ORAL EVERY MORNING
Status: DISCONTINUED | OUTPATIENT
Start: 2019-10-08 | End: 2019-10-08 | Stop reason: HOSPADM

## 2019-10-07 RX ORDER — CALCIUM CARBONATE 500 MG/1
1000 TABLET, CHEWABLE ORAL 4 TIMES DAILY PRN
Status: DISCONTINUED | OUTPATIENT
Start: 2019-10-07 | End: 2019-10-08 | Stop reason: HOSPADM

## 2019-10-07 RX ORDER — CLINDAMYCIN PHOSPHATE 900 MG/50ML
900 INJECTION, SOLUTION INTRAVENOUS EVERY 8 HOURS
Status: COMPLETED | OUTPATIENT
Start: 2019-10-07 | End: 2019-10-08

## 2019-10-07 RX ORDER — CLINDAMYCIN PHOSPHATE 900 MG/50ML
900 INJECTION, SOLUTION INTRAVENOUS SEE ADMIN INSTRUCTIONS
Status: DISCONTINUED | OUTPATIENT
Start: 2019-10-07 | End: 2019-10-07 | Stop reason: HOSPADM

## 2019-10-07 RX ORDER — MAGNESIUM GLUCONATE 30 MG(550)
1 TABLET ORAL
Status: DISCONTINUED | OUTPATIENT
Start: 2019-10-08 | End: 2019-10-08 | Stop reason: HOSPADM

## 2019-10-07 RX ORDER — NALOXONE HYDROCHLORIDE 0.4 MG/ML
.1-.4 INJECTION, SOLUTION INTRAMUSCULAR; INTRAVENOUS; SUBCUTANEOUS
Status: DISCONTINUED | OUTPATIENT
Start: 2019-10-07 | End: 2019-10-08 | Stop reason: HOSPADM

## 2019-10-07 RX ORDER — EPHEDRINE SULFATE 50 MG/ML
INJECTION, SOLUTION INTRAMUSCULAR; INTRAVENOUS; SUBCUTANEOUS PRN
Status: DISCONTINUED | OUTPATIENT
Start: 2019-10-07 | End: 2019-10-07

## 2019-10-07 RX ORDER — HYDROMORPHONE HYDROCHLORIDE 1 MG/ML
.3-.5 INJECTION, SOLUTION INTRAMUSCULAR; INTRAVENOUS; SUBCUTANEOUS
Status: DISCONTINUED | OUTPATIENT
Start: 2019-10-07 | End: 2019-10-08 | Stop reason: HOSPADM

## 2019-10-07 RX ADMIN — ACETAMINOPHEN 975 MG: 325 TABLET, FILM COATED ORAL at 19:03

## 2019-10-07 RX ADMIN — SODIUM CHLORIDE 1 G: 9 INJECTION, SOLUTION INTRAVENOUS at 13:04

## 2019-10-07 RX ADMIN — SODIUM CHLORIDE, POTASSIUM CHLORIDE, SODIUM LACTATE AND CALCIUM CHLORIDE: 600; 310; 30; 20 INJECTION, SOLUTION INTRAVENOUS at 11:28

## 2019-10-07 RX ADMIN — SODIUM CHLORIDE: 9 INJECTION, SOLUTION INTRAVENOUS at 16:42

## 2019-10-07 RX ADMIN — DEXAMETHASONE SODIUM PHOSPHATE 4 MG: 4 INJECTION, SOLUTION INTRA-ARTICULAR; INTRALESIONAL; INTRAMUSCULAR; INTRAVENOUS; SOFT TISSUE at 13:04

## 2019-10-07 RX ADMIN — NEOSTIGMINE METHYLSULFATE 4 MG: 1 INJECTION, SOLUTION INTRAVENOUS at 13:58

## 2019-10-07 RX ADMIN — PHENYLEPHRINE HYDROCHLORIDE 100 MCG: 10 INJECTION INTRAVENOUS at 13:03

## 2019-10-07 RX ADMIN — PROPOFOL 180 MG: 10 INJECTION, EMULSION INTRAVENOUS at 12:50

## 2019-10-07 RX ADMIN — LIDOCAINE HYDROCHLORIDE 100 MG: 20 INJECTION, SOLUTION INFILTRATION; PERINEURAL at 12:50

## 2019-10-07 RX ADMIN — ONDANSETRON 4 MG: 2 INJECTION INTRAMUSCULAR; INTRAVENOUS at 13:54

## 2019-10-07 RX ADMIN — PHENYLEPHRINE HYDROCHLORIDE 100 MCG: 10 INJECTION INTRAVENOUS at 13:51

## 2019-10-07 RX ADMIN — HYDROMORPHONE HYDROCHLORIDE 0.3 MG: 1 INJECTION, SOLUTION INTRAMUSCULAR; INTRAVENOUS; SUBCUTANEOUS at 16:46

## 2019-10-07 RX ADMIN — SODIUM CHLORIDE, POTASSIUM CHLORIDE, SODIUM LACTATE AND CALCIUM CHLORIDE: 600; 310; 30; 20 INJECTION, SOLUTION INTRAVENOUS at 14:04

## 2019-10-07 RX ADMIN — GLYCOPYRROLATE 0.8 MG: 0.2 INJECTION, SOLUTION INTRAMUSCULAR; INTRAVENOUS at 13:58

## 2019-10-07 RX ADMIN — PHENYLEPHRINE HYDROCHLORIDE 100 MCG: 10 INJECTION INTRAVENOUS at 13:17

## 2019-10-07 RX ADMIN — PHENYLEPHRINE HYDROCHLORIDE 100 MCG: 10 INJECTION INTRAVENOUS at 13:57

## 2019-10-07 RX ADMIN — PHENYLEPHRINE HYDROCHLORIDE 100 MCG: 10 INJECTION INTRAVENOUS at 13:39

## 2019-10-07 RX ADMIN — MIDAZOLAM HYDROCHLORIDE 1 MG: 1 INJECTION, SOLUTION INTRAMUSCULAR; INTRAVENOUS at 11:32

## 2019-10-07 RX ADMIN — CLINDAMYCIN PHOSPHATE 900 MG: 18 INJECTION, SOLUTION INTRAVENOUS at 12:53

## 2019-10-07 RX ADMIN — HYDROMORPHONE HYDROCHLORIDE 1 MG: 2 TABLET ORAL at 23:46

## 2019-10-07 RX ADMIN — FENTANYL CITRATE 50 MCG: 50 INJECTION, SOLUTION INTRAMUSCULAR; INTRAVENOUS at 14:28

## 2019-10-07 RX ADMIN — PHENYLEPHRINE HYDROCHLORIDE 100 MCG: 10 INJECTION INTRAVENOUS at 13:24

## 2019-10-07 RX ADMIN — ROCURONIUM BROMIDE 50 MG: 10 INJECTION INTRAVENOUS at 12:50

## 2019-10-07 RX ADMIN — SENNOSIDES AND DOCUSATE SODIUM 2 TABLET: 8.6; 5 TABLET ORAL at 20:18

## 2019-10-07 RX ADMIN — ACETAMINOPHEN 975 MG: 325 TABLET, FILM COATED ORAL at 11:29

## 2019-10-07 RX ADMIN — PHENYLEPHRINE HYDROCHLORIDE 100 MCG: 10 INJECTION INTRAVENOUS at 13:09

## 2019-10-07 RX ADMIN — FENTANYL CITRATE 50 MCG: 50 INJECTION INTRAMUSCULAR; INTRAVENOUS at 11:32

## 2019-10-07 RX ADMIN — PHENYLEPHRINE HYDROCHLORIDE 100 MCG: 10 INJECTION INTRAVENOUS at 13:30

## 2019-10-07 RX ADMIN — FENTANYL CITRATE 50 MCG: 50 INJECTION, SOLUTION INTRAMUSCULAR; INTRAVENOUS at 12:50

## 2019-10-07 RX ADMIN — HYDROMORPHONE HYDROCHLORIDE 0.5 MG: 10 INJECTION, SOLUTION INTRAMUSCULAR; INTRAVENOUS; SUBCUTANEOUS at 14:43

## 2019-10-07 RX ADMIN — BUPIVACAINE HYDROCHLORIDE 20 ML GIVEN: 5 INJECTION, SOLUTION EPIDURAL; INTRACAUDAL; PERINEURAL at 12:01

## 2019-10-07 RX ADMIN — CLINDAMYCIN PHOSPHATE 900 MG: 900 INJECTION, SOLUTION INTRAVENOUS at 20:19

## 2019-10-07 RX ADMIN — Medication 5 MG: at 13:42

## 2019-10-07 ASSESSMENT — ACTIVITIES OF DAILY LIVING (ADL)
ADLS_ACUITY_SCORE: 14
ADLS_ACUITY_SCORE: 12

## 2019-10-07 ASSESSMENT — ENCOUNTER SYMPTOMS: DYSRHYTHMIAS: 1

## 2019-10-07 ASSESSMENT — MIFFLIN-ST. JEOR: SCORE: 1316

## 2019-10-07 NOTE — ANESTHESIA PROCEDURE NOTES
Peripheral nerve/Neuraxial procedure note : Interscalene  Pre-Procedure  Performed by Bob Tyson DO  Location: pre-op      Pre-Anesthestic Checklist: patient identified, IV checked, site marked, risks and benefits discussed, informed consent, monitors and equipment checked, pre-op evaluation, at physician/surgeon's request and post-op pain management    Timeout  Correct Patient: Yes   Correct Procedure: Yes   Correct Site: Yes   Correct Laterality: Yes   Correct Position: Yes   Site Marked: Yes   .   Procedure Documentation    .    Procedure: Interscalene, right.   Patient Position:supine Local skin infiltrated with 3 mL of 1% lidocaine.    Ultrasound used to identify targeted nerve, plexus, or vascular marker and placed a needle adjacent to it., Ultrasound was used to visualize the spread of the anesthetic in close proximity to the above stated nerve. A permanent image is entered into the patient's record.  Patient Prep/Sterile Barriers; mask, sterile gloves, chlorhexidine gluconate and isopropyl alcohol, patient draped.  .  Needle: insulated, short bevel   Needle Gauge: 21.    Needle Length (Inches) 4   Insertion Method: Single Shot.        Assessment/Narrative  Paresthesias: No.  .  The placement was negative for: blood aspirated, painful injection and site bleeding.  Bolus given via needle..   Secured via.   Complications: none. Test dose of mL at. Test dose negative for signs of intravascular, subdural or intrathecal injection. Comments:  The surgeon has given a verbal order transferring care of this patient to me for the performance of a regional analgesia block for post-op pain control. It is requested of me because I am uniquely trained and qualified to perform this block and the surgeon is neither trained nor qualified to perform this procedure

## 2019-10-07 NOTE — BRIEF OP NOTE
[facility]    Brief Operative Note    Pre-operative diagnosis: RIGHT SHOULDER ROTATOR CUFF ARTHROPATHY  Post-operative diagnosis right rotator cuff arthropathy  Procedure: Procedure(s):  RIGHT TOTAL REVERSE SHOULDER ARTHROPLASTY (BIOMET)^  Surgeon: Surgeon(s) and Role:     * Husam Berkowitz MD - Primary     * Sarahi Liz - Assisting  Anesthesia: General   Estimated blood loss: 25 ml  Drains: None  Specimens: * No specimens in log *  Findings:   None.  Complications: None.  Implants:    Implant Name Type Inv. Item Serial No.  Lot No. LRB No. Used   IMP BASEPLATE MINI GLENOSPHERE BIOM REV SHLDR 25MM [id number] Total Joint Component/Insert IMP BASEPLATE MINI GLENOSPHERE BIOM REV SHLDR 25MM [id number]  [employer] [id number] Right 1   CENTRAL SCREW    BIOMET [id number] Right 1   IMP SCR LOCKING BIOM REV SHLDR 3.5 HEX [id number] Metallic Hardware/[place] IMP SCR LOCKING BIOM REV SHLDR 3.5 HEX [id number]  [employer] [id number] Right 1   IMP SCR LOCKING BIOM REV SHLDR 3.5 HEX [id number] Total Joint Component/Insert IMP SCR LOCKING BIOM REV SHLDR 3.5 HEX [id number]  [employer] [id number] Right 2   IMP SCR LOCKING BIOM REV SHLDR 3.5 HEX [id number] [id number] Metallic Hardware/[place] IMP SCR LOCKING BIOM REV SHLDR 3.5 HEX [id number]  [employer] [id number] Right 1   PRIMARY SHOULDER STEM, STANDARD LENGTH, 12MM, 122MM LONG    BIOMET [id number] Right 1   IMP GLENOSPHERE BIOM REV SHLDR 36MM STD [id number] Total Joint Component/Insert IMP GLENOSPHERE BIOM REV SHLDR 36MM STD [id number]  [employer] [id number] Right 1   PROLONG HIGHLY CROSSLINKED POLYETHYLENE BEARING , +3MM THICKNESS, 36MM DIAMETER    BIOMET [id number] Right 1

## 2019-10-07 NOTE — ANESTHESIA PREPROCEDURE EVALUATION
Anesthesia Pre-Procedure Evaluation    Patient: María Henderson   MRN: [id number] : 1945          Preoperative Diagnosis: RIGHT SHOULDER ROTATOR CUFF ARTHROPATHY    Procedure(s):  RIGHT TOTAL REVERSE SHOULDER ARTHROPLASTY (BIOMET)^    Past Medical History:   Diagnosis Date     Allergic rhinitis      Bacteremia due to Streptococcus      Edema      Gastro-oesophageal reflux disease      Heart murmur     ?murmur     Hyperlipemia      Hyperlipidemia      Hypertension      Kidney stone      Numbness and tingling     numbness left distal medial tibia     Osteoarthritis      Osteoarthrosis      Osteopenia      Paroxysmal supraventricular tachycardia (H)      S/P ablation of atrial fibrillation      Past Surgical History:   Procedure Laterality Date     7 foot and ankle        ARTHROPLASTY REVISION ANKLE  3/10/2014    Procedure: ARTHROPLASTY REVISION ANKLE;  REVISION LEFT TOTAL ANKLE  WITH CONVERSION TO  IM LAURIE FUSION WITH FEMORAL HEAD ALLOGRAFT (C-ARM);  Surgeon: Ramirez Fang MD;  Location: [facility]     ASPIRATION NEEDLE KNEE Right 2016    Procedure: ASPIRATION NEEDLE KNEE;  Surgeon: Jian Fisher MD;  Location:  OR     BUNIONECTOMY JONAS  10/24/2011    Procedure:BUNIONECTOMY JONAS; Left Foot Bunionette Repair (C-Arm); Surgeon:RAMIREZ FANG; Location:[facility]     C TOTAL KNEE ARTHROPLASTY Right      COLONOSCOPY       EYE SURGERY      cataract     H ABLATION SVT       HERNIA REPAIR       IRRIGATION AND DEBRIDEMENT FOOT, COMBINED Left 2016    Procedure: COMBINED IRRIGATION AND DEBRIDEMENT FOOT;  Surgeon: Jian Fisher MD;  Location:  OR     ORTHOPEDIC SURGERY      RTKA     ORTHOPEDIC SURGERY Left     ROTATOR CUFF     REMOVE ANTIBIOTIC CEMENT BEADS / SPACER KNEE N/A 2017    Procedure: REMOVE ANTIBIOTIC CEMENT BEADS / SPACER KNEE;  REMOVAL ANTIBIOTIC SPACER RIGHT KNEE REIMPLANT TOTAL JOINT COMPONENT TO RIGHT KNEE ;  Surgeon: Husam Berkowitz MD;   Location: [facility] OR     REMOVE HARDWARE ARTHROPLASTY KNEE, IRRIG & JOSE, PLACE ANTIBIOTIC CEMENT SPACER, COMBINED Right 2/20/2017    Procedure: COMBINED REMOVE HARDWARE ARTHROPLASTY KNEE, IRRIGATION AND DEBRIDEMENT, PLACE ANTIBIOTIC CEMENT BEADS / SPACER;  Surgeon: Husam Berkowitz MD;  Location: [facility] OR     TONSILLECTOMY       TONSILLECTOMY       total knee arthroplasy         Anesthesia Evaluation     .             ROS/MED HX    ENT/Pulmonary:     (+)vocal cord abnormalities- , . .    Neurologic:  - neg neurologic ROS     Cardiovascular: Comment: Parox SVT s/p successful ablation    (+) hypertension----. : . . fainting (syncope). :. dysrhythmias Other, valvular problems/murmurs .       METS/Exercise Tolerance:     Hematologic:  - neg hematologic  ROS       Musculoskeletal:   (+) arthritis,  -       GI/Hepatic:     (+) GERD       Renal/Genitourinary:     (+) chronic renal disease, Nephrolithiasis ,       Endo:  - neg endo ROS       Psychiatric:  - neg psychiatric ROS       Infectious Disease:  - neg infectious disease ROS       Malignancy:      - no malignancy   Other:                          Physical Exam  Normal systems: cardiovascular, pulmonary and dental    Airway   Mallampati: II  TM distance: >3 FB  Neck ROM: limited    Dental     Cardiovascular       Pulmonary             Lab Results   Component Value Date    WBC 6.7 03/06/2018    HGB 13.2 03/06/2018    HCT 42.3 03/06/2018     03/06/2018    CRP 3.1 03/06/2018    SED 9 03/06/2018     03/28/2017    POTASSIUM 3.8 04/24/2017    CHLORIDE 103 03/28/2017    CO2 31 03/28/2017    BUN 22 03/28/2017    CR 0.70 04/27/2017    GLC 88 03/28/2017    MATHEW 9.4 03/28/2017    MAG 2.2 03/25/2017    ALBUMIN 3.1 (L) 03/21/2017    PROTTOTAL 7.0 03/21/2017    ALT 41 03/21/2017    AST 29 03/21/2017    ALKPHOS 173 (H) 03/21/2017    BILITOTAL 0.2 03/21/2017    PTT 29 05/14/2005    INR 0.94 04/24/2017    TSH 4.66 (H) 03/21/2017    T4 1.13 03/21/2017       Preop  "Vitals  BP Readings from Last 3 Encounters:   10/07/19 (!) 155/97   05/17/17 120/60   04/27/17 128/66    Pulse Readings from Last 3 Encounters:   05/17/17 56   04/26/17 73   04/06/17 52      Resp Readings from Last 3 Encounters:   10/07/19 16   04/27/17 18   04/06/17 18    SpO2 Readings from Last 3 Encounters:   10/07/19 97%   04/27/17 94%   04/06/17 96%      Temp Readings from Last 1 Encounters:   10/07/19 37  C (98.6  F) (Temporal)    Ht Readings from Last 1 Encounters:   10/07/19 1.676 m (5' 6\")      Wt Readings from Last 1 Encounters:   10/07/19 79.4 kg (175 lb 1.6 oz)    Estimated body mass index is 28.26 kg/m  as calculated from the following:    Height as of this encounter: 1.676 m (5' 6\").    Weight as of this encounter: 79.4 kg (175 lb 1.6 oz).       Anesthesia Plan      History & Physical Review  History and physical reviewed and following examination; no interval change.    ASA Status:  3 .    NPO Status:  > 8 hours    Plan for General, ETT, For Post-op pain in coordination with surgeon and Peripheral Nerve Block with Intravenous induction. Maintenance will be Balanced.    PONV prophylaxis:  Ondansetron (or other 5HT-3) and Dexamethasone or Solumedrol  Additional equipment: Videolaryngoscope      Postoperative Care      Consents  Anesthetic plan, risks, benefits and alternatives discussed with:  Patient..                 Bob Tyson, DO, DO  "

## 2019-10-07 NOTE — OP NOTE
10/7/2019    María Henderson    PREOPERATIVE DIAGNOSIS: Irreparable Right  rotator tear/rotator cuff arthropathy.     POSTOPERATIVE DIAGNOSIS: Irreparable Right  rotator cuff tear/rotator cuff arthropathy.     PROCEDURE PERFORMED: Right  reverse total shoulder replacement.     ANESTHESIA: General plus scalene block.     SURGEON: Husam Berkowitz Jr., MD.     ASSISTANT: Sarahi Liz PA-C.     DESCRIPTION OF PROCEDURE: A scalene block was placed in the preoperative area. The patient was brought to the operating room. Two grams of Ancef were given intravenously. General anesthesia was induced. The patient was positioned in the beach chair position with the Slim positioner. The right upper extremity, shoulder, axilla, pectoral region and lateral neck were all prepped and draped in customary fashion.   An incision was made beginning just lateral to the coracoid process and following along the deltopectoral interval. Dissection was carried down through the subcutaneous tissues. The deltopectoral interval was identified and bluntly developed. The cephalic vein was retracted medially with the pectoral muscle. Distally, a portion of the pectoral attachment to the humerus was divided. Deep retractors were placed. The clavipectoral fascia was divided. Care was taken to avoid the axillary nerve.   There was very little rotator cuff remaining, just a little bit of the inferior aspect of the subscapularis. Even this was in very poor condition. What was left was divided. Subperiosteal dissection was carried down the proximal medial humeral metaphysis. Great care was taken to remain directly on bone. This allowed us to gradually dislocate the humeral head. Beginning superiorly, reamers were passed down the humeral shaft up to a size 12. This allowed us to place the outrigger guide, which was utilized to resect the humeral head in 30 degrees of retroversion. Broaching was then also performed nicely up to size 12. An  "excellent fit was obtained. The humeral head retractor was then used to retract the humerus posteriorly. The labrum was debrided. There was no biceps tendon found at any point in the dissection. A guide pin was then placed in the relatively inferior aspect of the glenoid, and the Biomet large reamer was then passed over the guide pin. We were careful to stay inferior enough so that we would avoid impingement. The glenosphere base was then impacted on the glenoid. A 25 mm large central screw was placed. The superior screw was 20 mm, the inferior screw was 30 mm, and the anterior and posterior screws were 15 mm. A standard  trial 36 mm glenosphere was placed. Based on trial reductions, we chose the standard tray and the +3 polyethylene insert. The glenosphere was placed at the \"B\" position in terms of offsets and impacted nicely on the taper. The real 12 stem was placed. Trial reductions were again performed. At final reduction, we had very nicely fluid motion, and we had good stability with the elbow at his side with the arm externally rotated.   It was elected to close. The wound was copiously irrigated. Final inspection was made for any deep bleeders. The retractors were removed, and the deltopectoral interval was allowed to close. Again, inspection was made for bleeding. Finally, the skin was closed with staples. A bulky sterile dressing and sling were applied, and the patient was taken to the Recovery Room in satisfactory condition. Final counts were correct.   "

## 2019-10-07 NOTE — PROGRESS NOTES
Admission medication history interview status for the 10/7/2019  admission is complete. See EPIC admission navigator for prior to admission medications     Medication history source reliability:Good    Medication history interview source(s):Patient    Medication history resources (including written lists, pill bottles, clinic record):Brought med list with     Primary pharmacy.[facility], [place]    Additional medication history information not noted on PTA med list :None    Time spent in this activity: 30 minutes    Prior to Admission medications    Medication Sig Last Dose Taking? Auth Provider   acetaminophen (TYLENOL) 500 MG tablet Take 1,000 mg by mouth At Bedtime 10/6/2019 at 2230 Yes Reported, Patient   ascorbic acid (VITAMIN C) 250 MG CHEW chewable tablet Take 250 mg by mouth daily 10/6/2019 at am Yes Reported, Patient   aspirin 81 MG EC tablet Take 81 mg by mouth daily Over 1 week ago at am Yes Reported, Patient   calcium carbonate (OS-MATHEW) 1500 (600 Ca) MG tablet Take 600 mg by mouth daily Over 1 week ago at am Yes Reported, Patient   Cholecalciferol (VITAMIN D3) 400 units CAPS Take 400 Units by mouth daily  Over 1 week ago at am Yes Unknown, Entered By History   clindamycin (CLEOCIN) 300 MG capsule Take 600 mg by mouth daily as needed (take 2 X 300 mg = 600 mg dose) 1 hour prior to dental appointment. Over 6 months ago at prn Yes Reported, Patient   furosemide (LASIX) 20 MG tablet Take 20-40 mg by mouth daily  120 mg on 0/6/2019 at am Yes Unknown, Entered By History   Glucosamine-Chondroitin (OSTEO BI-FLEX REGULAR STRENGTH PO) Take 2 tablets by mouth daily  10/7/2019 at 0645 Yes Reported, Patient   Multiple Vitamins-Minerals (WOMENS 50+ MULTI VITAMIN/MIN PO) Take 1 tablet by mouth every morning Over 1 week ago at am Yes Reported, Patient   Potassium Gluconate 595 (99 K) MG TABS Take 1 tablet by mouth every morning 10/7/2019 at 0645 Yes Reported, Patient   simvastatin (ZOCOR) 40 MG tablet Take 40 mg by  mouth every morning  10/7/2019 at 0645 Yes Reported, Patient   valsartan-hydrochlorothiazide (DIOVAN-HCT) 80-12.5 MG per tablet Take 1 tablet by mouth every morning  10/6/2019 at am Yes Reported, Patient   vitamin B6 (PYRIDOXINE) 100 MG tablet Take 100 mg by mouth daily Over 1 week ago at am Yes Reported, Patient   order for DME Equipment being ordered: Walker Wheels () and Walker ()  Treatment Diagnosis: impaired gait   Jian Fisher MD   order for DME Equipment being ordered: Walker Wheels () and Walker ()  Treatment Diagnosis: difficulty with gait   Jian Fisher MD

## 2019-10-07 NOTE — ANESTHESIA POSTPROCEDURE EVALUATION
Patient: María Henderson    Procedure(s):  RIGHT TOTAL REVERSE SHOULDER ARTHROPLASTY    Diagnosis:RIGHT SHOULDER ROTATOR CUFF ARTHROPATHY  Diagnosis Additional Information: No value filed.    Anesthesia Type:  General, ETT, For Post-op pain in coordination with surgeon, Peripheral Nerve Block    Note:  Anesthesia Post Evaluation    Patient location during evaluation: bedside  Patient participation: Able to fully participate in evaluation  Level of consciousness: awake  Pain management: adequate  Airway patency: patent  Cardiovascular status: acceptable  Respiratory status: acceptable  Hydration status: acceptable  PONV: none     Anesthetic complications: None    Comments: No anesthetic complications noted.         Last vitals:  Vitals:    10/07/19 1430 10/07/19 1440 10/07/19 1450   BP: 136/77 127/72 118/66   Pulse: 80 75 72   Resp: 17 15 12   Temp:  36.7  C (98  F)    SpO2: 100% 99% 99%         Electronically Signed By: Bob Tyson DO, DO  October 7, 2019  3:13 PM

## 2019-10-08 ENCOUNTER — APPOINTMENT (OUTPATIENT)
Dept: OCCUPATIONAL THERAPY | Facility: CLINIC | Age: 74
DRG: 483 | End: 2019-10-08
Attending: PHYSICIAN ASSISTANT
Payer: COMMERCIAL

## 2019-10-08 VITALS
HEART RATE: 66 BPM | RESPIRATION RATE: 16 BRPM | BODY MASS INDEX: 28.14 KG/M2 | DIASTOLIC BLOOD PRESSURE: 66 MMHG | HEIGHT: 66 IN | TEMPERATURE: 98.3 F | SYSTOLIC BLOOD PRESSURE: 137 MMHG | WEIGHT: 175.1 LBS | OXYGEN SATURATION: 94 %

## 2019-10-08 LAB
GLUCOSE SERPL-MCNC: 120 MG/DL (ref 70–99)
HGB BLD-MCNC: 11.9 G/DL (ref 11.7–15.7)

## 2019-10-08 PROCEDURE — 36415 COLL VENOUS BLD VENIPUNCTURE: CPT | Performed by: PHYSICIAN ASSISTANT

## 2019-10-08 PROCEDURE — 97165 OT EVAL LOW COMPLEX 30 MIN: CPT | Mod: GO

## 2019-10-08 PROCEDURE — 25000125 ZZHC RX 250: Performed by: PHYSICIAN ASSISTANT

## 2019-10-08 PROCEDURE — 97110 THERAPEUTIC EXERCISES: CPT | Mod: GO

## 2019-10-08 PROCEDURE — 97535 SELF CARE MNGMENT TRAINING: CPT | Mod: GO

## 2019-10-08 PROCEDURE — 85018 HEMOGLOBIN: CPT | Performed by: PHYSICIAN ASSISTANT

## 2019-10-08 PROCEDURE — 82947 ASSAY GLUCOSE BLOOD QUANT: CPT | Performed by: PHYSICIAN ASSISTANT

## 2019-10-08 PROCEDURE — 25000132 ZZH RX MED GY IP 250 OP 250 PS 637: Performed by: PHYSICIAN ASSISTANT

## 2019-10-08 RX ORDER — HYDROMORPHONE HYDROCHLORIDE 2 MG/1
1-2 TABLET ORAL EVERY 4 HOURS PRN
Qty: 60 TABLET | Refills: 0 | Status: SHIPPED | OUTPATIENT
Start: 2019-10-08 | End: 2023-12-05

## 2019-10-08 RX ORDER — AMOXICILLIN 250 MG
1 CAPSULE ORAL 2 TIMES DAILY
Qty: 60 TABLET | Refills: 0 | Status: SHIPPED | OUTPATIENT
Start: 2019-10-08 | End: 2024-01-24

## 2019-10-08 RX ADMIN — SENNOSIDES AND DOCUSATE SODIUM 1 TABLET: 8.6; 5 TABLET ORAL at 08:24

## 2019-10-08 RX ADMIN — ACETAMINOPHEN 975 MG: 325 TABLET, FILM COATED ORAL at 02:50

## 2019-10-08 RX ADMIN — FUROSEMIDE 20 MG: 20 TABLET ORAL at 08:24

## 2019-10-08 RX ADMIN — VALSARTAN AND HYDROCHLOROTHIAZIDE 1 TABLET: 80; 12.5 TABLET, FILM COATED ORAL at 08:24

## 2019-10-08 RX ADMIN — SIMVASTATIN 40 MG: 40 TABLET, FILM COATED ORAL at 08:24

## 2019-10-08 RX ADMIN — ACETAMINOPHEN 975 MG: 325 TABLET, FILM COATED ORAL at 10:27

## 2019-10-08 RX ADMIN — HYDROMORPHONE HYDROCHLORIDE 1 MG: 2 TABLET ORAL at 10:26

## 2019-10-08 RX ADMIN — CLINDAMYCIN PHOSPHATE 900 MG: 900 INJECTION, SOLUTION INTRAVENOUS at 05:12

## 2019-10-08 RX ADMIN — HYDROMORPHONE HYDROCHLORIDE 1 MG: 2 TABLET ORAL at 05:21

## 2019-10-08 ASSESSMENT — ACTIVITIES OF DAILY LIVING (ADL)
ADLS_ACUITY_SCORE: 13
PREVIOUS_RESPONSIBILITIES: MEAL PREP;HOUSEKEEPING;LAUNDRY;SHOPPING;YARDWORK;MEDICATION MANAGEMENT;FINANCES;DRIVING

## 2019-10-08 NOTE — PLAN OF CARE
VSS. A&O x4. Pain controlled with tylenol, PO dilaudid and ice. CMS intact. Right shoulder dressing CDI. RUE sling on. Ambulated to bathroom with SBA. Voiding adequately. Tolerating regular diet. Plan to discharge today.

## 2019-10-08 NOTE — PROGRESS NOTES
ORTHO PROGRESS NOTE      Procedure(s):  RIGHT TOTAL REVERSE SHOULDER ARTHROPLASTY   -1 Day Post-Op      Doing well.  Pain controlled adequately.  No concerns.    Vital Signs with Ranges  Temp:  [96.6  F (35.9  C)-98.6  F (37  C)] 97.7  F (36.5  C)  Pulse:  [66-80] 66  Heart Rate:  [59-80] 65  Resp:  [12-25] 16  BP: (118-155)/(60-97) 144/75  SpO2:  [90 %-100 %] 95 %  I/O last 3 completed shifts:  In: 1360 [P.O.:360; I.V.:1000]  Out: 425 [Urine:400; Blood:25]  Recent Labs   Lab 10/08/19  0628   HGB 11.9       Dressing clean, dry and intact.  Motor and neuro function intact at elbow, wrist and hand  Swelling appropriate for post operative condition      Plan:    -Progress Physical Therapy as able.  -Continue pain control measures    -Discharge to today with assist of significant other when passes PT and continued stable vital signs.      Sarahi Liz PA-C

## 2019-10-08 NOTE — PROGRESS NOTES
10/08/19 0926   Quick Adds   Type of Visit Initial Occupational Therapy Evaluation   Living Environment   Lives With significant other   Living Arrangements house  (Rambler style home )   Home Accessibility stairs within home;stairs to enter home   Number of Stairs, Main Entrance 2   Number of Stairs, Within Home, Primary 10  (Laundry in lower level )   Transportation Anticipated family or friend will provide  (Pt typically drives )   Living Environment Comment Significant other is availabe to assist 24/7. Right hand dominant. Walk in shower and tub/shower.    Self-Care   Usual Activity Tolerance good   Current Activity Tolerance moderate   Regular Exercise No   Equipment Currently Used at Home grab bar, toilet;grab bar, tub/shower;walker, standard  (Has a shower chair)   Functional Level   Ambulation 0-->independent   Transferring 0-->independent   Toileting 0-->independent   Bathing 0-->independent   Dressing 0-->independent   Fall history within last six months no   General Information   Onset of Illness/Injury or Date of Surgery - Date 10/07/19   Referring Physician Sarahi Liz   Patient/Family Goals Statement Home    Additional Occupational Profile Info/Pertinent History of Current Problem Per chart: s/p RIGHT TOTAL REVERSE SHOULDER ARTHROPLASTY    Weight-Bearing Status - RUE nonweight-bearing   Cognitive Status Examination   Orientation orientation to person, place and time   Level of Consciousness alert   Visual Perception   Visual Perception Wears glasses   Sensory Examination   Sensory Quick Adds No deficits were identified   Pain Assessment   Patient Currently in Pain Yes, see Vital Sign flowsheet   Mobility   Bed Mobility Bed mobility skill: Sit to supine;Bed mobility skill: Supine to sit   Bed Mobility Skill: Sit to Supine   Level of [place]: Sit/Supine independent   Bed Mobility Skill: Supine to Sit   Level of [place]: Supine/Sit independent   Transfer Skills   Transfer Transfer  "Safety Analysis Bed/Chair;Transfer Skill: Stand to Sit;Transfer Safety Analysis Sit/Stand   Transfer Skill: Bed to Chair/Chair to Bed   Level of [place]: Bed to Chair   (Mod I)   Transfer Skill: Sit to Stand   Level of [place]: Sit/Stand   (Mod I)   Toilet Transfer   Toilet Transfer Toilet Transfer Skill;Toilet Transfer Safety Analysis   Transfer Skill: Toilet Transfer   Level of [place]: Toilet   (Mod I)   Upper Body Dressing   Level of [place]: Dress Upper Body minimum assist (75% patients effort)   Physical Assist/Nonphysical Assist: Dress Upper Body 1 person assist   Lower Body Dressing   Level of [place]: Dress Lower Body stand-by assist   Physical Assist/Nonphysical Assist: Dress Lower Body verbal cues   Instrumental Activities of Daily Living (IADL)   Previous Responsibilities meal prep;housekeeping;laundry;shopping;yardwork;medication management;finances;driving   General Therapy Interventions   Planned Therapy Interventions ADL retraining;IADL retraining;transfer training;strengthening   Clinical Impression   Criteria for Skilled Therapeutic Interventions Met yes, treatment indicated   OT Diagnosis Decreased independence  for  I/ADLs    Influenced by the following impairments Decreased independence  for  I/ADLs    Assessment of Occupational Performance 1-3 Performance Deficits   Identified Performance Deficits Decreased independence  for  I/ADLs  (Dressing, bathing, toileting)   Clinical Decision Making (Complexity) Low complexity   Predicted Duration of Therapy Intervention (days/wks) Evaluation and one treatment    Anticipated Discharge Disposition Home with Assist;Home with Outpatient Therapy  (OP OT or PT per MD for ROM and strengthening of RUE)   Risks and Benefits of Treatment have been explained. Yes   Patient, Family & other staff in agreement with plan of care Yes   [facility] AM-PAC TM \"6 Clicks\"   2016, Trustees of [facility], under license to [employer].  " "All rights reserved.   6 Clicks Short Forms Daily Activity Inpatient Short Form   [facility] AM-PAC  \"6 Clicks\" Daily Activity Inpatient Short Form   1. Putting on and taking off regular lower body clothing? 4 - None   2. Bathing (including washing, rinsing, drying)? 3 - A Little   3. Toileting, which includes using toilet, bedpan or urinal? 3 - A Little   4. Putting on and taking off regular upper body clothing? 3 - A Little   5. Taking care of personal grooming such as brushing teeth? 4 - None   6. Eating meals? 4 - None   Daily Activity Raw Score (Score out of 24.Lower scores equate to lower levels of function) 21   Total Evaluation Time   Total Evaluation Time (Minutes) 8     "

## 2019-10-08 NOTE — PLAN OF CARE
A&O x4, VSS on RA, tingling sensation on RUE with mod , Drng CDI with sling on RUE, up with assist of 1, Voiding adequately in BR, Pain controlled w/ dilaudid and tylenol, Diet reg, IV-infusing, Continue to monitor.

## 2019-10-08 NOTE — PROGRESS NOTES
Spiritual Health    Pt is coping well health condition. Pt is looking forward to going home this afternoon. Pt has many friends who are supportive and praying for her recovery. Pt goes to [employer] and is active in the [employer] community.     SH listened and provided support.     Pt is coping well and has supportive network of friends.     SH will remain available as needed.     Mabel Garcia  Chaplain Resident

## 2019-10-08 NOTE — PLAN OF CARE
Patient plan: Per OT note, home   Current status: Orders received and chart reviewed. Per chart review and discussion with OT, patient is mobilizing/ambulating with mod I with no balance or mobility deficits noted. No skilled IP PT needs identified, will complete orders at this time.   Anticipated status at discharge: Defer to OT

## 2019-10-08 NOTE — PROGRESS NOTES
Reviewed discharge with pt & .  Verbalized understanding.  Paperwork & RX given to pt.  Pt counciled on only filling 42 po dilaudid instead of 60 due to insurance.  Reviewed dressing change with .  Pt to discharge home.

## 2019-10-08 NOTE — PLAN OF CARE
OT: Evaluation and treatment initiated. Pt lives in a rambler home with her significant other. Prior pt independent in I/ADLs. S/P R TSA.     Patient plan: Home    Current status: Pt ambulated to the bathroom with Mod I and transferred to/from the toilet with Mod I. Pt completed LE dressing with SBA.  Pt completed UE dressing  with minimum assist. Pt reported plans to have assist with dressing tasks as needed and I/ADLs as needed from significant other. Educated on UE exercises for RUE in order to increase independence in I/ADLs. Exercises completed for 1 set of 10 reps each exercise. All IP OT goal areas met, no further needs identified.     Anticipated status at discharge: Assist of 1 for UE dressing; assist as needed for I/ADLs.     Occupational Therapy Discharge Summary    Reason for therapy discharge:    All goals and outcomes met, no further needs identified.    Progress towards therapy goal(s). See goals on Care Plan in [facility] electronic health record for goal details.  Goals met    Therapy recommendation(s):    Assist of 1 for UE dressing; assist as needed for I/ADLs.

## 2019-10-09 NOTE — DISCHARGE SUMMARY
Discharge Summary    María Henderson MRN# [id number]   YOB: 1945 Age: 74 year old     Date of Admission:  10/7/2019  Date of Discharge:  10/8/2019 12:59 PM  Admitting Physician:  Husam Berkowitz MD  Discharge Physician:  Husam Berkowitz MD     Primary Provider: Thad Puri          Admission Diagnoses:    right rotator cuff arthropathy          Discharge Diagnosis:   Same           Surgical Procedure:   right reverse total shoulder replacement           Secondary Diagnosis:   hypertension and hyperlipidemia           Discharge Disposition:   Discharged to home           Medications Prior to Admission:     No medications prior to admission.             Discharge Medications:     Discharge Medication List as of 10/8/2019 11:37 AM      START taking these medications    Details   HYDROmorphone (DILAUDID) 2 MG tablet Take 0.5-1 tablets (1-2 mg) by mouth every 4 hours as needed for severe pain (pain control or improvement in physical function. Hold dose for analgesic side effects.), Disp-60 tablet, R-0, Local Print      senna-docusate (SENOKOT-S/PERICOLACE) 8.6-50 MG tablet Take 1 tablet by mouth 2 times daily, Disp-60 tablet, R-0, E-Prescribe         CONTINUE these medications which have NOT CHANGED    Details   acetaminophen (TYLENOL) 500 MG tablet Take 1,000 mg by mouth At Bedtime, Historical      ascorbic acid (VITAMIN C) 250 MG CHEW chewable tablet Take 250 mg by mouth daily, Historical      aspirin 81 MG EC tablet Take 81 mg by mouth daily, Historical      calcium carbonate (OS-MATHEW) 1500 (600 Ca) MG tablet Take 600 mg by mouth daily, Historical      Cholecalciferol (VITAMIN D3) 400 units CAPS Take 400 Units by mouth daily , Historical      clindamycin (CLEOCIN) 300 MG capsule Take 600 mg by mouth daily as needed (take 2 X 300 mg = 600 mg dose) 1 hour prior to dental appointment., Historical      furosemide (LASIX) 20 MG tablet Take 20-40 mg by mouth daily Usually Takes 20  mg daily., Historical      Glucosamine-Chondroitin (OSTEO BI-FLEX REGULAR STRENGTH PO) Take 2 tablets by mouth daily , Historical      Multiple Vitamins-Minerals (WOMENS 50+ MULTI VITAMIN/MIN PO) Take 1 tablet by mouth every morning, Historical      !! order for DME Equipment being ordered: Walker Wheels () and Walker ()  Treatment Diagnosis: impaired gaitDisp-1 each, R-0, Local Print      !! order for DME Equipment being ordered: Walker Wheels () and Walker ()  Treatment Diagnosis: difficulty with gaitDisp-1 each, R-0, Local Print      Potassium Gluconate 595 (99 K) MG TABS Take 1 tablet by mouth every morning, Historical      simvastatin (ZOCOR) 40 MG tablet Take 40 mg by mouth every morning , Historical      valsartan-hydrochlorothiazide (DIOVAN-HCT) 80-12.5 MG per tablet Take 1 tablet by mouth every morning , Historical      vitamin B6 (PYRIDOXINE) 100 MG tablet Take 100 mg by mouth daily, Historical       !! - Potential duplicate medications found. Please discuss with provider.                Consultations:   No consultations were requested during this admission           Hospital Course:   The patient was admitted after the surgical procedure. The patient underwent an uneventfulright reverse total shoulder replacement. Postoperatively, no anticoagulation was needed. No transfusion was required. The patient will be discharged to home. Home medications have been reconciled. Dilaudid 2 mg was prescribed for pain. No anticoagulation  will be prescribed.             Pending Results:   None           Discharge Instructions and Follow-Up:        Discharge activity: pendulum exercises   Discharge follow-up: Follow up with Dr. Berkowitz in 2 weeks   Outpatient therapy: TBD    Home Care agency: None    Supplies and equipment: None        Wound care: dry dressing daily and as needed   Other instructions: None

## 2019-10-10 ENCOUNTER — DOCUMENTATION ONLY (OUTPATIENT)
Dept: OTHER | Facility: CLINIC | Age: 74
End: 2019-10-10

## 2019-10-10 ENCOUNTER — AMBULATORY - HEALTHEAST (OUTPATIENT)
Dept: OTHER | Facility: CLINIC | Age: 74
End: 2019-10-10

## 2019-10-22 NOTE — ED NOTES
[facility]  ED Nurse Handoff Report    ED Chief complaint: palpitaions ( upon arrival, pt c/o L hip mpain after feelimng a pop. recent R knee L ankle surgeries) and Hip Pain      ED Diagnosis:   Final diagnoses:   Hip pain       Code Status: to be addressed    Allergies:   Allergies   Allergen Reactions     Pcn [Penicillin V Potassium] Anaphylaxis     Oxycodone      Sulfa Drugs      Valium [Diazepam] Other (See Comments)     unconsciousness     Vicodin [Hydrocodone-Acetaminophen]      Patient unsure if allergy     Iodine Solution [Povidone Iodine] Rash       Activity level:  Stand with Assist     Needed?: No    Isolation: No  Infection: Not Applicable    Bariatric?: No      Vital Signs:   Vitals:    03/21/17 2236 03/21/17 2240 03/21/17 2245 03/22/17 0000   BP:   (!) 139/95 140/81   Resp: 10 24  20   Temp:       TempSrc:       SpO2: 96% 94%  96%   Height:           Cardiac Rhythm: ,   Cardiac  Cardiac Rhythm: Normal sinus rhythm    Pain level: 0-10 Pain Scale: 6    Is this patient confused?: No    Patient Report: Initial Complaint: left hip pain from fall day prior, palpitations  Focused Assessment: In PSVT rates 160's on arrival, converted with valsalva to NSR with isolated unifocal pvc's, c/o left hip pain  Tests Performed: labs/rad  Abnormal Results: hip xray wnl  Treatments provided: IVF, 12 lead EKG, dilaudid    Family Comments: significant other present    OBS brochure/video discussed/provided to patient: No    ED Medications:   Medications   0.9% sodium chloride BOLUS (1,000 mLs Intravenous New Bag 3/21/17 2243)     Followed by   0.9% sodium chloride infusion (not administered)   HYDROmorphone (DILAUDID) injection 1 mg (1 mg Intravenous Given 3/22/17 0010)       Drips infusing?:  No      ED NURSE PHONE NUMBER: [phone]        no

## 2019-12-27 ENCOUNTER — TRANSFERRED RECORDS (OUTPATIENT)
Dept: HEALTH INFORMATION MANAGEMENT | Facility: CLINIC | Age: 74
End: 2019-12-27

## 2019-12-31 NOTE — ED AVS SNAPSHOT
[facility] Emergency Department    [address]    [facility] [address]    Phone:  [phone]    Fax:  [phone]                                       María Henderson   MRN: [id number]    Department:  [facility] Emergency Department   Date of Visit:  3/12/2017           Patient Information     Date Of Birth          1945        Your diagnoses for this visit were:     Pyogenic arthritis of left knee joint, due to unspecified organism (H)     Occlusion of peripherally inserted central catheter (PICC) line, initial encounter (H)        You were seen by Ruben Wise MD.      24 Hour Appointment Hotline       To make an appointment at any [facility], call [phone] ([phone]). If you don't have a family doctor or clinic, we will help you find one. [facility] clinics are conveniently located to serve the needs of you and your family.             Review of your medicines      Our records show that you are taking the medicines listed below. If these are incorrect, please call your family doctor or clinic.        Dose / Directions Last dose taken    albuterol 108 (90 BASE) MCG/ACT Inhaler   Commonly known as:  PROAIR HFA/PROVENTIL HFA/VENTOLIN HFA   Dose:  2 puff        Inhale 2 puffs into the lungs every 6 hours as needed for shortness of breath / dyspnea or wheezing   Refills:  0        ASPIRIN PO   Dose:  81 mg        Take 81 mg by mouth daily   Refills:  0        CALCIUM PO   Dose:  600 mg        Take 600 mg by mouth daily   Refills:  0        cefTRIAXone 1 GM vial   Commonly known as:  ROCEPHIN   Dose:  2000 mg   Quantity:  600 mL        Inject 2 g (2,000 mg) into the vein daily CBC with differential, creatinine, SGOT weekly while on this medication to be faxed to Dr. Douglas office.   Refills:  0        DAKINS EX        Externally apply topically daily   Refills:  0        HYDROmorphone 2 MG tablet   Commonly known as:  DILAUDID   Dose:  2-4  [facility] PROGRESS NOTE    12/31/2019 6:01 PM        Name: Erick Wall .               Admitted: 12/30/2019  Primary Care Provider: Bridger Key MD (Tel: [phone])         Subjective:    Patient sitting in bed feeling much better sob has improved, no chest pain n/v    Reviewed interval ancillary notes    Current Medications  glucose (GLUTOSE) 40 % oral gel 15 g, PRN  dextrose 50 % IV solution, PRN  glucagon (rDNA) injection 1 mg, PRN  dextrose 5 % solution, PRN  insulin lispro (1 Unit Dial) 0-6 Units, TID WC  insulin lispro (1 Unit Dial) 0-3 Units, Nightly  sodium chloride flush 0.9 % injection 10 mL, 2 times per day  sodium chloride flush 0.9 % injection 10 mL, PRN  magnesium hydroxide (MILK OF MAGNESIA) 400 MG/5ML suspension 30 mL, Daily PRN  ondansetron (ZOFRAN) injection 4 mg, Q6H PRN  famotidine (PEPCID) tablet 20 mg, BID  acetaminophen (TYLENOL) tablet 650 mg, Q4H PRN  albuterol sulfate  (90 Base) MCG/ACT inhaler 2 puff, Q6H PRN  allopurinol (ZYLOPRIM) tablet 300 mg, Daily  aspirin chewable tablet 81 mg, Daily  fluticasone (FLONASE) 50 MCG/ACT nasal spray 2 spray, Daily  mometasone-formoterol (DULERA) 200-5 MCG/ACT inhaler 2 puff, BID  lisinopril (PRINIVIL;ZESTRIL) tablet 30 mg, Daily  montelukast (SINGULAIR) tablet 10 mg, QPM  pravastatin (PRAVACHOL) tablet 80 mg, QPM  sotalol (BETAPACE) tablet 80 mg, BID  furosemide (LASIX) injection 40 mg, BID  tiotropium (SPIRIVA RESPIMAT) 2.5 MCG/ACT inhaler 2 puff, Daily  warfarin (COUMADIN) tablet 15 mg, Once per day on Tue  warfarin (COUMADIN) tablet 10 mg, Once per day on Sun Mon Wed Thu Fri Sat  digoxin (LANOXIN) injection 250 mcg, Q6H        Objective:  BP (!) 130/90   Pulse 111   Temp 96.8 °F (36 °C) (Temporal)   Resp 20   Ht 6' 1\" (1.854 m)   Wt 285 lb 6 oz (129.4 kg)   SpO2 91%   BMI 37.65 kg/m²     Intake/Output Summary (Last 24 hours) at 12/31/2019 [address] [place] [address] filed at 12/31/2019 1521  Gross per 24 hour   Intake 240 ml   Output 1225 ml   Net -985 ml      Wt Readings from Last 3 Encounters:   12/31/19 285 lb 6 oz (129.4 kg)   11/18/19 290 lb 4.8 oz (131.7 kg)   10/15/19 286 lb 3.2 oz (129.8 kg)       General appearance:  Appears comfortable. AAOx3  HEENT: atraumatic, Pupils equal, muscous membranes moist, no masses appreciated  Cardiovascular: irregulary irregular, no jvd appreciated  Respiratory: no wheezing+ rales at bases  Gastrointestinal: Abdomen soft, non-tender, BS+  EXT: 2+ BLE  Neurology: no gross focal deficts  Psychiatry: Appropriate affect. Not agitated  Skin: Warm, dry, no rashes appreciated    Labs and Tests:  CBC:   Recent Labs     12/30/19  2143 12/31/19  0230   WBC 6.7 6.7   HGB 13.4* 12.9*    142     BMP:    Recent Labs     12/30/19  2144 12/31/19  0230    139   K 4.5 4.0    99   CO2 27 27   BUN 30* 32*   CREATININE 1.3 1.5*   GLUCOSE 109* 215*     Hepatic: No results for input(s): AST, ALT, ALB, BILITOT, ALKPHOS in the last 72 hours.   XR CHEST STANDARD (2 VW)   Final Result   Cardiomegaly with pulmonary vascular congestion and small pleural effusions   with bibasilar atelectasis             Recent imaging reviewed        Assessment & Plan:   Acute on chronic Systolic heart failure Last ECHO on 11/23/16 noted EF of 60-65%, Repeat Echo 12/31 with ef of 40-45% with global hypokinesis and abnormal septal motion  - iv lasix 40mg BID  - on acei and bb  - repeat renal in am given diuresis  - will likely need ischemic work up as new reduced function discuss with cards  - cards consult pending     PAF  Rate 100-110  Continue home sotalol and coumadin  Pharmacy consulted to dose coumadin     History of aortic valve disease  S/p mechanical AVR  - subtherapetuic inr will give lovneox 1mg/kg one dose repeat inr in am goal 2.5-3.5 will have pharmacy assist     ROSETTA  Continue home BiPAP     Complete AV block  S/p dual chamber mg   Quantity:  70 tablet        Take 1-2 tablets (2-4 mg) by mouth every 4 hours as needed for moderate to severe pain   Refills:  0        LASIX PO   Dose:  20-40 mg        Take 20-40 mg by mouth daily   Refills:  0        multivitamin, therapeutic with minerals Tabs tablet   Dose:  1 tablet        Take 1 tablet by mouth daily.   Refills:  0        * order for DME   Quantity:  1 each        Equipment being ordered: Walker Wheels () and Walker () Treatment Diagnosis: difficulty with gait   Refills:  0        * order for DME   Quantity:  1 each        Equipment being ordered: Walker Wheels () and Walker () Treatment Diagnosis: impaired gait   Refills:  0        polyethylene glycol Packet   Commonly known as:  MIRALAX/GLYCOLAX   Dose:  17 g   Quantity:  20 packet        Take 17 g by mouth daily   Refills:  1        POTASSIUM PO   Dose:  595 mg        Take 595 mg by mouth every morning   Refills:  0        TRUBIOTICS PO   Dose:  1 tablet        Take 1 tablet by mouth daily   Refills:  0        valsartan-hydrochlorothiazide 80-12.5 MG per tablet   Commonly known as:  DIOVAN-HCT   Dose:  1 tablet        Take 1 tablet by mouth daily   Refills:  0        VITAMIN B6 PO   Dose:  1 tablet        Take 1 tablet by mouth daily   Refills:  0        VITAMIN D3 PO   Dose:  400 Units        Take 400 Units by mouth daily   Refills:  0        ZOCOR 40 MG tablet   Dose:  40 mg   Generic drug:  simvastatin        Take 40 mg by mouth every morning   Refills:  0        * Notice:  This list has 2 medication(s) that are the same as other medications prescribed for you. Read the directions carefully, and ask your doctor or other care provider to review them with you.            Procedures and tests performed during your visit     Procedure/Test Number of Times Performed    Basic metabolic panel 1    CBC with platelets differential 1    Magnesium 1    Peripheral IV catheter 1    XR Chest 1 View 2      Orders Needing  Specimen Collection     None      Pending Results     No orders found from 3/10/2017 to 3/13/2017.            Pending Culture Results     No orders found from 3/10/2017 to 3/13/2017.             Test Results from your hospital stay     3/12/2017  2:09 PM - Interface, Flexilab Results      Component Results     Component Value Ref Range & Units Status    WBC 8.2 4.0 - 11.0 10e9/L Final    RBC Count 4.22 3.8 - 5.2 10e12/L Final    Hemoglobin 11.5 (L) 11.7 - 15.7 g/dL Final    Hematocrit 38.5 35.0 - 47.0 % Final    MCV 91 78 - 100 fl Final    MCH 27.3 26.5 - 33.0 pg Final    MCHC 29.9 (L) 31.5 - 36.5 g/dL Final    RDW 15.1 (H) 10.0 - 15.0 % Final    Platelet Count 368 150 - 450 10e9/L Final    Diff Method Automated Method  Final    % Neutrophils 63.8 % Final    % Lymphocytes 24.5 % Final    % Monocytes 8.3 % Final    % Eosinophils 2.2 % Final    % Basophils 0.7 % Final    % Immature Granulocytes 0.5 % Final    Nucleated RBCs 0 0 /100 Final    Absolute Neutrophil 5.3 1.6 - 8.3 10e9/L Final    Absolute Lymphocytes 2.0 0.8 - 5.3 10e9/L Final    Absolute Monocytes 0.7 0.0 - 1.3 10e9/L Final    Absolute Eosinophils 0.2 0.0 - 0.7 10e9/L Final    Absolute Basophils 0.1 0.0 - 0.2 10e9/L Final    Abs Immature Granulocytes 0.0 0 - 0.4 10e9/L Final    Absolute Nucleated RBC 0.0  Final         3/12/2017  2:28 PM - Interface, Flexilab Results      Component Results     Component Value Ref Range & Units Status    Sodium 140 133 - 144 mmol/L Final    Potassium 4.2 3.4 - 5.3 mmol/L Final    Specimen slightly hemolyzed, potassium may be falsely elevated    Chloride 103 94 - 109 mmol/L Final    Carbon Dioxide 28 20 - 32 mmol/L Final    Anion Gap 9 3 - 14 mmol/L Final    Glucose 93 70 - 99 mg/dL Final    Urea Nitrogen 20 7 - 30 mg/dL Final    Creatinine 0.65 0.52 - 1.04 mg/dL Final    GFR Estimate 89 >60 mL/min/1.7m2 Final    Non  GFR Calc    GFR Estimate If Black >90   GFR Calc   >60 mL/min/1.7m2 Final     Calcium 9.5 8.5 - 10.1 mg/dL Final         3/12/2017  2:28 PM - Interface, Flexilab Results      Component Results     Component Value Ref Range & Units Status    Magnesium 2.2 1.6 - 2.3 mg/dL Final         3/12/2017  2:26 PM - Interface, Radiant Ib      Narrative     CHEST ONE VIEW PORTABLE PICC  3/12/2017 2:13 PM    HISTORY: PICC line placement.    COMPARISON: None.        Impression     IMPRESSION: There has been placement of a right upper extremity PICC  line. The distal tip is in the right subclavian location and should be  advanced.    RIZWAN ROSAS MD         3/12/2017  5:06 PM - Interface, Radiant Ib      Narrative     CHEST ONE VIEW  3/12/2017 4:36 PM     HISTORY: PICC line placement.    COMPARISON: 3/12/2017 at 1405 hours.        Impression     IMPRESSION: PICC line has been advanced and is now in good position  with the tip in the distal superior vena cava.    KELSEY KNOTT MD                Clinical Quality Measure: Blood Pressure Screening     Your blood pressure was checked while you were in the emergency department today. The last reading we obtained was  BP: 137/79 (Simultaneous filing. User may not have seen previous data.) . Please read the guidelines below about what these numbers mean and what you should do about them.  If your systolic blood pressure (the top number) is less than 120 and your diastolic blood pressure (the bottom number) is less than 80, then your blood pressure is normal. There is nothing more that you need to do about it.  If your systolic blood pressure (the top number) is 120-139 or your diastolic blood pressure (the bottom number) is 80-89, your blood pressure may be higher than it should be. You should have your blood pressure rechecked within a year by a primary care provider.  If your systolic blood pressure (the top number) is 140 or greater or your diastolic blood pressure (the bottom number) is 90 or greater, you may have high blood pressure. High blood pressure  "is treatable, but if left untreated over time it can put you at risk for heart attack, stroke, or kidney failure. You should have your blood pressure rechecked by a primary care provider within the next 4 weeks.  If your provider in the emergency department today gave you specific instructions to follow-up with your doctor or provider even sooner than that, you should follow that instruction and not wait for up to 4 weeks for your follow-up visit.        Thank you for choosing [facility]       Thank you for choosing [facility] for your care. Our goal is always to provide you with excellent care. Hearing back from our patients is one way we can continue to improve our services. Please take a few minutes to complete the written survey that you may receive in the mail after you visit with us. Thank you!        [facility] Information     [facility] lets you send messages to your doctor, view your test results, renew your prescriptions, schedule appointments and more. To sign up, go to [email or web address] . Click on \"Log in\" on the left side of the screen, which will take you to the Welcome page. Then click on \"Sign up Now\" on the right side of the page.     You will be asked to enter the access code listed below, as well as some personal information. Please follow the directions to create your username and password.     Your access code is: [email or web address]  Expires: 3/30/2017  9:57 AM     Your access code will  in 90 days. If you need help or a new code, please call your [facility] clinic or [phone].        Care EveryWhere ID     This is your Care EveryWhere ID. This could be used by other organizations to access your [facility] medical records  [id number]        After Visit Summary       This is your record. Keep this with you and show to your community pharmacist(s) and doctor(s) at your next visit.                  "

## 2021-03-05 ENCOUNTER — IMMUNIZATION (OUTPATIENT)
Dept: NURSING | Facility: CLINIC | Age: 76
End: 2021-03-05
Payer: COMMERCIAL

## 2021-03-05 PROCEDURE — 0011A PR COVID VAC MODERNA 100 MCG/0.5 ML IM: CPT

## 2021-03-05 PROCEDURE — 91301 PR COVID VAC MODERNA 100 MCG/0.5 ML IM: CPT

## 2021-03-21 ENCOUNTER — HEALTH MAINTENANCE LETTER (OUTPATIENT)
Age: 76
End: 2021-03-21

## 2021-04-02 ENCOUNTER — IMMUNIZATION (OUTPATIENT)
Dept: NURSING | Facility: CLINIC | Age: 76
End: 2021-04-02
Attending: PHARMACIST
Payer: COMMERCIAL

## 2021-04-02 PROCEDURE — 91301 PR COVID VAC MODERNA 100 MCG/0.5 ML IM: CPT

## 2021-04-02 PROCEDURE — 0012A PR COVID VAC MODERNA 100 MCG/0.5 ML IM: CPT

## 2021-09-04 ENCOUNTER — HEALTH MAINTENANCE LETTER (OUTPATIENT)
Age: 76
End: 2021-09-04

## 2021-10-20 ENCOUNTER — HOSPITAL ENCOUNTER (EMERGENCY)
Facility: CLINIC | Age: 76
Discharge: HOME | End: 2021-10-20
Attending: NURSE PRACTITIONER | Admitting: NURSE PRACTITIONER
Payer: COMMERCIAL

## 2021-10-20 ENCOUNTER — APPOINTMENT (OUTPATIENT)
Dept: ULTRASOUND IMAGING | Facility: CLINIC | Age: 76
End: 2021-10-20
Attending: NURSE PRACTITIONER
Payer: COMMERCIAL

## 2021-10-20 VITALS
BODY MASS INDEX: 28.71 KG/M2 | OXYGEN SATURATION: 100 % | HEART RATE: 58 BPM | SYSTOLIC BLOOD PRESSURE: 159 MMHG | RESPIRATION RATE: 18 BRPM | WEIGHT: 177.9 LBS | TEMPERATURE: 97.2 F | DIASTOLIC BLOOD PRESSURE: 71 MMHG

## 2021-10-20 DIAGNOSIS — M25.562 LEFT KNEE PAIN: ICD-10-CM

## 2021-10-20 PROBLEM — E78.00 PURE HYPERCHOLESTEROLEMIA: Status: ACTIVE | Noted: 2017-02-27

## 2021-10-20 PROBLEM — K21.9 GASTROESOPHAGEAL REFLUX DISEASE WITHOUT ESOPHAGITIS: Status: ACTIVE | Noted: 2017-02-27

## 2021-10-20 PROBLEM — T84.50XD: Status: ACTIVE | Noted: 2017-02-27

## 2021-10-20 PROBLEM — Z87.39 HISTORY OF SEPTIC ARTHRITIS: Status: ACTIVE | Noted: 2018-06-28

## 2021-10-20 LAB
ALBUMIN SERPL-MCNC: 3.9 G/DL (ref 3.4–5)
ALP SERPL-CCNC: 103 U/L (ref 40–150)
ALT SERPL W P-5'-P-CCNC: 35 U/L (ref 0–50)
ANION GAP SERPL CALCULATED.3IONS-SCNC: 4 MMOL/L (ref 3–14)
AST SERPL W P-5'-P-CCNC: 27 U/L (ref 0–45)
BILIRUB SERPL-MCNC: 0.4 MG/DL (ref 0.2–1.3)
BUN SERPL-MCNC: 25 MG/DL (ref 7–30)
CALCIUM SERPL-MCNC: 9.6 MG/DL (ref 8.5–10.1)
CHLORIDE BLD-SCNC: 106 MMOL/L (ref 94–109)
CO2 SERPL-SCNC: 33 MMOL/L (ref 20–32)
CREAT SERPL-MCNC: 0.8 MG/DL (ref 0.52–1.04)
ERYTHROCYTE [DISTWIDTH] IN BLOOD BY AUTOMATED COUNT: 14.4 % (ref 10–15)
GFR SERPL CREATININE-BSD FRML MDRD: 72 ML/MIN/1.73M2
GLUCOSE BLD-MCNC: 85 MG/DL (ref 70–99)
HCT VFR BLD AUTO: 44.5 % (ref 35–47)
HGB BLD-MCNC: 14.1 G/DL (ref 11.7–15.7)
LACTATE SERPL-SCNC: 0.9 MMOL/L (ref 0.7–2)
MCH RBC QN AUTO: 29.9 PG (ref 26.5–33)
MCHC RBC AUTO-ENTMCNC: 31.7 G/DL (ref 31.5–36.5)
MCV RBC AUTO: 95 FL (ref 78–100)
PLATELET # BLD AUTO: 262 10E3/UL (ref 150–450)
POTASSIUM BLD-SCNC: 3.4 MMOL/L (ref 3.4–5.3)
PROT SERPL-MCNC: 7.2 G/DL (ref 6.8–8.8)
RBC # BLD AUTO: 4.71 10E6/UL (ref 3.8–5.2)
SODIUM SERPL-SCNC: 143 MMOL/L (ref 133–144)
WBC # BLD AUTO: 6.4 10E3/UL (ref 4–11)

## 2021-10-20 PROCEDURE — 36415 COLL VENOUS BLD VENIPUNCTURE: CPT | Performed by: NURSE PRACTITIONER

## 2021-10-20 PROCEDURE — 83605 ASSAY OF LACTIC ACID: CPT | Performed by: NURSE PRACTITIONER

## 2021-10-20 PROCEDURE — 93971 EXTREMITY STUDY: CPT | Mod: LT

## 2021-10-20 PROCEDURE — 85027 COMPLETE CBC AUTOMATED: CPT | Performed by: NURSE PRACTITIONER

## 2021-10-20 PROCEDURE — 80053 COMPREHEN METABOLIC PANEL: CPT | Performed by: NURSE PRACTITIONER

## 2021-10-20 PROCEDURE — 99284 EMERGENCY DEPT VISIT MOD MDM: CPT | Mod: 25

## 2021-10-20 ASSESSMENT — ENCOUNTER SYMPTOMS
NUMBNESS: 0
CHILLS: 0
ARTHRALGIAS: 1
WEAKNESS: 0
SHORTNESS OF BREATH: 0
FEVER: 0
COLOR CHANGE: 0

## 2021-10-20 NOTE — ED PROVIDER NOTES
History   Chief Complaint:  Knee Pain       History is provided by the patient.    EDUARDO Henderson is a 76 year old female with history of hyperlipidemia, hypertension, cellulitis, osteoarthritis, and septic arthritis who presents with left knee pain. She says that she has a history with arthritis, knee replacements, and had sepsis in 2017 after being hospitalized. Around 2 weeks ago, she began having a left knee pain with additional arthralgia of her shoulders. She says the pain in her knee restricts her from lifting her foot off the ground. These symptoms are similar to when she had sepsis. She denies any recent traumas. No recent fevers. She is not on any blood thinners. She also provides that she had blood work done yesterday with no acute findings.    Review of Systems   Constitutional: Negative for chills and fever.   Respiratory: Negative for shortness of breath.    Cardiovascular: Negative for chest pain.   Musculoskeletal: Positive for arthralgias (shoulders and left knee).   Skin: Negative for color change.   Neurological: Negative for weakness and numbness.   All other systems reviewed and are negative.      Allergies:  Pcn [Penicillin V Potassium]  Ceftriaxone  Cephalexin  Oxycodone  Sulfa Drugs  Valium [Diazepam]  Vicodin [Hydrocodone-Acetaminophen]  Iodine Solution [Povidone Iodine]    Medications:  Cleocin  Lasix  Dilaudid  Senokot-S  Zocor  Diovan-hematocrit  Pyridoxine    Past Medical History:     Allergic rhinitis  Bacteremia due to streptococcus  Edema  GERD  Heart murmur  Hyperlipidemia  Hypertension  Kidney stone  Osteoarthritis  Osteopenia  Paroxysmal SVT  Cellulitis  Sepsis  Septic arthritis    Past Surgical History:    Arthroplasty revision ankle  Aspiration needle knee  Bunionectomy  Total knee replacement  Colonoscopy  Cataract extraction  Heart ablation  Hernia repair  Irrigation and debridement of foot  Rotator cuff repair  Remove antibiotic cement beads  Reverse arthroplasty  of shoulder  Tonsillectomy     Family History:    The patient denies any family history.    Social History:  Patient came from home.  Patient is accompanied in the ED by her .    Physical Exam     Patient Vitals for the past 24 hrs:   BP Temp Temp src Pulse Resp SpO2 Weight   10/20/21 1641 -- -- -- -- -- -- 80.7 kg (177 lb 14.4 oz)   10/20/21 1636 (!) 185/92 -- -- -- -- -- --   10/20/21 1635 -- 97.2  F (36.2  C) Temporal 64 18 100 % --       Physical Exam  General: Alert, No obvious discomfort, well kept   HENT:  Normal voice, No lymphadenopathy  Eyes:  The pupils are equal, round, and reactive to light, Conjunctiva normal, No scleral icterus   Neck:  Normal range of motion  CV:  Normal Pulses  Resp:  Non-labored, No cough  MS:  Normal muscular tone, moves all extremities, left knee with tenderness along the joint line and positive grind test.  There is symmetric bilateral lower extremity edema.  Chronic skin color discoloration multiple spider and varicose veins.  No popliteal fullness.  No calf tenderness.  No proximal streaking.  No wound.  Skin:  No rash or acute skin lesions noted  Neuro: Speech is normal and fluent  Psych:  Awake. Alert.  Normal affect.  Appropriate interactions. Good eye contact    Emergency Department Course     Imaging:    US Lower Extremity Venous Duplex Left   Final Result   IMPRESSION:   1.  No deep venous thrombosis in the left lower extremity.        Report per radiology    Laboratory:    Labs Ordered and Resulted from Time of ED Arrival Up to the Time of Departure from the ED   COMPREHENSIVE METABOLIC PANEL - Abnormal; Notable for the following components:       Result Value    Carbon Dioxide (CO2) 33 (*)     All other components within normal limits   CBC WITH PLATELETS - Normal   LACTIC ACID WHOLE BLOOD - Normal       Emergency Department Course:    Reviewed:  I reviewed nursing notes, vitals, past medical history and Care Everywhere    Assessments:  1642 I obtained history  and examined the patient as noted above.   1810 I rechecked the patient and explained findings.     Disposition:  The patient was discharged to home.     Impression & Plan     CMS Diagnoses: None    Medical Decision Making:   María Henderson is a 76 year old female is a 76 year old old female who presents for evaluation of knee pain.  Patient was seen at urgent care yesterday and advised that her laboratory studies were good however she was told she should get a ultrasound and attempted to go to urgent care however as they were closing they were unable to see her and sent her here for further evaluation.  Her physical examination was most suspicious for meniscus type injury.  There are no signs of DVT on ultrasound.  There are no signs of a septic joint or bursitis.  I have low suspicion for an occult tibial plateau fracture based on exam, ultrasound and mechanism. The patients neurovascular status is normal.  I suspect based on exam a meniscal injury vs strain/sprain type injury.  Plan is for protected weightbearing, ACE wrap, RICE treatment and follow-up with primary or ortho in 5-7 days for reevaluation.      Diagnosis:    ICD-10-CM    1. Left knee pain  M25.562        Discharge Medications:  New Prescriptions    No medications on file       Scribe Disclosure:  I, aDmi Mendez, am serving as a scribe at 4:54 PM on 10/20/2021 to document services personally performed by Dillan Pitt APRN CNP based on my observations and the provider's statements to me.                 Dillan Pitt APRN CNP  10/20/21 1822

## 2021-10-20 NOTE — ED TRIAGE NOTES
A&O x4, ABCs intact. Pt presents with left knee pain that started a couple of weeks ago. Pt states that pain has been getting worse and she is having a hard time picking up her foot to put on the ground.

## 2022-04-14 ENCOUNTER — APPOINTMENT (OUTPATIENT)
Dept: GENERAL RADIOLOGY | Facility: CLINIC | Age: 77
End: 2022-04-14
Attending: EMERGENCY MEDICINE
Payer: COMMERCIAL

## 2022-04-14 ENCOUNTER — HOSPITAL ENCOUNTER (EMERGENCY)
Facility: CLINIC | Age: 77
Discharge: HOME | End: 2022-04-14
Attending: EMERGENCY MEDICINE | Admitting: EMERGENCY MEDICINE
Payer: COMMERCIAL

## 2022-04-14 VITALS
DIASTOLIC BLOOD PRESSURE: 78 MMHG | HEART RATE: 61 BPM | SYSTOLIC BLOOD PRESSURE: 150 MMHG | TEMPERATURE: 97.5 F | OXYGEN SATURATION: 97 % | RESPIRATION RATE: 19 BRPM

## 2022-04-14 DIAGNOSIS — J06.9 VIRAL URI WITH COUGH: ICD-10-CM

## 2022-04-14 LAB
FLUAV RNA SPEC QL NAA+PROBE: NEGATIVE
FLUBV RNA RESP QL NAA+PROBE: NEGATIVE
RSV RNA SPEC NAA+PROBE: NEGATIVE
SARS-COV-2 RNA RESP QL NAA+PROBE: NEGATIVE

## 2022-04-14 PROCEDURE — 94640 AIRWAY INHALATION TREATMENT: CPT

## 2022-04-14 PROCEDURE — 99284 EMERGENCY DEPT VISIT MOD MDM: CPT | Mod: 25

## 2022-04-14 PROCEDURE — 250N000013 HC RX MED GY IP 250 OP 250 PS 637: Performed by: EMERGENCY MEDICINE

## 2022-04-14 PROCEDURE — 71046 X-RAY EXAM CHEST 2 VIEWS: CPT

## 2022-04-14 PROCEDURE — 87637 SARSCOV2&INF A&B&RSV AMP PRB: CPT | Performed by: EMERGENCY MEDICINE

## 2022-04-14 PROCEDURE — 250N000009 HC RX 250: Performed by: EMERGENCY MEDICINE

## 2022-04-14 PROCEDURE — C9803 HOPD COVID-19 SPEC COLLECT: HCPCS

## 2022-04-14 RX ORDER — ALBUTEROL SULFATE 90 UG/1
6 AEROSOL, METERED RESPIRATORY (INHALATION) ONCE
Status: COMPLETED | OUTPATIENT
Start: 2022-04-14 | End: 2022-04-14

## 2022-04-14 RX ORDER — PREDNISONE 20 MG/1
TABLET ORAL
Qty: 10 TABLET | Refills: 0 | Status: SHIPPED | OUTPATIENT
Start: 2022-04-14 | End: 2023-12-05

## 2022-04-14 RX ORDER — ALBUTEROL SULFATE 90 UG/1
AEROSOL, METERED RESPIRATORY (INHALATION)
Qty: 18 G | Refills: 0 | Status: SHIPPED | OUTPATIENT
Start: 2022-04-14 | End: 2024-08-12

## 2022-04-14 RX ORDER — IPRATROPIUM BROMIDE AND ALBUTEROL SULFATE 2.5; .5 MG/3ML; MG/3ML
3 SOLUTION RESPIRATORY (INHALATION) ONCE
Status: COMPLETED | OUTPATIENT
Start: 2022-04-14 | End: 2022-04-14

## 2022-04-14 RX ADMIN — IPRATROPIUM BROMIDE AND ALBUTEROL SULFATE 3 ML: 2.5; .5 SOLUTION RESPIRATORY (INHALATION) at 11:25

## 2022-04-14 RX ADMIN — ALBUTEROL SULFATE 6 PUFF: 90 AEROSOL, METERED RESPIRATORY (INHALATION) at 09:41

## 2022-04-14 ASSESSMENT — ENCOUNTER SYMPTOMS
COUGH: 1
FEVER: 0
SHORTNESS OF BREATH: 1

## 2022-04-14 NOTE — ED PROVIDER NOTES
History     Chief Complaint:    Cold Symptoms      HPI   María Henderson is a 77 year old female who presents with cough and chest congestion.  The patient and her  who is here with her remind me that I saw the patient's  last week.  That time both of them had symptoms though the patient's symptoms were not as bad as her .  The patient's  states that he was tested for COVID and it was negative.    The patient notes that she has had somewhat of a productive cough and even some wheezing. The patient states that she has not needed to use any kind of inhaler for probably the last 10 years.  She denies chest pain, palpitations, lightheadedness.  She states that her symptoms have been subjectively getting worse leading to her presentation to the emergency department today.    Review of Systems   Constitutional: Negative for fever.   Respiratory: Positive for cough and shortness of breath.    All other systems reviewed and are negative.    Allergies:  Pcn [Penicillin V Potassium]  Ceftriaxone  Cephalexin  Oxycodone  Sulfa Drugs  Valium [Diazepam]  Vicodin [Hydrocodone-Acetaminophen]  Iodine Solution [Povidone Iodine]    Medications:    albuterol (PROAIR HFA/PROVENTIL HFA/VENTOLIN HFA) 108 (90 Base) MCG/ACT inhaler  predniSONE (DELTASONE) 20 MG tablet  acetaminophen (TYLENOL) 500 MG tablet  ascorbic acid (VITAMIN C) 250 MG CHEW chewable tablet  aspirin 81 MG EC tablet  calcium carbonate (OS-MATHEW) 1500 (600 Ca) MG tablet  Cholecalciferol (VITAMIN D3) 400 units CAPS  clindamycin (CLEOCIN) 300 MG capsule  furosemide (LASIX) 20 MG tablet  Glucosamine-Chondroitin (OSTEO BI-FLEX REGULAR STRENGTH PO)  HYDROmorphone (DILAUDID) 2 MG tablet  Multiple Vitamins-Minerals (WOMENS 50+ MULTI VITAMIN/MIN PO)  order for DME  order for DME  Potassium Gluconate 595 (99 K) MG TABS  senna-docusate (SENOKOT-S/PERICOLACE) 8.6-50 MG tablet  simvastatin (ZOCOR) 40 MG tablet  valsartan-hydrochlorothiazide  (DIOVAN-HCT) 80-12.5 MG per tablet  vitamin B6 (PYRIDOXINE) 100 MG tablet         Past Medical History:   Past Surgical History:     Past Medical History:   Diagnosis Date     Allergic rhinitis      Bacteremia due to Streptococcus      Edema      Gastro-oesophageal reflux disease      Heart murmur      Hyperlipemia      Hyperlipidemia      Hypertension      Kidney stone      Numbness and tingling      Osteoarthritis      Osteoarthrosis      Osteopenia      Paroxysmal supraventricular tachycardia (H)      S/P ablation of atrial fibrillation     Past Surgical History:   Procedure Laterality Date     7 foot and ankle        ARTHROPLASTY REVISION ANKLE  3/10/2014    Procedure: ARTHROPLASTY REVISION ANKLE;  REVISION LEFT TOTAL ANKLE  WITH CONVERSION TO  IM LAURIE FUSION WITH FEMORAL HEAD ALLOGRAFT (C-ARM);  Surgeon: Ramirez Fang MD;  Location: [facility]     ASPIRATION NEEDLE KNEE Right 12/27/2016    Procedure: ASPIRATION NEEDLE KNEE;  Surgeon: Jian Fisher MD;  Location:  OR     BUNIONECTOMY JONAS  10/24/2011    Procedure:BUNIONECTOMY JONAS; Left Foot Bunionette Repair (C-Arm); Surgeon:RAMIREZ FANG; Location:[facility]     C TOTAL KNEE ARTHROPLASTY Right      COLONOSCOPY       EYE SURGERY      cataract     H ABLATION SVT       HERNIA REPAIR       IRRIGATION AND DEBRIDEMENT FOOT, COMBINED Left 12/27/2016    Procedure: COMBINED IRRIGATION AND DEBRIDEMENT FOOT;  Surgeon: Jian Fisher MD;  Location:  OR     ORTHOPEDIC SURGERY      RTKA     ORTHOPEDIC SURGERY Left     ROTATOR CUFF     REMOVE ANTIBIOTIC CEMENT BEADS / SPACER KNEE N/A 4/24/2017    Procedure: REMOVE ANTIBIOTIC CEMENT BEADS / SPACER KNEE;  REMOVAL ANTIBIOTIC SPACER RIGHT KNEE REIMPLANT TOTAL JOINT COMPONENT TO RIGHT KNEE ;  Surgeon: Husam Berkowitz MD;  Location:  OR     REMOVE HARDWARE ARTHROPLASTY KNEE, IRRIG & JOSE, PLACE ANTIBIOTIC CEMENT SPACER, COMBINED Right 2/20/2017    Procedure: COMBINED REMOVE HARDWARE  ARTHROPLASTY KNEE, IRRIGATION AND DEBRIDEMENT, PLACE ANTIBIOTIC CEMENT BEADS / SPACER;  Surgeon: Husam Berkowitz MD;  Location:  OR     REVERSE ARTHROPLASTY SHOULDER Right 10/7/2019    Procedure: RIGHT TOTAL REVERSE SHOULDER ARTHROPLASTY;  Surgeon: Husam Berkowitz MD;  Location:  OR     TONSILLECTOMY       TONSILLECTOMY       total knee arthroplasy        Patient Active Problem List    Diagnosis Date Noted     Status post reverse total arthroplasty of right shoulder 10/07/2019     Priority: Medium     History of septic arthritis 06/28/2018     Priority: Medium     Knee joint replacement status 04/24/2017     Priority: Medium     SVT (supraventricular tachycardia) (H) 04/06/2017     Priority: Medium     Left hip pain 03/22/2017     Priority: Medium     Pure hypercholesterolemia 02/27/2017     Priority: Medium     Gastroesophageal reflux disease without esophagitis 02/27/2017     Priority: Medium     Infection and inflammatory reaction due to unspecified internal joint prosthesis, subsequent encounter 02/27/2017     Priority: Medium     Septic joint of left knee joint (H) 02/20/2017     Priority: Medium     Sepsis (H) 12/27/2016     Priority: Medium     Cellulitis 12/26/2016     Priority: Medium     S/P ankle fusion 03/10/2014     Priority: Medium     Sprain of neck 02/08/2012     Priority: Medium     Sprain and strain of shoulder and upper arm 02/08/2012     Priority: Medium     Elbow strain 02/08/2012     Priority: Medium     Wrist strain 02/08/2012     Priority: Medium     Pain in joint, ankle and foot 01/13/2012     Priority: Medium     Syncope 10/25/2011     Priority: Medium     Hypertension 10/25/2011     Priority: Medium     Bradycardia 10/25/2011     Priority: Medium     Hyperlipidemia LDL goal <130 10/25/2011     Priority: Medium     S/P foot surgery  10/24/2011     Priority: Medium     Bunionette excision as well as a medializing osteotomy of the fifth metatarsal, Cheilectomy and  debridement of the sinus tarsi and subtalar joint.             Family History  No family history on file.    Social History:  Presents to the ED with her     Physical Exam     Patient Vitals for the past 24 hrs:   BP Temp Temp src Pulse Resp SpO2   04/14/22 1130 (!) 150/78 -- -- 61 -- 97 %   04/14/22 1100 (!) 158/108 -- -- 66 -- 95 %   04/14/22 1030 (!) 140/69 -- -- 64 -- 95 %   04/14/22 1015 (!) 143/68 -- -- 58 -- 95 %   04/14/22 0915 (!) 179/99 97.5  F (36.4  C) Oral 75 19 96 %       Physical Exam  Constitutional: Vital signs reviewed as above.   Head: No external signs of trauma noted.  Eyes: Pupils are equal, round, and reactive to light.   Neck: No JVD noted  Cardiovascular: Normal rate, regular rhythm and normal heart sounds.  No murmur heard. Equal B/L peripheral pulses.  Pulmonary/Chest: No respiratory distress. Patient has wheezes at the B/L bases. Patient has coarse breath sounds B/L in all fields.  Gastrointestinal: Soft. There is no tenderness.   Musculoskeletal/Extremities: No pitting edema noted. Normal tone.  Neurological: Patient is alert and oriented to person, place, and time.   Skin: Skin is warm and dry. There is no diaphoresis noted.   Psychiatric: The patient appears calm.      Emergency Department Course     Imaging:  Chest XR,  PA & LAT   Preliminary Result   IMPRESSION: Tortuous calcified thoracic aorta. Lungs clear. No pleural   effusions. Heart size and pulmonary vascularity are within normal   limits. Right shoulder arthroplasty.          Laboratory:  Labs Ordered and Resulted from Time of ED Arrival to Time of ED Departure   INFLUENZA A/B & SARS-COV2 PCR MULTIPLEX - Normal       Result Value    Influenza A PCR Negative      Influenza B PCR Negative      RSV PCR Negative      SARS CoV2 PCR Negative         Procedures:      Emergency Department Course:           Reviewed:    I reviewed nursing notes, vitals and past history    Assessments/Consults:   ED Course as of 04/14/22 7343    Thu Apr 14, 2022   0920 Dr. Reyes' evaluation.   1130 Rechecked. Finished neb. Still with some coarseness, but no wheezing.       Interventions:  Medications   albuterol (PROVENTIL HFA/VENTOLIN HFA) inhaler (6 puffs Inhalation Given 4/14/22 0999)   ipratropium - albuterol 0.5 mg/2.5 mg/3 mL (DUONEB) neb solution 3 mL (3 mLs Nebulization Given 4/14/22 1128)       Disposition:  The patient was discharged to home.    Impression & Plan      CMS Diagnoses:         Medical Decision Making:    This 77 year old female presents to the ED due to chest congestion/cough. Please see the HPI and exam for specifics.    My differential included but was not limited to pneumonia, COVID, asthma.  Based on the differential and exam, the above workup was undertaken. Those results were reviewed by me and fortunately chest x-ray imaging and COVID testing are normal..    Based on this, I felt that the most likely etiology of their symptoms is a viral process. I believe that a reasonable course of action is that they be discharged.    Anticipatory guidance given prior to discharge.    Critical Care time:  none    Covid-19  María Henderson was evaluated during a global COVID-19 pandemic, which necessitated consideration that the patient might be at risk for infection with the SARS-CoV-2 virus that causes COVID-19.  Applicable protocols for evaluation were followed during the patient's care. COVID-19 was considered as part of the patient's evaluation.       Diagnosis:    ICD-10-CM    1. Viral URI with cough  J06.9        Discharge Medications:  Discharge Medication List as of 4/14/2022 11:49 AM      START taking these medications    Details   albuterol (PROAIR HFA/PROVENTIL HFA/VENTOLIN HFA) 108 (90 Base) MCG/ACT inhaler Inhale 1-2 puffs into the lungs every 6 hours, Disp-18 g, R-0, E-PrescribePharmacy may dispense brand covered by insurance (Proair, or proventil or ventolin or generic albuterol inhaler)      predniSONE (DELTASONE) 20  MG tablet Take two tablets (= 40mg) each day for 5 (five) days, Disp-10 tablet, R-0, E-Prescribe                    Jayro Reyes,   04/14/22 6929

## 2022-04-14 NOTE — ED TRIAGE NOTES
Patient presents with cough and chest congestion for the past week. HX asthma, endorsing some wheezing. Taking mucinex at home.  was tested for COVID last week and was negative.

## 2022-04-14 NOTE — DISCHARGE INSTRUCTIONS
What do you do next:   Continue your home medications unless we have specifically changed them  Use the inhaler and steroid medication I have prescribed as directed.  You should also stay hydrated and you may consider using over-the-counter (plain) Mucinex (not Mucinex D).  Follow up as indicated below    When do you return: If you have uncontrolled shortness of breath, uncontrolled fevers, severe wheezing, or any other symptoms that concern you, please return to the ED for reevaluation.    Thank you for allowing us to care for you today.

## 2022-04-16 ENCOUNTER — HEALTH MAINTENANCE LETTER (OUTPATIENT)
Age: 77
End: 2022-04-16

## 2022-09-20 ENCOUNTER — ALLIED HEALTH/NURSE VISIT (OUTPATIENT)
Dept: FAMILY MEDICINE | Facility: CLINIC | Age: 77
End: 2022-09-20
Payer: COMMERCIAL

## 2022-09-20 DIAGNOSIS — Z23 HIGH PRIORITY FOR 2019-NCOV VACCINE: ICD-10-CM

## 2022-09-20 DIAGNOSIS — Z23 NEED FOR PROPHYLACTIC VACCINATION AND INOCULATION AGAINST INFLUENZA: ICD-10-CM

## 2022-09-20 PROCEDURE — G0008 ADMIN INFLUENZA VIRUS VAC: HCPCS

## 2022-09-20 PROCEDURE — 91312 COVID-19,PF,PFIZER BOOSTER BIVALENT: CPT

## 2022-09-20 PROCEDURE — 90662 IIV NO PRSV INCREASED AG IM: CPT

## 2022-09-20 PROCEDURE — 99207 PR NO CHARGE NURSE ONLY: CPT

## 2022-09-20 PROCEDURE — 0124A COVID-19,PF,PFIZER BOOSTER BIVALENT: CPT

## 2022-12-10 ENCOUNTER — HEALTH MAINTENANCE LETTER (OUTPATIENT)
Age: 77
End: 2022-12-10

## 2023-01-05 ENCOUNTER — ANESTHESIA (OUTPATIENT)
Dept: SURGERY | Facility: CLINIC | Age: 78
End: 2023-01-05
Payer: COMMERCIAL

## 2023-01-05 ENCOUNTER — ANESTHESIA EVENT (OUTPATIENT)
Dept: SURGERY | Facility: CLINIC | Age: 78
End: 2023-01-05
Payer: COMMERCIAL

## 2023-01-05 ENCOUNTER — APPOINTMENT (OUTPATIENT)
Dept: GENERAL RADIOLOGY | Facility: CLINIC | Age: 78
End: 2023-01-05
Attending: EMERGENCY MEDICINE
Payer: COMMERCIAL

## 2023-01-05 ENCOUNTER — HOSPITAL ENCOUNTER (OUTPATIENT)
Facility: CLINIC | Age: 78
Discharge: HOME | End: 2023-01-05
Attending: EMERGENCY MEDICINE | Admitting: INTERNAL MEDICINE
Payer: COMMERCIAL

## 2023-01-05 VITALS
WEIGHT: 175 LBS | SYSTOLIC BLOOD PRESSURE: 138 MMHG | DIASTOLIC BLOOD PRESSURE: 81 MMHG | TEMPERATURE: 96.9 F | BODY MASS INDEX: 28.25 KG/M2 | RESPIRATION RATE: 16 BRPM | OXYGEN SATURATION: 98 % | HEART RATE: 65 BPM

## 2023-01-05 DIAGNOSIS — T18.108A FOREIGN BODY IN ESOPHAGUS, INITIAL ENCOUNTER: ICD-10-CM

## 2023-01-05 LAB — UPPER GI ENDOSCOPY: NORMAL

## 2023-01-05 PROCEDURE — 370N000017 HC ANESTHESIA TECHNICAL FEE, PER MIN: Performed by: INTERNAL MEDICINE

## 2023-01-05 PROCEDURE — 258N000003 HC RX IP 258 OP 636: Performed by: ANESTHESIOLOGY

## 2023-01-05 PROCEDURE — 71046 X-RAY EXAM CHEST 2 VIEWS: CPT

## 2023-01-05 PROCEDURE — 999N000141 HC STATISTIC PRE-PROCEDURE NURSING ASSESSMENT: Performed by: INTERNAL MEDICINE

## 2023-01-05 PROCEDURE — 999N000053 HC STATISTIC EGD (OR PROCEDURE): Performed by: INTERNAL MEDICINE

## 2023-01-05 PROCEDURE — 250N000009 HC RX 250

## 2023-01-05 PROCEDURE — 99285 EMERGENCY DEPT VISIT HI MDM: CPT | Mod: 25

## 2023-01-05 PROCEDURE — 710N000012 HC RECOVERY PHASE 2, PER MINUTE: Performed by: INTERNAL MEDICINE

## 2023-01-05 PROCEDURE — 70360 X-RAY EXAM OF NECK: CPT

## 2023-01-05 PROCEDURE — 272N000001 HC OR GENERAL SUPPLY STERILE: Performed by: INTERNAL MEDICINE

## 2023-01-05 PROCEDURE — 360N000075 HC SURGERY LEVEL 2, PER MIN: Performed by: INTERNAL MEDICINE

## 2023-01-05 PROCEDURE — 250N000011 HC RX IP 250 OP 636

## 2023-01-05 RX ORDER — FENTANYL CITRATE 50 UG/ML
50 INJECTION, SOLUTION INTRAMUSCULAR; INTRAVENOUS EVERY 5 MIN PRN
Status: DISCONTINUED | OUTPATIENT
Start: 2023-01-05 | End: 2023-01-05 | Stop reason: HOSPADM

## 2023-01-05 RX ORDER — HYDROMORPHONE HCL IN WATER/PF 6 MG/30 ML
0.4 PATIENT CONTROLLED ANALGESIA SYRINGE INTRAVENOUS EVERY 5 MIN PRN
Status: DISCONTINUED | OUTPATIENT
Start: 2023-01-05 | End: 2023-01-05 | Stop reason: HOSPADM

## 2023-01-05 RX ORDER — ONDANSETRON 2 MG/ML
4 INJECTION INTRAMUSCULAR; INTRAVENOUS EVERY 6 HOURS PRN
Status: DISCONTINUED | OUTPATIENT
Start: 2023-01-05 | End: 2023-01-05 | Stop reason: HOSPADM

## 2023-01-05 RX ORDER — DEXAMETHASONE SODIUM PHOSPHATE 4 MG/ML
INJECTION, SOLUTION INTRA-ARTICULAR; INTRALESIONAL; INTRAMUSCULAR; INTRAVENOUS; SOFT TISSUE PRN
Status: DISCONTINUED | OUTPATIENT
Start: 2023-01-05 | End: 2023-01-05

## 2023-01-05 RX ORDER — METOPROLOL TARTRATE 1 MG/ML
1-2 INJECTION, SOLUTION INTRAVENOUS EVERY 5 MIN PRN
Status: DISCONTINUED | OUTPATIENT
Start: 2023-01-05 | End: 2023-01-05 | Stop reason: HOSPADM

## 2023-01-05 RX ORDER — LIDOCAINE 40 MG/G
CREAM TOPICAL
Status: DISCONTINUED | OUTPATIENT
Start: 2023-01-05 | End: 2023-01-05 | Stop reason: HOSPADM

## 2023-01-05 RX ORDER — NALOXONE HYDROCHLORIDE 0.4 MG/ML
0.4 INJECTION, SOLUTION INTRAMUSCULAR; INTRAVENOUS; SUBCUTANEOUS
Status: DISCONTINUED | OUTPATIENT
Start: 2023-01-05 | End: 2023-01-05 | Stop reason: HOSPADM

## 2023-01-05 RX ORDER — GLYCOPYRROLATE 0.2 MG/ML
INJECTION, SOLUTION INTRAMUSCULAR; INTRAVENOUS PRN
Status: DISCONTINUED | OUTPATIENT
Start: 2023-01-05 | End: 2023-01-05

## 2023-01-05 RX ORDER — HYDRALAZINE HYDROCHLORIDE 20 MG/ML
2.5-5 INJECTION INTRAMUSCULAR; INTRAVENOUS EVERY 10 MIN PRN
Status: DISCONTINUED | OUTPATIENT
Start: 2023-01-05 | End: 2023-01-05 | Stop reason: HOSPADM

## 2023-01-05 RX ORDER — FENTANYL CITRATE 50 UG/ML
50 INJECTION, SOLUTION INTRAMUSCULAR; INTRAVENOUS
Status: DISCONTINUED | OUTPATIENT
Start: 2023-01-05 | End: 2023-01-05 | Stop reason: HOSPADM

## 2023-01-05 RX ORDER — PROPOFOL 10 MG/ML
INJECTION, EMULSION INTRAVENOUS CONTINUOUS PRN
Status: DISCONTINUED | OUTPATIENT
Start: 2023-01-05 | End: 2023-01-05

## 2023-01-05 RX ORDER — LIDOCAINE HYDROCHLORIDE 10 MG/ML
INJECTION, SOLUTION INFILTRATION; PERINEURAL PRN
Status: DISCONTINUED | OUTPATIENT
Start: 2023-01-05 | End: 2023-01-05

## 2023-01-05 RX ORDER — PROPOFOL 10 MG/ML
INJECTION, EMULSION INTRAVENOUS PRN
Status: DISCONTINUED | OUTPATIENT
Start: 2023-01-05 | End: 2023-01-05

## 2023-01-05 RX ORDER — FLUMAZENIL 0.1 MG/ML
0.2 INJECTION, SOLUTION INTRAVENOUS
Status: DISCONTINUED | OUTPATIENT
Start: 2023-01-05 | End: 2023-01-05 | Stop reason: HOSPADM

## 2023-01-05 RX ORDER — OXYCODONE HYDROCHLORIDE 5 MG/1
5 TABLET ORAL EVERY 4 HOURS PRN
Status: DISCONTINUED | OUTPATIENT
Start: 2023-01-05 | End: 2023-01-05 | Stop reason: HOSPADM

## 2023-01-05 RX ORDER — HYDROMORPHONE HCL IN WATER/PF 6 MG/30 ML
0.2 PATIENT CONTROLLED ANALGESIA SYRINGE INTRAVENOUS EVERY 5 MIN PRN
Status: DISCONTINUED | OUTPATIENT
Start: 2023-01-05 | End: 2023-01-05 | Stop reason: HOSPADM

## 2023-01-05 RX ORDER — SODIUM CHLORIDE, SODIUM LACTATE, POTASSIUM CHLORIDE, CALCIUM CHLORIDE 600; 310; 30; 20 MG/100ML; MG/100ML; MG/100ML; MG/100ML
INJECTION, SOLUTION INTRAVENOUS CONTINUOUS
Status: DISCONTINUED | OUTPATIENT
Start: 2023-01-05 | End: 2023-01-05 | Stop reason: HOSPADM

## 2023-01-05 RX ORDER — ONDANSETRON 2 MG/ML
INJECTION INTRAMUSCULAR; INTRAVENOUS PRN
Status: DISCONTINUED | OUTPATIENT
Start: 2023-01-05 | End: 2023-01-05

## 2023-01-05 RX ORDER — ONDANSETRON 2 MG/ML
4 INJECTION INTRAMUSCULAR; INTRAVENOUS
Status: DISCONTINUED | OUTPATIENT
Start: 2023-01-05 | End: 2023-01-05 | Stop reason: HOSPADM

## 2023-01-05 RX ORDER — KETOROLAC TROMETHAMINE 30 MG/ML
15 INJECTION, SOLUTION INTRAMUSCULAR; INTRAVENOUS ONCE
Status: DISCONTINUED | OUTPATIENT
Start: 2023-01-05 | End: 2023-01-05 | Stop reason: HOSPADM

## 2023-01-05 RX ORDER — NALOXONE HYDROCHLORIDE 0.4 MG/ML
0.2 INJECTION, SOLUTION INTRAMUSCULAR; INTRAVENOUS; SUBCUTANEOUS
Status: DISCONTINUED | OUTPATIENT
Start: 2023-01-05 | End: 2023-01-05 | Stop reason: HOSPADM

## 2023-01-05 RX ORDER — ONDANSETRON 4 MG/1
4 TABLET, ORALLY DISINTEGRATING ORAL EVERY 6 HOURS PRN
Status: DISCONTINUED | OUTPATIENT
Start: 2023-01-05 | End: 2023-01-05 | Stop reason: HOSPADM

## 2023-01-05 RX ORDER — ONDANSETRON 2 MG/ML
4 INJECTION INTRAMUSCULAR; INTRAVENOUS EVERY 30 MIN PRN
Status: DISCONTINUED | OUTPATIENT
Start: 2023-01-05 | End: 2023-01-05 | Stop reason: HOSPADM

## 2023-01-05 RX ORDER — ONDANSETRON 4 MG/1
4 TABLET, ORALLY DISINTEGRATING ORAL EVERY 30 MIN PRN
Status: DISCONTINUED | OUTPATIENT
Start: 2023-01-05 | End: 2023-01-05 | Stop reason: HOSPADM

## 2023-01-05 RX ORDER — ACETAMINOPHEN 325 MG/1
975 TABLET ORAL ONCE
Status: DISCONTINUED | OUTPATIENT
Start: 2023-01-05 | End: 2023-01-05 | Stop reason: HOSPADM

## 2023-01-05 RX ORDER — FENTANYL CITRATE 50 UG/ML
25 INJECTION, SOLUTION INTRAMUSCULAR; INTRAVENOUS EVERY 5 MIN PRN
Status: DISCONTINUED | OUTPATIENT
Start: 2023-01-05 | End: 2023-01-05 | Stop reason: HOSPADM

## 2023-01-05 RX ORDER — MEPERIDINE HYDROCHLORIDE 25 MG/ML
12.5 INJECTION INTRAMUSCULAR; INTRAVENOUS; SUBCUTANEOUS
Status: DISCONTINUED | OUTPATIENT
Start: 2023-01-05 | End: 2023-01-05 | Stop reason: HOSPADM

## 2023-01-05 RX ORDER — PROCHLORPERAZINE MALEATE 5 MG
5 TABLET ORAL EVERY 6 HOURS PRN
Status: DISCONTINUED | OUTPATIENT
Start: 2023-01-05 | End: 2023-01-05 | Stop reason: HOSPADM

## 2023-01-05 RX ADMIN — PROPOFOL 30 MG: 10 INJECTION, EMULSION INTRAVENOUS at 14:25

## 2023-01-05 RX ADMIN — LIDOCAINE HYDROCHLORIDE 5 ML: 10 INJECTION, SOLUTION INFILTRATION; PERINEURAL at 14:25

## 2023-01-05 RX ADMIN — SODIUM CHLORIDE, POTASSIUM CHLORIDE, SODIUM LACTATE AND CALCIUM CHLORIDE: 600; 310; 30; 20 INJECTION, SOLUTION INTRAVENOUS at 14:20

## 2023-01-05 RX ADMIN — PROPOFOL 150 MCG/KG/MIN: 10 INJECTION, EMULSION INTRAVENOUS at 14:25

## 2023-01-05 RX ADMIN — ONDANSETRON 4 MG: 2 INJECTION INTRAMUSCULAR; INTRAVENOUS at 14:25

## 2023-01-05 RX ADMIN — GLYCOPYRROLATE 0.2 MG: 0.2 INJECTION, SOLUTION INTRAMUSCULAR; INTRAVENOUS at 14:25

## 2023-01-05 RX ADMIN — DEXAMETHASONE SODIUM PHOSPHATE 4 MG: 4 INJECTION, SOLUTION INTRA-ARTICULAR; INTRALESIONAL; INTRAMUSCULAR; INTRAVENOUS; SOFT TISSUE at 14:30

## 2023-01-05 ASSESSMENT — ACTIVITIES OF DAILY LIVING (ADL)
ADLS_ACUITY_SCORE: 35
ADLS_ACUITY_SCORE: 35

## 2023-01-05 NOTE — ED TRIAGE NOTES
Pt is a seamstress and accidentally swallowed a pin. Attempted to vomit it up with no success. States pin is around 2 inches long.      Triage Assessment     Row Name 01/05/23 1120       Triage Assessment (Adult)    Airway WDL X  foreign body in throat       Respiratory WDL    Respiratory WDL WDL       Skin Circulation/Temperature WDL    Skin Circulation/Temperature WDL WDL       Cardiac WDL    Cardiac WDL WDL       Peripheral/Neurovascular WDL    Peripheral Neurovascular WDL WDL       Cognitive/Neuro/Behavioral WDL    Cognitive/Neuro/Behavioral WDL WDL

## 2023-01-05 NOTE — DISCHARGE INSTRUCTIONS
SEDATION ADULT DISCHARGE INSTRUCTIONS   SPECIAL PRECAUTIONS FOR 24 HOURS AFTER SURGERY    IT IS NOT UNUSUAL TO FEEL LIGHT-HEADED OR FAINT, UP TO 24 HOURS AFTER SURGERY OR WHILE TAKING PAIN MEDICATION.  IF YOU HAVE THESE SYMPTOMS; SIT FOR A FEW MINUTES BEFORE STANDING AND HAVE SOMEONE ASSIST YOU WHEN YOU GET UP TO WALK OR USE THE BATHROOM.    YOU SHOULD REST AND RELAX FOR THE NEXT 24 HOURS AND YOU MUST MAKE ARRANGEMENTS TO HAVE SOMEONE STAY WITH YOU FOR AT LEAST 24 HOURS AFTER YOUR DISCHARGE.  AVOID HAZARDOUS AND STRENUOUS ACTIVITIES.  DO NOT MAKE IMPORTANT DECISIONS FOR 24 HOURS.    DO NOT DRIVE ANY VEHICLE OR OPERATE MECHANICAL EQUIPMENT FOR 24 HOURS FOLLOWING THE END OF YOUR SURGERY.  EVEN THOUGH YOU MAY FEEL NORMAL, YOUR REACTIONS MAY BE AFFECTED BY THE MEDICATION YOU HAVE RECEIVED.    DO NOT DRINK ALCOHOLIC BEVERAGES FOR 24 HOURS FOLLOWING YOUR SURGERY.    DRINK CLEAR LIQUIDS (APPLE JUICE, GINGER ALE, 7-UP, BROTH, ETC.).  PROGRESS TO YOUR REGULAR DIET AS YOU FEEL ABLE.    YOU MAY HAVE A DRY MOUTH, A SORE THROAT, MUSCLES ACHES OR TROUBLE SLEEPING.  THESE SHOULD GO AWAY AFTER 24 HOURS.    CALL YOUR DOCTOR FOR ANY OF THE FOLLOWING:  SIGNS OF INFECTION (FEVER, GROWING TENDERNESS AT THE SURGERY SITE, A LARGE AMOUNT OF DRAINAGE OR BLEEDING, SEVERE PAIN, FOUL-SMELLING DRAINAGE, REDNESS OR SWELLING.    IT HAS BEEN OVER 8 TO 10 HOURS SINCE SURGERY AND YOU ARE STILL NOT ABLE TO URINATE (PASS WATER).     ESOPHAGOGASTRODUODENOSCOPY DISCHARGE INSTRUCTIONS    You may not drive, use heavy equipment or consume alcohol for 24 hours because the drugs you were given may cause dizziness, drowsiness, forgetfulness and slower reaction time.    Small pieces or tissue (biopsies) or polyps may have been removed.    You may resume your regular diet and medications. Exception: If you had a biopsy or polypectomy, do not take aspirin, aleve (naproxen) or ibuprofen for the next 10 days.  Tylenol (acetaminophen) is safe to take.    Additional  instructions:  If you had a biopsy or polypectomy, the pathology report will be sent to your doctor.  If you have not received the results within 10 days, call your doctor's office.    What to watch for:  Problems rarely occur after the procedure.  It is important for you to be aware of the early signs of a possible complication.  Call immediately if you notice any of the followin.  Unusual pain or difficulty swallowing.    2.  Unusual abdominal or chest pain.    3.  Vomiting of blood.    4.  Black or bloody stools.    5.  Temperature above 100.6 degrees F

## 2023-01-05 NOTE — ANESTHESIA PREPROCEDURE EVALUATION
Anesthesia Pre-Procedure Evaluation    Patient: María Henderson   MRN: [id number] : 1945        Procedure : Procedure(s):  ESOPHAGOGASTRODUODENOSCOPY (EGD), WITH FOREIGN BODY REMOVAL          Past Medical History:   Diagnosis Date     Allergic rhinitis      Bacteremia due to Streptococcus      Edema      Gastro-oesophageal reflux disease      Heart murmur     ?murmur     Hyperlipemia      Hyperlipidemia      Hypertension      Kidney stone      Numbness and tingling     numbness left distal medial tibia     Osteoarthritis      Osteoarthrosis      Osteopenia      Paroxysmal supraventricular tachycardia (H)      S/P ablation of atrial fibrillation       Past Surgical History:   Procedure Laterality Date     7 foot and ankle        ARTHROPLASTY REVISION ANKLE  3/10/2014    Procedure: ARTHROPLASTY REVISION ANKLE;  REVISION LEFT TOTAL ANKLE  WITH CONVERSION TO  IM LAURIE FUSION WITH FEMORAL HEAD ALLOGRAFT (C-ARM);  Surgeon: Ramirez Fang MD;  Location: [facility]     ASPIRATION NEEDLE KNEE Right 2016    Procedure: ASPIRATION NEEDLE KNEE;  Surgeon: Jian Fisher MD;  Location:  OR     BUNIONECTOMY JONAS  10/24/2011    Procedure:BUNIONECTOMY JONAS; Left Foot Bunionette Repair (C-Arm); Surgeon:RAMIREZ FANG; Location:[facility]     COLONOSCOPY       EYE SURGERY      cataract     H ABLATION SVT       HERNIA REPAIR       IRRIGATION AND DEBRIDEMENT FOOT, COMBINED Left 2016    Procedure: COMBINED IRRIGATION AND DEBRIDEMENT FOOT;  Surgeon: Jian Fisher MD;  Location:  OR     ORTHOPEDIC SURGERY      RTKA     ORTHOPEDIC SURGERY Left     ROTATOR CUFF     REMOVE ANTIBIOTIC CEMENT BEADS / SPACER KNEE N/A 2017    Procedure: REMOVE ANTIBIOTIC CEMENT BEADS / SPACER KNEE;  REMOVAL ANTIBIOTIC SPACER RIGHT KNEE REIMPLANT TOTAL JOINT COMPONENT TO RIGHT KNEE ;  Surgeon: Husam Berkowitz MD;  Location:  OR     REMOVE HARDWARE ARTHROPLASTY KNEE, IRRIG & JOSE, PLACE ANTIBIOTIC  CEMENT SPACER, COMBINED Right 2/20/2017    Procedure: COMBINED REMOVE HARDWARE ARTHROPLASTY KNEE, IRRIGATION AND DEBRIDEMENT, PLACE ANTIBIOTIC CEMENT BEADS / SPACER;  Surgeon: Husam Berkowitz MD;  Location: [facility]     REVERSE ARTHROPLASTY SHOULDER Right 10/7/2019    Procedure: RIGHT TOTAL REVERSE SHOULDER ARTHROPLASTY;  Surgeon: Husam Berkowitz MD;  Location:  OR     TONSILLECTOMY       TONSILLECTOMY       total knee arthroplasy       ZZC TOTAL KNEE ARTHROPLASTY Right       Allergies   Allergen Reactions     Pcn [Penicillin V Potassium] Anaphylaxis     Ceftriaxone Itching     Cephalexin Itching     Oxycodone Other (See Comments)     unconsciousness     Sulfa Drugs      Valium [Diazepam] Other (See Comments)     unconsciousness     Vicodin [Hydrocodone-Acetaminophen]      Patient unsure if allergy     Iodine Solution [Povidone Iodine] Rash      Social History     Tobacco Use     Smoking status: Never     Smokeless tobacco: Never   Substance Use Topics     Alcohol use: Yes     Comment: occasional      Wt Readings from Last 1 Encounters:   10/20/21 80.7 kg (177 lb 14.4 oz)        Anesthesia Evaluation   Pt has had prior anesthetic.         ROS/MED HX  ENT/Pulmonary:     (+) asthma  (-) sleep apnea   Neurologic:       Cardiovascular:     (+) Dyslipidemia hypertension-----Irregular Heartbeat/Palpitations,     METS/Exercise Tolerance:     Hematologic:       Musculoskeletal:   (+) arthritis,     GI/Hepatic:     (+) GERD,     Renal/Genitourinary:     (+) Nephrolithiasis ,     Endo:       Psychiatric/Substance Use:       Infectious Disease:       Malignancy:       Other:            Physical Exam    Airway        Mallampati: II   TM distance: > 3 FB   Neck ROM: full     Respiratory Devices and Support         Dental           Cardiovascular          Rhythm and rate: regular and normal     Pulmonary           breath sounds clear to auscultation           OUTSIDE LABS:  CBC:   Lab Results   Component  Value Date    WBC 6.4 10/20/2021    WBC 6.7 03/06/2018    HGB 14.1 10/20/2021    HGB 11.9 10/08/2019    HCT 44.5 10/20/2021    HCT 42.3 03/06/2018     10/20/2021     03/06/2018     BMP:   Lab Results   Component Value Date     10/20/2021     03/28/2017    POTASSIUM 3.4 10/20/2021    POTASSIUM 3.4 10/07/2019    CHLORIDE 106 10/20/2021    CHLORIDE 103 03/28/2017    CO2 33 (H) 10/20/2021    CO2 31 03/28/2017    BUN 25 10/20/2021    BUN 22 03/28/2017    CR 0.80 10/20/2021    CR 0.84 10/07/2019    GLC 85 10/20/2021     (H) 10/08/2019     COAGS:   Lab Results   Component Value Date    PTT 29 05/14/2005    INR 0.94 04/24/2017     POC: No results found for: BGM, HCG, HCGS  HEPATIC:   Lab Results   Component Value Date    ALBUMIN 3.9 10/20/2021    PROTTOTAL 7.2 10/20/2021    ALT 35 10/20/2021    AST 27 10/20/2021    ALKPHOS 103 10/20/2021    BILITOTAL 0.4 10/20/2021     OTHER:   Lab Results   Component Value Date    LACT 0.9 10/20/2021    MATHEW 9.6 10/20/2021    MAG 2.2 03/25/2017    TSH 4.66 (H) 03/21/2017    T4 1.13 03/21/2017    CRP 3.1 03/06/2018    SED 9 03/06/2018       Anesthesia Plan    ASA Status:  2, emergent    NPO Status:  NPO Appropriate    Anesthesia Type: MAC.   Induction: Intravenous.   Maintenance: TIVA.        Consents    Anesthesia Plan(s) and associated risks, benefits, and realistic alternatives discussed. Questions answered and patient/representative(s) expressed understanding.    - Discussed:     - Discussed with:  Patient         Postoperative Care    Pain management: IV analgesics, Oral pain medications, Multi-modal analgesia.   PONV prophylaxis: Dexamethasone or Solumedrol, Ondansetron (or other 5HT-3)     Comments:                Jak Bansal MD

## 2023-01-05 NOTE — PROCEDURES
PRE-PROCEDURE H&P    CHIEF COMPLAINT / REASON FOR PROCEDURE:  Foreign body    PERTINENT HISTORY :    Past Medical History:   Diagnosis Date     Allergic rhinitis      Bacteremia due to Streptococcus      Edema      Gastro-oesophageal reflux disease      Heart murmur     ?murmur     Hyperlipemia      Hyperlipidemia      Hypertension      Kidney stone      Numbness and tingling     numbness left distal medial tibia     Osteoarthritis      Osteoarthrosis      Osteopenia      Paroxysmal supraventricular tachycardia (H)      S/P ablation of atrial fibrillation       Past Surgical History:   Procedure Laterality Date     7 foot and ankle        ARTHROPLASTY REVISION ANKLE  3/10/2014    Procedure: ARTHROPLASTY REVISION ANKLE;  REVISION LEFT TOTAL ANKLE  WITH CONVERSION TO  IM LAURIE FUSION WITH FEMORAL HEAD ALLOGRAFT (C-ARM);  Surgeon: Ramirez Fang MD;  Location: [facility]     ASPIRATION NEEDLE KNEE Right 12/27/2016    Procedure: ASPIRATION NEEDLE KNEE;  Surgeon: Jian Fisher MD;  Location:  OR     BUNIONECTOMY JONAS  10/24/2011    Procedure:BUNIONECTOMY JONAS; Left Foot Bunionette Repair (C-Arm); Surgeon:RAMIREZ FANG; Location:[facility]     COLONOSCOPY       EYE SURGERY      cataract     H ABLATION SVT       HERNIA REPAIR       IRRIGATION AND DEBRIDEMENT FOOT, COMBINED Left 12/27/2016    Procedure: COMBINED IRRIGATION AND DEBRIDEMENT FOOT;  Surgeon: Jian Fisher MD;  Location:  OR     ORTHOPEDIC SURGERY      RTKA     ORTHOPEDIC SURGERY Left     ROTATOR CUFF     REMOVE ANTIBIOTIC CEMENT BEADS / SPACER KNEE N/A 4/24/2017    Procedure: REMOVE ANTIBIOTIC CEMENT BEADS / SPACER KNEE;  REMOVAL ANTIBIOTIC SPACER RIGHT KNEE REIMPLANT TOTAL JOINT COMPONENT TO RIGHT KNEE ;  Surgeon: Husam Berkowitz MD;  Location:  OR     REMOVE HARDWARE ARTHROPLASTY KNEE, IRRIG & JOSE, PLACE ANTIBIOTIC CEMENT SPACER, COMBINED Right 2/20/2017    Procedure: COMBINED REMOVE HARDWARE ARTHROPLASTY KNEE,  IRRIGATION AND DEBRIDEMENT, PLACE ANTIBIOTIC CEMENT BEADS / SPACER;  Surgeon: Husam Berkowitz MD;  Location:  OR     REVERSE ARTHROPLASTY SHOULDER Right 10/7/2019    Procedure: RIGHT TOTAL REVERSE SHOULDER ARTHROPLASTY;  Surgeon: Husam Berkowitz MD;  Location:  OR     TONSILLECTOMY       TONSILLECTOMY       total knee arthroplasy       ZZC TOTAL KNEE ARTHROPLASTY Right          Bleeding tendencies:  No    Relevant Family History:  NONE     Relevant Social History:  NONE      A relevant review of systems was performed and was negative      ALLERGIES/SENSITIVITIES:   Allergies   Allergen Reactions     Pcn [Penicillin V Potassium] Anaphylaxis     Ceftriaxone Itching     Cephalexin Itching     Oxycodone Other (See Comments)     unconsciousness     Sulfa Drugs      Valium [Diazepam] Other (See Comments)     unconsciousness     Vicodin [Hydrocodone-Acetaminophen]      Patient unsure if allergy     Iodine Solution [Povidone Iodine] Rash       CURRENT MEDICATIONS:   No current outpatient medications on file.        PRE-SEDATION ASSESSMENT:    Lung Exam:  normal  Heart Exam:  normal  Airway Exam: normal  Previous reaction to anesthesia/sedation:   No  Sedation plan based on assessment: mac  ASA Classification:  2 - Mild systemic disease        IMPRESSION:  Foreign body    PLAN:  egd    Dedra Acosta MD  [facility]  Office: [phone]

## 2023-01-05 NOTE — ANESTHESIA CARE TRANSFER NOTE
Patient: María Henderson    Procedure: Procedure(s):  ESOPHAGOGASTRODUODENOSCOPY (EGD), WITH FOREIGN BODY REMOVAL       Diagnosis: Foreign body in stomach [T18.2XXA]  Diagnosis Additional Information: No value filed.    Anesthesia Type:   MAC     Note:    Oropharynx: oropharynx clear of all foreign objects  Level of Consciousness: drowsy  Oxygen Supplementation: face mask  Level of Supplemental Oxygen (L/min / FiO2): 2  Independent Airway: airway patency satisfactory and stable  Dentition: dentition unchanged  Vital Signs Stable: post-procedure vital signs reviewed and stable  Report to RN Given: handoff report given  Patient transferred to: Phase II    Handoff Report: Identifed the Patient, Identified the Reponsible Provider, Reviewed the pertinent medical history, Discussed the surgical course, Reviewed Intra-OP anesthesia mangement and issues during anesthesia, Set expectations for post-procedure period and Allowed opportunity for questions and acknowledgement of understanding      Vitals:  Vitals Value Taken Time   BP     Temp     Pulse     Resp     SpO2 98 % 01/05/23 1453   Vitals shown include unvalidated device data.    Electronically Signed By: DOMINIQUE Feng CRNA  January 5, 2023  2:55 PM

## 2023-01-05 NOTE — ANESTHESIA POSTPROCEDURE EVALUATION
Patient: María Henderson    Procedure: Procedure(s):  ESOPHAGOGASTRODUODENOSCOPY (EGD), WITH FOREIGN BODY REMOVAL       Anesthesia Type:  MAC    Note:  Disposition: Outpatient   Postop Pain Control: Uneventful            Sign Out: Well controlled pain   PONV: No   Neuro/Psych: Uneventful            Sign Out: Acceptable/Baseline neuro status   Airway/Respiratory: Uneventful            Sign Out: Acceptable/Baseline resp. status   CV/Hemodynamics: Uneventful            Sign Out: Acceptable CV status; No obvious hypovolemia; No obvious fluid overload   Other NRE: NONE   DID A NON-ROUTINE EVENT OCCUR? No           Last vitals:  Vitals Value Taken Time   /81 01/05/23 1512   Temp     Pulse 91 01/05/23 1505   Resp     SpO2 96 % 01/05/23 1506   Vitals shown include unvalidated device data.    Electronically Signed By: Jak Bansal MD  January 5, 2023  3:38 PM

## 2023-01-05 NOTE — ED PROVIDER NOTES
History     Chief Complaint:  Swallowed Foreign Body       HPI   María Henderson is a 77 year old female with a history of HTN and hyperlipidemia who presents for evaluation of a swallowed foreign body. 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 The patient tried to stick her finger down her throat and was able to get some phlegm out which was not bloody, but did was not successful in retrieving the pin. She says she can feel it inside on the right side of her throat at this time. She has tried not to swallow since swallowing the pin.      Independent Historian: Patient      Review of External Notes: I reviewed patient's Care Everywhere.    ROS:  Review of Systems   HENT:        (+) feels foreign body in throat   All other systems reviewed and are negative.    Allergies:  Pcn [Penicillin V Potassium]  Ceftriaxone  Cephalexin  Oxycodone  Sulfa Drugs  Valium [Diazepam]  Vicodin [Hydrocodone-Acetaminophen]  Iodine Solution [Povidone Iodine]     Medications:    Pro-air  Aspirin 81 mg  Dilaudid   Lasix  Cleocin  Zocor  Senokot  Deltasone  Diovan     Past Medical History:    Allergic rhinitis  GERD  Heart murmur  Hyperlipidemia   HTN  Kidney stones  Osteoarthritis  Osteopenia   Paroxysmal SVT  Sepsis   Septic arthritis     Past Surgical History:    Tonsillectomy  TKA, right  Remove hardware arthroplasty knee, right  Hernia repair  Arthroplasty revision ankle   Colonoscopy   Bunionectomy, left  I & D foot, left  Rotator cuff repair, left  Cataract surgery  Reverse arthroplasty shoulder, right    Social History:  Patient reports that she has never smoked. She has never used smokeless tobacco. She reports current alcohol use. She reports that she does not use drugs.   She has been a seamstress for 61 years  Arrives alone via EMS    Physical Exam     Patient Vitals for the past 24 hrs:   BP Temp Temp src Pulse Resp  SpO2   01/05/23 1119 (!) 175/90 98.1  F (36.7  C) Oral 73 20 97 %        Physical Exam  Vitals reviewed.   HENT:      Head: Normocephalic.      Right Ear: Tympanic membrane normal.      Left Ear: Tympanic membrane normal.      Nose: Nose normal.   Eyes:      Pupils: Pupils are equal, round, and reactive to light.   Cardiovascular:      Rate and Rhythm: Normal rate.   Pulmonary:      Effort: Pulmonary effort is normal.   Abdominal:      General: Abdomen is flat.      Palpations: Abdomen is soft.   Musculoskeletal:         General: Normal range of motion.   Skin:     General: Skin is warm.      Capillary Refill: Capillary refill takes less than 2 seconds.   Neurological:      General: No focal deficit present.      Mental Status: She is alert and oriented to person, place, and time.   Psychiatric:         Mood and Affect: Mood normal.           Emergency Department Course     Imaging:  Chest XR,  PA & LAT   Final Result   IMPRESSION: Within the lower posterior mediastinum there is a 3.8 cm   thin needlelike metallic density, likely in the lower thoracic   esophagus. Correlation with CT may be helpful. No definite   pneumothorax or pneumomediastinum. The lungs are clear and there are   no pleural effusions. Right shoulder arthroplasty. Normal heart size.   Spinal degenerative changes.      RIANNA RICO MD            SYSTEM ID:  [id number]      Neck soft tissue XR   Final Result         Report per radiology    Emergency Department Course & Assessments:       Interventions:  1124 I obtained history and examined the patient.    1211 I rechecked the patient and updated on her the findings.     Consultations/Discussion of Management or Tests:  1208 I spoke with Dr. Acosta [facility], regarding the patient.         Social Determinants of Health affecting care:  None       Disposition:  The patient was transferred to the OR.    Impression & Plan        Medical Decision Making:  Patient presents with accidental ingestion of a  needle patient quilts and has done so for the last 30 to 40 years.  Patient had a pin in her mouth and she thinks it stuck in her throat.  Patient arrives with no pain with swallowing no respiratory difficulties feels something in the back of her throat.  Examination is normal x-rays confirm 4 cm sewing pin in the distal esophagus.  Care was discussed with Dr. Johnson who does recommend endoscopic retrieval.  Patient will be transferred to the OR for sedation and endoscopic retrieval of esophageal foreign body.      Diagnosis:    ICD-10-CM    1. Foreign body in esophagus, initial encounter  T18.108A            Scribe Disclosure:  IMABEL PRINCESS, am serving as a scribe at 11:29 AM on 1/5/2023 to document services personally performed by Alex De Leon MD based on my observations and the provider's statements to me.     Lou SOSA, am serving as a scribe  at 11:42 AM on 1/5/2023 to document services personally performed by Alex De Leon MD based on my observations and the provider's statements to me.     1/5/2023   Alex De Leon MD Goodman, Brian Samuel, MD  01/05/23 8721

## 2023-01-05 NOTE — ED NOTES
[facility]  Expected date:   Expected time:   Means of arrival:   Comments:  Kameron

## 2023-01-10 ENCOUNTER — TRANSFERRED RECORDS (OUTPATIENT)
Dept: HEALTH INFORMATION MANAGEMENT | Facility: CLINIC | Age: 78
End: 2023-01-10

## 2023-06-01 ENCOUNTER — ALLIED HEALTH/NURSE VISIT (OUTPATIENT)
Dept: FAMILY MEDICINE | Facility: CLINIC | Age: 78
End: 2023-06-01
Payer: COMMERCIAL

## 2023-06-01 DIAGNOSIS — Z23 NEED FOR SECOND BOOSTER DOSE OF COVID-19 VACCINE: Primary | ICD-10-CM

## 2023-06-01 PROCEDURE — 91312 COVID-19 BIVALENT 12+ (PFIZER): CPT

## 2023-06-01 PROCEDURE — 0121A COVID-19 BIVALENT 12+ (PFIZER): CPT

## 2023-06-01 PROCEDURE — 99207 PR NO CHARGE NURSE ONLY: CPT

## 2023-06-01 NOTE — PROGRESS NOTES
Prior to immunization administration, verified patients identity using patient s name and date of birth. Please see Immunization Activity for additional information.     Screening Questionnaire for Adult Immunization    Are you sick today?   No   Do you have allergies to medications, food, a vaccine component or latex?   No   Have you ever had a serious reaction after receiving a vaccination?   Yes   Do you have a long-term health problem with heart, lung, kidney, or metabolic disease (e.g., diabetes), asthma, a blood disorder, no spleen, complement component deficiency, a cochlear implant, or a spinal fluid leak?  Are you on long-term aspirin therapy?   No   Do you have cancer, leukemia, HIV/AIDS, or any other immune system problem?   No   Do you have a parent, brother, or sister with an immune system problem?   No   In the past 3 months, have you taken medications that affect  your immune system, such as prednisone, other steroids, or anticancer drugs; drugs for the treatment of rheumatoid arthritis, Crohn s disease, or psoriasis; or have you had radiation treatments?   No   Have you had a seizure, or a brain or other nervous system problem?   No   During the past year, have you received a transfusion of blood or blood    products, or been given immune (gamma) globulin or antiviral drug?   No   For women: Are you pregnant or is there a chance you could become       pregnant during the next month?   No   Have you received any vaccinations in the past 4 weeks?   No     Immunization questionnaire was positive for at least one answer.  Notified Provider.    I have reviewed the following standing orders:   This patient is due and qualifies for the Covid-19 vaccine.     Click here for COVID-19 Standing Order    I have reviewed the vaccines inclusion and exclusion criteria; No concerns regarding eligibility.     Injection of Covid-19 Pfizer Booster given by Rena Johnson CMA. Patient instructed to remain in clinic for  15 minutes afterwards, and to report any adverse reactions.     Screening performed by Rena Johnson CMA on 6/1/2023 at 11:56 AM.

## 2023-06-21 ENCOUNTER — TRANSCRIBE ORDERS (OUTPATIENT)
Dept: OTHER | Age: 78
End: 2023-06-21

## 2023-06-21 DIAGNOSIS — S76.312A LEFT HAMSTRING MUSCLE STRAIN: Primary | ICD-10-CM

## 2023-06-27 ENCOUNTER — THERAPY VISIT (OUTPATIENT)
Dept: PHYSICAL THERAPY | Facility: CLINIC | Age: 78
End: 2023-06-27
Attending: FAMILY MEDICINE
Payer: OTHER MISCELLANEOUS

## 2023-06-27 DIAGNOSIS — M25.552 LEFT HIP PAIN: Primary | ICD-10-CM

## 2023-06-27 PROCEDURE — 97110 THERAPEUTIC EXERCISES: CPT | Mod: GP | Performed by: SPECIALIST/TECHNOLOGIST

## 2023-06-27 PROCEDURE — 97161 PT EVAL LOW COMPLEX 20 MIN: CPT | Mod: GP | Performed by: SPECIALIST/TECHNOLOGIST

## 2023-06-27 NOTE — PROGRESS NOTES
PHYSICAL THERAPY EVALUATION  Type of Visit: Evaluation    See electronic medical record for Abuse and Falls Screening details.    Subjective       Insidious onset of left posterior upper thigh pain. 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 Not being able to walk up stairs in a reciprocal pattern is the biggest complaint.   Presenting condition or subjective complaint: Left Leg pain  Date of onset: 06/21/23    Relevant medical history: Arthritis; Asthma; Osteoporosis; Osteoarthritis   Dates & types of surgery: 2 left ankle replacements and 1 fusion, 3 knee replacements    Prior diagnostic imaging/testing results:       Prior therapy history for the same diagnosis, illness or injury:        Prior Level of Function   Transfers: Independent  Ambulation: Independent  ADL: Independent      Living Environment  Social support: With a significant other or spouse   Type of home: House; 1 level; Basement   Stairs to enter the home: Yes 2 Is there a railing: No   Ramp: No   Stairs inside the home: Yes 13 Is there a railing: Yes   Help at home: None  Equipment owned:       Employment: No    Hobbies/Interests:      Patient goals for therapy: Go up and down stairs    Pain assessment: Pain present     Objective   HIP EVALUATION  PAIN: Pain is Exacerbated By: Stairs  GAIT:   Weightbearing Status: WBAT  Assistive Device(s): None  Gait Deviations: Due to pain but also significant surgical hx, has an antalgic gait and many gait deviations due to lack of ankle motion.   ROM: AROM WNL  Painful at end range hip flexion with and without knee extension but worse with knee extended  PELVIC/SI SCREEN: WNL  STRENGTH: Decrease knee flexion and hip extension strength, all other measures WNL  SPECIAL TESTS: WNL, no neural tension  FUNCTIONAL TESTS: Double Leg Squat: Unable due to ankle motion  PALPATION: Tenderness in proximal L hamstring    Assessment & Plan   CLINICAL IMPRESSIONS   Medical Diagnosis: Left Hamstring Strain    Treatment Diagnosis:  Pain in left posterior thigh   Impression/Assessment: Patient is a 78 year old female with Left posterior thigh pain complaints.  The following significant findings have been identified: Pain, Decreased strength, Impaired muscle performance and Decreased activity tolerance. These impairments interfere with their ability to perform self care tasks, work tasks, recreational activities, household chores, driving , household mobility and community mobility as compared to previous level of function.     Clinical Decision Making (Complexity):   Clinical Presentation: Stable/Uncomplicated  Clinical Presentation Rationale: based on medical and personal factors listed in PT evaluation  Clinical Decision Making (Complexity): Low complexity    PLAN OF CARE  Treatment Interventions:  Interventions: Manual Therapy, Neuromuscular Re-education, Therapeutic Activity, Therapeutic Exercise, Self-Care/Home Management    Long Term Goals     PT Goal 1  Goal Description: Walking up a flight of stairs with reciprocal pattern and no pain  Rationale: to maximize safety and independence with performance of ADLs and functional tasks;to maximize safety and independence within the home;to maximize safety and independence within the community;to maximize safety and independence with transportation;to maximize safety and independence with self cares  Target Date: 08/22/23      Frequency of Treatment: 1x/wk  Duration of Treatment: 8 weeks    Recommended Referrals to Other Professionals:   Education Assessment:   Learner/Method: Patient    Risks and benefits of evaluation/treatment have been explained.   Patient/Family/caregiver agrees with Plan of Care.     Evaluation Time:     PT Eval, Low Complexity Minutes (58367): 20      Signing Clinician: Steve Choudhary PT

## 2023-07-05 ENCOUNTER — THERAPY VISIT (OUTPATIENT)
Dept: PHYSICAL THERAPY | Facility: CLINIC | Age: 78
End: 2023-07-05
Attending: FAMILY MEDICINE
Payer: OTHER MISCELLANEOUS

## 2023-07-05 DIAGNOSIS — M25.552 LEFT HIP PAIN: Primary | ICD-10-CM

## 2023-07-05 PROCEDURE — 97530 THERAPEUTIC ACTIVITIES: CPT | Mod: GP | Performed by: SPECIALIST/TECHNOLOGIST

## 2023-07-05 PROCEDURE — 97110 THERAPEUTIC EXERCISES: CPT | Mod: GP | Performed by: SPECIALIST/TECHNOLOGIST

## 2023-07-12 ENCOUNTER — THERAPY VISIT (OUTPATIENT)
Dept: PHYSICAL THERAPY | Facility: CLINIC | Age: 78
End: 2023-07-12
Payer: OTHER MISCELLANEOUS

## 2023-07-12 DIAGNOSIS — M25.552 LEFT HIP PAIN: Primary | ICD-10-CM

## 2023-07-12 PROCEDURE — 97110 THERAPEUTIC EXERCISES: CPT | Mod: 59 | Performed by: SPECIALIST/TECHNOLOGIST

## 2023-07-12 PROCEDURE — 97530 THERAPEUTIC ACTIVITIES: CPT | Mod: GP | Performed by: SPECIALIST/TECHNOLOGIST

## 2023-07-25 ENCOUNTER — THERAPY VISIT (OUTPATIENT)
Dept: PHYSICAL THERAPY | Facility: CLINIC | Age: 78
End: 2023-07-25
Payer: OTHER MISCELLANEOUS

## 2023-07-25 DIAGNOSIS — M25.552 LEFT HIP PAIN: Primary | ICD-10-CM

## 2023-07-25 PROCEDURE — 97530 THERAPEUTIC ACTIVITIES: CPT | Mod: GP | Performed by: SPECIALIST/TECHNOLOGIST

## 2023-07-25 PROCEDURE — 97110 THERAPEUTIC EXERCISES: CPT | Mod: GP | Performed by: SPECIALIST/TECHNOLOGIST

## 2023-08-02 ENCOUNTER — THERAPY VISIT (OUTPATIENT)
Dept: PHYSICAL THERAPY | Facility: CLINIC | Age: 78
End: 2023-08-02
Payer: OTHER MISCELLANEOUS

## 2023-08-02 DIAGNOSIS — M25.552 LEFT HIP PAIN: Primary | ICD-10-CM

## 2023-08-02 PROCEDURE — 97530 THERAPEUTIC ACTIVITIES: CPT | Mod: GP | Performed by: SPECIALIST/TECHNOLOGIST

## 2023-08-02 PROCEDURE — 97110 THERAPEUTIC EXERCISES: CPT | Mod: GP | Performed by: SPECIALIST/TECHNOLOGIST

## 2023-08-09 ENCOUNTER — THERAPY VISIT (OUTPATIENT)
Dept: PHYSICAL THERAPY | Facility: CLINIC | Age: 78
End: 2023-08-09
Payer: OTHER MISCELLANEOUS

## 2023-08-09 DIAGNOSIS — M25.552 LEFT HIP PAIN: Primary | ICD-10-CM

## 2023-08-09 PROCEDURE — 97530 THERAPEUTIC ACTIVITIES: CPT | Mod: GP | Performed by: SPECIALIST/TECHNOLOGIST

## 2023-08-09 PROCEDURE — 97110 THERAPEUTIC EXERCISES: CPT | Mod: GP | Performed by: SPECIALIST/TECHNOLOGIST

## 2023-08-09 NOTE — PROGRESS NOTES
DISCHARGE  Reason for Discharge: No further expectation of progress.  Patient chooses to discontinue therapy.  Pt will use the [facility] for more consistent strength training now that they have a good understanding of what to do.        08/09/23 0500   Appointment Info   Signing clinician's name / credentials Ben Choudhary PT, DPT   Total/Authorized Visits EAT   Visits Used 6   Medical Diagnosis Left Hamstring Strain   PT Tx Diagnosis Pain in left posterior thigh   Progress Note/Certification   Onset of illness/injury or Date of Surgery 06/21/23   Therapy Frequency 1x/wk   Predicted Duration 8 weeks   PT Goal 1   Goal Description Walking up a flight of stairs with reciprocal pattern and no pain   Rationale to maximize safety and independence with performance of ADLs and functional tasks;to maximize safety and independence within the home;to maximize safety and independence within the community;to maximize safety and independence with transportation;to maximize safety and independence with self cares   Goal Progress Stairs are much easier, but still difficult with R leg   Target Date 08/22/23   Subjective Report   Subjective Report Reports things continue to improve. Not having any complaints. Reports she is able to do more. Did visit the [facility] and plans to start going there. Will DC from therapy today.   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 Due to R knee replacement, lacks quad strength to low self down stairs.   Treatment Interventions (PT)   Interventions Therapeutic Procedure/Exercise;Therapeutic Activity;Neuromuscular Re-education;Manual Therapy   Therapeutic Procedure/Exercise   Therapeutic Procedures: strength, endurance, ROM, flexibillity minutes (53244) 30   Therapeutic Procedures Ther Proc 2;Ther Proc 3   Ther Proc 1 Education   Ther Proc 1 -  Details Aracelis/phys, HEP, Form cues/corrections, activity modifications   Ther Proc 2 Leg press   Ther Proc 2 - Details 3x12 60#   Ther Proc 3 NuStep   Ther Proc 3 - Details 6 min   PTRx Ther Proc 1 Supine Hamstring Stretch   PTRx Ther Proc 1 - Details 1x10   PTRx Ther Proc 2 Seated Hamstring Stretch   PTRx Ther Proc 2 - Details 1x10   PTRx Ther Proc 3 Isometric Hamstring   PTRx Ther Proc 3 - Details 1x10 with 5 second hold   PTRx Ther Proc 4 Bridging #1   PTRx Ther Proc 4 - Details 1x10 with 2 second hold   PTRx Ther Proc 5 Dumbbell Barbadian Dead Lift - Bilateral   PTRx Ther Proc 5 - Details 1x10   PTRx Ther Proc 6 Lateral Step Down   PTRx Ther Proc 6 - Details 1x12 each leg   PTRx Ther Proc 7 Front Knee Strengthening   PTRx Ther Proc 7 - Details 1x20   PTRx Ther Proc 8 Wall Sit   PTRx Ther Proc 8 - Details 1x15 seconds   PTRx Ther Proc 9 Hip Extension In Neutral With Theraband   PTRx Ther Proc 9 - Details No Notes   PTRx Ther Proc 10 Hip Abduction With Theraband   PTRx Ther Proc 10 - Details No Notes   PTRx Ther Proc 11 Standing Sideglide   PTRx Ther Proc 11 - Details No Notes   Therapeutic Activity   Therapeutic Activities: dynamic activities to improve functional performance minutes (70433) 10   Ther Act 1 Step ups/downs   Ther Act 1 - Details 3x8-12 each leg   Skilled Intervention Cues for correct compensations due to surgical hx   Education   Learner/Method Patient   Plan   Home program PTrx   Updates to plan of care DC from PT; continue with HEP and begin exercising consistently at the [facility]   Total Session Time   Timed Code Treatment Minutes 40   Total Treatment Time (sum of timed and untimed services) 40         Discharge Plan: Patient to continue home program.    Referring Provider:  Natalie Colón

## 2023-08-29 ENCOUNTER — TRANSFERRED RECORDS (OUTPATIENT)
Dept: HEALTH INFORMATION MANAGEMENT | Facility: CLINIC | Age: 78
End: 2023-08-29

## 2023-10-25 ENCOUNTER — ALLIED HEALTH/NURSE VISIT (OUTPATIENT)
Dept: FAMILY MEDICINE | Facility: CLINIC | Age: 78
End: 2023-10-25
Payer: COMMERCIAL

## 2023-10-25 DIAGNOSIS — Z23 HIGH PRIORITY FOR 2019-NCOV VACCINE: ICD-10-CM

## 2023-10-25 DIAGNOSIS — Z23 NEED FOR PROPHYLACTIC VACCINATION AND INOCULATION AGAINST INFLUENZA: Primary | ICD-10-CM

## 2023-10-25 PROCEDURE — 90480 ADMN SARSCOV2 VAC 1/ONLY CMP: CPT

## 2023-10-25 PROCEDURE — 91320 SARSCV2 VAC 30MCG TRS-SUC IM: CPT

## 2023-10-25 PROCEDURE — 90662 IIV NO PRSV INCREASED AG IM: CPT

## 2023-10-25 PROCEDURE — G0008 ADMIN INFLUENZA VIRUS VAC: HCPCS

## 2023-10-25 PROCEDURE — 99207 PR NO CHARGE NURSE ONLY: CPT

## 2023-12-04 ENCOUNTER — TELEPHONE (OUTPATIENT)
Dept: FAMILY MEDICINE | Facility: CLINIC | Age: 78
End: 2023-12-04
Payer: COMMERCIAL

## 2023-12-04 NOTE — TELEPHONE ENCOUNTER
Pt calling with assistance with scheduling a visit to establish care with a new PCP and to schedule an appointment to address chronic arthritic pain and difficulty sleeping. She received my number as writer is PAL for [facility].     -Scheduled her with an appointment on 1/24 at 9:40 with Roxanne Hatfield PA-C  -Scheduled her with Onur Orellana NP on 12/4 at 9:40 AM.     Pt verbalizes understanding and is agreeable with plan, appointment time and date. She denies any further questions or concerns at this time.     NEHEMIAS Nieves (Patient Advocate Liaison)  [facility]  [phone]

## 2023-12-05 ENCOUNTER — OFFICE VISIT (OUTPATIENT)
Dept: FAMILY MEDICINE | Facility: CLINIC | Age: 78
End: 2023-12-05
Payer: COMMERCIAL

## 2023-12-05 VITALS
SYSTOLIC BLOOD PRESSURE: 136 MMHG | WEIGHT: 177 LBS | OXYGEN SATURATION: 97 % | HEIGHT: 66 IN | BODY MASS INDEX: 28.45 KG/M2 | DIASTOLIC BLOOD PRESSURE: 72 MMHG | HEART RATE: 77 BPM | RESPIRATION RATE: 16 BRPM | TEMPERATURE: 97.9 F

## 2023-12-05 DIAGNOSIS — R53.83 FATIGUE, UNSPECIFIED TYPE: ICD-10-CM

## 2023-12-05 DIAGNOSIS — I10 PRIMARY HYPERTENSION: ICD-10-CM

## 2023-12-05 DIAGNOSIS — M10.29 ACUTE DRUG-INDUCED GOUT OF MULTIPLE SITES: ICD-10-CM

## 2023-12-05 DIAGNOSIS — M25.50 MULTIPLE JOINT PAIN: Primary | ICD-10-CM

## 2023-12-05 DIAGNOSIS — E79.0 ELEVATED URIC ACID IN BLOOD: ICD-10-CM

## 2023-12-05 LAB
ALBUMIN SERPL BCG-MCNC: 4.2 G/DL (ref 3.5–5.2)
ALP SERPL-CCNC: 73 U/L (ref 40–150)
ALT SERPL W P-5'-P-CCNC: 50 U/L (ref 0–50)
ANION GAP SERPL CALCULATED.3IONS-SCNC: 10 MMOL/L (ref 7–15)
AST SERPL W P-5'-P-CCNC: 41 U/L (ref 0–45)
BASOPHILS # BLD AUTO: 0 10E3/UL (ref 0–0.2)
BASOPHILS NFR BLD AUTO: 1 %
BILIRUB SERPL-MCNC: 0.4 MG/DL
BUN SERPL-MCNC: 20.7 MG/DL (ref 8–23)
CALCIUM SERPL-MCNC: 10.1 MG/DL (ref 8.8–10.2)
CHLORIDE SERPL-SCNC: 104 MMOL/L (ref 98–107)
CREAT SERPL-MCNC: 0.79 MG/DL (ref 0.51–0.95)
CRP SERPL-MCNC: <3 MG/L
DEPRECATED HCO3 PLAS-SCNC: 30 MMOL/L (ref 22–29)
EGFRCR SERPLBLD CKD-EPI 2021: 77 ML/MIN/1.73M2
EOSINOPHIL # BLD AUTO: 0.2 10E3/UL (ref 0–0.7)
EOSINOPHIL NFR BLD AUTO: 4 %
ERYTHROCYTE [DISTWIDTH] IN BLOOD BY AUTOMATED COUNT: 14.2 % (ref 10–15)
ERYTHROCYTE [SEDIMENTATION RATE] IN BLOOD BY WESTERGREN METHOD: 8 MM/HR (ref 0–30)
GLUCOSE SERPL-MCNC: 113 MG/DL (ref 70–99)
HCT VFR BLD AUTO: 42.9 % (ref 35–47)
HGB BLD-MCNC: 13.8 G/DL (ref 11.7–15.7)
IMM GRANULOCYTES # BLD: 0 10E3/UL
IMM GRANULOCYTES NFR BLD: 1 %
LYMPHOCYTES # BLD AUTO: 1.7 10E3/UL (ref 0.8–5.3)
LYMPHOCYTES NFR BLD AUTO: 26 %
MCH RBC QN AUTO: 29.8 PG (ref 26.5–33)
MCHC RBC AUTO-ENTMCNC: 32.2 G/DL (ref 31.5–36.5)
MCV RBC AUTO: 93 FL (ref 78–100)
MONOCYTES # BLD AUTO: 0.5 10E3/UL (ref 0–1.3)
MONOCYTES NFR BLD AUTO: 8 %
NEUTROPHILS # BLD AUTO: 4 10E3/UL (ref 1.6–8.3)
NEUTROPHILS NFR BLD AUTO: 62 %
PLATELET # BLD AUTO: 288 10E3/UL (ref 150–450)
POTASSIUM SERPL-SCNC: 3.7 MMOL/L (ref 3.4–5.3)
PROT SERPL-MCNC: 6.5 G/DL (ref 6.4–8.3)
RBC # BLD AUTO: 4.63 10E6/UL (ref 3.8–5.2)
RHEUMATOID FACT SERPL-ACNC: <10 IU/ML
SODIUM SERPL-SCNC: 144 MMOL/L (ref 135–145)
TSH SERPL DL<=0.005 MIU/L-ACNC: 3.49 UIU/ML (ref 0.3–4.2)
URATE SERPL-MCNC: 7.1 MG/DL (ref 2.4–5.7)
WBC # BLD AUTO: 6.5 10E3/UL (ref 4–11)

## 2023-12-05 PROCEDURE — 86038 ANTINUCLEAR ANTIBODIES: CPT

## 2023-12-05 PROCEDURE — 36415 COLL VENOUS BLD VENIPUNCTURE: CPT

## 2023-12-05 PROCEDURE — 99204 OFFICE O/P NEW MOD 45 MIN: CPT

## 2023-12-05 PROCEDURE — 85652 RBC SED RATE AUTOMATED: CPT

## 2023-12-05 PROCEDURE — 80053 COMPREHEN METABOLIC PANEL: CPT

## 2023-12-05 PROCEDURE — 85025 COMPLETE CBC W/AUTO DIFF WBC: CPT

## 2023-12-05 PROCEDURE — 84550 ASSAY OF BLOOD/URIC ACID: CPT

## 2023-12-05 PROCEDURE — 86431 RHEUMATOID FACTOR QUANT: CPT

## 2023-12-05 PROCEDURE — 86140 C-REACTIVE PROTEIN: CPT

## 2023-12-05 PROCEDURE — 84443 ASSAY THYROID STIM HORMONE: CPT

## 2023-12-05 RX ORDER — PREDNISONE 10 MG/1
TABLET ORAL
Qty: 20 TABLET | Refills: 0 | Status: SHIPPED | OUTPATIENT
Start: 2023-12-05 | End: 2024-01-24

## 2023-12-05 RX ORDER — VALSARTAN 80 MG/1
80 TABLET ORAL DAILY
Qty: 90 TABLET | Refills: 0 | Status: SHIPPED | OUTPATIENT
Start: 2023-12-05 | End: 2023-12-28

## 2023-12-05 RX ORDER — RESPIRATORY SYNCYTIAL VIRUS VACCINE 120MCG/0.5
0.5 KIT INTRAMUSCULAR ONCE
Qty: 1 EACH | Refills: 0 | Status: CANCELLED | OUTPATIENT
Start: 2023-12-05 | End: 2023-12-05

## 2023-12-05 ASSESSMENT — PAIN SCALES - GENERAL: PAINLEVEL: EXTREME PAIN (8)

## 2023-12-05 NOTE — PROGRESS NOTES
{PROVIDER CHARTING PREFERENCE:502315}    Ryan Daniel is a 78 year old, presenting for the following health issues:  Pain and Sleep Problem        12/5/2023     9:42 AM   Additional Questions   Roomed by Josselyn Jennings       Pain    History of Present Illness       Reason for visit:  Arthritus in my joints    She eats 2-3 servings of fruits and vegetables daily.She consumes 2 sweetened beverage(s) daily.She exercises with enough effort to increase her heart rate 10 to 19 minutes per day.  She exercises with enough effort to increase her heart rate 4 days per week.   She is taking medications regularly.       {MA/LPN/RN Pre-Provider Visit Orders- hCG/UA/Strep (Optional):979775}  Pain History:  When did you first notice your pain? ***   Have you seen this provider for your pain in the past? Yes   Where in your body do you have pain? Left hip pain.   Are you seeing anyone else for your pain? { :031479}    {Rooming staff  Please complete PHQ assessment :971670}  {Rooming staff  Please complete GAD7 assessment :151661}  {Rooming staff  Please complete the PEG Assessment  Assess Pain, Function, and Quality of Life. Complete every pain visit :597787}    {After completing assessments, pull in flowsheet scores (Optional) :576032}    Chronic Pain Follow Up:    Location of pain: ***  Analgesia/pain control:    - Recent changes:  ***    - Overall control: {Pain control level :081339}    - Current treatments: ***   Adherence:     - Do you ever take more pain medicine than prescribed? {Yes ***/No :881642}    - When did you take your last dose of pain medicine?  ***   Adverse effects: {Yes ***/No :123128}   PDMP Review       None          Last CSA Agreement:   CSA -- Patient Level:    CSA: None found at the patient level.       Last UDS:   {If newly prescribing or considering opioid therapy, the following assessments must be completed :805180}  {Pull in ORT  EYALRD results (Optional) :037202}    {Provider  Link  to Pain Management SmartSet  Includes non-opioid pharmacological medications and referrals  :821730}  {additonal problems for provider to add (Optional):158598}      Review of Systems   {ROS COMP (Optional):641106}      Objective    There were no vitals taken for this visit.  There is no height or weight on file to calculate BMI.  Physical Exam   {Exam List (Optional):401080}    {Diagnostic Test Results (Optional):873965}    {AMBULATORY ATTESTATION (Optional):277385}

## 2023-12-05 NOTE — PROGRESS NOTES
"  Assessment & Plan     (M25.50) Multiple joint pain  (primary encounter diagnosis)  (R53.83) Fatigue, unspecified type  Comment: broad work up today. Unsure of exact etiology however suspect arthritic component. Would like to rule in/out rheumatologic/inflammatory causes. Will check thyroid to ensure stable as well as inflammatory markers. Patient is on hydrochlorothiazide, will check uric acid d/t knee and ankle pain. Will follow up on labs and whether further direction is necessary. Patient has establish care visit in January w/ Roxanne Hatfield, this will serve as excellent follow up for this, sooner if need be. Patient fully understands and is agreeable with plan of care, at this point patient will follow up as needed unless acute concerns arise in the meantime.  Plan: Rheumatoid factor, Anti Nuclear Lupis IgG by IFA         with Reflex, CBC with platelets and         differential, Comprehensive metabolic panel         (BMP + Alb, Alk Phos, ALT, AST, Total. Bili,         TP), ESR: Erythrocyte sedimentation rate, CRP,         inflammation, TSH with free T4 reflex, Uric         acid    30 minutes spent by me on the date of the encounter doing chart review, history and exam, documentation and further activities per the note       BMI:   Estimated body mass index is 28.57 kg/m  as calculated from the following:    Height as of this encounter: 1.676 m (5' 6\").    Weight as of this encounter: 80.3 kg (177 lb).     DOMINIQUE Knight [facility]   María is a 78 year old, presenting for the following health issues:  Sleep Problem and Joint Pain        12/5/2023     9:42 AM   Additional Questions   Roomed by Josselyn Jennings       History of Present Illness       Reason for visit:  Arthritus in my joints    She eats 2-3 servings of fruits and vegetables daily.She consumes 2 sweetened beverage(s) daily.She exercises with enough effort to increase her heart rate 10 to 19 minutes per " day.  She exercises with enough effort to increase her heart rate 4 days per week.   She is taking medications regularly.     Pain History:  When did you first notice your pain? Less than 6 weeks.   Have you seen anyone else for your pain? Yes - [facility].    How has your pain affected your ability to work? Not applicable  Where in your body do you have pain? Musculoskeletal problem/pain  Onset/Duration: Less than 6 weeks.   Description  Location: Joint pain   Joint Swelling: YES  Redness: No  Pain: YES  Warmth: No  Intensity:  moderate, severe  Progression of Symptoms:  intermittent  Accompanying signs and symptoms:   Fevers: No  Numbness/tingling/weakness: YES  History  Trauma to the area: No  Recent illness:  No  Previous similar problem: YES  Previous evaluation:  YES  Precipitating or alleviating factors:  Aggravating factors include: walking, climbing stairs, and exercise  Therapies tried and outcome: heat, ice, and acetaminophen   and Neck Pain  Onset/Duration: Less than 6 weeks.  Description:   Location: Neck  Radiation: into the right shoulder and nto the left shoulder  Intensity: moderate, severe  Progression of Symptoms:  intermittent  Accompanying Signs & Symptoms:  Burning, tingling, prickly sensation in arm(s): YES  Numbness in arm(s): YES  Weakness in arm(s):  No  Fever: No  Headache: No  Nausea and/or vomiting: No  History:   Trauma: No  Previous neck pain: No  Previous surgery or injections: YES  Previous Imaging (MRI,X ray): YES  Precipitating or alleviating factors: None  Does movement impact the pain:  YES  Therapies tried and outcome: rest/inactivity, heat, ice, and acetaminophen    Patient having multiple joint pain   Has seen rheum, was told that they can't do much for her and feel it's more non inflammatory arthritis related  Patient concerned because she feels how she felt when she had sepsis  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   Pain in bilateral shoulders, right knee and left ankle.  Has been noticing being fatigued  No sudden weight changes  No fever, chills    Review of Systems   Constitutional, HEENT, cardiovascular, pulmonary, GI, , musculoskeletal, neuro, skin, endocrine and psych systems are negative, except as otherwise noted.      Objective    /72 (BP Location: Right arm, Patient Position: Sitting, Cuff Size: Adult Regular)   Pulse 77   Temp 97.9  F (36.6  C) (Oral)   Resp 16   Ht 1.676 m (5' 6\")   Wt 80.3 kg (177 lb)   LMP  (LMP Unknown)   SpO2 97%   Breastfeeding No   BMI 28.57 kg/m    Body mass index is 28.57 kg/m .  Physical Exam  Vitals and nursing note reviewed.   Constitutional:       General: She is not in acute distress.     Appearance: Normal appearance. She is not ill-appearing.   Cardiovascular:      Rate and Rhythm: Normal rate.   Pulmonary:      Effort: Pulmonary effort is normal.   Musculoskeletal:      Right shoulder: Tenderness present.      Left shoulder: Tenderness present.      Right knee: Swelling present. Tenderness present.      Left ankle: Tenderness present.      Comments: Mild tenderness to palpation to shoulders, right knee, and left ankle. No s/sx of infection noted.    Skin:     General: Skin is warm and dry.   Neurological:      Mental Status: She is alert.   Psychiatric:         Mood and Affect: Mood normal.         Behavior: Behavior normal.         Thought Content: Thought content normal.         Judgment: Judgment normal.                "

## 2023-12-06 LAB — ANA SER QL IF: NEGATIVE

## 2023-12-14 ENCOUNTER — PATIENT OUTREACH (OUTPATIENT)
Dept: FAMILY MEDICINE | Facility: CLINIC | Age: 78
End: 2023-12-14
Payer: COMMERCIAL

## 2023-12-14 NOTE — TELEPHONE ENCOUNTER
Pt calling to report that her previous clinic has not received the YANELI from the clinic.     -Instructed pt to come into clinic and receive a copy from the  to have it re-faxed to clinic.     Pt verbalizes understanding and is agreeable with plan. She denies any further questions or concerns.     Fe JUAREZ RN   PAL (Patient Advocate Liaison)  [facility]  [phone]

## 2023-12-15 NOTE — TELEPHONE ENCOUNTER
Pt filled out form. Writer faxed form to [facility] [phone] from [facility].     Fe JUAREZ RN   PAL (Patient Advocate Liaison)  [facility]  [phone]

## 2023-12-21 ENCOUNTER — ALLIED HEALTH/NURSE VISIT (OUTPATIENT)
Dept: FAMILY MEDICINE | Facility: CLINIC | Age: 78
End: 2023-12-21
Payer: COMMERCIAL

## 2023-12-21 ENCOUNTER — TELEPHONE (OUTPATIENT)
Dept: FAMILY MEDICINE | Facility: CLINIC | Age: 78
End: 2023-12-21

## 2023-12-21 VITALS — DIASTOLIC BLOOD PRESSURE: 58 MMHG | SYSTOLIC BLOOD PRESSURE: 120 MMHG

## 2023-12-21 DIAGNOSIS — Z01.30 BP CHECK: Primary | ICD-10-CM

## 2023-12-21 PROCEDURE — 99207 PR NO CHARGE NURSE ONLY: CPT

## 2023-12-21 NOTE — PROGRESS NOTES
María Henderson is a 78 year old patient who comes in today for a Blood Pressure check.  Initial BP:  /58 (BP Location: Left arm, Patient Position: Sitting, Cuff Size: Adult Regular)   LMP  (LMP Unknown)      Data Unavailable  Disposition: follow-up as previously indicated by provider    Kiah Cramer

## 2023-12-21 NOTE — TELEPHONE ENCOUNTER
Pt left a vm on PAL RN line.     -She states she finished the prednisone taper on 12/17 and has since had a gradual return of joint pain symptoms.     -She was not seen for follow-up BP check as requested from Onur Orellana. NP lab result note. -Scheduled pt for BP check today.     -Scheduled pt with follow-up on 12/27 for return of joint pain with Onur Orellana NP.     -Pt requested dietary information to help lowering uric acid. -Printed information and will give to pt when in for BP check.     Pt verbalizes understanding of above, is agreeable with plan, and denies any further questions or concerns at this time.     ENHEMIAS Nieves (Patient Advocate Liaison)  [facility]  [phone]

## 2023-12-27 ENCOUNTER — OFFICE VISIT (OUTPATIENT)
Dept: FAMILY MEDICINE | Facility: CLINIC | Age: 78
End: 2023-12-27
Payer: COMMERCIAL

## 2023-12-27 VITALS
TEMPERATURE: 98 F | RESPIRATION RATE: 16 BRPM | DIASTOLIC BLOOD PRESSURE: 73 MMHG | WEIGHT: 182 LBS | HEART RATE: 92 BPM | SYSTOLIC BLOOD PRESSURE: 151 MMHG | HEIGHT: 66 IN | BODY MASS INDEX: 29.25 KG/M2 | OXYGEN SATURATION: 96 %

## 2023-12-27 DIAGNOSIS — I10 PRIMARY HYPERTENSION: ICD-10-CM

## 2023-12-27 DIAGNOSIS — R60.0 BILATERAL LEG EDEMA: ICD-10-CM

## 2023-12-27 DIAGNOSIS — M25.50 MULTIPLE JOINT PAIN: ICD-10-CM

## 2023-12-27 DIAGNOSIS — E79.0 ELEVATED URIC ACID IN BLOOD: Primary | ICD-10-CM

## 2023-12-27 LAB — URATE SERPL-MCNC: 6.3 MG/DL (ref 2.4–5.7)

## 2023-12-27 PROCEDURE — 84550 ASSAY OF BLOOD/URIC ACID: CPT

## 2023-12-27 PROCEDURE — 99213 OFFICE O/P EST LOW 20 MIN: CPT

## 2023-12-27 PROCEDURE — 36415 COLL VENOUS BLD VENIPUNCTURE: CPT

## 2023-12-27 RX ORDER — RESPIRATORY SYNCYTIAL VIRUS VACCINE 120MCG/0.5
0.5 KIT INTRAMUSCULAR ONCE
Qty: 1 EACH | Refills: 0 | Status: CANCELLED | OUTPATIENT
Start: 2023-12-27 | End: 2023-12-27

## 2023-12-27 RX ORDER — VALSARTAN AND HYDROCHLOROTHIAZIDE 80; 12.5 MG/1; MG/1
1 TABLET, FILM COATED ORAL EVERY MORNING
COMMUNITY
Start: 2023-12-11 | End: 2023-12-28 | Stop reason: ALTCHOICE

## 2023-12-27 RX ORDER — ALENDRONATE SODIUM 70 MG/1
TABLET ORAL
COMMUNITY
Start: 2023-11-27 | End: 2024-02-07

## 2023-12-27 RX ORDER — OXYBUTYNIN CHLORIDE 10 MG/1
1 TABLET, EXTENDED RELEASE ORAL
COMMUNITY
Start: 2023-12-08 | End: 2024-01-24

## 2023-12-27 NOTE — PROGRESS NOTES
"  Assessment & Plan     (E79.0) Elevated uric acid in blood  (primary encounter diagnosis)  (M25.50) Multiple joint pain  (R60.0) Bilateral leg edema  Comment: will re check patient's uric acid levels today to see if ongoing gout flare potentially. Patient leg swelling could be contributing to pain as well. May need to increase lasix dose, compression stockings in meantime. We did discuss potential of having to start Allopurinol to allow patient to be on diuretic therapy for leg swelling. Will follow up on uric acid levels and whether further direction is necessary. Patient fully understands and is agreeable with plan of care, at this point patient will follow up as needed unless acute concerns arise in the meantime.  Plan: Uric acid    (I10) Primary hypertension  Comment: elevated in office today. This is typical from previous office visits per patient history. Her BP at home typically running 130/70s since stopping hydrochlorothiazide. Discussed continuing to monitor and notify if BP >140/90 consistently. Patient leg swelling notable today, could be contributing. May increase lasix to 40 mg pending uric acid levels. In meantime utilize compression stockings. Follow up at upcoming visit w/ [facility] for establish care.   Plan: as above.    28 minutes spent by me on the date of the encounter doing chart review, history and exam, documentation and further activities per the note       BMI:   Estimated body mass index is 29.38 kg/m  as calculated from the following:    Height as of this encounter: 1.676 m (5' 6\").    Weight as of this encounter: 82.6 kg (182 lb).     DOMINIQUE Knight [facility]    Ryan Daniel is a 78 year old, presenting for the following health issues:  No chief complaint on file.        12/27/2023    10:12 AM   Additional Questions   Roomed by Emily Jmiénez       History of Present Illness       Reason for visit:  Recheck    She eats 2-3 servings of fruits and " "vegetables daily.She consumes 3 sweetened beverage(s) daily.She exercises with enough effort to increase her heart rate 9 or less minutes per day.  She exercises with enough effort to increase her heart rate 3 or less days per week.   She is taking medications regularly.     Pain History:  When did you first notice your pain? Over a month   Have you seen this provider for your pain in the past? Yes   Where in your body do you have pain? Neck, and shoulders, legs,ankles   Are you seeing anyone else for your pain? No            Chronic Pain Follow Up:    Location of pain: bilateral shoulders, right knee, left ankle  Analgesia/pain control:    - Recent changes:  hydrochlorothiazide discontinued, treated for gout flare given elevated Uric Acid.     - Overall control: Tolerable with discomfort. Prednisone helped for a few days but has    - Current treatments: prednisone finished.    Adherence:     - Do you ever take more pain medicine than prescribed? N/A    - When did you take your last dose of pain medicine?  Tylenol, last night   Adverse effects: No     Patient noted leg swelling once stopped hydrochlorothiazide  Currently only on 20 mg furosemide, patient used to be on 40 mg but was cut back.     Review of Systems   Constitutional, cardiovascular, GI, , musculoskeletal systems are negative, except as otherwise noted.      Objective    BP (!) 151/73 (BP Location: Right arm, Patient Position: Sitting, Cuff Size: Adult Regular)   Pulse 92   Temp 98  F (36.7  C) (Oral)   Resp 16   Ht 1.676 m (5' 6\")   Wt 82.6 kg (182 lb)   LMP  (LMP Unknown)   SpO2 96%   BMI 29.38 kg/m    Body mass index is 29.38 kg/m .  Physical Exam  Vitals and nursing note reviewed.   Constitutional:       General: She is not in acute distress.     Appearance: Normal appearance. She is not ill-appearing.   Cardiovascular:      Rate and Rhythm: Normal rate.   Pulmonary:      Effort: Pulmonary effort is normal.   Musculoskeletal:      Right " shoulder: Tenderness present. No swelling. Normal range of motion.      Left shoulder: Tenderness present. No swelling. Normal range of motion.      Right knee: Swelling present. No deformity. Tenderness present over the medial joint line, lateral joint line and patellar tendon.      Left knee: Swelling present. No deformity.      Right lower leg: Swelling present. 1+ Pitting Edema present.      Left lower leg: Swelling present. 2+ Pitting Edema present.      Right ankle: Swelling present.      Left ankle: Swelling present. Tenderness present over the lateral malleolus and medial malleolus.      Right foot: Swelling present.      Left foot: Swelling present.   Skin:     General: Skin is warm and dry.   Neurological:      Mental Status: She is alert.   Psychiatric:         Mood and Affect: Mood normal.         Behavior: Behavior normal.         Thought Content: Thought content normal.         Judgment: Judgment normal.

## 2023-12-27 NOTE — PATIENT INSTRUCTIONS
Uric acid level today  -will follow up with you on this and whether further direction necessary    Continue to monitor BP at home.

## 2023-12-28 ENCOUNTER — TELEPHONE (OUTPATIENT)
Dept: FAMILY MEDICINE | Facility: CLINIC | Age: 78
End: 2023-12-28
Payer: COMMERCIAL

## 2023-12-28 ENCOUNTER — TELEPHONE (OUTPATIENT)
Dept: SCHEDULING | Facility: CLINIC | Age: 78
End: 2023-12-28
Payer: COMMERCIAL

## 2023-12-28 DIAGNOSIS — I10 PRIMARY HYPERTENSION: ICD-10-CM

## 2023-12-28 DIAGNOSIS — R60.0 BILATERAL LEG EDEMA: Primary | ICD-10-CM

## 2023-12-28 RX ORDER — VALSARTAN 160 MG/1
160 TABLET ORAL DAILY
Qty: 90 TABLET | Refills: 0 | Status: SHIPPED | OUTPATIENT
Start: 2023-12-28 | End: 2024-01-24

## 2023-12-28 RX ORDER — FUROSEMIDE 20 MG
20-40 TABLET ORAL DAILY
Qty: 180 TABLET | Refills: 0 | Status: SHIPPED | OUTPATIENT
Start: 2023-12-28 | End: 2024-01-24

## 2023-12-28 NOTE — TELEPHONE ENCOUNTER
Patient calling back.   See note below: Onur Orellana would like to speak with patient regarding plan.     RN checked with [facility] rocael- Onur is with patient.   Informed patient that RN will send another message to Onur for call back when he is available.        Connie ELMORE RN on 12/28/2023 at 10:12 AM

## 2023-12-28 NOTE — TELEPHONE ENCOUNTER
Patient calling.  States she saw Onur yesterday.  States she is to start taking her bp.  BP this am 178/83 P-60.  Has not taken bp medications today.  Advised to take her medications, wait an hour or more, relax for 10 minutes or more and recheck and call back with another reading.  Patient agrees with plan.  Jeanette Arriaga RN

## 2023-12-28 NOTE — TELEPHONE ENCOUNTER
Reason for Call:  Appointment Request    Patient requesting this type of appt:  patient was told to schedule with Onur Gould but there are no openings within 2 weeks    Requested provider:  Onur Gould    Reason patient unable to be scheduled: Not within requested timeframe    When does patient want to be seen/preferred time:  within 2 weeks    Comments:     Could we send this information to you in [employer] or would you prefer to receive a phone call?:   Patient would prefer a phone call   Okay to leave a detailed message?: Yes at Cell number on file:    Telephone Information:   Mobile [phone]       Call taken on 12/28/2023 at 2:55 PM by Yanira JUAREZ

## 2023-12-28 NOTE — TELEPHONE ENCOUNTER
LVM for pt to call the clinic to schedule an 2 week follow up appt, can be either in person or virtual.  Caroline Cardona TC

## 2023-12-28 NOTE — TELEPHONE ENCOUNTER
Patient calling back.  BP now 173/85 P- 61.  Also advised of below.  Patient wanted to be transferred to [facility].  Transferred to [facility] team to see if Onur available.  NEHEMIAS Mcgee APRN CNP  12/28/2023  9:35 AM CST Back to Top      Called patient and left VM with elevated uric acid results.  Requesting patient to call back to discuss further plan starting allopurinol vs waiting and checking uric acid level at upcoming PCP establish care visit.     DOMINIQUE Knight CNP

## 2023-12-28 NOTE — TELEPHONE ENCOUNTER
Called patient and discussed BP and uric acid results. Will have patient increase her Valsartan to 160 mg daily along w/ taking Furosemide 40 mg daily. Will have follow up visit in 2 weeks to re evaluate BP. Followed by establish care visit w /[facility] at end of January.     DOMINIQUE Knight CNP

## 2023-12-29 NOTE — TELEPHONE ENCOUNTER
Called pt and left voicemail to call clinic to help schedule a follow up visit with Onur Orellana.    Emily Jiménez MA

## 2024-01-10 ENCOUNTER — OFFICE VISIT (OUTPATIENT)
Dept: FAMILY MEDICINE | Facility: CLINIC | Age: 79
End: 2024-01-10
Payer: COMMERCIAL

## 2024-01-10 VITALS
DIASTOLIC BLOOD PRESSURE: 86 MMHG | BODY MASS INDEX: 28.45 KG/M2 | HEIGHT: 66 IN | OXYGEN SATURATION: 97 % | TEMPERATURE: 97.4 F | WEIGHT: 177 LBS | RESPIRATION RATE: 16 BRPM | SYSTOLIC BLOOD PRESSURE: 140 MMHG | HEART RATE: 58 BPM

## 2024-01-10 DIAGNOSIS — I10 PRIMARY HYPERTENSION: Primary | ICD-10-CM

## 2024-01-10 DIAGNOSIS — E79.0 ELEVATED URIC ACID IN BLOOD: ICD-10-CM

## 2024-01-10 DIAGNOSIS — R60.0 BILATERAL LEG EDEMA: ICD-10-CM

## 2024-01-10 PROBLEM — M15.0 PRIMARY OSTEOARTHRITIS INVOLVING MULTIPLE JOINTS: Status: ACTIVE | Noted: 2018-06-28

## 2024-01-10 PROCEDURE — 99213 OFFICE O/P EST LOW 20 MIN: CPT

## 2024-01-10 RX ORDER — RESPIRATORY SYNCYTIAL VIRUS VACCINE 120MCG/0.5
0.5 KIT INTRAMUSCULAR ONCE
Qty: 1 EACH | Refills: 0 | Status: CANCELLED | OUTPATIENT
Start: 2024-01-10 | End: 2024-01-10

## 2024-01-10 ASSESSMENT — PAIN SCALES - GENERAL: PAINLEVEL: MODERATE PAIN (5)

## 2024-01-10 NOTE — PROGRESS NOTES
"  Assessment & Plan     (I10) Primary hypertension  (primary encounter diagnosis)  (E79.0) Elevated uric acid in blood  (R60.0) Bilateral leg edema  Comment: BP borderline today. At this point shared decision to continue w/ current regimen.  Discussed that if BP still elevated could add on amlodipine low dose. It is possible that her joint pain could be contributing to her BP a bit. Her leg edema is a bit improved w/ 40 mg of lasix. Placed a uric acid level to be completed at that visit, if continues to be elevated may need to start allopurinol given patient will more than likely require a diuretic to help w/ her leg swelling and could be continuing to contribute to her joint pain. However, if uric acid normalized more than likely arthritis. Will follow up on her lab results when they are obtained and whether further direction is necessary. Patient has visit scheduled w/ Roxanne Hatfield in 2 weeks which will serve as a good follow up. Patient fully understands and is agreeable with plan of care, at this point patient will follow up as needed unless acute concerns arise in the meantime.  Plan: Uric acid    28 minutes spent by me on the date of the encounter doing chart review, history and exam, documentation and further activities per the note       BMI:   Estimated body mass index is 28.57 kg/m  as calculated from the following:    Height as of this encounter: 1.676 m (5' 6\").    Weight as of this encounter: 80.3 kg (177 lb).     DOMINIQUE Knight CNP  [facility]    Ryan Daniel is a 78 year old, presenting for the following health issues:  Hypertension        1/10/2024     8:46 AM   Additional Questions   Roomed by Tali BETH CMA      Hypertension Follow-up    Do you check your blood pressure regularly outside of the clinic? Yes   Are you following a low salt diet? Yes  Are your blood pressures ever more than 140 on the top number (systolic) OR more   than 90 on the bottom number " "(diastolic), for example 140/90? Yes    Patient continues to have ankle, knee, and shoulder pain    Review of Systems   Constitutional, HEENT, cardiovascular, pulmonary, GI, , musculoskeletal, neuro, skin, endocrine and psych systems are negative, except as otherwise noted.      Objective    BP (!) 160/76 (BP Location: Right arm, Patient Position: Sitting, Cuff Size: Adult Large)   Pulse 58   Temp 97.4  F (36.3  C) (Temporal)   Resp 16   Ht 1.676 m (5' 6\")   Wt 80.3 kg (177 lb)   LMP  (LMP Unknown)   SpO2 97%   BMI 28.57 kg/m    Body mass index is 28.57 kg/m .  Physical Exam  Vitals and nursing note reviewed.   Constitutional:       General: She is not in acute distress.     Appearance: Normal appearance. She is not ill-appearing.   Cardiovascular:      Rate and Rhythm: Normal rate and regular rhythm.      Heart sounds: No murmur heard.     No friction rub. No gallop.   Pulmonary:      Effort: Pulmonary effort is normal. No respiratory distress.      Breath sounds: No wheezing, rhonchi or rales.   Skin:     General: Skin is warm and dry.   Neurological:      Mental Status: She is alert.   Psychiatric:         Mood and Affect: Mood normal.         Behavior: Behavior normal. Behavior is cooperative.         Thought Content: Thought content normal.         Judgment: Judgment normal.            "

## 2024-01-10 NOTE — PATIENT INSTRUCTIONS
No changes today, follow up at upcoming visit w/ Roxanne Hatfield in 2 weeks    Uric acid level ordered today to have done in 2 weeks.

## 2024-01-14 ENCOUNTER — HEALTH MAINTENANCE LETTER (OUTPATIENT)
Age: 79
End: 2024-01-14

## 2024-01-18 SDOH — HEALTH STABILITY: PHYSICAL HEALTH: ON AVERAGE, HOW MANY DAYS PER WEEK DO YOU ENGAGE IN MODERATE TO STRENUOUS EXERCISE (LIKE A BRISK WALK)?: 2 DAYS

## 2024-01-18 SDOH — HEALTH STABILITY: PHYSICAL HEALTH: ON AVERAGE, HOW MANY MINUTES DO YOU ENGAGE IN EXERCISE AT THIS LEVEL?: 20 MIN

## 2024-01-18 ASSESSMENT — ACTIVITIES OF DAILY LIVING (ADL): CURRENT_FUNCTION: NO ASSISTANCE NEEDED

## 2024-01-18 ASSESSMENT — ENCOUNTER SYMPTOMS
JOINT SWELLING: 1
MYALGIAS: 1
BREAST MASS: 0
ARTHRALGIAS: 1

## 2024-01-18 ASSESSMENT — SOCIAL DETERMINANTS OF HEALTH (SDOH)
IN A TYPICAL WEEK, HOW MANY TIMES DO YOU TALK ON THE PHONE WITH FAMILY, FRIENDS, OR NEIGHBORS?: MORE THAN THREE TIMES A WEEK
HOW OFTEN DO YOU GET TOGETHER WITH FRIENDS OR RELATIVES?: THREE TIMES A WEEK
ARE YOU MARRIED, WIDOWED, DIVORCED, SEPARATED, NEVER MARRIED, OR LIVING WITH A PARTNER?: LIVING WITH PARTNER
HOW OFTEN DO YOU ATTENT MEETINGS OF THE CLUB OR ORGANIZATION YOU BELONG TO?: MORE THAN 4 TIMES PER YEAR
DO YOU BELONG TO ANY CLUBS OR ORGANIZATIONS SUCH AS CHURCH GROUPS UNIONS, FRATERNAL OR ATHLETIC GROUPS, OR SCHOOL GROUPS?: YES
HOW OFTEN DO YOU ATTEND CHURCH OR RELIGIOUS SERVICES?: MORE THAN 4 TIMES PER YEAR

## 2024-01-18 ASSESSMENT — LIFESTYLE VARIABLES
AUDIT-C TOTAL SCORE: 0
HOW OFTEN DO YOU HAVE A DRINK CONTAINING ALCOHOL: NEVER
SKIP TO QUESTIONS 9-10: 1
HOW MANY STANDARD DRINKS CONTAINING ALCOHOL DO YOU HAVE ON A TYPICAL DAY: PATIENT DOES NOT DRINK
HOW OFTEN DO YOU HAVE SIX OR MORE DRINKS ON ONE OCCASION: NEVER

## 2024-01-24 ENCOUNTER — OFFICE VISIT (OUTPATIENT)
Dept: FAMILY MEDICINE | Facility: CLINIC | Age: 79
End: 2024-01-24
Payer: COMMERCIAL

## 2024-01-24 ENCOUNTER — PATIENT OUTREACH (OUTPATIENT)
Dept: FAMILY MEDICINE | Facility: CLINIC | Age: 79
End: 2024-01-24

## 2024-01-24 VITALS
WEIGHT: 176 LBS | SYSTOLIC BLOOD PRESSURE: 136 MMHG | TEMPERATURE: 98 F | HEIGHT: 66 IN | RESPIRATION RATE: 12 BRPM | OXYGEN SATURATION: 97 % | DIASTOLIC BLOOD PRESSURE: 72 MMHG | BODY MASS INDEX: 28.28 KG/M2 | HEART RATE: 71 BPM

## 2024-01-24 DIAGNOSIS — Z00.00 ENCOUNTER FOR MEDICARE ANNUAL WELLNESS EXAM: Primary | ICD-10-CM

## 2024-01-24 DIAGNOSIS — G62.9 NEUROPATHY: ICD-10-CM

## 2024-01-24 DIAGNOSIS — M81.0 OSTEOPOROSIS, UNSPECIFIED OSTEOPOROSIS TYPE, UNSPECIFIED PATHOLOGICAL FRACTURE PRESENCE: ICD-10-CM

## 2024-01-24 DIAGNOSIS — R60.0 BILATERAL LEG EDEMA: ICD-10-CM

## 2024-01-24 DIAGNOSIS — R39.15 URINARY URGENCY: ICD-10-CM

## 2024-01-24 DIAGNOSIS — I10 PRIMARY HYPERTENSION: ICD-10-CM

## 2024-01-24 DIAGNOSIS — E78.00 PURE HYPERCHOLESTEROLEMIA: ICD-10-CM

## 2024-01-24 PROCEDURE — G0439 PPPS, SUBSEQ VISIT: HCPCS

## 2024-01-24 PROCEDURE — 99214 OFFICE O/P EST MOD 30 MIN: CPT | Mod: 25

## 2024-01-24 RX ORDER — FUROSEMIDE 20 MG
20-40 TABLET ORAL DAILY
Qty: 180 TABLET | Refills: 1 | Status: SHIPPED | OUTPATIENT
Start: 2024-01-24 | End: 2024-06-28

## 2024-01-24 RX ORDER — ALENDRONATE SODIUM 70 MG/1
70 TABLET ORAL
Qty: 13 TABLET | Refills: 3 | Status: SHIPPED | OUTPATIENT
Start: 2024-01-24

## 2024-01-24 RX ORDER — VALSARTAN AND HYDROCHLOROTHIAZIDE 160; 12.5 MG/1; MG/1
1 TABLET, FILM COATED ORAL DAILY
Qty: 90 TABLET | Refills: 3 | Status: SHIPPED | OUTPATIENT
Start: 2024-01-24 | End: 2024-01-24 | Stop reason: ALTCHOICE

## 2024-01-24 RX ORDER — RESPIRATORY SYNCYTIAL VIRUS VACCINE 120MCG/0.5
0.5 KIT INTRAMUSCULAR ONCE
Qty: 1 EACH | Refills: 0 | Status: CANCELLED | OUTPATIENT
Start: 2024-01-24 | End: 2024-01-24

## 2024-01-24 RX ORDER — OXYBUTYNIN CHLORIDE 10 MG/1
10 TABLET, EXTENDED RELEASE ORAL
Qty: 90 TABLET | Refills: 3 | Status: SHIPPED | OUTPATIENT
Start: 2024-01-24

## 2024-01-24 RX ORDER — VALSARTAN 160 MG/1
160 TABLET ORAL DAILY
Qty: 90 TABLET | Refills: 3 | Status: SHIPPED | OUTPATIENT
Start: 2024-01-24

## 2024-01-24 RX ORDER — SIMVASTATIN 40 MG
40 TABLET ORAL EVERY MORNING
Qty: 90 TABLET | Refills: 3 | Status: SHIPPED | OUTPATIENT
Start: 2024-01-24

## 2024-01-24 ASSESSMENT — ACTIVITIES OF DAILY LIVING (ADL): CURRENT_FUNCTION: NO ASSISTANCE NEEDED

## 2024-01-24 ASSESSMENT — ENCOUNTER SYMPTOMS
MYALGIAS: 1
JOINT SWELLING: 1
ARTHRALGIAS: 1

## 2024-01-24 NOTE — PATIENT INSTRUCTIONS
Patient Education   Personalized Prevention Plan  You are due for the preventive services outlined below.  Your care team is available to assist you in scheduling these services.  If you have already completed any of these items, please share that information with your care team to update in your medical record.  Health Maintenance Due   Topic Date Due     ANNUAL REVIEW OF HM ORDERS  Never done     Hepatitis C Screening  Never done     Diptheria Tetanus Pertussis (DTAP/TDAP/TD) Vaccine (1 - Tdap) Never done     Cholesterol Lab  Never done     Zoster (Shingles) Vaccine (1 of 2) Never done     RSV VACCINE (Pregnancy & 60+) (1 - 1-dose 60+ series) Never done     Preventive Health Recommendations    See your health care provider every year to  Review health changes.   Discuss preventive care.    Review your medicines if your doctor has prescribed any.  You no longer need a yearly Pap test unless you've had an abnormal Pap test in the past 10 years. If you have vaginal symptoms, such as bleeding or discharge, be sure to talk with your provider about a Pap test.  Every 1 to 2 years, have a mammogram.  If you are over 69, talk with your health care provider about whether or not you want to continue having screening mammograms.  Every 10 years, have a colonoscopy. Or, have a yearly FIT test (stool test). These exams will check for colon cancer.   Have a cholesterol test every 5 years, or more often if your doctor advises it.   Have a diabetes test (fasting glucose) every three years. If you are at risk for diabetes, you should have this test more often.   At age 65, have a bone density scan (DEXA) to check for osteoporosis (brittle bone disease).    Shots:  Get a flu shot each year.  Get a tetanus shot every 10 years.  Talk to your doctor about your pneumonia vaccines. There are now two you should receive - Pneumovax (PPSV 23) and Prevnar (PCV 13).  Talk to your pharmacist about the shingles vaccine.  Talk to your doctor  about the hepatitis B vaccine.    Nutrition:   Eat at least 5 servings of fruits and vegetables each day.  Eat whole-grain bread, whole-wheat pasta and brown rice instead of white grains and rice.  Get adequate Calcium and Vitamin D.     Lifestyle  Exercise at least 150 minutes a week (30 minutes a day, 5 days a week). This will help you control your weight and prevent disease.  Limit alcohol to one drink per day.  No smoking.   Wear sunscreen to prevent skin cancer.   See your dentist twice a year for an exam and cleaning.  See your eye doctor every 1 to 2 years to screen for conditions such as glaucoma, macular degeneration and cataracts.    Personalized Prevention Plan  You are due for the preventive services outlined below.  Your care team is available to assist you in scheduling these services.  If you have already completed any of these items, please share that information with your care team to update in your medical record.  Health Maintenance   Topic Date Due     ANNUAL REVIEW OF HM ORDERS  Never done     HEPATITIS C SCREENING  Never done     DTAP/TDAP/TD IMMUNIZATION (1 - Tdap) Never done     LIPID  Never done     ZOSTER IMMUNIZATION (1 of 2) Never done     RSV VACCINE (Pregnancy & 60+) (1 - 1-dose 60+ series) Never done     MEDICARE ANNUAL WELLNESS VISIT  01/24/2025     FALL RISK ASSESSMENT  01/24/2025     ADVANCE CARE PLANNING  01/24/2029     DEXA  01/10/2038     PHQ-2 (once per calendar year)  Completed     INFLUENZA VACCINE  Completed     Pneumococcal Vaccine: 65+ Years  Completed     COVID-19 Vaccine  Completed     IPV IMMUNIZATION  Aged Out     HPV IMMUNIZATION  Aged Out     MENINGITIS IMMUNIZATION  Aged Out     RSV MONOCLONAL ANTIBODY  Aged Out     MAMMO SCREENING  Discontinued

## 2024-01-24 NOTE — PROGRESS NOTES
"Preventive Care Visit  [facility]  Roxanne Hatfield PA-C, Family Medicine  Jan 24, 2024    SUBJECTIVE:   María is a 78 year old, presenting for the following:  Establish Care (First meeting provider/Find this info under Notes/Trans/Outside, Provider Physician/ /Progress Notes/Signed/ /Encounter Date: 8/29/2023//Associated Documents/Progress Note/Clinic Note - Outside Records - Scan on 12/28/2023 1:15 PM: [facility]/Last signed by: Sindhu, Provider at 12/28/2023  1:15 PM/), Physical (Medication has different doses, prescription changes/Wants to know about checking BP all the time/), and Hypertension (Does check BP outside the clinic/1-11-24 136/74/1-12-24 136/70/1-13-24 136/65/1-15-24 140/72/..../1-22-24 141-75)        1/24/2024     9:41 AM   Additional Questions   Roomed by alva velazquez   Accompanied by Leon     Are you in the first 12 months of your Medicare coverage?  No    Healthy Habits:     In general, how would you rate your overall health?  Good    Frequency of exercise:  2-3 days/week    Duration of exercise:  Less than 15 minutes    Do you usually eat at least 4 servings of fruit and vegetables a day, include whole grains    & fiber and avoid regularly eating high fat or \"junk\" foods?  Yes    Taking medications regularly:  Yes    Medication side effects:  Muscle aches    Ability to successfully perform activities of daily living:  No assistance needed    Home Safety:  No safety concerns identified    Hearing Impairment:  No hearing concerns    In the past 6 months, have you been bothered by leaking of urine?  No    In general, how would you rate your overall mental or emotional health?  Excellent    Additional concerns today:  No      -Blood noses, frequently. Always the right nostril. Has been happening with the Fall/Winter season. No issues with bleeding in the past.    -Having ongoing issues with tingling in the RUE and has noticed a lump beneath the " scar tissue on the right shoulder.     -having ongoing intermittent tingling of the bilateral feet. Stable but continues to bother her.      Today's PHQ-2 Score:       1/24/2024     9:41 AM   PHQ-2 ( 1999 [employer])   Q1: Little interest or pleasure in doing things 0   Q2: Feeling down, depressed or hopeless 0   PHQ-2 Score 0   Q1: Little interest or pleasure in doing things Not at all   Q2: Feeling down, depressed or hopeless Not at all   PHQ-2 Score 0     Answers submitted by the patient for this visit:  Annual Preventive Visit (Submitted on 1/18/2024)  Chief Complaint: Annual Exam:  peripheral edema: Yes  tenderness: No  breast mass: No  breast discharge: No    Have you ever done Advance Care Planning? (For example, a Health Directive, POLST, or a discussion with a medical provider or your loved ones about your wishes): Yes, advance care planning is on file.    Fall risk  Fallen 2 or more times in the past year?: No  Any fall with injury in the past year?: No  click delete button to remove this line now  Cognitive Screening   1) Repeat 3 items (Leader, Season, Table)    2) Clock draw: NORMAL  3) 3 item recall: Recalls 2 objects   Results: NORMAL clock, 1-2 items recalled: COGNITIVE IMPAIRMENT LESS LIKELY    Mini-CogTM Copyright KARELY Montoya. Licensed by the author for use in [facility]; reprinted with permission (leonela@.[email or web address]). All rights reserved.      Do you have sleep apnea, excessive snoring or daytime drowsiness? : no    Reviewed and updated as needed this visit by clinical staff   Tobacco  Allergies  Meds  Problems  Med Hx  Surg Hx  Fam Hx          Reviewed and updated as needed this visit by Provider   Tobacco  Allergies  Meds  Problems  Med Hx  Surg Hx  Fam Hx          Social History     Tobacco Use    Smoking status: Never    Smokeless tobacco: Never   Substance Use Topics    Alcohol use: Yes     Comment: occasional         1/18/2024     9:03 AM   Alcohol Use   Prescreen: >3  drinks/day or >7 drinks/week? No     Do you have a current opioid prescription? No  Do you use any other controlled substances or medications that are not prescribed by a provider? None    Current providers sharing in care for this patient include:   Patient Care Team:  Natalie Colón PA-C as PCP - General (Physician Assistant)    The following health maintenance items are reviewed in Epic and correct as of today:  Health Maintenance   Topic Date Due    ANNUAL REVIEW OF HM ORDERS  Never done    HEPATITIS C SCREENING  Never done    DTAP/TDAP/TD IMMUNIZATION (1 - Tdap) Never done    LIPID  Never done    ZOSTER IMMUNIZATION (1 of 2) Never done    RSV VACCINE (Pregnancy & 60+) (1 - 1-dose 60+ series) Never done    MEDICARE ANNUAL WELLNESS VISIT  10/19/2022    ADVANCE CARE PLANNING  10/10/2024    FALL RISK ASSESSMENT  01/24/2025    DEXA  01/10/2038    PHQ-2 (once per calendar year)  Completed    INFLUENZA VACCINE  Completed    Pneumococcal Vaccine: 65+ Years  Completed    COVID-19 Vaccine  Completed    IPV IMMUNIZATION  Aged Out    HPV IMMUNIZATION  Aged Out    MENINGITIS IMMUNIZATION  Aged Out    RSV MONOCLONAL ANTIBODY  Aged Out    MAMMO SCREENING  Discontinued     BP Readings from Last 3 Encounters:   01/24/24 136/72   01/10/24 (!) 140/86   12/27/23 (!) 151/73    Wt Readings from Last 3 Encounters:   01/24/24 79.8 kg (176 lb)   01/10/24 80.3 kg (177 lb)   12/27/23 82.6 kg (182 lb)         Patient Active Problem List   Diagnosis    Syncope    Hypertension    Bradycardia    Hyperlipidemia LDL goal <130    S/P foot surgery     Pain in joint, ankle and foot    Sprain of neck    Sprain and strain of shoulder and upper arm    Elbow strain    Wrist strain    S/P ankle fusion    Cellulitis    Sepsis (H)    Septic joint of left knee joint (H)    Left hip pain    SVT (supraventricular tachycardia)    Knee joint replacement status    Status post reverse total arthroplasty of right shoulder    Pure hypercholesterolemia     Gastroesophageal reflux disease without esophagitis    History of septic arthritis    Infection and inflammatory reaction due to unspecified internal joint prosthesis, subsequent encounter    Primary osteoarthritis involving multiple joints     Past Surgical History:   Procedure Laterality Date    7 foot and ankle       ARTHROPLASTY REVISION ANKLE  3/10/2014    Procedure: ARTHROPLASTY REVISION ANKLE;  REVISION LEFT TOTAL ANKLE  WITH CONVERSION TO  IM LAURIE FUSION WITH FEMORAL HEAD ALLOGRAFT (C-ARM);  Surgeon: Ramirez Fang MD;  Location: [facility]    ASPIRATION NEEDLE KNEE Right 12/27/2016    Procedure: ASPIRATION NEEDLE KNEE;  Surgeon: Jian Fisher MD;  Location:  OR    BUNIONECTOMY JONAS  10/24/2011    Procedure:BUNIONECTOMY JONAS; Left Foot Bunionette Repair (C-Arm); Surgeon:RAMIREZ FANG; Location:[facility]    COLONOSCOPY      ESOPHAGOSCOPY, GASTROSCOPY, DUODENOSCOPY (EGD), COMBINED N/A 1/5/2023    Procedure: ESOPHAGOGASTRODUODENOSCOPY WITH FOREIGN BODY REMOVAL;  Surgeon: Dedra Acosta MD;  Location:  OR    EYE SURGERY      cataract    H ABLATION SVT      HERNIA REPAIR      IRRIGATION AND DEBRIDEMENT FOOT, COMBINED Left 12/27/2016    Procedure: COMBINED IRRIGATION AND DEBRIDEMENT FOOT;  Surgeon: Jian Fisher MD;  Location:  OR    ORTHOPEDIC SURGERY      RTKA    ORTHOPEDIC SURGERY Left     ROTATOR CUFF    REMOVE ANTIBIOTIC CEMENT BEADS / SPACER KNEE N/A 4/24/2017    Procedure: REMOVE ANTIBIOTIC CEMENT BEADS / SPACER KNEE;  REMOVAL ANTIBIOTIC SPACER RIGHT KNEE REIMPLANT TOTAL JOINT COMPONENT TO RIGHT KNEE ;  Surgeon: Husam Berkowitz MD;  Location:  OR    REMOVE HARDWARE ARTHROPLASTY KNEE, IRRIG & JOSE, PLACE ANTIBIOTIC CEMENT SPACER, COMBINED Right 2/20/2017    Procedure: COMBINED REMOVE HARDWARE ARTHROPLASTY KNEE, IRRIGATION AND DEBRIDEMENT, PLACE ANTIBIOTIC CEMENT BEADS / SPACER;  Surgeon: Husam Berkowitz MD;  Location:  OR    REVERSE  ARTHROPLASTY SHOULDER Right 10/7/2019    Procedure: RIGHT TOTAL REVERSE SHOULDER ARTHROPLASTY;  Surgeon: Husam Berkowitz MD;  Location: [facility] OR    TONSILLECTOMY      TONSILLECTOMY      total knee arthroplasy      ZZC TOTAL KNEE ARTHROPLASTY Right        Social History     Tobacco Use    Smoking status: Never    Smokeless tobacco: Never   Substance Use Topics    Alcohol use: Yes     Comment: occasional     History reviewed. No pertinent family history.      Current Outpatient Medications   Medication Sig Dispense Refill    acetaminophen (TYLENOL) 500 MG tablet Take 1,000 mg by mouth At Bedtime      alendronate (FOSAMAX) 70 MG tablet Take 1 tablet (70 mg) by mouth every 7 days 13 tablet 3    alendronate (FOSAMAX) 70 MG tablet       ascorbic acid (VITAMIN C) 250 MG CHEW chewable tablet Take 250 mg by mouth daily      aspirin 81 MG EC tablet Take 81 mg by mouth daily      Cholecalciferol (VITAMIN D3) 400 units CAPS Take 400 Units by mouth daily       furosemide (LASIX) 20 MG tablet Take 1-2 tablets (20-40 mg) by mouth daily Usually Takes 20 mg daily. 180 tablet 1    Glucosamine-Chondroitin (OSTEO BI-FLEX REGULAR STRENGTH PO) Take 2 tablets by mouth daily       Multiple Vitamins-Minerals (WOMENS 50+ MULTI VITAMIN/MIN PO) Take 1 tablet by mouth every morning      oxyBUTYnin ER (DITROPAN XL) 10 MG 24 hr tablet Take 1 tablet (10 mg) by mouth daily at 2 pm 90 tablet 3    Potassium Gluconate 595 (99 K) MG TABS Take 1 tablet by mouth every morning      simvastatin (ZOCOR) 40 MG tablet Take 1 tablet (40 mg) by mouth every morning 90 tablet 3    valsartan (DIOVAN) 160 MG tablet Take 1 tablet (160 mg) by mouth daily 90 tablet 3    vitamin B6 (PYRIDOXINE) 100 MG tablet Take 100 mg by mouth daily      albuterol (PROAIR HFA/PROVENTIL HFA/VENTOLIN HFA) 108 (90 Base) MCG/ACT inhaler Inhale 1-2 puffs into the lungs every 6 hours (Patient not taking: Reported on 1/24/2024) 18 g 0    calcium carbonate (OS-MATHEW) 1500 (600 Ca) MG  "tablet Take 600 mg by mouth daily (Patient not taking: Reported on 1/24/2024)      clindamycin (CLEOCIN) 300 MG capsule Take 600 mg by mouth daily as needed (take 2 X 300 mg = 600 mg dose) 1 hour prior to dental appointment. (Patient not taking: Reported on 1/24/2024)      order for DME Equipment being ordered: Walker Wheels () and Walker ()  Treatment Diagnosis: difficulty with gait (Patient not taking: Reported on 1/10/2024) 1 each 0    Vitamin D-Vitamin K (K2 PLUS D3 PO)  (Patient not taking: Reported on 1/24/2024)       Allergies   Allergen Reactions    Melon Anaphylaxis     Throat swelling    Pcn [Penicillin V Potassium] Anaphylaxis    Ceftriaxone Itching    Cephalexin Itching    Oxycodone Other (See Comments)     unconsciousness    Sulfa Antibiotics     Valium [Diazepam] Other (See Comments)     unconsciousness    Vicodin [Hydrocodone-Acetaminophen]      Patient unsure if allergy    Iodine Solution [Povidone Iodine] Rash     Mammogram Screening - Patient over age 75, has elected to discontinue screenings. Pertinent mammograms are reviewed under the imaging tab.    Review of Systems   Genitourinary:  Negative for pelvic pain, vaginal bleeding and vaginal discharge.   Musculoskeletal:  Positive for arthralgias, joint swelling and myalgias.      Constitutional, neuro, ENT, endocrine, pulmonary, cardiac, gastrointestinal, genitourinary, musculoskeletal, integument and psychiatric systems are negative, except as otherwise noted.    OBJECTIVE:   BP (!) 172/74 (BP Location: Right arm, Patient Position: Sitting, Cuff Size: Adult Regular)   Pulse 71   Temp 98  F (36.7  C) (Oral)   Resp 12   Ht 1.676 m (5' 6\")   Wt 79.8 kg (176 lb)   LMP  (LMP Unknown)   SpO2 97%   BMI 28.41 kg/m     Estimated body mass index is 28.41 kg/m  as calculated from the following:    Height as of this encounter: 1.676 m (5' 6\").    Weight as of this encounter: 79.8 kg (176 lb).  Physical Exam  GENERAL: alert and no " distress  RESP: lungs clear to auscultation - no rales, rhonchi or wheezes  CV: regular rate and rhythm, normal S1 S2, no S3 or S4, no murmur, click or rub, bilateral peripheral edema  MS: well healed surgical scar over right anterior shoulder, normal PROM. No tenderness with palpation over the shoulder.     Diagnostic Test Results:  Labs reviewed in Epic    ASSESSMENT / PLAN:   (Z00.00) Encounter for Medicare annual wellness exam  (primary encounter diagnosis)  Stable exam. Had recent lab workup, do not feel additional testing indicated at this time. For bloody noses, her nasal mucosa is dry so did recommend using a humidifier in the bedroom at night and to use saline nasal sprays twice daily. Consider repeat uric acid level at follow up visit in 3 months.     (G62.9) Neuropathy  Having neuropathy of the right shoulder which improved after prednisone course in December. Now returned. History of surgery to the right shoulder and has what feels like thicker scar tissues at the area of the incision. Will discuss with her surgeon for any impingement, compression from scar tissue, etc to see what treatment options are available. Did discuss possible gabapentin for neuropathic pain, especially given she is also having tingling and numbness in the feet. Sugars have been normal. Foot numbness could be secondary to BLE edema as well.     (R60.0) Bilateral leg edema  Currently using Lasix with improvement in swelling but continues to have BLE edema and neuropathy. Will keep on current dose of Lasix for now and re-evaluate when she comes in for follow up in 3 months.   Plan: furosemide (LASIX) 20 MG tablet          (E78.00) Pure hypercholesterolemia  Well controlled with use of simvastatin. No new side effects of the medication. Refill provided.  Plan: simvastatin (ZOCOR) 40 MG tablet          (R39.15) Urinary urgency  Well controlled with use of oxybutynin. No new side effects of the medication. Refill provided.  Plan:  "oxyBUTYnin ER (DITROPAN XL) 10 MG 24 hr tablet          (I10) Primary hypertension  Well controlled with use of valsartan and with lasix use. Was wondering about going back to valsartan-hydrochlorothiazide combination pill but with elevated uric acid level will keep on valsartan for now. No new side effects of the medication. Refill provided.  Plan: valsartan (DIOVAN) 160 MG tablet, DISCONTINUED:        valsartan-hydrochlorothiazide (DIOVAN HCT)         160-12.5 MG tablet          (M81.0) Osteoporosis, unspecified osteoporosis type, unspecified pathological fracture presence  Currently on alendronate. Unsure how long has been on. Need to confirm duration of medication regimen. She had Dexa scan last year. Recommending possible repeat studies in 2-3 years.   Plan: alendronate (FOSAMAX) 70 MG tablet          Patient has been advised of split billing requirements and indicates understanding: Yes    Counseling  Reviewed preventive health counseling, as reflected in patient instructions      BMI  Estimated body mass index is 28.41 kg/m  as calculated from the following:    Height as of this encounter: 1.676 m (5' 6\").    Weight as of this encounter: 79.8 kg (176 lb).       She reports that she has never smoked. She has never used smokeless tobacco.      Appropriate preventive services were discussed with this patient, including applicable screening as appropriate for fall prevention, nutrition, physical activity, Tobacco-use cessation, weight loss and cognition.  Checklist reviewing preventive services available has been given to the patient.    Reviewed patients plan of care and provided an AVS. The Basic Care Plan (routine screening as documented in Health Maintenance) for María meets the Care Plan requirement. This Care Plan has been established and reviewed with the Patient.          Signed Electronically by: Roxanne Hatfield PA-C    Identified Health Risks  I have reviewed Opioid Use Disorder and Substance Use " Disorder risk factors and made any needed referrals.

## 2024-01-24 NOTE — TELEPHONE ENCOUNTER
Called and spoke with pt,     -Relayed message from PCP below.     She verbalizes understanding and is agreeable to taking valsartan 160 mg along with furosemide 40 mg. She denies any further questions or concerns at this time.     Fe JUAREZ RN   PAL (Patient Advocate Liaison)  [facility]  [phone]

## 2024-01-24 NOTE — Clinical Note
Please call patient to let her know that I figured out the reason the combination blood pressure with hydrochlorothiazide was discontinued. With her gout history it is recommended to not use hydrochlorothiazide as it increases the risk of gout flares.  We will continue with previous plan of valsartan 160 mg and lasix 40 mg if tolerating well for now.

## 2024-01-24 NOTE — TELEPHONE ENCOUNTER
"Per Roxanne Hatfield PA-C-  \"Please call patient to let her know that I figured out the reason the combination blood pressure with hydrochlorothiazide was discontinued. With her gout history it is recommended to not use hydrochlorothiazide as it increases the risk of gout flares.  We will continue with previous plan of valsartan 160 mg and lasix 40 mg if tolerating well for now.\"    Fe JUAREZ RN   PAL (Patient Advocate Liaison)  [facility]  [phone]    "

## 2024-01-29 ENCOUNTER — PATIENT OUTREACH (OUTPATIENT)
Dept: FAMILY MEDICINE | Facility: CLINIC | Age: 79
End: 2024-01-29
Payer: COMMERCIAL

## 2024-01-29 NOTE — TELEPHONE ENCOUNTER
Roxanne Hatfield PA-C- Please review and remove, if appropriate.     -Pt is requesting the following medications removed from medication list as she is not taking and has not in a long period.     Osteo bi-flex   Vit D-Vit K   DME order for walker.     Received a vm on [facility] RN line,     -Pt is requesting a call back.     Called and spoke with pt,     -She requests medication review. Osteo bi-flex and Vit D-Vit K she is no longer taking.     -She wanted clarification that she is to take valsartan 160 mg and not valsartan-hydrochlorothiazide combination.     Writer clarified that she is to be taking valsartan without hydrochlorothiazide due to the risk for gout flares.     Pt verbalizes understanding, is agreeable with plan, and denies any further questions or concerns.     Routed to PCP    NEHEMIAS Nieves (Patient Advocate Liaison)  [facility]  [phone]

## 2024-02-05 ENCOUNTER — NURSE TRIAGE (OUTPATIENT)
Dept: FAMILY MEDICINE | Facility: CLINIC | Age: 79
End: 2024-02-05
Payer: COMMERCIAL

## 2024-02-05 NOTE — TELEPHONE ENCOUNTER
"Nurse Triage SBAR    Is this a 2nd Level Triage? NO    Situation: Pt reporting mouth sores particularly possible canker sores, blood blisters in vaginal area, and hives behind knees.     Background: HTN. Started on valsartan 160 mg on 1/25/24. Multiple allergies.     Assessment:     -Blood blisters on vaginal folds.   -Small hives behind knees, \"much smaller than previous reactions.\" Pt states she has had similar reactions before.   -Canker sores in mouth, dry roof of mouth  -Denies inflamed joints from baseline.   -No fever  -No pain or shortness of breath.   -No difficulty swallowing.   -Denies any new foods, topical ointments, or OTC medications.   -She believes this reaction may be related to increased dose of valsartan.   -Vitals on 2/4 110/60 P 68 and today 126/56 P 66.     Protocol Recommended Disposition:   Home Care, See More Appropriate Guideline    Recommendation:     -Scheduled appointment with Marium Dodd PA-C on 2/6 at 7:40 AM.     Huddled with PCP:    Okay for pt to be seen in clinic on 2/6 as long as she does not have worsening of symptoms or shortness of breath.     Called and relayed message from PCP to pt that it will be okay to keep scheduled appointment  with Marium Dodd PA-C on 2/6 and to be seen in ED with worsening symptoms. Pt given care advise to take benadryl, however to take care for sleepiness.     Pt verbalizes understanding and is agreeable with plan, with appointment time and date. She denies any further questions or concerns.     Does the patient meet one of the following criteria for ADS visit consideration? 16+ years old, with an [facility] PCP     TIP  Providers, please consider if this condition is appropriate for management at one of our Acute and Diagnostic Services sites.     If patient is a good candidate, please use dotphrase <dot>triageresponse and select Refer to ADS to document.  Pt left a vm on PAL RN line,     -She is requesting a call back.     Called and spoke with " pt,     She is reporting concern over valsartan dosing and that it may be causing an allergic rx.     Reason for Disposition   [1] Hives AND [2] not from bee sting or prescription drug   Localized hives    Additional Information   Negative: [1] Life-threatening reaction (anaphylaxis) in the past to similar substance (e.g., food, insect bite/sting, chemical, etc.) AND [2] < 2 hours since exposure   Negative: Difficulty breathing or wheezing now   Negative: [1] Swollen tongue AND [2] rapid onset   Negative: [1] Hoarseness or cough now AND [2] rapid onset   Negative: Shock suspected (e.g., cold/pale/clammy skin, too weak to stand, low BP, rapid pulse)   Negative: Difficult to awaken or acting confused (e.g., disoriented, slurred speech)   Negative: Sounds like a life-threatening emergency to the triager   Negative: Life-threatening allergic reaction (anaphylaxis) suspected   Negative: Tongue swelling is main symptom   Negative: Lip swelling is main symptom   Negative: Face swelling   Negative: Asthma attack triggered by pollen or other allergen   Negative: [1] Bee sting AND [2] widespread hives or swelling   Negative: [1] Drug allergy suspected AND [2] taking prescription medicine AND [3] widespread rash   Negative: Coughing from pollen or other allergen (allergic cough suspected)   Negative: Eyelid swelling   Negative: Allergic rhinitis suspected (itchy nose with clear discharge)   Negative: [1] Allergic conjunctivitis suspected (itchy red eyes) AND [2] no other symptoms   Negative: COVID-19 vaccine reaction suspected   Negative: Immunization (vaccine) reaction suspected   Negative: [1] Widespread itching BUT [2] no rash   Negative: [1] Bee, wasp, or yellow jacket sting AND [2] within last 24 hours   Negative: Blood-colored, dark red or purple rash   Negative: [1] Drug rash suspected AND [2] started taking new medicine within last 2 weeks(Exception: Antihistamine, eye drops, ear drops, decongestant or other OTC  cough/cold medicines.)   Negative: Doesn't match the SYMPTOMS of hives   Negative: Swollen tongue   Negative: [1] Widespread hives, itching or facial swelling AND [2] onset < 2 hours of exposure to high-risk allergen (e.g., sting, nuts, 1st dose of antibiotic)   Negative: Patient sounds very sick or weak to the triager   Negative: [1] MODERATE-SEVERE hives persist (i.e., hives interfere with normal activities or work) AND [2] taking antihistamine (e.g., Benadryl, Claritin) > 24 hours   Negative: [1] Hives have become worse AND [2] taking oral steroids (e.g., prednisone) > 24 hours   Negative: Joint swelling   Negative: [1] Abdominal pain AND [2] pain present > 12 hours   Negative: Fever   Negative: [1] Widespread hives, itching or facial swelling AND [2] onset > 2 hours after exposure to high-risk allergen (e.g., sting, nuts, 1st dose of antibiotic)   Negative: [1] Hives from food reaction AND [2] diagnosis never confirmed by a doctor (or NP/PA)   Negative: Hives persist > 1 week   Negative: [1] Hives has occurred 3 or more times in the last year AND [2] the cause was not found   Negative: [1] Hives from food reaction AND [2] diagnosis already confirmed    Protocols used: Allergic Reactions - Guideline [id number], Hives-A-AH    Routed to PCP    Fe JUAREZ RN   PAL (Patient Advocate Liaison)  [facility]  [phone]

## 2024-02-07 ENCOUNTER — OFFICE VISIT (OUTPATIENT)
Dept: FAMILY MEDICINE | Facility: CLINIC | Age: 79
End: 2024-02-07
Payer: COMMERCIAL

## 2024-02-07 VITALS
SYSTOLIC BLOOD PRESSURE: 128 MMHG | TEMPERATURE: 98.4 F | HEART RATE: 69 BPM | BODY MASS INDEX: 28.45 KG/M2 | WEIGHT: 177 LBS | OXYGEN SATURATION: 95 % | DIASTOLIC BLOOD PRESSURE: 66 MMHG | HEIGHT: 66 IN | RESPIRATION RATE: 14 BRPM

## 2024-02-07 DIAGNOSIS — L50.9 HIVES: Primary | ICD-10-CM

## 2024-02-07 DIAGNOSIS — L73.1 INGROWN HAIR: ICD-10-CM

## 2024-02-07 DIAGNOSIS — M00.9 PYOGENIC ARTHRITIS OF LEFT KNEE JOINT, DUE TO UNSPECIFIED ORGANISM (H): ICD-10-CM

## 2024-02-07 DIAGNOSIS — I10 PRIMARY HYPERTENSION: ICD-10-CM

## 2024-02-07 PROCEDURE — 99214 OFFICE O/P EST MOD 30 MIN: CPT | Performed by: PHYSICIAN ASSISTANT

## 2024-02-07 RX ORDER — MUPIROCIN 20 MG/G
OINTMENT TOPICAL 3 TIMES DAILY
Qty: 30 G | Refills: 1 | Status: SHIPPED | OUTPATIENT
Start: 2024-02-07 | End: 2024-08-12

## 2024-02-07 NOTE — PROGRESS NOTES
Assessment & Plan     Hives  Only change in medication patient can think of is the valsartan 160 mg. She has been on valsartan 80 mg for many years. Previously has tried numerous other options for blood pressure (but is unsure what). I reviewed records from her previous clinic and they only go back to 2020. Will get more records.  For now given hives are improving I recommended she continue the 160 mg of the valsartan. Perhaps consider taking two of the 80 mg tablets. Once records have returned I can review to see what she has previously been on if we need to change treatment.    Looking at records from 2006, patient has been on lisinopril 5 mg previously and had a cough with use thus it was discontinued. She was then prescribed amlodipine 5 mg. Due to edema with the amlodipine it was changed to valsartan 80 mg-hydrochlorothiazide 12.5 mg. It seems she has been on this ever since until changed to 160 mg valsartan recently.    Primary hypertension  As noted above.    Ingrown hair  Try topical mupirocin and soaks/warm wash cloth. I felt oral antibiotics not indicated due to numerous allergies and for a superficial infection.  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    Review of prior external note(s) from - Outside records from [facility]  Review of external notes as documented elsewhere in note  Prescription drug management  37 minutes spent by me on the date of the encounter doing chart review, history and exam, documentation and further activities per the note      Subjective   María is a 78 year old, presenting for the following health issues:  Hives (C/o hives on inner section of both knees since 2/2/24 but started benadryl 2/5/24 and also roof of mouth is itchy.) and Vaginal Problem (C/o multiply blisters on labia X 1  "wk)      2/7/2024     7:45 AM   Additional Questions   Roomed by Mikayla   Accompanied by Self         2/7/2024     7:45 AM   Patient Reported Additional Medications   Patient reports taking the following new medications Valsartan dosage uped to 160 mg     Vaginal Problem     History of Present Illness       Back Pain:  She presents for follow up of back pain. Patient's back pain is a chronic problem.  Location of back pain:  Right lower back  Description of back pain: sharp  Back pain spreads: nowhere    Since patient first noticed back pain, pain is: unchanged  Does back pain interfere with her job:  Not applicable       She eats 2-3 servings of fruits and vegetables daily.She consumes 1 sweetened beverage(s) daily.She exercises with enough effort to increase her heart rate 9 or less minutes per day.  She exercises with enough effort to increase her heart rate 4 days per week.   She is taking medications regularly.       Concern - Hives  Onset: 2/2/2024 (6 days)  Description: Hives around inner section of both knees, canker sores in the mouth, itching on the top of the mouth  Intensity: mild  Progression of Symptoms:  improving  Accompanying Signs & Symptoms:   Previous history of similar problem:   Precipitating factors:        Worsened by:   Alleviating factors:        Improved by:   Therapies tried and outcome: Benadryl  Concern - Blisters on labia  Onset:  about 1 week  Description: blisters on sides of labia with pain if push on it  Intensity: mild  Progression of Symptoms:  same  Accompanying Signs & Symptoms:   Previous history of similar problem:   Precipitating factors:        Worsened by:   Alleviating factors:        Improved by:   Therapies tried and outcome: None        Objective    /66 (BP Location: Right arm, Patient Position: Sitting, Cuff Size: Adult Large)   Pulse 69   Temp 98.4  F (36.9  C) (Oral)   Resp 14   Ht 1.676 m (5' 6\")   Wt 80.3 kg (177 lb)   LMP  (LMP Unknown)   SpO2 95%   " BMI 28.57 kg/m    Body mass index is 28.57 kg/m .  Physical Exam   GENERAL: No acute distress, breathing comfortably.  HEENT: Normocephalic, No edema of the lips or throat. No obvious ulcerations of the mouth.  SKIN: Several white pustules in the labia majora bilaterally. Faint erythema of the inside of the bilateral knees. No obvious hives currently.  NEURO: Alert and non-focal          Signed Electronically by: Marium Dodd PA-C

## 2024-02-12 ENCOUNTER — TELEPHONE (OUTPATIENT)
Dept: FAMILY MEDICINE | Facility: CLINIC | Age: 79
End: 2024-02-12
Payer: COMMERCIAL

## 2024-02-12 NOTE — TELEPHONE ENCOUNTER
Called patient for update.      States she had 2 bigger sores on each side and small blood blisters on each side.  Bigger areas ones are decreasing in size.  Blood blister ones are unchanged and does not know what to do other than put mupirocin on.     Hives are gone.  Has been taking Benadryl.  Has been taking just at night.      Update to provider.  Jeanette Arriaga, RN    2/7/2024  [facility]    Hives  Only change in medication patient can think of is the valsartan 160 mg. She has been on valsartan 80 mg for many years. Previously has tried numerous other options for blood pressure (but is unsure what). I reviewed records from her previous clinic and they only go back to 2020. Will get more records.  For now given hives are improving I recommended she continue the 160 mg of the valsartan. Perhaps consider taking two of the 80 mg tablets. Once records have returned I can review to see what she has previously been on if we need to change treatment.     Looking at records from 2006, patient has been on lisinopril 5 mg previously and had a cough with use thus it was discontinued. She was then prescribed amlodipine 5 mg. Due to edema with the amlodipine it was changed to valsartan 80 mg-hydrochlorothiazide 12.5 mg. It seems she has been on this ever since until changed to 160 mg valsartan recently.     Ingrown hair  Try topical mupirocin and soaks/warm wash cloth. I felt oral antibiotics not indicated due to numerous allergies and for a superficial infection.  - mupirocin (BACTROBAN) 2 % external ointment; Apply topically 3 times daily     Signed Electronically by: Marium Dodd PA-C

## 2024-02-13 NOTE — TELEPHONE ENCOUNTER
Pt calls back d/t missed call. Relayed provider message below. Advised calling back with new or worsening symptoms. Pt verbalized understanding and agrees with plan.  Kevin Sanders RN on 2/13/2024 at 4:18 PM

## 2024-02-13 NOTE — TELEPHONE ENCOUNTER
I would recommend continuing the mupirocin and continue to monitor. Warm compresses or warm Sitz baths may be helpful as well.  She can continue Benadryl for another 1-2 weeks then stop.

## 2024-03-15 ENCOUNTER — TRANSFERRED RECORDS (OUTPATIENT)
Dept: HEALTH INFORMATION MANAGEMENT | Facility: CLINIC | Age: 79
End: 2024-03-15
Payer: COMMERCIAL

## 2024-03-27 ENCOUNTER — OFFICE VISIT (OUTPATIENT)
Dept: FAMILY MEDICINE | Facility: CLINIC | Age: 79
End: 2024-03-27
Payer: COMMERCIAL

## 2024-03-27 ENCOUNTER — NURSE TRIAGE (OUTPATIENT)
Dept: FAMILY MEDICINE | Facility: CLINIC | Age: 79
End: 2024-03-27

## 2024-03-27 VITALS
HEIGHT: 66 IN | DIASTOLIC BLOOD PRESSURE: 79 MMHG | SYSTOLIC BLOOD PRESSURE: 138 MMHG | HEART RATE: 73 BPM | RESPIRATION RATE: 16 BRPM | OXYGEN SATURATION: 97 % | WEIGHT: 177 LBS | TEMPERATURE: 98.2 F | BODY MASS INDEX: 28.45 KG/M2

## 2024-03-27 DIAGNOSIS — N39.9 URINARY PROBLEM IN FEMALE: Primary | ICD-10-CM

## 2024-03-27 DIAGNOSIS — M15.0 PRIMARY OSTEOARTHRITIS INVOLVING MULTIPLE JOINTS: ICD-10-CM

## 2024-03-27 DIAGNOSIS — E79.0 ELEVATED URIC ACID IN BLOOD: ICD-10-CM

## 2024-03-27 LAB
ALBUMIN UR-MCNC: NEGATIVE MG/DL
ANION GAP SERPL CALCULATED.3IONS-SCNC: 12 MMOL/L (ref 7–15)
APPEARANCE UR: CLEAR
BACTERIA #/AREA URNS HPF: ABNORMAL /HPF
BILIRUB UR QL STRIP: NEGATIVE
BUN SERPL-MCNC: 13.6 MG/DL (ref 8–23)
CALCIUM SERPL-MCNC: 9.8 MG/DL (ref 8.8–10.2)
CHLORIDE SERPL-SCNC: 100 MMOL/L (ref 98–107)
COLOR UR AUTO: YELLOW
CREAT SERPL-MCNC: 0.83 MG/DL (ref 0.51–0.95)
DEPRECATED HCO3 PLAS-SCNC: 29 MMOL/L (ref 22–29)
EGFRCR SERPLBLD CKD-EPI 2021: 72 ML/MIN/1.73M2
ERYTHROCYTE [DISTWIDTH] IN BLOOD BY AUTOMATED COUNT: 13.6 % (ref 10–15)
GLUCOSE SERPL-MCNC: 140 MG/DL (ref 70–99)
GLUCOSE UR STRIP-MCNC: NEGATIVE MG/DL
HCT VFR BLD AUTO: 42.3 % (ref 35–47)
HGB BLD-MCNC: 13.7 G/DL (ref 11.7–15.7)
HGB UR QL STRIP: ABNORMAL
KETONES UR STRIP-MCNC: NEGATIVE MG/DL
LEUKOCYTE ESTERASE UR QL STRIP: NEGATIVE
MCH RBC QN AUTO: 29.7 PG (ref 26.5–33)
MCHC RBC AUTO-ENTMCNC: 32.4 G/DL (ref 31.5–36.5)
MCV RBC AUTO: 92 FL (ref 78–100)
NITRATE UR QL: NEGATIVE
PH UR STRIP: 5.5 [PH] (ref 5–7)
PLATELET # BLD AUTO: 253 10E3/UL (ref 150–450)
POTASSIUM SERPL-SCNC: 3.2 MMOL/L (ref 3.4–5.3)
RBC # BLD AUTO: 4.62 10E6/UL (ref 3.8–5.2)
RBC #/AREA URNS AUTO: ABNORMAL /HPF
SODIUM SERPL-SCNC: 141 MMOL/L (ref 135–145)
SP GR UR STRIP: 1.01 (ref 1–1.03)
SQUAMOUS #/AREA URNS AUTO: ABNORMAL /LPF
URATE SERPL-MCNC: 6.1 MG/DL (ref 2.4–5.7)
UROBILINOGEN UR STRIP-ACNC: 0.2 E.U./DL
WBC # BLD AUTO: 5.8 10E3/UL (ref 4–11)
WBC #/AREA URNS AUTO: ABNORMAL /HPF

## 2024-03-27 PROCEDURE — 36415 COLL VENOUS BLD VENIPUNCTURE: CPT

## 2024-03-27 PROCEDURE — 85027 COMPLETE CBC AUTOMATED: CPT

## 2024-03-27 PROCEDURE — 99213 OFFICE O/P EST LOW 20 MIN: CPT

## 2024-03-27 PROCEDURE — 81001 URINALYSIS AUTO W/SCOPE: CPT

## 2024-03-27 PROCEDURE — 80048 BASIC METABOLIC PNL TOTAL CA: CPT

## 2024-03-27 PROCEDURE — 84550 ASSAY OF BLOOD/URIC ACID: CPT

## 2024-03-27 RX ORDER — RESPIRATORY SYNCYTIAL VIRUS VACCINE 120MCG/0.5
0.5 KIT INTRAMUSCULAR ONCE
Qty: 1 EACH | Refills: 0 | Status: CANCELLED | OUTPATIENT
Start: 2024-03-27 | End: 2024-03-27

## 2024-03-27 RX ORDER — PREDNISONE 10 MG/1
TABLET ORAL
Qty: 20 TABLET | Refills: 0 | Status: SHIPPED | OUTPATIENT
Start: 2024-03-27 | End: 2024-04-09

## 2024-03-27 RX ORDER — MELOXICAM 15 MG/1
15 TABLET ORAL DAILY
Qty: 20 TABLET | Refills: 0 | Status: SHIPPED | OUTPATIENT
Start: 2024-03-27 | End: 2024-08-12

## 2024-03-27 NOTE — TELEPHONE ENCOUNTER
Pt left a vm on PAL RN line,     She has concern for a possible bladder infection and is having increased arthritic pain. She would like a call back.     Fe JUAREZ RN   PAL (Patient Advocate Liaison)  [facility]  [phone]

## 2024-03-27 NOTE — PROGRESS NOTES
"  Assessment & Plan     (N39.9) Urinary problem in female  (primary encounter diagnosis)  Urinary frequency starting last night. UA obtained in clinic negative for UTI. No additional follow up at this time, patient is going to monitor symptoms.   Plan: UA Macroscopic with reflex to Microscopic and         Culture - Lab Collect, UA Microscopic with         Reflex to Culture, Basic metabolic panel  (Ca,         Cl, CO2, Creat, Gluc, K, Na, BUN)    (E79.0) Elevated uric acid in blood  Ordered previously. Will check today. Did come down slightly on last check after discontinuation of hydrochlorothiazide.   Plan: predniSONE (DELTASONE) 10 MG tablet          (M15.9) Primary osteoarthritis involving multiple joints  Likely flare of osteoarthritis from lifting boxes last weekend. Has been seen by specialists in the past. Does have a history of septic arthritis so CBC obtained in clinic without WBC elevation. She also has no clinical evidence of septic arthritis flare at this time. Given persistent pain will treat with prednisone taper. Did discuss that for short term management of pain okay to take a dose of meloxicam tonight for pain. She is on ASA 81 mg for history of atrial fibrillation s/p ablation. Did discuss bleeding risk and patient does not plan to use routinely. Follow up if pain persists.   Plan: CBC with platelets, predniSONE (DELTASONE) 10         MG tablet, meloxicam (MOBIC) 15 MG tablet          BMI  Estimated body mass index is 28.57 kg/m  as calculated from the following:    Height as of this encounter: 1.676 m (5' 6\").    Weight as of this encounter: 80.3 kg (177 lb).       Follow up as needed if symptoms worsen or fail to improve.      Ryan Daniel is a 79 year old, presenting for the following health issues:  Urinary Problem      3/27/2024     1:14 PM   Additional Questions   Roomed by Ketty   Accompanied by spouse     HPI     Genitourinary - Female  Onset/Duration: 3 days  Description: " "  Painful urination (Dysuria): No           Frequency: YES  Blood in urine (Hematuria): No  Delay in urine (Hesitency): No  Intensity: no pain  Progression of Symptoms:  same  Accompanying Signs & Symptoms:  Fever/chills: No  Flank pain: No  Nausea and vomiting: No  Vaginal symptoms: none  Abdominal/Pelvic Pain: YES  History:   History of frequent UTI s: YES  History of kidney stones: YES  Sexually Active: No  Possibility of pregnancy: No  Precipitating or alleviating factors: None  Therapies tried and outcome: OTC advil or tylenol       Musculoskeletal problem/pain  Onset/Duration: 1 week  Description  Location: L ankle,  both shoulders, both knees   Joint Swelling: YES  Redness: No  Pain: YES  Warmth: No  Intensity:  8/10  Progression of Symptoms:  same  Accompanying signs and symptoms:   Fevers: No  Numbness/tingling/weakness: YES- both hands but L is worse  History  Trauma to the area: No  Recent illness:  No  Previous similar problem: No  Previous evaluation:  No  Precipitating or alleviating factors:  Aggravating factors include: none  Therapies tried and outcome: nothing      Review of Systems  Constitutional, HEENT, cardiovascular, pulmonary, gi and gu systems are negative, except as otherwise noted.        Objective    /79 (BP Location: Right arm, Patient Position: Chair, Cuff Size: Adult Regular)   Pulse 73   Temp 98.2  F (36.8  C) (Oral)   Resp 16   Ht 1.676 m (5' 6\")   Wt 80.3 kg (177 lb)   LMP  (LMP Unknown)   SpO2 97%   BMI 28.57 kg/m    Body mass index is 28.57 kg/m .  Physical Exam   GENERAL: alert and no distress  RESP: lungs clear to auscultation - no rales, rhonchi or wheezes  CV: regular rate and rhythm, normal S1 S2, no S3 or S4, no murmur, click or rub  MS: diffuse joint swelling and chronic changes to MCP joints in bilateral hands, no joint effusion or overlying erythema.       Signed Electronically by: Roxanne Hatfield PA-C    "

## 2024-03-27 NOTE — TELEPHONE ENCOUNTER
Nurse Triage SBAR    Is this a 2nd Level Triage? YES, LICENSED PRACTITIONER REVIEW IS REQUIRED    Situation: Pt calling to report painful joints and concern for bladder infection.     Background: Hx of pain in joint, ankle, and foot, GERD, HTN, primary osteoarthritis of multiple joints, hx of septic arthritis.     Assessment:     Pt reports pain in multiple joints, including left elbow to left shoulder to the upper back, right shoulder, left ankle. Reports trouble sleeping last night due to pain.     -Reports urinary symptoms of UTI, including urinary frequency and darker colored urine. No foul odor or pain with urination. No blood in urine.     -She reports the pain as 8/10.     -No fever.   -No injury  -Chronic intermittent swelling in ankles and numbness in fingers of left hand.     Protocol Recommended Disposition:   No disposition on file.    Recommendation:     Huddled with PCP:    Okay to be seen in clinic today or 3/28  Okay to use voltaren gel or capsaicin cream to painful areas.      Routed to provider    -Scheduled pt with PCP today at 1:10.     Pt verbalizes understanding and is agreeable with appointment time and date. She denies any further questions or concerns.     Does the patient meet one of the following criteria for ADS visit consideration? 16+ years old, with an [facility] PCP     TIP  Providers, please consider if this condition is appropriate for management at one of our Acute and Diagnostic Services sites.     If patient is a good candidate, please use dotphrase <dot>triageresponse and select Refer to ADS to document.    Reason for Disposition   SEVERE pain (e.g., excruciating, unable to do any normal activities) and not improved 2 hours after pain medicine   Urinating more frequently than usual (i.e., frequency)    Additional Information   Negative: Shock suspected (e.g., cold/pale/clammy skin, too weak to stand, low BP, rapid pulse)   Negative: Difficult to awaken or acting confused (e.g.,  disoriented, slurred speech)   Negative: Sounds like a life-threatening emergency to the triager   Negative: Chest pain   Negative: Arm pains with exertion (e.g., walking)   Negative: Muscle aches from influenza (flu) suspected   Negative: Muscle aches from heat exposure suspected   Negative: Lyme disease suspected (e.g., bull's eye rash or tick bite / exposure in past month)   Negative: Pain only in back   Negative: Pain in one arm OR arm pains caused by recent vigorous activity (e.g., sports, lifting, overuse)   Negative: Pain in one leg OR leg pains caused by recent vigorous activity (e.g., sports, lifting, overuse)   Negative: Rash over large area or most of the body (widespread or generalized)   Negative: Dark (cola or tea-colored) or red-colored urine   Negative: Drinking very little and dehydration suspected (e.g., no urine > 12 hours, very dry mouth, very lightheaded)   Negative: Patient sounds very sick or weak to the triager   Negative: Shock suspected (e.g., cold/pale/clammy skin, too weak to stand, low BP, rapid pulse)   Negative: Sounds like a life-threatening emergency to the triager   Negative: Followed a female genital area injury (e.g., labia, vagina, vulva)   Negative: Followed a male genital area injury (penis, scrotum)   Negative: Pus (white, yellow) or bloody discharge from end of penis   Negative: Vaginal discharge   Negative: Pain or burning with passing urine (urination) and pregnant   Negative: Pain or burning with passing urine (urination) and female   Negative: Pain or burning with passing urine (urination) and male   Negative: Pain or itching in the vulvar area   Negative: Pain in scrotum is main symptom   Negative: Blood in the urine is main symptom   Negative: Symptoms arising from use of a urinary catheter (e.g., Coude, Hancock)   Negative: Unable to urinate (or only a few drops) > 4 hours and bladder feels very full (e.g., palpable bladder or strong urge to urinate)   Negative:  "Decreased urination and drinking very little and dehydration suspected (e.g., dark urine, no urine > 12 hours, very dry mouth, very lightheaded)   Negative: Patient sounds very sick or weak to the triager   Negative: Fever > 100.4 F  (38.0 C)   Negative: Side (flank) or lower back pain present   Negative: Bad or foul-smelling urine    Answer Assessment - Initial Assessment Questions  1. ONSET: \"When did the muscle aches or body pains start?\"       Left elbow and shoulder around to the back, rt shoulder, left ankle.   2. LOCATION: \"What part of your body is hurting?\" (e.g., entire body, arms, legs)        Left elbow and shoulder around to the back, rt shoulder, left ankle in the joints.   3. SEVERITY: \"How bad is the pain?\" (Scale 1-10; or mild, moderate, severe)    - MILD (1-3): doesn't interfere with normal activities     - MODERATE (4-7): interferes with normal activities or awakens from sleep     - SEVERE (8-10):  excruciating pain, unable to do any normal activities       Severe 8/10 pain. Constant pain.   4. CAUSE: \"What do you think is causing the pains?\"      \"No clue\"  5. FEVER: \"Have you been having fever?\"      97.5 at time of call.   6. OTHER SYMPTOMS: \"Do you have any other symptoms?\" (e.g., chest pain, weakness, rash, cold or flu symptoms, weight loss)      Weight loss. She is 2.5 lighter today.   7. PREGNANCY: \"Is there any chance you are pregnant?\" \"When was your last menstrual period?\"      No  8. TRAVEL: \"Have you traveled out of the country in the last month?\" (e.g., travel history, exposures)      No    Answer Assessment - Initial Assessment Questions  1. SYMPTOM: \"What's the main symptom you're concerned about?\" (e.g., frequency, incontinence)      Urinary frequency, darker colored urine from typical.   2. ONSET: \"When did the symptoms  start?\"      Last night  3. PAIN: \"Is there any pain?\" If Yes, ask: \"How bad is it?\" (Scale: 1-10; mild, moderate, severe)      No pain with urinating  4. CAUSE: " "\"What do you think is causing the symptoms?\"      Bladder infection  5. OTHER SYMPTOMS: \"Do you have any other symptoms?\" (e.g., blood in urine, fever, flank pain, pain with urination)      No  6. PREGNANCY: \"Is there any chance you are pregnant?\" \"When was your last menstrual period?\"      No    Protocols used: Muscle Aches and Body Pain-A-OH, Urinary Symptoms-A-OH    Routed to PCP    Fe JUAREZ RN   PAL (Patient Advocate Liaison)  [facility]  [phone]    "

## 2024-04-09 ENCOUNTER — OFFICE VISIT (OUTPATIENT)
Dept: FAMILY MEDICINE | Facility: CLINIC | Age: 79
End: 2024-04-09
Payer: COMMERCIAL

## 2024-04-09 VITALS
TEMPERATURE: 98 F | RESPIRATION RATE: 18 BRPM | BODY MASS INDEX: 29.2 KG/M2 | HEART RATE: 71 BPM | OXYGEN SATURATION: 98 % | DIASTOLIC BLOOD PRESSURE: 81 MMHG | SYSTOLIC BLOOD PRESSURE: 152 MMHG | HEIGHT: 66 IN | WEIGHT: 181.7 LBS

## 2024-04-09 DIAGNOSIS — Z29.11 NEED FOR VACCINATION AGAINST RESPIRATORY SYNCYTIAL VIRUS: ICD-10-CM

## 2024-04-09 DIAGNOSIS — R73.09 ELEVATED GLUCOSE: ICD-10-CM

## 2024-04-09 DIAGNOSIS — K21.00 GASTROESOPHAGEAL REFLUX DISEASE WITH ESOPHAGITIS WITHOUT HEMORRHAGE: ICD-10-CM

## 2024-04-09 DIAGNOSIS — E78.00 PURE HYPERCHOLESTEROLEMIA: Primary | ICD-10-CM

## 2024-04-09 DIAGNOSIS — R20.1 HYPOESTHESIA: ICD-10-CM

## 2024-04-09 DIAGNOSIS — I10 PRIMARY HYPERTENSION: ICD-10-CM

## 2024-04-09 DIAGNOSIS — Z11.59 ENCOUNTER FOR SCREENING FOR VIRAL DISEASE: ICD-10-CM

## 2024-04-09 DIAGNOSIS — E87.6 HYPOKALEMIA: ICD-10-CM

## 2024-04-09 PROBLEM — M00.9 SEPTIC JOINT OF LEFT KNEE JOINT (H): Status: RESOLVED | Noted: 2017-02-20 | Resolved: 2024-04-09

## 2024-04-09 LAB
ANION GAP SERPL CALCULATED.3IONS-SCNC: 10 MMOL/L (ref 7–15)
BUN SERPL-MCNC: 28.2 MG/DL (ref 8–23)
CALCIUM SERPL-MCNC: 10.2 MG/DL (ref 8.8–10.2)
CHLORIDE SERPL-SCNC: 105 MMOL/L (ref 98–107)
CHOLEST SERPL-MCNC: 183 MG/DL
CREAT SERPL-MCNC: 0.9 MG/DL (ref 0.51–0.95)
DEPRECATED HCO3 PLAS-SCNC: 31 MMOL/L (ref 22–29)
EGFRCR SERPLBLD CKD-EPI 2021: 65 ML/MIN/1.73M2
FASTING STATUS PATIENT QL REPORTED: ABNORMAL
GLUCOSE SERPL-MCNC: 115 MG/DL (ref 70–99)
HBA1C MFR BLD: 5.9 % (ref 0–5.6)
HCV AB SERPL QL IA: NONREACTIVE
HDLC SERPL-MCNC: 65 MG/DL
LDLC SERPL CALC-MCNC: 101 MG/DL
NONHDLC SERPL-MCNC: 118 MG/DL
POTASSIUM SERPL-SCNC: 4.7 MMOL/L (ref 3.4–5.3)
SODIUM SERPL-SCNC: 146 MMOL/L (ref 135–145)
T PALLIDUM AB SER QL: NONREACTIVE
TRIGL SERPL-MCNC: 86 MG/DL
VIT B12 SERPL-MCNC: >4000 PG/ML (ref 232–1245)

## 2024-04-09 PROCEDURE — 86780 TREPONEMA PALLIDUM: CPT | Performed by: FAMILY MEDICINE

## 2024-04-09 PROCEDURE — 80061 LIPID PANEL: CPT | Performed by: FAMILY MEDICINE

## 2024-04-09 PROCEDURE — 86803 HEPATITIS C AB TEST: CPT | Performed by: FAMILY MEDICINE

## 2024-04-09 PROCEDURE — 82746 ASSAY OF FOLIC ACID SERUM: CPT | Performed by: FAMILY MEDICINE

## 2024-04-09 PROCEDURE — 80048 BASIC METABOLIC PNL TOTAL CA: CPT | Performed by: FAMILY MEDICINE

## 2024-04-09 PROCEDURE — 82607 VITAMIN B-12: CPT | Performed by: FAMILY MEDICINE

## 2024-04-09 PROCEDURE — 83036 HEMOGLOBIN GLYCOSYLATED A1C: CPT | Performed by: FAMILY MEDICINE

## 2024-04-09 PROCEDURE — 99214 OFFICE O/P EST MOD 30 MIN: CPT | Performed by: FAMILY MEDICINE

## 2024-04-09 PROCEDURE — 36415 COLL VENOUS BLD VENIPUNCTURE: CPT | Performed by: FAMILY MEDICINE

## 2024-04-09 RX ORDER — RESPIRATORY SYNCYTIAL VIRUS VACCINE 120MCG/0.5
0.5 KIT INTRAMUSCULAR ONCE
Qty: 1 EACH | Refills: 0 | Status: CANCELLED | OUTPATIENT
Start: 2024-04-09 | End: 2024-04-09

## 2024-04-09 RX ORDER — OMEPRAZOLE 40 MG/1
40 CAPSULE, DELAYED RELEASE ORAL DAILY
Status: SHIPPED
Start: 2024-04-09

## 2024-04-09 NOTE — ASSESSMENT & PLAN NOTE
She is taking OTC potassium as a dietary supplement.  She may need higher doses to continue with her current loop diuretic

## 2024-04-09 NOTE — ASSESSMENT & PLAN NOTE
Glucose   Date Value Ref Range Status   03/27/2024 140 (H) 70 - 99 mg/dL Final   12/05/2023 113 (H) 70 - 99 mg/dL Final   10/20/2021 85 70 - 99 mg/dL Final   10/08/2019 120 (H) 70 - 99 mg/dL Final   09/25/2019 98 65 - 99 mg/dL Final   03/28/2017 88 70 - 100 mg/dL Final   03/24/2017 95 70 - 99 mg/dL Final   03/23/2017 91 70 - 99 mg/dL Final     [facility] data base

## 2024-04-09 NOTE — PROGRESS NOTES
"  Assessment & Plan   Problem List Items Addressed This Visit       Elevated glucose     Glucose   Date Value Ref Range Status   03/27/2024 140 (H) 70 - 99 mg/dL Final   12/05/2023 113 (H) 70 - 99 mg/dL Final   10/20/2021 85 70 - 99 mg/dL Final   10/08/2019 120 (H) 70 - 99 mg/dL Final   09/25/2019 98 65 - 99 mg/dL Final   03/28/2017 88 70 - 100 mg/dL Final   03/24/2017 95 70 - 99 mg/dL Final   03/23/2017 91 70 - 99 mg/dL Final   Broaden data base           Relevant Orders    Hemoglobin A1c (Completed)    Encounter for screening for viral disease     Routine         Relevant Orders    Hepatitis C Screen Reflex to HCV RNA Quant and Genotype    Gastroesophageal reflux disease with esophagitis without hemorrhage     Endoscopy showed gastritis and esophagitis.  We have no records of pathology.  She was placed on omeprazole.  YANELI.  She is concerned about her hiatal hernia, which she believes she feels stretching when she stands up.  Discussed current thoughts regarding hiatal hernia.  Records indicate hernia repair site unknown.  She does not recall         Relevant Medications    omeprazole (PRILOSEC) 40 MG DR capsule    Hypertension     Elevated today, borderline.  Much lower previously.  Monitor.further         Hypoesthesia     Patient reports that she had \"numbness\" at her ankles knees and hands, in the past attributed to \"arthritis\".  Recent prednisone pulse eliminated hypoesthesia in her legs, hands remain hypoesthetic in a stocking glove distribution.  Broaden database with serologic review and nerve conduction study.         Relevant Orders    Vitamin B12    Folate    Treponema Abs w Reflex to RPR and Titer    Adult Neurology  Referral    Hypokalemia     She is taking OTC potassium as a dietary supplement.  She may need higher doses to continue with her current loop diuretic         Relevant Orders    Basic metabolic panel  (Ca, Cl, CO2, Creat, Gluc, K, Na, BUN)    Need for vaccination against respiratory " "syncytial virus     Recommended at pharmacy         Pure hypercholesterolemia - Primary     Assessment due         Relevant Orders    Lipid panel reflex to direct LDL Non-fasting               BMI  Estimated body mass index is 29.33 kg/m  as calculated from the following:    Height as of this encounter: 1.676 m (5' 6\").    Weight as of this encounter: 82.4 kg (181 lb 11.2 oz).             Ryan Daniel is a 79 year old, presenting for the following health issues:  Back Pain        4/9/2024     9:42 AM   Additional Questions   Roomed by Callie Cuadra     History of Present Illness       Back Pain:  She presents for follow up of back pain. Patient's back pain is a recurring problem.  Location of back pain:  Right lower back  Description of back pain: dull ache  Back pain spreads: right shoulder    Since patient first noticed back pain, pain is: unchanged  Does back pain interfere with her job:  No       She eats 2-3 servings of fruits and vegetables daily.She consumes 1 sweetened beverage(s) daily.She exercises with enough effort to increase her heart rate 9 or less minutes per day.  She exercises with enough effort to increase her heart rate 3 or less days per week.   She is taking medications regularly.       HPI  General Follow Up    Concern: numbness on hands  Problem started: follow up from 3-  Progression of symptoms: better  Description: patient states she is better but still has numbess in her hands. Her numbess everywhere else is gone      Patient is concerned about the above medical issues, not concerned about her back today          Objective    BP (!) 152/81 (BP Location: Right arm, Patient Position: Sitting, Cuff Size: Adult Regular)   Pulse 71   Temp 98  F (36.7  C) (Oral)   Resp 18   Ht 1.676 m (5' 6\")   Wt 82.4 kg (181 lb 11.2 oz)   LMP  (LMP Unknown)   SpO2 98%   BMI 29.33 kg/m    Body mass index is 29.33 kg/m .  Physical Exam               Signed Electronically by: Alex RODRIGUEZ" MD [place]

## 2024-04-09 NOTE — ASSESSMENT & PLAN NOTE
Endoscopy showed gastritis and esophagitis.  We have no records of pathology.  She was placed on omeprazole.  YANELI.  She is concerned about her hiatal hernia, which she believes she feels stretching when she stands up.  Discussed current thoughts regarding hiatal hernia.  Records indicate hernia repair site unknown.  She does not recall

## 2024-04-09 NOTE — ASSESSMENT & PLAN NOTE
"Patient reports that she had \"numbness\" at her ankles knees and hands, in the past attributed to \"arthritis\".  Recent prednisone pulse eliminated hypoesthesia in her legs, hands remain hypoesthetic in a stocking glove distribution.  Broaden database with serologic review and nerve conduction study.  "

## 2024-04-10 LAB — FOLATE SERPL-MCNC: 29.1 NG/ML (ref 4.6–34.8)

## 2024-04-18 ENCOUNTER — TELEPHONE (OUTPATIENT)
Dept: FAMILY MEDICINE | Facility: CLINIC | Age: 79
End: 2024-04-18
Payer: COMMERCIAL

## 2024-04-18 NOTE — TELEPHONE ENCOUNTER
[facility]: [phone]  Caroline Cardona TC

## 2024-04-18 NOTE — TELEPHONE ENCOUNTER
Patient calling.  Got called from [facility] scheduling for neurology.  [facility] was [facility].  She states first available appt not til September and they told her to call and have us put new referral in for BV.  Discussed referral is not for [facility] and is for [facility].  Advised to call below # for [facility].  Patient agrees with plan.  Routing to care team to make sure referral was faxed.  Jeanette Arriaga RN    Referred To     Alex Roach MD   [address] [facility] [address]   Phone: [phone]   Fax: [phone]    Diagnoses: Hypoesthesia   Order: Adult Neurology  Referral    [facility] [facility] [place] [facility] [address]   [address]   [facility] [address]   Phone: [phone]

## 2024-05-06 ENCOUNTER — TRANSFERRED RECORDS (OUTPATIENT)
Dept: HEALTH INFORMATION MANAGEMENT | Facility: CLINIC | Age: 79
End: 2024-05-06
Payer: COMMERCIAL

## 2024-06-28 DIAGNOSIS — R60.0 BILATERAL LEG EDEMA: ICD-10-CM

## 2024-07-01 RX ORDER — FUROSEMIDE 20 MG
20-40 TABLET ORAL DAILY
Qty: 180 TABLET | Refills: 1 | Status: SHIPPED | OUTPATIENT
Start: 2024-07-01

## 2024-07-10 ENCOUNTER — TRANSFERRED RECORDS (OUTPATIENT)
Dept: HEALTH INFORMATION MANAGEMENT | Facility: CLINIC | Age: 79
End: 2024-07-10
Payer: COMMERCIAL

## 2024-07-11 ENCOUNTER — LAB (OUTPATIENT)
Dept: LAB | Facility: CLINIC | Age: 79
End: 2024-07-11
Payer: COMMERCIAL

## 2024-07-11 DIAGNOSIS — R73.03 PREDIABETES: ICD-10-CM

## 2024-07-11 DIAGNOSIS — G62.9 NEUROPATHY: ICD-10-CM

## 2024-07-11 DIAGNOSIS — E55.9 VITAMIN D DEFICIENCY: ICD-10-CM

## 2024-07-11 DIAGNOSIS — G62.9 PERIPHERAL NERVE DISORDER: Primary | ICD-10-CM

## 2024-07-11 LAB — HBA1C MFR BLD: 5.7 % (ref 0–5.6)

## 2024-07-11 PROCEDURE — 83921 ORGANIC ACID SINGLE QUANT: CPT

## 2024-07-11 PROCEDURE — 84443 ASSAY THYROID STIM HORMONE: CPT

## 2024-07-11 PROCEDURE — 84155 ASSAY OF PROTEIN SERUM: CPT

## 2024-07-11 PROCEDURE — 84165 PROTEIN E-PHORESIS SERUM: CPT | Performed by: PATHOLOGY

## 2024-07-11 PROCEDURE — 82306 VITAMIN D 25 HYDROXY: CPT

## 2024-07-11 PROCEDURE — 86334 IMMUNOFIX E-PHORESIS SERUM: CPT | Performed by: PATHOLOGY

## 2024-07-11 PROCEDURE — 36415 COLL VENOUS BLD VENIPUNCTURE: CPT

## 2024-07-11 PROCEDURE — 83036 HEMOGLOBIN GLYCOSYLATED A1C: CPT

## 2024-07-12 LAB
TOTAL PROTEIN SERUM FOR ELP: 6.2 G/DL (ref 6.4–8.3)
TSH SERPL DL<=0.005 MIU/L-ACNC: 3.08 UIU/ML (ref 0.3–4.2)
VIT D+METAB SERPL-MCNC: 61 NG/ML (ref 20–50)

## 2024-07-16 LAB
ALBUMIN SERPL ELPH-MCNC: 4 G/DL (ref 3.7–5.1)
ALPHA1 GLOB SERPL ELPH-MCNC: 0.3 G/DL (ref 0.2–0.4)
ALPHA2 GLOB SERPL ELPH-MCNC: 0.6 G/DL (ref 0.5–0.9)
B-GLOBULIN SERPL ELPH-MCNC: 0.7 G/DL (ref 0.6–1)
GAMMA GLOB SERPL ELPH-MCNC: 0.6 G/DL (ref 0.7–1.6)
M PROTEIN SERPL ELPH-MCNC: 0 G/DL
PROT PATTERN SERPL ELPH-IMP: ABNORMAL
PROT PATTERN SERPL IFE-IMP: NORMAL

## 2024-07-17 LAB — METHYLMALONATE SERPL-SCNC: 0.23 UMOL/L (ref 0–0.4)

## 2024-08-12 ENCOUNTER — OFFICE VISIT (OUTPATIENT)
Dept: PHARMACY | Facility: CLINIC | Age: 79
End: 2024-08-12
Payer: COMMERCIAL

## 2024-08-12 VITALS
SYSTOLIC BLOOD PRESSURE: 135 MMHG | DIASTOLIC BLOOD PRESSURE: 76 MMHG | HEART RATE: 75 BPM | OXYGEN SATURATION: 96 % | BODY MASS INDEX: 29.92 KG/M2 | WEIGHT: 185.4 LBS

## 2024-08-12 DIAGNOSIS — I10 PRIMARY HYPERTENSION: Primary | ICD-10-CM

## 2024-08-12 DIAGNOSIS — R60.9 EDEMA, UNSPECIFIED TYPE: ICD-10-CM

## 2024-08-12 DIAGNOSIS — E78.5 HYPERLIPIDEMIA LDL GOAL <130: ICD-10-CM

## 2024-08-12 DIAGNOSIS — K21.9 GASTROESOPHAGEAL REFLUX DISEASE WITHOUT ESOPHAGITIS: ICD-10-CM

## 2024-08-12 DIAGNOSIS — K21.00 GASTROESOPHAGEAL REFLUX DISEASE WITH ESOPHAGITIS WITHOUT HEMORRHAGE: ICD-10-CM

## 2024-08-12 DIAGNOSIS — R39.15 URINARY URGENCY: ICD-10-CM

## 2024-08-12 DIAGNOSIS — M15.0 PRIMARY OSTEOARTHRITIS INVOLVING MULTIPLE JOINTS: ICD-10-CM

## 2024-08-12 DIAGNOSIS — Z79.82 ASPIRIN LONG-TERM USE: ICD-10-CM

## 2024-08-12 DIAGNOSIS — Z78.9 TAKES DIETARY SUPPLEMENTS: ICD-10-CM

## 2024-08-12 PROCEDURE — 99607 MTMS BY PHARM ADDL 15 MIN: CPT | Performed by: PHARMACIST

## 2024-08-12 PROCEDURE — 99605 MTMS BY PHARM NP 15 MIN: CPT | Performed by: PHARMACIST

## 2024-08-12 RX ORDER — SODIUM PHOSPHATE,MONO-DIBASIC 19G-7G/118
2 ENEMA (ML) RECTAL DAILY
COMMUNITY

## 2024-08-12 RX ORDER — VIT C/B6/B5/MAGNESIUM/HERB 173 50-5-6-5MG
1 CAPSULE ORAL DAILY
COMMUNITY

## 2024-08-12 RX ORDER — SAME BUTANEDISULFONATE/BETAINE 400-600 MG
1 POWDER IN PACKET (EA) ORAL DAILY
COMMUNITY

## 2024-08-12 RX ORDER — BIOTIN 10 MG
2 TABLET ORAL DAILY
COMMUNITY

## 2024-08-12 NOTE — PATIENT INSTRUCTIONS
"Recommendations from today's MTM visit:                                                    MTM (medication therapy management) is a service provided by a clinical pharmacist designed to help you get the most of out of your medicines.   Today we reviewed what your medicines are for, how to know if they are working, that your medicines are safe and how to make your medicine regimen as easy as possible.    - Okay to stop:  Vitamin C  Vitamin D 2000 units daily  Zinc daily  Probioitic gummies  Citrical - continue the other calcium supplement  Daily multivitamin    - Stop Nervovive for a week or two to see if you notice a difference for your neuropathy. If so, restart    - I will recommend to Roxanne LAWTON to stop your aspirin.     - Make sure to take your simvastatin at night time. Otherwise you can talk with your PCP about a different statin     - I will discuss your blood pressure with your PCP and whether you need an increase in the Valsartan.     - I will recommend to decrease the oxybutynin to your PCP    Follow-up: Return in about 6 months (around 2/12/2025) for Follow up.    It was great speaking with you today.  I value your experience and would be very thankful for your time in providing feedback in our clinic survey. In the next few days, you may receive an email or text message from [employer] with a link to a survey related to your  clinical pharmacist.\"     To schedule another MTM appointment, please call the clinic directly or you may call the MTM scheduling line at [phone] or toll-free at [phone].     My Clinical Pharmacist's contact information:                                                      Please feel free to contact me with any questions or concerns you have.      Jani Weaver, Pharm. D., [facility]  Medication Therapy Management Pharmacist    "

## 2024-08-12 NOTE — LETTER
_  Medication List        Prepared on: Aug 12, 2024     Bring your Medication List when you go to the doctor, hospital, or   emergency room. And, share it with your family or caregivers.     Note any changes to how you take your medications.  Cross out medications when you no longer use them.    Medication How I take it Why I use it Prescriber   acetaminophen (TYLENOL) 500 MG tablet Take 1,000 mg by mouth At Bedtime  Pain Patient Reported   alendronate (FOSAMAX) 70 MG tablet Take 1 tablet (70 mg) by mouth every 7 days Osteoporosis, unspecified osteoporosis type, unspecified pathological fracture presence Roxanne Hatfield PA-C   aspirin 81 MG EC tablet Take 81 mg by mouth daily Primary Prevention Patient Reported   calcium carbonate (OS-MATHEW) 1500 (600 Ca) MG tablet Take 650 mg by mouth daily  Osteoporosis Patient Reported   clindamycin (CLEOCIN) 300 MG capsule Take 600 mg by mouth daily as needed (take 2 X 300 mg = 600 mg dose) 1 hour prior to dental appointment.  Dental Prophylaxis  Patient Reported   Cyanocobalamin (VITAMIN B-12) 3000 MCG SUBL Place 2 each under the tongue daily Taking the gummies - 2 for a dose of 3000 mcg  Supplement Patient Reported   furosemide (LASIX) 20 MG tablet Take 1-2 tablets (20-40 mg) by mouth daily Usually Takes 20 mg daily. Bilateral Leg Edema Roxanne Hatfield PA-C   glucosamine-chondroitin 500-400 MG CAPS per capsule Take 2 capsules by mouth daily  Pain Patient Reported   omeprazole (PRILOSEC) 40 MG DR capsule Take 1 capsule (40 mg) by mouth daily Gastroesophageal reflux disease with esophagitis without hemorrhage Alex Roach MD   oxyBUTYnin ER (DITROPAN XL) 10 MG 24 hr tablet Take 1 tablet (10 mg) by mouth daily at 2 pm Urinary Urgency Roxanne Hatfield PA-C   Potassium Gluconate 595 (99 K) MG TABS Take 2 tablets by mouth every morning  Supplement Patient Reported   simvastatin (ZOCOR) 40 MG tablet Take 1 tablet (40 mg) by mouth every morning Pure Hypercholesterolemia Roxanne Hatfield PA-C    Turmeric 500 MG CAPS Take 1 capsule by mouth daily  Pain  Patient Reported   valsartan (DIOVAN) 160 MG tablet Take 1 tablet (160 mg) by mouth daily Primary Hypertension Roxanne Hatfield PA-C   Vitamin D-Vitamin K (K2-D3 5000) 5000-90 UNIT-MCG CAPS Take 1 capsule by mouth daily  Supplement Patient Reported         Add new medications, over-the-counter drugs, herbals, vitamins, or  minerals in the blank rows below.    Medication How I take it Why I use it Prescriber                                      Allergies:      - Melon - Anaphylaxis  - Pcn [penicillin V Potassium] - Anaphylaxis  - Walnuts [nuts] - Angioedema, Anaphylaxis  - Ceftriaxone - Itching  - Cephalexin - Itching  - Oxycodone - Other (See Comments)  - Sulfa Antibiotics  - Valium [diazepam] - Other (See Comments)  - Vicodin [hydrocodone-acetaminophen]  - Iodine Solution [povidone Iodine] - Rash        Side effects I have had:      Not on File        Other Information:              My notes and questions:

## 2024-08-12 NOTE — LETTER
"Recommended To-Do List      Prepared on: Aug 12, 2024       You can get the best results from your medications by completing the items on this \"To-Do List.\"      Bring your To-Do List when you go to your doctor. And, share it with your family or caregivers.    My To-Do List:  What we talked about: What I should do:   A medication that you may no longer need    Discuss with PCPC on whether you should keep taking aspirin          What we talked about: What I should do:   An issue with your medication    Discuss a decrease of your dosage of oxyBUTYnin ER (DITROPAN XL) with PCP          What we talked about: What I should do:   The importance of taking your medication as intended    Education: take simvastatin at night           What we talked about: What I should do:   A medication that you may no longer need    Stop taking ascorbic acid (vitamin C),Vitamin D 2000 units daily, Zinc daily, Probioitic gummies  , Citrical - continue the other calcium supplement, and your daily multivitamin          What we talked about: What I should do:                     "

## 2024-08-12 NOTE — PROGRESS NOTES
Medication Therapy Management (MTM) Encounter    ASSESSMENT:                            Medication Adherence/Access: No issues identified    Supplements   She is taking many supplements that she has no indication for. See plan for recommended supplements to stop. She had multiple supplements with vitamin D which is likely why her last vitamin D draw was elevated.      Osteoporosis:   Stable.     Hyperlipidemia   Simvisatatin is indicated to be taken at night. For better efficacy she should switch to night time administration or switch ot a different statin that does not have this requirement such as rosuvastatin.     Hypertension   Blood pressure not at goal of <130/80 mmHg. May need an increase in valsartan at some point.     GERD    Stable.     Urinary Urgency:  Oxybutynin is on the Beers list. May be able to do with a lower dose of Oxybutynin.     Pain:   Stable.     Edema:  Stable.     Heart Health:  If patient is taking aspirin for primary prevention she should likely be able to stop. At her age she may be at higher risk for bleed than benefit.     PLAN:                            - Okay to stop:  Vitamin C  Vitamin D 2000 units daily  Zinc daily  Probioitic gummies  Citrical - continue the other calcium supplement  Daily multivitamin    - Stop Nervovive for a week or two to see if you notice a difference for your neuropathy. If so, restart    - I will recommend to Roxanne LAWTON to stop your aspirin.     - Make sure to take your simvastatin at night time. Otherwise you can talk with your PCP about a different statin     - I will discuss your blood pressure with your PCP and whether you need an increase in the Valsartan.     - I will recommend to decrease the oxybutynin to your PCP      Follow-up: Return in about 6 months (around 2/12/2025) for Follow up.    SUBJECTIVE/OBJECTIVE:                          María Henderson is a 79 year old female seen for an initial visit. She was referred to me from self referred.  Patient was accompanied by Leon significant other.     Reason for visit: CMR.    Allergies/ADRs: Reviewed in chart  Past Medical History: Reviewed in chart  Tobacco: She reports that she has never smoked. She has never used smokeless tobacco.  Alcohol: none    Medication Adherence/Access: no issues reported - she would like to reduce the amount she is taking     Supplements   Vitamin C 500 mg once daily - for immune   Vitamin d 2000 units daily  Vitamin K2 90 mg with vitamin D 5000 units daily  Vitamin B12 3000 mcg gummies daily  Turmeric 500 mg daily  Nervovive 2 capsules daily for neuropathy. Unsure if it is helping   Probiotics  2 gummies daily - not having any stomach symptoms  Potassium 188 mg daily over the counter supplement- reports potassium has been low historically. Last low in March    Vitamin D Deficiency Screening Results:  Lab Results   Component Value Date    VITDT 61 (H) 07/11/2024       No reported issues at this time.       Bone Health     Osteoporosis:   calcium 650/Vitamin D 1000 daily and taking another supplement with 600 mg calcium and 400 units vitamin D  alendronate (Fosamax) 70mg weekly (has been on current therapy about 2-3 years)  Patient is not experiencing side effects.  Patient is getting  yogurt and cheese in her diet .         Hyperlipidemia   simvastatin 40 mg daily during the day  Patient reports no significant myalgias or other side effects.  The 10-year ASCVD risk score (Manda DK, et al., 2019) is: 31.6%    Values used to calculate the score:      Age: 79 years      Sex: Female      Is Non- : No      Diabetic: No      Tobacco smoker: No      Systolic Blood Pressure: 135 mmHg      Is BP treated: Yes      HDL Cholesterol: 65 mg/dL      Total Cholesterol: 183 mg/dL       Hypertension   Valsartan 160 mg daily  Not having much salt in her diet. Not getting much exercise.   Patient reports no current medication side effects  Patient does not self-monitor  blood pressure.      BP Readings from Last 3 Encounters:   08/12/24 135/76   04/09/24 (!) 152/81   03/27/24 138/79       Pulse Readings from Last 3 Encounters:   08/12/24 75   04/09/24 71   03/27/24 73          GERD      Omeprazole 40 mg once daily   Patient feels that current regimen is effective.         Urinary Urgency:  Oxybutynin Er 10 mg once daily  No side effects noted.      Pain:   Glucosamine/chondroiton 2 daily - unsure if helpful or not, Would like to keep taking  Acetaminophen 1000 mg at bedtime and it is helpful    Edema:  Furosemide 40 mg every morning  Reports this is helpful     Heart Health:  Aspirin 81 mg daily    No bleed issues. Notes not hard indications.     Today's Vitals: /76   Pulse 75   Wt 185 lb 6.4 oz (84.1 kg)   LMP  (LMP Unknown)   SpO2 96%   BMI 29.92 kg/m    ----------------      I spent 60 minutes with this patient today. All changes were made via collaborative practice agreement with Roxanne Hatfield PA-C. A copy of the visit note was provided to the patient's provider(s).    A summary of these recommendations was given to the patient.    Jani Weaver, Pharm. D., [facility]  Medication Therapy Management Pharmacist           Medication Therapy Recommendations  Aspirin long-term use    Current Medication: aspirin 81 MG EC tablet   Rationale: No medical indication at this time - Unnecessary medication therapy - Indication   Recommendation: Discontinue Medication   Status: Contact Provider - Awaiting Response         Hyperlipidemia LDL goal <130    Current Medication: simvastatin (ZOCOR) 40 MG tablet   Rationale: Does not understand instructions - Adherence - Adherence   Recommendation: Provide Education   Status: Patient Agreed - Adherence/Education         Takes dietary supplements    Current Medication: ascorbic acid (VITAMIN C) 250 MG CHEW chewable tablet (Discontinued)   Rationale: No medical indication at this time - Unnecessary medication therapy - Indication   Recommendation:  Discontinue Medication   Status: Accepted - no CPA Needed         Urinary urgency    Current Medication: oxyBUTYnin ER (DITROPAN XL) 10 MG 24 hr tablet   Rationale: Unsafe medication for the patient - Adverse medication event - Safety   Recommendation: Decrease Dose   Status: Contact Provider - Awaiting Response

## 2024-08-12 NOTE — LETTER
August 14, 2024  María Jukirk Henderson  [id number]  [place] [address]    Dear Ms. Henderson, CINDY [facility]     Thank you for talking with me on Aug 12, 2024 about your health and medications. As a follow-up to our conversation, I have included two documents:      Your Recommended To-Do List has steps you should take to get the best results from your medications.  Your Medication List will help you keep track of your medications and how to take them.    If you want to talk about these documents, please call Jani Weaver RPH at phone: [phone], Monday-Friday 8-4:30pm.    I look forward to working with you and your doctors to make sure your medications work well for you.    Sincerely,  Jani Weaver RPH  [facility] Pharmacist, [facility]

## 2024-08-12 NOTE — Clinical Note
Sent separate staff message with recommendations   Jani Weaver, Pharm. D., [facility] Medication Therapy Management Pharmacist

## 2024-08-15 ENCOUNTER — TELEPHONE (OUTPATIENT)
Dept: FAMILY MEDICINE | Facility: CLINIC | Age: 79
End: 2024-08-15
Payer: COMMERCIAL

## 2024-08-15 DIAGNOSIS — R39.15 URINARY URGENCY: ICD-10-CM

## 2024-08-15 DIAGNOSIS — I10 PRIMARY HYPERTENSION: Primary | ICD-10-CM

## 2024-08-15 RX ORDER — OXYBUTYNIN CHLORIDE 5 MG/1
5 TABLET, EXTENDED RELEASE ORAL DAILY
Qty: 90 TABLET | Refills: 1 | Status: SHIPPED | OUTPATIENT
Start: 2024-08-15

## 2024-08-15 RX ORDER — VALSARTAN 40 MG/1
40 TABLET ORAL DAILY
Qty: 90 TABLET | Refills: 1 | Status: SHIPPED | OUTPATIENT
Start: 2024-08-15

## 2024-08-15 NOTE — TELEPHONE ENCOUNTER
MTM recommends increasing Valsartan and decreasing Oxybutynin. Valsartan will be continuing her current dose and adding a 40 mg tablet at the same time. Oxybutynin will be switching to a 5 mg tablet.  Will send different doses of these medications. OK to stop aspirin. Please let patient know of changes.    Kane Urbano PA-C on 8/15/2024 at 3:02 PM (covering for Roxanne Hatfield PA-C)

## 2024-08-15 NOTE — TELEPHONE ENCOUNTER
Kane Urbano PAC      Patient is very worried about decreasing the oxybutynin due to urinary urgency any further information you can provide besides what RN explained below? Are there any other recommendations/alternatives? Or just take the decrease dose and monitor?     RN explained that this medication is on the BEERS list and it was decreased due to safety concerns     Patient states she is just always urinating, RN explained that furosemide can cause increase in urination     RN routing back to provider for further recommendations     Mimi Mendosa, Registered Nurse  [facility]

## 2024-08-16 NOTE — TELEPHONE ENCOUNTER
Called and spoke with pt,     Relayed message below.     Pt verbalizes understanding and is agreeable with plan. She states she will continue on 10 mg of oxybutynin. Pt denies any further questions or concerns at this time.     She states she has not received results of MRI that was ordered by [facility]. -Instructed pt to follow-up with [facility] regarding results.     Pt verbalizes understanding and is agreeable with plan. Pt denies any further questions or concerns at this time.     Fe JUAREZ RN   Clinic RN  [facility]

## 2024-08-16 NOTE — TELEPHONE ENCOUNTER
The MTM note said her symptoms were well controlled. If so, it is worthwhile to try decreasing the dose. If symptoms worsen, we can go back up.     If symptoms are not well controlled, lets keep dose the same. (10 mg) Her other script is still valid for that one so she should still be able to get refills.     Kane Urbano PA-C on 8/16/2024 at 7:21 AM (covering for Roxanne Hatfield PA-C)

## 2024-09-04 ENCOUNTER — TRANSFERRED RECORDS (OUTPATIENT)
Dept: HEALTH INFORMATION MANAGEMENT | Facility: CLINIC | Age: 79
End: 2024-09-04
Payer: COMMERCIAL

## 2024-09-10 ENCOUNTER — TRANSFERRED RECORDS (OUTPATIENT)
Dept: HEALTH INFORMATION MANAGEMENT | Facility: CLINIC | Age: 79
End: 2024-09-10
Payer: COMMERCIAL

## 2024-09-27 ENCOUNTER — APPOINTMENT (OUTPATIENT)
Dept: GENERAL RADIOLOGY | Facility: CLINIC | Age: 79
End: 2024-09-27
Attending: EMERGENCY MEDICINE
Payer: COMMERCIAL

## 2024-09-27 ENCOUNTER — HOSPITAL ENCOUNTER (EMERGENCY)
Facility: CLINIC | Age: 79
Discharge: HOME | End: 2024-09-27
Attending: EMERGENCY MEDICINE | Admitting: EMERGENCY MEDICINE
Payer: COMMERCIAL

## 2024-09-27 VITALS
DIASTOLIC BLOOD PRESSURE: 82 MMHG | OXYGEN SATURATION: 97 % | HEART RATE: 82 BPM | TEMPERATURE: 98.2 F | RESPIRATION RATE: 16 BRPM | SYSTOLIC BLOOD PRESSURE: 173 MMHG

## 2024-09-27 DIAGNOSIS — R06.02 SHORTNESS OF BREATH: ICD-10-CM

## 2024-09-27 DIAGNOSIS — I10 HYPERTENSION, UNSPECIFIED TYPE: ICD-10-CM

## 2024-09-27 DIAGNOSIS — E87.6 HYPOKALEMIA: Primary | ICD-10-CM

## 2024-09-27 DIAGNOSIS — U07.1 COVID-19 VIRUS INFECTION: ICD-10-CM

## 2024-09-27 DIAGNOSIS — R53.1 GENERAL WEAKNESS: ICD-10-CM

## 2024-09-27 LAB
ALBUMIN SERPL BCG-MCNC: 4.3 G/DL (ref 3.5–5.2)
ALBUMIN UR-MCNC: NEGATIVE MG/DL
ALP SERPL-CCNC: 87 U/L (ref 40–150)
ALT SERPL W P-5'-P-CCNC: 28 U/L (ref 0–50)
ANION GAP SERPL CALCULATED.3IONS-SCNC: 15 MMOL/L (ref 7–15)
APPEARANCE UR: CLEAR
AST SERPL W P-5'-P-CCNC: 30 U/L (ref 0–45)
BACTERIA #/AREA URNS HPF: ABNORMAL /HPF
BASOPHILS # BLD AUTO: 0 10E3/UL (ref 0–0.2)
BASOPHILS NFR BLD AUTO: 0 %
BILIRUB SERPL-MCNC: 0.4 MG/DL
BILIRUB UR QL STRIP: NEGATIVE
BUN SERPL-MCNC: 17.3 MG/DL (ref 8–23)
CALCIUM SERPL-MCNC: 9.6 MG/DL (ref 8.8–10.4)
CHLORIDE SERPL-SCNC: 101 MMOL/L (ref 98–107)
COLOR UR AUTO: ABNORMAL
CREAT SERPL-MCNC: 0.86 MG/DL (ref 0.51–0.95)
D DIMER PPP FEU-MCNC: 0.57 UG/ML FEU (ref 0–0.5)
EGFRCR SERPLBLD CKD-EPI 2021: 69 ML/MIN/1.73M2
EOSINOPHIL # BLD AUTO: 0.1 10E3/UL (ref 0–0.7)
EOSINOPHIL NFR BLD AUTO: 2 %
ERYTHROCYTE [DISTWIDTH] IN BLOOD BY AUTOMATED COUNT: 14.5 % (ref 10–15)
FLUAV RNA SPEC QL NAA+PROBE: NEGATIVE
FLUBV RNA RESP QL NAA+PROBE: NEGATIVE
GLUCOSE SERPL-MCNC: 88 MG/DL (ref 70–99)
GLUCOSE UR STRIP-MCNC: NEGATIVE MG/DL
HCO3 SERPL-SCNC: 25 MMOL/L (ref 22–29)
HCT VFR BLD AUTO: 40.5 % (ref 35–47)
HGB BLD-MCNC: 12.6 G/DL (ref 11.7–15.7)
HGB UR QL STRIP: ABNORMAL
HOLD SPECIMEN: NORMAL
IMM GRANULOCYTES # BLD: 0 10E3/UL
IMM GRANULOCYTES NFR BLD: 0 %
KETONES UR STRIP-MCNC: NEGATIVE MG/DL
LEUKOCYTE ESTERASE UR QL STRIP: ABNORMAL
LYMPHOCYTES # BLD AUTO: 1.3 10E3/UL (ref 0.8–5.3)
LYMPHOCYTES NFR BLD AUTO: 17 %
MCH RBC QN AUTO: 28.7 PG (ref 26.5–33)
MCHC RBC AUTO-ENTMCNC: 31.1 G/DL (ref 31.5–36.5)
MCV RBC AUTO: 92 FL (ref 78–100)
MONOCYTES # BLD AUTO: 0.8 10E3/UL (ref 0–1.3)
MONOCYTES NFR BLD AUTO: 11 %
MUCOUS THREADS #/AREA URNS LPF: PRESENT /LPF
NEUTROPHILS # BLD AUTO: 5.3 10E3/UL (ref 1.6–8.3)
NEUTROPHILS NFR BLD AUTO: 70 %
NITRATE UR QL: NEGATIVE
NRBC # BLD AUTO: 0 10E3/UL
NRBC BLD AUTO-RTO: 0 /100
NT-PROBNP SERPL-MCNC: 368 PG/ML (ref 0–1800)
PH UR STRIP: 6.5 [PH] (ref 5–7)
PLATELET # BLD AUTO: 261 10E3/UL (ref 150–450)
POTASSIUM SERPL-SCNC: 3.3 MMOL/L (ref 3.4–5.3)
PROT SERPL-MCNC: 6.6 G/DL (ref 6.4–8.3)
RBC # BLD AUTO: 4.39 10E6/UL (ref 3.8–5.2)
RBC URINE: 4 /HPF
RSV RNA SPEC NAA+PROBE: NEGATIVE
SARS-COV-2 RNA RESP QL NAA+PROBE: POSITIVE
SODIUM SERPL-SCNC: 141 MMOL/L (ref 135–145)
SP GR UR STRIP: 1.02 (ref 1–1.03)
SQUAMOUS EPITHELIAL: <1 /HPF
TROPONIN T SERPL HS-MCNC: 10 NG/L
TROPONIN T SERPL HS-MCNC: 11 NG/L
UROBILINOGEN UR STRIP-MCNC: NORMAL MG/DL
WBC # BLD AUTO: 7.5 10E3/UL (ref 4–11)
WBC URINE: 2 /HPF

## 2024-09-27 PROCEDURE — 73610 X-RAY EXAM OF ANKLE: CPT | Mod: LT

## 2024-09-27 PROCEDURE — 85025 COMPLETE CBC W/AUTO DIFF WBC: CPT | Performed by: EMERGENCY MEDICINE

## 2024-09-27 PROCEDURE — 82040 ASSAY OF SERUM ALBUMIN: CPT | Performed by: EMERGENCY MEDICINE

## 2024-09-27 PROCEDURE — 84484 ASSAY OF TROPONIN QUANT: CPT | Performed by: EMERGENCY MEDICINE

## 2024-09-27 PROCEDURE — 83880 ASSAY OF NATRIURETIC PEPTIDE: CPT | Performed by: EMERGENCY MEDICINE

## 2024-09-27 PROCEDURE — 93005 ELECTROCARDIOGRAM TRACING: CPT

## 2024-09-27 PROCEDURE — 85379 FIBRIN DEGRADATION QUANT: CPT | Performed by: EMERGENCY MEDICINE

## 2024-09-27 PROCEDURE — 81001 URINALYSIS AUTO W/SCOPE: CPT | Performed by: EMERGENCY MEDICINE

## 2024-09-27 PROCEDURE — 87637 SARSCOV2&INF A&B&RSV AMP PRB: CPT | Performed by: EMERGENCY MEDICINE

## 2024-09-27 PROCEDURE — 71046 X-RAY EXAM CHEST 2 VIEWS: CPT

## 2024-09-27 PROCEDURE — 99285 EMERGENCY DEPT VISIT HI MDM: CPT | Mod: 25

## 2024-09-27 PROCEDURE — 36415 COLL VENOUS BLD VENIPUNCTURE: CPT | Performed by: EMERGENCY MEDICINE

## 2024-09-27 PROCEDURE — 250N000013 HC RX MED GY IP 250 OP 250 PS 637: Performed by: EMERGENCY MEDICINE

## 2024-09-27 RX ORDER — POTASSIUM CHLORIDE 1500 MG/1
40 TABLET, EXTENDED RELEASE ORAL ONCE
Status: COMPLETED | OUTPATIENT
Start: 2024-09-27 | End: 2024-09-27

## 2024-09-27 RX ADMIN — POTASSIUM CHLORIDE 40 MEQ: 1500 TABLET, EXTENDED RELEASE ORAL at 21:54

## 2024-09-27 ASSESSMENT — ACTIVITIES OF DAILY LIVING (ADL)
ADLS_ACUITY_SCORE: 38

## 2024-09-27 NOTE — ED TRIAGE NOTES
Patient arrives with complaints of dizziness and becoming easily winded for the last 3 days.  recently tested positive for covid. Reports she tested negative. Patient reports having A flutter with no complications. BEFAST otherwise negative

## 2024-09-27 NOTE — ED PROVIDER NOTES
Emergency Department Note      History of Present Illness     Chief Complaint   Dizziness    HPI   María Henderson is a 79 year old female with history of asthma and hypertension who presents to the ED via EMS with her  for evaluation of dizziness. Earlier this evening María was taking off her shoes when she became dizzy and fell over. She was unable to get up due to her dizziness, she is unsure if she hit her head. María has been experiencing this dizziness, jitteriness, and shortness of breath for the past 3 days. She notes that getting up too fast has been causing her to get lightheaded, exertion has been leaving her short of breath. María endorses cough, bilateral leg edema, congestion, and left ankle pain. She denies double vision, abdominal pain, nausea, vomiting, or diarrhea. This morning she had a dark green stool. Of note, her  has been sick with Covid recently.    Independent Historian   None    Review of External Notes   Reviewed primary care visit from 4/9/2024.    Past Medical History     Medical History and Problem List   Allergic rhinitis   Bacteremia   Bradycardia   Cellulitis   Diaphragmatic hernia   Esophageal dysphagia   Edema   GERD   Heart murmur   Hyperlipidemia   Hyperlipemia  Hypertension   Hypokalemia   Hypoesthesia   Hypercholesteremia   Osteoarthritis   Osteoarthrosis   Osteopenia   Obstruction of esophagus   Paroxysmal supraventricular tachycardia    Syncope   Sepsis     Medications   Aspirin 81 mg   Cleocin   Ditropan   Diovan   Fosamax   Lasix   Prilosec   Zocor     Surgical History   Past Surgical History:   Procedure Laterality Date    7 foot and ankle       ARTHROPLASTY REVISION ANKLE  3/10/2014    Procedure: ARTHROPLASTY REVISION ANKLE;  REVISION LEFT TOTAL ANKLE  WITH CONVERSION TO  IM LAURIE FUSION WITH FEMORAL HEAD ALLOGRAFT (C-ARM);  Surgeon: Ramirez Fang MD;  Location: [facility]    ASPIRATION NEEDLE KNEE Right 12/27/2016    Procedure: ASPIRATION  NEEDLE KNEE;  Surgeon: Jian Fisher MD;  Location:  OR    BUNIONECTOMY JONAS  10/24/2011    Procedure:BUNIONECTOMY JONAS; Left Foot Bunionette Repair (C-Arm); Surgeon:BHAVIK PENG; Location: [facility]    COLONOSCOPY      ESOPHAGOSCOPY, GASTROSCOPY, DUODENOSCOPY (EGD), COMBINED N/A 1/5/2023    Procedure: ESOPHAGOGASTRODUODENOSCOPY WITH FOREIGN BODY REMOVAL;  Surgeon: Dedra Acosta MD;  Location:  OR    EYE SURGERY      cataract    H ABLATION SVT      HERNIA REPAIR      IRRIGATION AND DEBRIDEMENT FOOT, COMBINED Left 12/27/2016    Procedure: COMBINED IRRIGATION AND DEBRIDEMENT FOOT;  Surgeon: Jian Fisher MD;  Location:  OR    ORTHOPEDIC SURGERY      RTKA    ORTHOPEDIC SURGERY Left     ROTATOR CUFF    REMOVE ANTIBIOTIC CEMENT BEADS / SPACER KNEE N/A 4/24/2017    Procedure: REMOVE ANTIBIOTIC CEMENT BEADS / SPACER KNEE;  REMOVAL ANTIBIOTIC SPACER RIGHT KNEE REIMPLANT TOTAL JOINT COMPONENT TO RIGHT KNEE ;  Surgeon: Husam Berkowitz MD;  Location:  OR    REMOVE HARDWARE ARTHROPLASTY KNEE, IRRIG & JOSE, PLACE ANTIBIOTIC CEMENT SPACER, COMBINED Right 2/20/2017    Procedure: COMBINED REMOVE HARDWARE ARTHROPLASTY KNEE, IRRIGATION AND DEBRIDEMENT, PLACE ANTIBIOTIC CEMENT BEADS / SPACER;  Surgeon: Husam Berkowitz MD;  Location:  OR    REVERSE ARTHROPLASTY SHOULDER Right 10/7/2019    Procedure: RIGHT TOTAL REVERSE SHOULDER ARTHROPLASTY;  Surgeon: Husam Berkowitz MD;  Location:  OR    TONSILLECTOMY      TONSILLECTOMY      total knee arthroplasy      ZZC TOTAL KNEE ARTHROPLASTY Right      Physical Exam     Patient Vitals for the past 24 hrs:   BP Temp Pulse Resp SpO2   09/27/24 2150 (!) 173/82 -- 82 -- 97 %   09/27/24 1945 (!) 158/81 -- 74 -- 94 %   09/27/24 1900 (!) 171/92 -- 100 -- 94 %   09/27/24 1845 (!) 176/102 -- 72 -- 95 %   09/27/24 1830 (!) 165/99 -- 101 -- 95 %   09/27/24 1815 -- 98.2  F (36.8  C) -- 16 --   09/27/24 1805 -- -- -- -- 92 %    09/27/24 1800 (!) 179/90 -- 105 -- 92 %     Physical Exam  Vitals and nursing note reviewed.   Constitutional:       General: She is not in acute distress.     Appearance: She is not ill-appearing.   HENT:      Head: Normocephalic and atraumatic.      Right Ear: External ear normal.      Left Ear: External ear normal.      Nose: Nose normal.      Mouth/Throat:      Mouth: Mucous membranes are moist.   Eyes:      Extraocular Movements: Extraocular movements intact.      Conjunctiva/sclera: Conjunctivae normal.   Cardiovascular:      Rate and Rhythm: Normal rate and regular rhythm.      Heart sounds: No murmur heard.  Pulmonary:      Effort: Pulmonary effort is normal. No respiratory distress.      Breath sounds: Normal breath sounds. No wheezing, rhonchi or rales.   Abdominal:      General: Abdomen is flat. Bowel sounds are normal. There is no distension.      Palpations: Abdomen is soft.      Tenderness: There is no abdominal tenderness. There is no guarding or rebound.   Musculoskeletal:         General: Swelling (1+ bilateral lower extremity) present. No deformity or signs of injury.      Cervical back: Normal range of motion and neck supple.   Skin:     General: Skin is warm and dry.      Findings: No rash.   Neurological:      Mental Status: She is alert and oriented to person, place, and time.      Cranial Nerves: No cranial nerve deficit.      Sensory: No sensory deficit.      Motor: No weakness.   Psychiatric:         Behavior: Behavior normal.           Diagnostics     Lab Results   Labs Ordered and Resulted from Time of ED Arrival to Time of ED Departure   D DIMER QUANTITATIVE - Abnormal       Result Value    D-Dimer Quantitative 0.57 (*)    COMPREHENSIVE METABOLIC PANEL - Abnormal    Sodium 141      Potassium 3.3 (*)     Carbon Dioxide (CO2) 25      Anion Gap 15      Urea Nitrogen 17.3      Creatinine 0.86      GFR Estimate 69      Calcium 9.6      Chloride 101      Glucose 88      Alkaline Phosphatase  87      AST 30      ALT 28      Protein Total 6.6      Albumin 4.3      Bilirubin Total 0.4     INFLUENZA A/B, RSV, & SARS-COV2 PCR - Abnormal    Influenza A PCR Negative      Influenza B PCR Negative      RSV PCR Negative      SARS CoV2 PCR Positive (*)    ROUTINE UA WITH MICROSCOPIC REFLEX TO CULTURE - Abnormal    Color Urine Light Yellow      Appearance Urine Clear      Glucose Urine Negative      Bilirubin Urine Negative      Ketones Urine Negative      Specific Gravity Urine 1.018      Blood Urine Small (*)     pH Urine 6.5      Protein Albumin Urine Negative      Urobilinogen Urine Normal      Nitrite Urine Negative      Leukocyte Esterase Urine Trace (*)     Bacteria Urine Few (*)     Mucus Urine Present (*)     RBC Urine 4 (*)     WBC Urine 2      Squamous Epithelials Urine <1     CBC WITH PLATELETS AND DIFFERENTIAL - Abnormal    WBC Count 7.5      RBC Count 4.39      Hemoglobin 12.6      Hematocrit 40.5      MCV 92      MCH 28.7      MCHC 31.1 (*)     RDW 14.5      Platelet Count 261      % Neutrophils 70      % Lymphocytes 17      % Monocytes 11      % Eosinophils 2      % Basophils 0      % Immature Granulocytes 0      NRBCs per 100 WBC 0      Absolute Neutrophils 5.3      Absolute Lymphocytes 1.3      Absolute Monocytes 0.8      Absolute Eosinophils 0.1      Absolute Basophils 0.0      Absolute Immature Granulocytes 0.0      Absolute NRBCs 0.0     TROPONIN T, HIGH SENSITIVITY - Normal    Troponin T, High Sensitivity 10     NT PROBNP INPATIENT - Normal    N terminal Pro BNP Inpatient 368     TROPONIN T, HIGH SENSITIVITY - Normal    Troponin T, High Sensitivity 11       Imaging   XR Ankle Left G/E 3 Views   Final Result   IMPRESSION: Left ankle negative for acute fracture or dislocation. Hindfoot triple fusion with an intramedullary nail in the distal tibia, talus, and calcaneus, with two AP directed calcaneal screws, and two transverse tibial and talar screws in place.    Hardware is well seated without  complication. Joints are solidly fused with bridging bone and medullary continuity. Persistent joint spaces at the talonavicular, naviculocuneiform, and TMT joints. Additional fixation screws in the first TMT joint and in    the fifth metatarsal, without loosening or complication.      XR Chest 2 Views   Final Result   IMPRESSION: Lungs are clear. No effusions or pneumothorax. Cardiomegaly without overt pulmonary edema. Degenerative changes of the spine. Right reverse shoulder arthroplasty.        EKG   ECG taken at 1936, ECG read at 1948  Sinus rhythm with marked sinus arrhythmia     No significant changes as compared to prior, dated 10/07/2019.  Rate 76 bpm. SD interval 198 ms. QRS duration 106 ms. QT/QTc 388/436 ms. P-R-T axes 19 82 25.    Independent Interpretation   CXR: No pneumothorax, infiltrate, or pulmonary edema.  X-ray left ankle shows no fracture    ED Course      Medications Administered   Medications   potassium chloride lian ER (KLOR-CON M20) CR tablet 40 mEq (40 mEq Oral $Given 9/27/24 2154)       Procedures   Procedures     Discussion of Management   None    ED Course   ED Course as of 09/27/24 2246   Fri Sep 27, 2024   1851 I obtained history and examined the patient as noted above.      Additional Documentation  None    Medical Decision Making / Diagnosis     CMS Diagnoses: None    MIPS       None    MDM   María Henderson is a 79 year old female who presents with fatigue, shortness of breath, lightheadedness.  Differential diagnosis includes an infectious etiology such as COVID or pneumonia, cardiac etiology such as ACS or CHF, PE, dehydration, etc.  She has no focal neurologic deficits or other neurologic complaints to suggest a stroke or CNS lesion.  Workup was pursued as noted.  She has no significant leukocytosis or anemia.  D-dimer is below age-adjusted cutoff.  Troponin was normal and remained normal on repeat.  She is mildly hypokalemic so was given potassium.  Her viral swab did come  back positive for COVID and I suspect that this is the main cause of her symptoms.  She may also be dehydrated given that she has not had anything to eat or drink today.  She also has been doing a lot of work at home and likely overdid it and is dehydrated.  Patient did feel better while in the ED and was able to tolerate p.o. and ambulate without difficulty.  She was comfortable with going home.  We discussed return precautions and recommended close follow-up with primary care.    Disposition   The patient was discharged.     Diagnosis     ICD-10-CM    1. COVID-19 virus infection  U07.1       2. General weakness  R53.1       3. Shortness of breath  R06.02       4. Hypokalemia  E87.6       5. Hypertension, unspecified type  I10            Discharge Medications   Discharge Medication List as of 9/27/2024 10:09 PM        Germania SOSA, am serving as a scribe at 6:50 PM on 9/27/2024 to document services personally performed by Haroldo Christopher MD based on my observations and the provider's statements to me.        Haroldo Christopher MD  09/27/24 2171

## 2024-09-27 NOTE — ED NOTES
Bed: ED27  Expected date:   Expected time:   Means of arrival:   Comments:  [id number]    
41 y/o F with PMH HTN, obesity, marijuana abuse, here with her sister while her sister was a patient, was told her sister is covid+ and then began experiencing L sided CP-- sharp, non-radiating while waiting with her sister in waiting room.  no diaphoresis, exertional component, n/v, fhx early cad/scd.   no fever, cough, sob.   Denies hemoptysis, recent surgery/immobilization, hx cancers, hx PE/DVT, hormone use, calf pain or swelling.

## 2024-09-30 ENCOUNTER — PATIENT OUTREACH (OUTPATIENT)
Dept: FAMILY MEDICINE | Facility: CLINIC | Age: 79
End: 2024-09-30
Payer: COMMERCIAL

## 2024-09-30 LAB
ATRIAL RATE - MUSE: 76 BPM
DIASTOLIC BLOOD PRESSURE - MUSE: NORMAL MMHG
INTERPRETATION ECG - MUSE: NORMAL
P AXIS - MUSE: 19 DEGREES
PR INTERVAL - MUSE: 198 MS
QRS DURATION - MUSE: 106 MS
QT - MUSE: 388 MS
QTC - MUSE: 436 MS
R AXIS - MUSE: 82 DEGREES
SYSTOLIC BLOOD PRESSURE - MUSE: NORMAL MMHG
T AXIS - MUSE: 25 DEGREES
VENTRICULAR RATE- MUSE: 76 BPM

## 2024-09-30 NOTE — TELEPHONE ENCOUNTER
Hospital Follow Up   9/27/2024 (4 hours) [facility] Emergency Dept    Diagnosis   COVID-19 virus infection  General weakness  Shortness of breath  Hypokalemia  Hypertension, unspecified type    Medications   No medication changes     Follow Up instructions   PCP asap     Routing to  AV RN leni Mendosa, Registered Nurse  [facility]

## 2024-09-30 NOTE — TELEPHONE ENCOUNTER
Transitions of Care Outreach  Chief Complaint   Patient presents with    Hospital F/U     COVID-19 virus infection General weakness Shortness of breath Hypokalemia Hypertension, unspecified type         Most Recent Admission Date: 9/27/2024   Most Recent Admission Diagnosis:      Most Recent Discharge Date: 9/27/2024   Most Recent Discharge Diagnosis: COVID-19 virus infection - U07.1  General weakness - R53.1  Shortness of breath - R06.02  Hypokalemia - E87.6  Hypertension, unspecified type - I10     Transitions of Care Assessment    Discharge Assessment  How are you doing now that you are home?: Doing very good  How are your symptoms? (Red Flag symptoms escalate to triage hotline per guidelines): Improved (nose isn't running, less congestion)  Do you know how to contact your clinic care team if you have future questions or changes to your health status? : Yes  Does the patient have their discharge instructions? : Yes  Does the patient have questions regarding their discharge instructions? : No  Were you started on any new medications or were there changes to any of your previous medications? : No  Does the patient have all of their medications?: Yes  Do you have questions regarding any of your medications? : No  Do you have all of your needed medical supplies or equipment (DME)?  (i.e. oxygen tank, CPAP, cane, etc.): Yes    Follow up Plan          Future Appointments   Date Time Provider Department Center   10/1/2024 10:30 AM Marium Dodd PA-C [facility]       Outpatient Plan as outlined on AVS reviewed with patient.    For any urgent concerns, please contact our 24 hour nurse triage line: [phone] ([phone])       Trini Byrnes RN

## 2024-09-30 NOTE — TELEPHONE ENCOUNTER
Outgoing call to patient. Attempt # 1. Left message to call back any triage nurse.      Aubree Lindsey, RN, BSN, PHN  [facility]

## 2024-10-01 ENCOUNTER — OFFICE VISIT (OUTPATIENT)
Dept: FAMILY MEDICINE | Facility: CLINIC | Age: 79
End: 2024-10-01
Payer: COMMERCIAL

## 2024-10-01 VITALS
SYSTOLIC BLOOD PRESSURE: 138 MMHG | TEMPERATURE: 98.8 F | OXYGEN SATURATION: 95 % | DIASTOLIC BLOOD PRESSURE: 78 MMHG | HEART RATE: 69 BPM | RESPIRATION RATE: 14 BRPM | WEIGHT: 183 LBS | HEIGHT: 66 IN | BODY MASS INDEX: 29.41 KG/M2

## 2024-10-01 DIAGNOSIS — G62.9 NEUROPATHY: ICD-10-CM

## 2024-10-01 DIAGNOSIS — U07.1 INFECTION DUE TO 2019 NOVEL CORONAVIRUS: Primary | ICD-10-CM

## 2024-10-01 DIAGNOSIS — H61.22 IMPACTED CERUMEN OF LEFT EAR: ICD-10-CM

## 2024-10-01 PROCEDURE — 99214 OFFICE O/P EST MOD 30 MIN: CPT | Performed by: PHYSICIAN ASSISTANT

## 2024-10-01 NOTE — PROGRESS NOTES
Assessment & Plan     Infection due to 2019 novel coronavirus  Symptoms improving. She is out of the range for treatment with Paxlovid (and reports she did not think she could afford treatment- it was over 1000 dollars for her 's prescription for Paxlovid).  Continue with OTC treatments.  Vitals reassuring today.    Impacted cerumen of left ear  Left canal occluded. She is having some popping and crackling. Try Debrox drops to loosen the ear wax.  - carbamide peroxide (DEBROX) 6.5 % otic solution; Place 5 drops Into the left ear daily as needed for other (ear wax).    Neuropathy  Patient reports she previously was told she could take gabapentin (but it appears it was not prescribed). Her  has a prescription he does not use and patient has been using this. Unsure of dose. I recommended she call us back or send MyChart with dosing and how she has been taking this. This will ensure we have it documented and we can also send a prescription for her (as patient should not be taking her 's medications).      MED REC REQUIRED  Post Medication Reconciliation Status:  Discharge medications reconciled, continue medications without change      Review of external notes as documented elsewhere in note  Review of the result(s) of each unique test - imaging and labs from 9/27/24 ER visit  Prescription drug management  31 minutes spent by me on the date of the encounter doing chart review, history and exam, documentation and further activities per the note    Subjective   María is a 79 year old, presenting for the following health issues:  ER F/U (Pt following up from ER visit on 9/27/24 for covid)      10/1/2024    10:12 AM   Additional Questions   Roomed by ethan hurst   Accompanied by s/o Osman     History of Present Illness       Reason for visit:  Covid follow up  Symptom onset:  3-7 days ago  Symptoms include:  None  Symptom intensity:  Mild  Symptom progression:  Improving  Had these symptoms  "before:  No  What makes it worse:  None  What makes it better:  None   She is taking medications regularly.     ED/UC Followup:    Facility:  [facility]  Date of visit: 9/27/24  Reason for visit: shortness of breath  Current Status: improved     Patient is a 79-year-old female with history of asthma and hypertension who presents today for follow-up from the ER.  Patient presented to the ER on 9/27/2024 due to dizziness and fall.  Per ER note patient was removing her shoes when she became dizzy and fell over and was unable to get up due to the dizziness.  She had been feeling dizzy, jittery and short of breath for the previous 3 days.  Patient had reported cough, bilateral leg edema, congestion and left ankle pain.  Testing in the ER included CMP, D-dimer, BNP, CBC, troponin x2, urinalysis, influenza/RSV/COVID testing, EKG, chest x-ray and ankle x-ray.  Chest x-ray showed no pneumonia.  Ankle x-ray showed no fracture.  D-dimer was slightly elevated (but below age adjusted cut off), normal troponins x 2 and normal BNP.  She tested positive for COVID-19.  Patient feeling better prior to discharging from the ER.  She is here today for close follow-up.        Objective    /78 (BP Location: Right arm, Patient Position: Sitting, Cuff Size: Adult Regular)   Pulse 69   Temp 98.8  F (37.1  C) (Oral)   Resp 14   Ht 1.676 m (5' 6\")   Wt 83 kg (183 lb)   LMP  (LMP Unknown)   SpO2 95%   BMI 29.54 kg/m    Body mass index is 29.54 kg/m .  Physical Exam   GENERAL: No acute distress  HEENT: Normocephalic, PERRL, Left canal occluded with cerumen. Right canal patent, right TM without erythema and non-bulging.  CARDIAC: Regular rate and rhythm. No murmurs.  PULMONARY: Lungs are clear to auscultation bilaterally. No wheezes, rhonchi or crackles.  NEURO: Alert and non-focal          Signed Electronically by: Marium Dodd PA-C    "

## 2024-10-14 ENCOUNTER — OFFICE VISIT (OUTPATIENT)
Dept: PEDIATRICS | Facility: CLINIC | Age: 79
End: 2024-10-14
Payer: OTHER MISCELLANEOUS

## 2024-10-14 VITALS
RESPIRATION RATE: 18 BRPM | WEIGHT: 189.4 LBS | TEMPERATURE: 97.5 F | HEART RATE: 72 BPM | SYSTOLIC BLOOD PRESSURE: 138 MMHG | BODY MASS INDEX: 30.57 KG/M2 | DIASTOLIC BLOOD PRESSURE: 76 MMHG | OXYGEN SATURATION: 95 %

## 2024-10-14 DIAGNOSIS — M79.601 PAIN OF RIGHT UPPER EXTREMITY: Primary | ICD-10-CM

## 2024-10-14 DIAGNOSIS — M54.2 NECK PAIN: ICD-10-CM

## 2024-10-14 PROCEDURE — 99203 OFFICE O/P NEW LOW 30 MIN: CPT | Performed by: STUDENT IN AN ORGANIZED HEALTH CARE EDUCATION/TRAINING PROGRAM

## 2024-10-14 ASSESSMENT — PAIN SCALES - GENERAL: PAINLEVEL: MILD PAIN (2)

## 2024-10-14 NOTE — PROGRESS NOTES
"  Assessment & Plan     Pain of right upper extremity  Neck pain  Presents today for evaluation of right arm pain and tingling.  Notes that this is a longstanding issue that has been going on for the past decade.  Has a history of bilateral shoulder replacement.  Symptoms typically improve with warm showers or hot packs.  Pain has been worse over the past few days to the point where it affects her sleep.  No associated weakness or numbness.  She has tried topical nsaids without much improvement.  Given chronicity low concern for acute process and exam overall reassuring.  At this point recommend trial of PT, if symptoms persist may benefit from neck imaging  - Physical Therapy  Referral; Future      I spent a total of 31 minutes on the day of the visit.   Time spent by me doing chart review, history and exam, documentation and further activities per the note        BMI  Estimated body mass index is 30.57 kg/m  as calculated from the following:    Height as of 10/1/24: 1.676 m (5' 6\").    Weight as of this encounter: 85.9 kg (189 lb 6.4 oz).             Ryan Daniel is a 79 year old, presenting for the following health issues:  Tingling        10/14/2024     9:07 AM   Additional Questions   Roomed by Jeanne Nascimento CMA     History of Present Illness       Reason for visit:  Covid follow up  Symptom onset:  3-7 days ago  Symptoms include:  None  Symptom intensity:  Mild  Symptom progression:  Improving  Had these symptoms before:  No  What makes it worse:  None  What makes it better:  None   She is taking medications regularly.     Here today for tingling right side , arm, finger, hand, shoulder all the way down. Also notes similar symptoms in  knee on right side  Has been going on \"for a long time\"    Intermittent tingling in arms and hands     Hot water is helpful as is a heating pad     Has been ongoing for the past 10 years     Right arm and shoulder     Right knee    Some numbness but does " notpersist    Comes and goes    Worse over the the past week     Never tried medication or therapy specifically for this     No associated weakness or numbness                   Objective    /76 (BP Location: Right arm, Cuff Size: Adult Regular)   Pulse 72   Temp 97.5  F (36.4  C) (Tympanic)   Resp 18   Wt 85.9 kg (189 lb 6.4 oz)   LMP  (LMP Unknown)   SpO2 95%   BMI 30.57 kg/m    Body mass index is 30.57 kg/m .  Physical Exam   GENERAL: alert and no distress  MS: no gross musculoskeletal defects noted, no edema.  Good ROM of bilateral shoulders.  Well healed scars from prior surgeries  NEURO: Normal strength and tone, 5/5 strength of shoulders, arms and hands mentation intact and speech normal            Signed Electronically by: Breanne Pan MD

## 2024-10-22 ENCOUNTER — TRANSFERRED RECORDS (OUTPATIENT)
Dept: HEALTH INFORMATION MANAGEMENT | Facility: CLINIC | Age: 79
End: 2024-10-22
Payer: COMMERCIAL

## 2024-10-31 ENCOUNTER — OFFICE VISIT (OUTPATIENT)
Dept: FAMILY MEDICINE | Facility: CLINIC | Age: 79
End: 2024-10-31
Payer: COMMERCIAL

## 2024-10-31 ENCOUNTER — PATIENT OUTREACH (OUTPATIENT)
Dept: FAMILY MEDICINE | Facility: CLINIC | Age: 79
End: 2024-10-31

## 2024-10-31 VITALS
DIASTOLIC BLOOD PRESSURE: 78 MMHG | WEIGHT: 191.6 LBS | OXYGEN SATURATION: 94 % | SYSTOLIC BLOOD PRESSURE: 165 MMHG | BODY MASS INDEX: 30.79 KG/M2 | HEART RATE: 76 BPM | RESPIRATION RATE: 12 BRPM | TEMPERATURE: 98.3 F | HEIGHT: 66 IN

## 2024-10-31 DIAGNOSIS — M25.511 ACUTE PAIN OF RIGHT SHOULDER: ICD-10-CM

## 2024-10-31 DIAGNOSIS — M25.561 ACUTE PAIN OF RIGHT KNEE: Primary | ICD-10-CM

## 2024-10-31 PROCEDURE — G2211 COMPLEX E/M VISIT ADD ON: HCPCS | Performed by: FAMILY MEDICINE

## 2024-10-31 PROCEDURE — 99213 OFFICE O/P EST LOW 20 MIN: CPT | Performed by: FAMILY MEDICINE

## 2024-10-31 ASSESSMENT — PAIN SCALES - GENERAL: PAINLEVEL_OUTOF10: MODERATE PAIN (5)

## 2024-10-31 NOTE — TELEPHONE ENCOUNTER
"Received a vm on PAL RN line,     -Pt reports being seen in ED on 10/11 following a fall in the doorway when carrying in groceries.   -She has since followed up with ortho where she reports being told that \"everything is okay with hip and knee.\"  -She reports she continues to have pain in right hip and right knee since fall.   -She is requesting a call back from writer.     Called and spoke with pt,     She states she was in the ED on 10/11, however this writer is unable to find any documentation or imaging reports. She states she was at [facility] ED.     She states she has been assessed by [facility] and determined that there is no concerns, however she is still having pain in rt hip and knee. She is requesting an office visit to be assessed.     -Assisted pt in scheduling a visit with Elsi Celestin MD today at 1:15 PM to be assessed.     Pt verbalizes understanding and is agreeable with plan. Pt denies any further questions or concerns at this time.     Fe JUAREZ, RN   Clinic RN  [facility]        "

## 2024-10-31 NOTE — PROGRESS NOTES
"  Assessment & Plan     Acute pain of right knee  Post fall, pt is still struggling with pain in the posterior part of the knee, pt was seen by orthopedic and had Xray done, I think this is mostly contusion, and explained that these symptoms can last for a long time.    Acute pain of right shoulder  Post fall, but the story is a little confusing as patient does have neck pain with numbness in the Rt hand, and this has been going on for a long time before the fall, but I think it was exacerbated by her recent fall, and advised to contact her orthopedic who has seen her for this issue to see if any further testing needed.  Meanwhile, I recommend PT to evaluate.        MED REC REQUIRED  Post Medication Reconciliation Status:   BMI  Estimated body mass index is 30.93 kg/m  as calculated from the following:    Height as of this encounter: 1.676 m (5' 6\").    Weight as of this encounter: 86.9 kg (191 lb 9.6 oz).             Subjective   Pat is a 79 year old, presenting for the following health issues:  Urgent Care (-Pt reports being seen in ED on 10/11 following a fall in the doorway when carrying in groceries. -She has since followed up with ortho where she reports being told that \"everything is okay with hip and knee.\" -She reports she continues to have pain in right hip and right knee since fall.-She is requesting a call back from writer. Called and spoke with pt- She states she was in the ED on 10/11, however this writer is unable to find any documentation or imaging reports. She states she was at [facility]) and ER F/U (ED Discharged 9/27/2024 (4 hours) [facility] Emergency Dept-Haroldo Christopher MD/Last attending   Treatment team Hypokalemia +4 more/Clinical impression Dizziness  Chief complaint)      10/31/2024     1:09 PM   Additional Questions   Accompanied by Osman CLARK         ED/UC Followup:    Facility:  [facility] ED  Date of visit: 09-27-20204  Reason for visit: right " knee inner side of the knee swollen, hard to move or get out of the chair  Current Status: since her fall on 9/27, she has been having pain in the right knee, mainly in the medial part of the knee, and the pain radiate to the mid thigh, also has been having pain in the Rt lower back.  Also has been having pain in the Rt anterior part of the shoulder, that radiate down to the Rt arm.            Objective    LMP  (LMP Unknown)   There is no height or weight on file to calculate BMI.  Physical Exam   Knee exam:  Inspection:surgical scar on the anterior part of the knee.    no antalgic gait,positive posterior soft tissue tenderness over back of the knee, effusion negative,  range of motion of the knee is nl,   X-ray: done by orthopedic office .  Neck exam :  Inspection: no rigidity.  ROM of the cervical spine :limited but normal for age, paraspinal muscles tenderness positive on the Rt side , Trapeza tenderness positive on the Rt  C5:deltoid strengthnl sensation over the deltoid nl  C6 biceps strength nl sensation over the radial part of the arm and thumb nl  C7 triceps and wrist extension nl, sensation of the middle fingers of the hand nl  T1 hand  nl, sensation on the ulnar part of the arm and hand nl  Spurling's maneuver: negative  Upper limb tension test (Elvey's test) not done.  Shoulder exam:inspection: normal.  Palpation : anterior joint tenderness.  ROM showed :Abduction:limited to 90 degrees  Anterior rotation:nl  Internal rotation:limited    Rotator cuff/supraspinatous strength nl  Drop arm sign negative                  Signed Electronically by: Elsi Celestin MD

## 2024-11-12 ENCOUNTER — OFFICE VISIT (OUTPATIENT)
Dept: URGENT CARE | Facility: URGENT CARE | Age: 79
End: 2024-11-12
Payer: COMMERCIAL

## 2024-11-12 ENCOUNTER — NURSE TRIAGE (OUTPATIENT)
Dept: FAMILY MEDICINE | Facility: CLINIC | Age: 79
End: 2024-11-12
Payer: COMMERCIAL

## 2024-11-12 VITALS
HEART RATE: 77 BPM | RESPIRATION RATE: 20 BRPM | SYSTOLIC BLOOD PRESSURE: 138 MMHG | WEIGHT: 191 LBS | TEMPERATURE: 97 F | BODY MASS INDEX: 30.83 KG/M2 | OXYGEN SATURATION: 100 % | DIASTOLIC BLOOD PRESSURE: 84 MMHG

## 2024-11-12 DIAGNOSIS — L97.509 SORE ON TOE (H): Primary | ICD-10-CM

## 2024-11-12 PROCEDURE — 99213 OFFICE O/P EST LOW 20 MIN: CPT | Performed by: PHYSICIAN ASSISTANT

## 2024-11-12 RX ORDER — DOXYCYCLINE 100 MG/1
100 CAPSULE ORAL 2 TIMES DAILY
Qty: 20 CAPSULE | Refills: 0 | Status: SHIPPED | OUTPATIENT
Start: 2024-11-12 | End: 2024-11-22

## 2024-11-12 NOTE — TELEPHONE ENCOUNTER
"Nurse Triage SBAR    Is this a 2nd Level Triage? NO    Situation: Blister on second toe on left foot    Background: Pt reports that she has vascular concerns in left foot. Hx of sepsis, HTN, elevated glucose.     Assessment:     Blister on left foot second toe, blister comes and goes, has been drained and \"pus\" drains and then blister reforms.     Blister appearance:  \"Round [place], brownish color in middle.\"  Size: 1/4 in. around, size of dime, raised slightly.   Surrounding skin: Pink, not red. Unsure if warm.   Pain:  Yes, 5/10. \"Only notice the pain when you have shoes on.\"    Denies fever or injury.     Protocol Recommended Disposition:   No disposition on file.    Recommendation:     -Pt given care advice.   -Advised pt to be seen within 24 hours.   - Pt given [facility] address.     Pt verbalizes understanding and is agreeable with plan to be seen in [facility] today. Pt denies any further questions or concerns at this time.     Fe JUAREZ RN   Clinic RN  [facility]    Does the patient meet one of the following criteria for ADS visit consideration? 16+ years old, with an [facility] PCP     TIP  Providers, please consider if this condition is appropriate for management at one of our Acute and Diagnostic Services sites.     If patient is a good candidate, please use dotphrase <dot>triageresponse and select Refer to ADS to document.    Reason for Disposition   [1] Blister on foot AND [2] diabetes mellitus or peripheral vascular disease (\"poor circulation\")    Additional Information   Negative: Sounds like a life-threatening emergency to the triager   Negative: Followed a thermal burn (e.g., hot object, scald from boiling water)   Negative: Followed a chemical burn   Negative: Followed frostbite   Negative: Poison ivy, oak, or sumac rash suspected (e.g., itchy rash after contact with poison ivy)   Negative: Shingles suspected (i.e., painful rash, multiple small blisters grouped together in one area of " "body; dermatomal distribution)   Negative: [1] Diabetes mellitus AND [2] boil, ulcer, or infection of foot   Negative: Blisters on other parts of the body (besides hands and feet)   Negative: Patient sounds very sick or weak to the triager   Negative: [1] Looks infected (spreading redness, red streak, pus) AND [2] fever   Negative: [1] Looks infected AND [2] large red area (> 2 in. or 5 cm)   Negative: [1] Foul-smelling odor from foot AND [2] new or increased   Negative: [1] Purple or black skin on foot or toe AND [2] new or increased   Negative: Looks like a boil or deep ulcer   Negative: [1] Looks infected (spreading redness, red streak, pus) AND [2] no fever    Answer Assessment - Initial Assessment Questions  1. APPEARANCE of BLISTER: \"What does it look like?\"     Round [place], brownish color in middle  2. SIZE: \"How large is the blister?\" (inches, cm or compare to coins)       1/4 in around, size of dime, raised slightly.   3. LOCATION: \"Where are the blisters located?\"       Second toe, left foot  4. WHEN: \"When did the blister happen?\"      11/8  5. CAUSE: \"What do you think caused the blister?\"      Not sure.   6. PAIN: \"Does it hurt?\" If Yes, ask: \"How bad is the pain?\"  (Scale 1-10; or mild, moderate, severe)      Yes, 5/10. \"Only notice the pain when you have shoes on.\"  7. OTHER SYMPTOMS: \"Do you have any other symptoms?\" (e.g., fever)      No    Protocols used: Blister - Foot and Hand-A-    Fe JUAREZ RN   Clinic RN  [facility]    "

## 2024-11-12 NOTE — PATIENT INSTRUCTIONS
[facility]  will call you to coordinate your care as prescribed by your provider. A representative will call you within 2 business days to help you schedule your appointment, or you may contact the  Representative at: [phone].     Please take the oral antibiotic as we discussed due to concern for infection on your left foot second toe.  Please keep a cotton wedge in between the left great toe and second toe to minimize friction.  Follow-up with podiatry as we discussed to ensure resolution.  Please follow-up sooner if any worsening symptoms.

## 2024-11-12 NOTE — PROGRESS NOTES
Assessment & Plan     Sore on left second toe (H)  Onset of symptoms 5 days ago with persistent erythema, moderate tenderness on exam.  Patient with history of sepsis, and poor circulation in the left leg.  Given comorbidities we have elected to put her on doxycycline due to concern for infection.  Patient has multiple medication allergies, doxycycline prescribed today given her allergy profile.  Recommended placing a cotton wedge in between the left great toe and second toe to minimize friction.  Podiatry referral placed today, we discussed close follow-up to ensure resolution.  Follow-up if any worsening symptoms.  Patient agrees with the plan.  - doxycycline hyclate (VIBRAMYCIN) 100 MG capsule  Dispense: 20 capsule; Refill: 0  - Orthopedic  Referral       Return in about 1 week (around 11/19/2024) for Follow up with podiatry for recheck.    Kristen Hunter PA-C  [facility] ChemultANGI Constantino is a 79 year old female who presents to clinic today for the following health issues:  Chief Complaint   Patient presents with    Urgent Care     Blister on toe- second toe, noticed it Friday- left foot- its been draining- sometimes yellow- creamy, hurts. Has been using a cream on it.        HPI  Patient with medical history significant for prediabetes, hypertension, hyperlipidemia presenting to urgent care today with complaint of a blister on the left foot second toe with purulent drainage. Onset of symptoms  4 days ago. Patient reports history of sepsis in the left foot.  No recent fever/chills.       Review of Systems  Constitutional, HEENT, cardiovascular, pulmonary, GI, , musculoskeletal, neuro, skin, endocrine and psych systems are negative, except as otherwise noted.      Objective    /84   Pulse 77   Temp 97  F (36.1  C)   Resp 20   Wt 86.6 kg (191 lb)   LMP  (LMP Unknown)   SpO2 100%   BMI 30.83 kg/m    Physical Exam   GENERAL: alert and no distress  MS: no  gross musculoskeletal defects noted, no edema  SKIN:  there is erythema noted medial aspect of the left second toe, there is about a 1 cm area where the skin has peeled with a 1 mm central ulceration, there is tenderness to palpation, no purulent drainage at this time. Please see pictures below. Onychomycosis of the left first and second toenails.

## 2024-11-12 NOTE — TELEPHONE ENCOUNTER
Received a vm on PAL RN line,     -She reports a blister in between great toe and second toe.   -She is requesting a call back.     Fe JUAREZ, RN   Clinic RN  [facility]

## 2024-11-18 ENCOUNTER — TELEPHONE (OUTPATIENT)
Dept: FAMILY MEDICINE | Facility: CLINIC | Age: 79
End: 2024-11-18
Payer: COMMERCIAL

## 2024-11-18 ENCOUNTER — TELEPHONE (OUTPATIENT)
Dept: WOUND CARE | Facility: CLINIC | Age: 79
End: 2024-11-18

## 2024-11-18 ENCOUNTER — OFFICE VISIT (OUTPATIENT)
Dept: URGENT CARE | Facility: URGENT CARE | Age: 79
End: 2024-11-18
Payer: COMMERCIAL

## 2024-11-18 ENCOUNTER — TELEPHONE (OUTPATIENT)
Dept: FAMILY MEDICINE | Facility: CLINIC | Age: 79
End: 2024-11-18

## 2024-11-18 VITALS
RESPIRATION RATE: 18 BRPM | DIASTOLIC BLOOD PRESSURE: 80 MMHG | WEIGHT: 191 LBS | SYSTOLIC BLOOD PRESSURE: 152 MMHG | OXYGEN SATURATION: 100 % | TEMPERATURE: 97.5 F | HEART RATE: 72 BPM | BODY MASS INDEX: 30.83 KG/M2

## 2024-11-18 DIAGNOSIS — S91.109A OPEN WOUND OF TOE, INITIAL ENCOUNTER: Primary | ICD-10-CM

## 2024-11-18 NOTE — TELEPHONE ENCOUNTER
Consult received via [employer] from Erin Cornejo PA-C for wound of the Medial Toe 2    Does the patient have an open and draining wound? Yes    Please schedule with Mabel Giles D.P.M., Raegan Leija PA-C, Thad Erickson D.P.M., Marija Henning NP, or Dillan Baum M.D. at [facility] [place] for next available appointment.    **For all providers, please schedule a follow up 4 weeks after initial appointment. (2-3 weeks for Dr. Giles and Dr. Erickson)**  --If unable to schedule within 2-3 weeks then please place on cancellation list--    Is the patient able to make their own medical decisions? Yes    Can the patient be scheduled on a Thursday (Sarika)? Yes    Is patient a PARKER lift? PLEASE INQUIRE WHEN MAKING THE APPOINTMENT AND PUT IN APPOINTMENT NOTES    Routing to  Wound Healing Scheduling.

## 2024-11-18 NOTE — PROGRESS NOTES
URGENT CARE VISIT:    SUBJECTIVE:   Chief Complaint   Patient presents with    Urgent Care     Was seen last week for blisters between toes- started antibiotics but is not improving.  Has pitting edema up to knee- painful along top of foot       María Henderson is a 79 year old female who presents with a chief complaint of left 2nd toe purulent wound.   Symptoms began 2 week(s) ago, are moderate and still present  No known injury.   Pain exacerbated by walking. Relieved by rest.  She treated it initially with antibiotics with no relief. This is the first time this type of injury has occurred to this patient.     PMH:   Past Medical History:   Diagnosis Date    Allergic rhinitis     Bacteremia due to Streptococcus     Edema     Gastro-oesophageal reflux disease     Heart murmur     ?murmur    Hyperlipemia     Hyperlipidemia     Hypertension     Kidney stone     Numbness and tingling     numbness left distal medial tibia    Osteoarthritis     Osteoarthrosis     Osteopenia     Paroxysmal supraventricular tachycardia (H)     S/P ablation of atrial fibrillation      Allergies: Melon, Pcn [penicillin v potassium], Walnuts [nuts], Ceftriaxone, Cephalexin, Oxycodone, Sulfa antibiotics, Valium [diazepam], Vicodin [hydrocodone-acetaminophen], and Iodine solution [povidone iodine]   Medications:   Current Outpatient Medications   Medication Sig Dispense Refill    acetaminophen (TYLENOL) 500 MG tablet Take 1,000 mg by mouth At Bedtime      alendronate (FOSAMAX) 70 MG tablet Take 1 tablet (70 mg) by mouth every 7 days 13 tablet 3    aspirin 81 MG EC tablet Take 81 mg by mouth daily      calcium carbonate (OS-MATHEW) 1500 (600 Ca) MG tablet Take 650 mg by mouth daily      carbamide peroxide (DEBROX) 6.5 % otic solution Place 5 drops Into the left ear daily as needed for other (ear wax). 15 mL 0    Cyanocobalamin (VITAMIN B-12) 3000 MCG SUBL Place 2 each under the tongue daily Taking the gummies - 2 for a dose of 3000 mcg       doxycycline hyclate (VIBRAMYCIN) 100 MG capsule Take 1 capsule (100 mg) by mouth 2 times daily for 10 days. 20 capsule 0    furosemide (LASIX) 20 MG tablet Take 1-2 tablets (20-40 mg) by mouth daily Usually Takes 20 mg daily. 180 tablet 1    GABAPENTIN PO Take 300 mg by mouth 3 times daily.      glucosamine-chondroitin 500-400 MG CAPS per capsule Take 2 capsules by mouth daily      omeprazole (PRILOSEC) 40 MG DR capsule Take 1 capsule (40 mg) by mouth daily      oxyBUTYnin ER (DITROPAN XL) 10 MG 24 hr tablet Take 1 tablet (10 mg) by mouth daily at 2 pm 90 tablet 3    Potassium Gluconate 595 (99 K) MG TABS Take 2 tablets by mouth every morning      simvastatin (ZOCOR) 40 MG tablet Take 1 tablet (40 mg) by mouth every morning 90 tablet 3    Turmeric 500 MG CAPS Take 1 capsule by mouth daily      valsartan (DIOVAN) 160 MG tablet Take 1 tablet (160 mg) by mouth daily 90 tablet 3    valsartan (DIOVAN) 40 MG tablet Take 1 tablet (40 mg) by mouth daily Take in addition to (with) 160 mg tablet for total dose of 200 mg once daily. 90 tablet 1    Vitamin D-Vitamin K (K2-D3 5000) 5000-90 UNIT-MCG CAPS Take 1 capsule by mouth daily       Social History:   Social History     Tobacco Use    Smoking status: Never    Smokeless tobacco: Never   Substance Use Topics    Alcohol use: Yes     Comment: occasional       ROS:  Review of systems negative except as stated above.    OBJECTIVE:  BP (!) 152/80   Pulse 72   Temp 97.5  F (36.4  C) (Tympanic)   Resp 18   Wt 86.6 kg (191 lb)   LMP  (LMP Unknown)   SpO2 100%   BMI 30.83 kg/m    GENERAL APPEARANCE: healthy, alert and no distress  MUSCULOSKELETAL: Mild TTP over proximal aspect of left 2nd toe. FROM  EXTREMITIES: peripheral pulses normal  SKIN: mild erythema and edema over left 2nd toe. There is 1 cm ulcer on medial side.  NEURO: sensation intact       ASSESSMENT:    ICD-10-CM    1. Open wound of toe, initial encounter  S91.109A Wound Care Referral          PLAN:  Patient  Instructions   Patient was educated on the natural course of condition. Finish antibiotics. Referred to wound care clinic. Conservative measures discussed including over-the-counter analgesics (Tylenol or Ibuprofen). Observe carefully for any signs of increased redness or pain in the affected area. See your primary care provider if symptoms worsen or do not improve in 3 days. Seek emergency care if you develop severe pain, streaking, or fever.     Patient verbalized understanding and is agreeable to plan. The patient was discharged ambulatory and in stable condition.    Erin Cornejo PA-C on 11/18/2024 at 11:27 AM

## 2024-11-18 NOTE — TELEPHONE ENCOUNTER
Updated pharmacy to [facility] in [place] per Patient request.     Rena Johnson [facility]

## 2024-11-18 NOTE — TELEPHONE ENCOUNTER
Patient calling and blister is still seeping and really pink.  Was to  11/12/24.  On day 7 of doxycycline.  Area is still the same as when seen at .   advised Podiatry within 1 week.  Podiatry appointment not til 12/11/24.  No clinic appts til 11/20/24.  Discussed and patient will go to [facility].  Jeanette Arriaga RN

## 2024-11-18 NOTE — PATIENT INSTRUCTIONS
Patient was educated on the natural course of condition. Finish antibiotics. Referred to wound care clinic. Conservative measures discussed including over-the-counter analgesics (Tylenol or Ibuprofen). Observe carefully for any signs of increased redness or pain in the affected area. See your primary care provider if symptoms worsen or do not improve in 3 days. Seek emergency care if you develop severe pain, streaking, or fever.

## 2024-11-19 ENCOUNTER — ANCILLARY PROCEDURE (OUTPATIENT)
Dept: GENERAL RADIOLOGY | Facility: CLINIC | Age: 79
End: 2024-11-19
Attending: PHYSICIAN ASSISTANT
Payer: COMMERCIAL

## 2024-11-19 ENCOUNTER — TELEPHONE (OUTPATIENT)
Dept: PEDIATRICS | Facility: CLINIC | Age: 79
End: 2024-11-19

## 2024-11-19 ENCOUNTER — NURSE TRIAGE (OUTPATIENT)
Dept: FAMILY MEDICINE | Facility: CLINIC | Age: 79
End: 2024-11-19

## 2024-11-19 ENCOUNTER — OFFICE VISIT (OUTPATIENT)
Dept: PEDIATRICS | Facility: CLINIC | Age: 79
End: 2024-11-19
Payer: COMMERCIAL

## 2024-11-19 VITALS
BODY MASS INDEX: 31.03 KG/M2 | SYSTOLIC BLOOD PRESSURE: 137 MMHG | WEIGHT: 193.1 LBS | RESPIRATION RATE: 22 BRPM | HEART RATE: 70 BPM | DIASTOLIC BLOOD PRESSURE: 81 MMHG | HEIGHT: 66 IN | OXYGEN SATURATION: 95 % | TEMPERATURE: 97.5 F

## 2024-11-19 DIAGNOSIS — S91.109D OPEN WOUND OF TOE, SUBSEQUENT ENCOUNTER: ICD-10-CM

## 2024-11-19 DIAGNOSIS — S91.109D OPEN WOUND OF TOE, SUBSEQUENT ENCOUNTER: Primary | ICD-10-CM

## 2024-11-19 PROCEDURE — 99213 OFFICE O/P EST LOW 20 MIN: CPT | Performed by: PHYSICIAN ASSISTANT

## 2024-11-19 PROCEDURE — 73630 X-RAY EXAM OF FOOT: CPT | Mod: TC | Performed by: RADIOLOGY

## 2024-11-19 PROCEDURE — G2211 COMPLEX E/M VISIT ADD ON: HCPCS | Performed by: PHYSICIAN ASSISTANT

## 2024-11-19 ASSESSMENT — PAIN SCALES - GENERAL: PAINLEVEL_OUTOF10: MODERATE PAIN (5)

## 2024-11-19 NOTE — TELEPHONE ENCOUNTER
Attempt #1:    Called pt at [phone], not available, so LM to call us back. Will await for her call back.     Yazmin WHITE  Clinic RN  [facility]

## 2024-11-19 NOTE — TELEPHONE ENCOUNTER
----- Message from Brandi Nunez sent at 11/19/2024  2:55 PM CST -----  Please let patient know there are no signs of osteomyelitis or fracture identified on foot x-ray. Keep appointment with wound care and podiatry as scheduled.     Brandi Nunez PA-C

## 2024-11-19 NOTE — TELEPHONE ENCOUNTER
She does not have wound care appointment until next week so with worsening symptoms despite being on oral antibiotics would recommend repeat assessment today (clinic or urgent care) to determine if alternative therapy indicated or if needing IV antibiotics.     Thanks!  Roxanne Hatfield PA-C

## 2024-11-19 NOTE — TELEPHONE ENCOUNTER
Called pt and relayed provider message below. Patient was given an opportunity to ask questions, verbalized understanding of plan, and is agreeable.    Scheduled pt for today with Brandi Nunez in [place].    Julisa BACH RN

## 2024-11-19 NOTE — TELEPHONE ENCOUNTER
"Protocol  states se in office today or tomorrow- taking antibiotic > 72 hours and cellulitis symptoms are the same   Routing to pcp and [facility] triage team.       Situation   Patient calls to if she should still go to physical therapy due to open wound on the left second toe?     Patient asks if she should keep both podiatry and woc appointment- advised Yes    Background   Patient explains she has been to [facility] recently for her wound-Left 2nd toe-    Urgent care 11/12 - rx - she has been taking this as directed.    Disp Refills Start End LADONNA   doxycycline hyclate (VIBRAMYCIN) 100 MG capsule 20 capsule 0 11/12/2024 11/22/2024 --   Sig - Route: Take 1 capsule (100 mg) by mouth 2 times daily for 10 days. - Oral     Podiatry apt Dec 11th  Woc nurse November 26th     Assessment   started as a  blister-Size of a 1/2 dime  between toes  red between toes goes along the length of the toe- unchanged- denied worsening.   Pink around blister - estimated to be inside length of toe   Denied streaking pink or red    She states she has baseline mild swelling in the left foot - for the last 10 days swelling is \" a little worse\"     \"sometimes oozing\" white or clear    This wound is about 3-4 inches from wound that has been healed for 5 years, the healed wound was  present for 20 years per patient.     Wound care she is doing:   Showering daily, clean between toes and cotton ball between.   Taking antibiotic as directed.     Denied fevers  She states pain is the same     Recommendations  Advised provider may want to see you again given symptoms are about the same nearing the end of the antibiotic.   Will send over to pcp regarding follow up and physical therapy question and  call her back.   Red flag symptoms reviewed   Advised to monitor for worsening signs and symptoms of infection to monitor and to call and report.       Reason for Disposition   Taking antibiotic > 72 hours (3 days) and cellulitis symptoms are SAME (not " getting better)    Additional Information   Negative: Shock suspected (e.g., cold/pale/clammy skin, too weak to stand, low BP, rapid pulse)   Negative: Sounds like a life-threatening emergency to the triager   Negative: Surgical wound infection suspected (post-op)   Negative: Widespread rash and drug rash suspected (i.e., allergic reaction to antibiotic)   Negative: Animal bite wound infection suspected   Negative: SEVERE pain   Negative: SEVERE pain with bending of finger (or toe) and cellulitis on hand (or foot)   Negative: Widespread rash and bright red, sunburn-like   Negative: Fever > 103 F (39.4 C)   Negative: Black (necrotic), dark purple, or blisters develop in area of cellulitis   Negative: Patient sounds very sick or weak to the triager   Negative: Taking antibiotic > 24 hours and fever persists   Negative: Taking antibiotic > 24 hours and red streak (or line) runs from area of infection   Negative: Fever > 100 F (37.8 C) and new-onset   Negative: Red streak runs from area of infection and new-onset   Negative: Caller has URGENT question and triager unable to answer question   Negative: Taking antibiotic > 24 hours and cellulitis symptoms are WORSE (e.g., spreading redness, pain, swelling)   Negative: Finished taking antibiotic and cellulitis symptoms are WORSE (e.g., redness, pain, drainage, swelling)   Negative: Red streak (or line) longer than 4 inches (10 cm) runs from area of infection    Protocols used: Cellulitis on Antibiotic Follow-up Call-A-[place]

## 2024-11-19 NOTE — PROGRESS NOTES
Assessment & Plan     Open wound of toe, subsequent encounter  No erythema or concern of infection at this time. Complete antibiotics course of doxycycline.   Will get x-ray to look for osteomyelitis.   Recommend seeing wound clinic and podiatry as scheduled.   If erythema, increased pain or oozing occurs recommend follow-up.   Encouraged elevation of her foot when possible to decrease swelling while not wearing compression sock.   Pt can continue to use cotton swab to decrease pressure between 1st and 2nd digit  - XR Foot Left G/E 3 Views    The longitudinal plan of care for the diagnosis(es)/condition(s) as documented were addressed during this visit. Due to the added complexity in care, I will continue to support Pat in the subsequent management and with ongoing continuity of care with PCP.    FUTURE APPOINTMENTS:       - Follow-up visit as needed for worsening or new symptoms    Subjective   Pat is a 79 year old, presenting for the following health issues:  Cellulitis        11/19/2024    10:50 AM   Additional Questions   Roomed by Beatriz BAILEY   Accompanied by None         11/19/2024    10:50 AM   Patient Reported Additional Medications   Patient reports taking the following new medications None     Wound Check    History of Present Illness       Reason for visit:  Open blister  Symptom onset:  1-2 weeks ago  Symptoms include:  Open blister  Symptom intensity:  Severe  Symptom progression:  Worsening  Had these symptoms before:  No  What makes it worse:  Walking  What makes it better:  Cotton between toes   She is taking medications regularly.     Patient is here for UC follow-up. Pt was seen initially on 11/12 and again yesterday 11/18. Pt was started on Doxycycline a week ago for concerns of infection. No change to medication yesterday. Pt has been referred to Podiatry and Wound clinic. She can't get into podiatry for about 3-4 weeks. Pt worried that antibiotics need to be changed. Pt reports ankle is  "frequently swollen and usually wears compression socks but it causes pressure on her toe wound. Wound is between the 1st and 2nd digit. Tenderness to palpation of the toe. No erythema or pus oozing. Callosus of the area identified. Pt using cotton swab to decrease pressure on the area.       Review of Systems  Constitutional, HEENT, cardiovascular, pulmonary, gi and gu systems are negative, except as otherwise noted.      Objective    /81 (BP Location: Right arm, Patient Position: Sitting, Cuff Size: Adult Regular)   Pulse 70   Temp 97.5  F (36.4  C) (Tympanic)   Resp 22   Ht 1.676 m (5' 6\")   Wt 87.6 kg (193 lb 1.6 oz)   LMP  (LMP Unknown)   SpO2 95%   BMI 31.17 kg/m    Body mass index is 31.17 kg/m .    Physical Exam   GENERAL: alert and no distress  MS: Tenderness over the 2nd digit. 2+ edema of ankle. Old scar of ankle identified.  SKIN: No erythema of the toe or oozing. 1cm ulcer with some callous formation. Toe curved laterally.  NEURO: sensation in tact        Signed Electronically by: Brandi Nunez PA-C    "

## 2024-11-20 NOTE — TELEPHONE ENCOUNTER
RN attempted to reach patient. Left voicemail to call back and speak with a nurse.     Connie Bella RN

## 2024-11-21 NOTE — TELEPHONE ENCOUNTER
RN called and spoke with patient. RN relayed provider result note below. Patient verbalized understanding and agreed with plan.     Bishop NORTH RN 11/21/2024 at 9:18 AM

## 2024-11-26 ENCOUNTER — HOSPITAL ENCOUNTER (OUTPATIENT)
Dept: WOUND CARE | Facility: CLINIC | Age: 79
Discharge: HOME | End: 2024-11-26
Attending: FAMILY MEDICINE | Admitting: FAMILY MEDICINE
Payer: OTHER MISCELLANEOUS

## 2024-11-26 VITALS — TEMPERATURE: 98 F | DIASTOLIC BLOOD PRESSURE: 84 MMHG | HEART RATE: 68 BPM | SYSTOLIC BLOOD PRESSURE: 168 MMHG

## 2024-11-26 DIAGNOSIS — S91.105A OPEN WOUND OF LESSER TOE OF LEFT FOOT: Primary | ICD-10-CM

## 2024-11-26 DIAGNOSIS — S91.109A OPEN WOUND OF TOE, INITIAL ENCOUNTER: ICD-10-CM

## 2024-11-26 PROCEDURE — 99204 OFFICE O/P NEW MOD 45 MIN: CPT | Performed by: FAMILY MEDICINE

## 2024-11-26 PROCEDURE — G0463 HOSPITAL OUTPT CLINIC VISIT: HCPCS

## 2024-11-26 NOTE — PROGRESS NOTES
Patient arrived for wound care visit. Certified Wound Care Nurse time spent evaluating patient record, completed a full evaluation and documented wound(s) & nghia-wound skin; provided recommendation based on treatment plan. Applied dressing, reviewed discharge instructions, patient education, and discussed plan of care with appropriate medical team staff members and patient and/or family members.

## 2024-11-26 NOTE — PROGRESS NOTES
Wound Clinic Note         Visit date: 11/26/2024       Cheif Complaint:     María Henderson is a 79 year old   female had concerns including WOUND CARE.  The patient has lower extremity edema and a left toe wound.         HISTORY OF PRESENT ILLNESS:    María Henderson reports the wound has been present since early November 2024.  The wound began without a clear cause.   She has never had other difficult to heal wounds in this area.  The wound is between her first and second toe.  She does have quite a bit of swelling in this extremity due to multiple previous orthopedic surgeries on the left.  She has not worn any kind of compression stocking.  She reports when this wound first developed she was not using any different kind of shoe wear.  Since the wound developed it is scabbed over and then opened back up again several times.    She has just been using a cotton ball between her toes to prevent her toes from rubbing against each other.  She reports has been no drainage for the last couple of days.          The pateint denies fevers or chills.  They report the pain from the wound has been 5/10 and has remained about the same recently.      The patient reports they currently do not have any routine for elevating their legs.     Today the patient reports maintaining a regular diet without special attention to protein.        The patient denies a history of diabetes, smoking or chronic steroid use.         The patient has not had any symptoms of infection relating to the wound recently and is not currently on antibiotics.       Problem List:   Past Medical History:   Diagnosis Date    Allergic rhinitis     Bacteremia due to Streptococcus     Edema     Gastro-oesophageal reflux disease     Heart murmur     ?murmur    Hyperlipemia     Hyperlipidemia     Hypertension     Kidney stone     Numbness and tingling     numbness left distal medial tibia    Osteoarthritis     Osteoarthrosis     Osteopenia      Paroxysmal supraventricular tachycardia (H)     S/P ablation of atrial fibrillation              Family Hx: family history is not on file.       Surgical Hx:   Past Surgical History:   Procedure Laterality Date    7 foot and ankle       ARTHROPLASTY REVISION ANKLE  3/10/2014    Procedure: ARTHROPLASTY REVISION ANKLE;  REVISION LEFT TOTAL ANKLE  WITH CONVERSION TO  IM LAURIE FUSION WITH FEMORAL HEAD ALLOGRAFT (C-ARM);  Surgeon: Ramirez Fang MD;  Location: [facility]    ASPIRATION NEEDLE KNEE Right 12/27/2016    Procedure: ASPIRATION NEEDLE KNEE;  Surgeon: Jian Fisher MD;  Location:  OR    BUNIONECTOMY JONAS  10/24/2011    Procedure:BUNIONECTOMY JONAS; Left Foot Bunionette Repair (C-Arm); Surgeon:RAMIREZ FANG; Location:[facility]    COLONOSCOPY      ESOPHAGOSCOPY, GASTROSCOPY, DUODENOSCOPY (EGD), COMBINED N/A 1/5/2023    Procedure: ESOPHAGOGASTRODUODENOSCOPY WITH FOREIGN BODY REMOVAL;  Surgeon: Dedra Acosta MD;  Location:  OR    EYE SURGERY      cataract    H ABLATION SVT      HERNIA REPAIR      IRRIGATION AND DEBRIDEMENT FOOT, COMBINED Left 12/27/2016    Procedure: COMBINED IRRIGATION AND DEBRIDEMENT FOOT;  Surgeon: Jian Fisher MD;  Location:  OR    ORTHOPEDIC SURGERY      RTKA    ORTHOPEDIC SURGERY Left     ROTATOR CUFF    REMOVE ANTIBIOTIC CEMENT BEADS / SPACER KNEE N/A 4/24/2017    Procedure: REMOVE ANTIBIOTIC CEMENT BEADS / SPACER KNEE;  REMOVAL ANTIBIOTIC SPACER RIGHT KNEE REIMPLANT TOTAL JOINT COMPONENT TO RIGHT KNEE ;  Surgeon: Husam Berkowitz MD;  Location:  OR    REMOVE HARDWARE ARTHROPLASTY KNEE, IRRIG & JOSE, PLACE ANTIBIOTIC CEMENT SPACER, COMBINED Right 2/20/2017    Procedure: COMBINED REMOVE HARDWARE ARTHROPLASTY KNEE, IRRIGATION AND DEBRIDEMENT, PLACE ANTIBIOTIC CEMENT BEADS / SPACER;  Surgeon: Husam Berkowitz MD;  Location:  OR    REVERSE ARTHROPLASTY SHOULDER Right 10/7/2019    Procedure: RIGHT TOTAL REVERSE SHOULDER ARTHROPLASTY;   Surgeon: Husam Berkowitz MD;  Location: [facility] OR    TONSILLECTOMY      TONSILLECTOMY      total knee arthroplasy      ZZC TOTAL KNEE ARTHROPLASTY Right           Allergies:    Allergies   Allergen Reactions    Melon Anaphylaxis     Throat swelling    Pcn [Penicillin V Potassium] Anaphylaxis    Walnuts [Nuts] Angioedema and Anaphylaxis    Ceftriaxone Itching    Cephalexin Itching    Oxycodone Other (See Comments)     unconsciousness    Sulfa Antibiotics     Valium [Diazepam] Other (See Comments)     unconsciousness    Vicodin [Hydrocodone-Acetaminophen]      Patient unsure if allergy    Iodine Solution [Povidone Iodine] Rash              Medication History:    Current Outpatient Medications   Medication Sig Dispense Refill    acetaminophen (TYLENOL) 500 MG tablet Take 1,000 mg by mouth At Bedtime      alendronate (FOSAMAX) 70 MG tablet Take 1 tablet (70 mg) by mouth every 7 days 13 tablet 3    aspirin 81 MG EC tablet Take 81 mg by mouth daily      calcium carbonate (OS-MATHEW) 1500 (600 Ca) MG tablet Take 650 mg by mouth daily      carbamide peroxide (DEBROX) 6.5 % otic solution Place 5 drops Into the left ear daily as needed for other (ear wax). 15 mL 0    Cyanocobalamin (VITAMIN B-12) 3000 MCG SUBL Place 2 each under the tongue daily Taking the gummies - 2 for a dose of 3000 mcg      furosemide (LASIX) 20 MG tablet Take 1-2 tablets (20-40 mg) by mouth daily Usually Takes 20 mg daily. 180 tablet 1    GABAPENTIN PO Take 300 mg by mouth 3 times daily.      glucosamine-chondroitin 500-400 MG CAPS per capsule Take 2 capsules by mouth daily      omeprazole (PRILOSEC) 40 MG DR capsule Take 1 capsule (40 mg) by mouth daily      oxyBUTYnin ER (DITROPAN XL) 10 MG 24 hr tablet Take 1 tablet (10 mg) by mouth daily at 2 pm 90 tablet 3    Potassium Gluconate 595 (99 K) MG TABS Take 2 tablets by mouth every morning      simvastatin (ZOCOR) 40 MG tablet Take 1 tablet (40 mg) by mouth every morning (Patient taking differently:  Take 40 mg by mouth at bedtime.) 90 tablet 3    Turmeric 500 MG CAPS Take 1 capsule by mouth daily      valsartan (DIOVAN) 160 MG tablet Take 1 tablet (160 mg) by mouth daily 90 tablet 3    valsartan (DIOVAN) 40 MG tablet Take 1 tablet (40 mg) by mouth daily Take in addition to (with) 160 mg tablet for total dose of 200 mg once daily. 90 tablet 1    Vitamin D-Vitamin K (K2-D3 5000) 5000-90 UNIT-MCG CAPS Take 1 capsule by mouth daily       No current facility-administered medications for this encounter.         Tobacco History:  reports that she has never smoked. She has never used smokeless tobacco.       REVIEW OF SYMPTOMS:   The review of systems was negative except as noted in the HPI.           PHYSICAL EXAMINATION:     BP (!) 168/84 (BP Location: Left arm, Patient Position: Chair, Cuff Size: Adult Regular)   Pulse 68   Temp 98  F (36.7  C) (Temporal)   LMP  (LMP Unknown)            GENERAL: The patient overall appears well and is no acute distress.   HEAD: normocephalic   EYES: Sclera and conjunctiva clear   NECK: no obvious masses   LUNGS: breathing is unlabored.   EXTREMITIES: No clubbing, cyanosis or edema   SKIN: No rashes or other abnormalities except as noted under the Wound section below.   NEUROLOGICAL: normal motor and sensory function   EDEMA: Moderate       WOUND: The wound is scabbed over today with no sign of infection and no drainage even with palpation.      Also see below for wound details:       Circumferential volume measures:             No data to display                Ulceration(s)/Wound(s):   Please see the media tab under the chart review for pictures of the wounds.  Nursing staff removed dressings and cleansed wound.               Recent Labs   Lab Test 07/11/24  1615 04/09/24  1029   A1C 5.7* 5.9*          Recent Labs   Lab Test 09/27/24  1820 12/05/23  1037 10/20/21  1654   ALBUMIN 4.3 4.2 3.9              No sharp debridement performed today.                  ASSESSMENT:   This is  a 79 year old  female with a scabbed over wound in between her first and second toe, the patient also has lower extremity edema which was also managed during today's clinic visit.          PLAN:   I recommend she continue to keep the area covered with a simple bandage changed with bathing for another week or 2 until the scab falls off then no further bandages to be required.  I recommend that she get gel toe separators over-the-counter at [facility] or [facility] that she can use to keep her toes from rubbing against each other.  She also has an appointment coming up with podiatry on December 11 and they may be able to recommend some other foot wear which will decrease the toes from rubbing against each other.    Separate from the wound care instructions we then discussed management strategies for lower extremity edema.  I explained the keys for managing lower extremity edema are compression and elevation.  I explained to the patient today that controlling the edema is probably the most important thing we can do to help heal the wound.  I have specifically recommended that they lay down with their legs above the level of the heart for 30 minutes at least twice a day.     I have explained to the patient the importance of protein intake to wound healing.  I have explained that increasing protein intake will speed wound healing.  We discussed several types of food that are high in protein and the wound care nurse gave the patient a handout that summarizes this information.  In addition to further speed wound healing I have encouraged the patient to take a protein supplement.   The patient will return to the wound clinic on an as-needed basis if further wounds develop.        30 minutes spent on the date of the encounter doing chart review, history and exam, documentation and further activities per the note, this time excludes any procedure time      Dillan Baum MD  11/26/2024   8:44 AM   [facility]  Vascular/Wound  [phone]    This note was electronically signed by Dillan Baum MD     ,

## 2024-11-26 NOTE — DISCHARGE INSTRUCTIONS
11/26/2024   Vira Henderson   1945    A DME order was not completed because supplies were not needed    Dressing changes outside of clinic are being performed by Patient    Plan 11/26/2024  - your wound is not currently open; thus no need for return here unless reoccurs despite guidance followed as below  - go to the podiatry as scheduled to address need for improved foot wear  - silicone gel toe spacer suggested; any pharmacy available off shelf without Rx; wear in between the great toe and second toe on left foot  - this area has been receiving pressure and thus friction due to your foot shape  - suggested that you wear compression bilaterally from toes to knee; edema contributes to skin fragility and risk for breakdown/injury; compression stockings available off shelf without RX  - elevation will assist swelling reduction; which will assist healing, comfort      Care of left toe 2 medial    Routine hygiene  Daily monitoring for recurrent breakdown  Use of toe spacer  Wearing compression to lower legs  Seeing podiatry for new footwear suggestions    Elevation:  It is recommended that you elevate your legs above the level of your heart for 30 minutes: approximately 2-3 times each day   Ways to do this:   - Lay on the couch or your bed and prop your legs up on pillows   - Recline back as far as you can go in your recliner and prop your legs on pillows.   Doing these things will help reduce the edema in your legs.          Main Provider: Dillan Baum M.D. November 26, 2024    Call us at [phone] if you have any questions about your wounds, if you have redness or swelling around your wound, have a fever of 101 degrees Fahrenheit or greater or if you have any other problems or concerns. We answer the phone Monday through Friday 8 am to 4 pm, please leave a message as we check the voicemail frequently throughout the day. If you have a concern over the weekend, please leave a message and we will return your  call Monday. If the need is urgent, go to the ER or urgent care.    If you had a positive experience please indicate that on your patient satisfaction survey form that [facility] will be sending you.    It was a pleasure meeting with you today.  Thank you for allowing me and my team the privilege of caring for you today.  YOU are the reason we are here, and I truly hope we provided you with the excellent service you deserve.  Please let us know if there is anything else we can do for you so that we can be sure you are leaving completely satisfied with your care experience.      If you have any billing related questions please call the [facility] Business office at [phone]. The clinic staff does not handle billing related matters.    If you are scheduled to have a follow up appointment, you will receive a reminder call the day before your visit. On the appointment day please arrive 15 minutes prior to your appointment time. If you are unable to keep that appointment, please call the clinic to cancel or reschedule. If you are more than 10 minutes late or greater for your scheduled appointment time, the clinic policy is that you may be asked to reschedule.

## 2024-12-11 ENCOUNTER — OFFICE VISIT (OUTPATIENT)
Dept: PODIATRY | Facility: CLINIC | Age: 79
End: 2024-12-11
Attending: PHYSICIAN ASSISTANT
Payer: OTHER MISCELLANEOUS

## 2024-12-11 VITALS — WEIGHT: 193 LBS | SYSTOLIC BLOOD PRESSURE: 126 MMHG | BODY MASS INDEX: 31.15 KG/M2 | DIASTOLIC BLOOD PRESSURE: 82 MMHG

## 2024-12-11 DIAGNOSIS — M79.672 LEFT FOOT PAIN: ICD-10-CM

## 2024-12-11 DIAGNOSIS — L97.522 ULCER OF LEFT FOOT WITH FAT LAYER EXPOSED (H): Primary | ICD-10-CM

## 2024-12-11 DIAGNOSIS — L97.509 SORE ON TOE (H): ICD-10-CM

## 2024-12-11 DIAGNOSIS — M20.42 HAMMERTOE OF LEFT FOOT: ICD-10-CM

## 2024-12-11 PROCEDURE — 99243 OFF/OP CNSLTJ NEW/EST LOW 30: CPT | Performed by: PODIATRIST

## 2024-12-11 RX ORDER — CLINDAMYCIN HYDROCHLORIDE 300 MG/1
300 CAPSULE ORAL 3 TIMES DAILY
Qty: 30 CAPSULE | Refills: 0 | Status: SHIPPED | OUTPATIENT
Start: 2024-12-11 | End: 2024-12-21

## 2024-12-11 NOTE — LETTER
12/11/2024      María Henderson  [address]  [facility] [address]      Dear Colleague,    Thank you for referring your patient, María Henderson, to the [facility]. Please see a copy of my visit note below.    PATIENT HISTORY: Jeanne Hunter PA-C requested I see this patient for their foot issue.  María Henderson is a 79 year old female who presents to clinic for on her toe.  Notes it has been going on for about 4 weeks.  An aching burning tingling pain.  Can be 6 out of 10.  Notes that her feet swell.  Worse with walking.  Denies specific injury.  Wondering what is causing this and what can be done for it.    Review of Systems:  Patient denies fever, chills, rash, wound, stiffness.     PAST MEDICAL HISTORY:   Past Medical History:   Diagnosis Date     Allergic rhinitis      Bacteremia due to Streptococcus      Edema      Gastro-oesophageal reflux disease      Heart murmur     ?murmur     Hyperlipemia      Hyperlipidemia      Hypertension      Kidney stone      Numbness and tingling     numbness left distal medial tibia     Osteoarthritis      Osteoarthrosis      Osteopenia      Paroxysmal supraventricular tachycardia (H)      S/P ablation of atrial fibrillation         PAST SURGICAL HISTORY:   Past Surgical History:   Procedure Laterality Date     7 foot and ankle        ARTHROPLASTY REVISION ANKLE  3/10/2014    Procedure: ARTHROPLASTY REVISION ANKLE;  REVISION LEFT TOTAL ANKLE  WITH CONVERSION TO  IM LAURIE FUSION WITH FEMORAL HEAD ALLOGRAFT (C-ARM);  Surgeon: Ramirez Fang MD;  Location: [facility]     ASPIRATION NEEDLE KNEE Right 12/27/2016    Procedure: ASPIRATION NEEDLE KNEE;  Surgeon: Jian Fisher MD;  Location: [facility] OR     BUNIONECTOMY JONAS  10/24/2011    Procedure:BUNIONECTOMY JONAS; Left Foot Bunionette Repair (C-Arm); Surgeon:RAMIREZ FANG; Location:[facility]     COLONOSCOPY       ESOPHAGOSCOPY, GASTROSCOPY, DUODENOSCOPY (EGD), COMBINED N/A  1/5/2023    Procedure: ESOPHAGOGASTRODUODENOSCOPY WITH FOREIGN BODY REMOVAL;  Surgeon: Dedra Acosta MD;  Location:  OR     EYE SURGERY      cataract     H ABLATION SVT       HERNIA REPAIR       IRRIGATION AND DEBRIDEMENT FOOT, COMBINED Left 12/27/2016    Procedure: COMBINED IRRIGATION AND DEBRIDEMENT FOOT;  Surgeon: Jian Fisher MD;  Location:  OR     ORTHOPEDIC SURGERY      RTKA     ORTHOPEDIC SURGERY Left     ROTATOR CUFF     REMOVE ANTIBIOTIC CEMENT BEADS / SPACER KNEE N/A 4/24/2017    Procedure: REMOVE ANTIBIOTIC CEMENT BEADS / SPACER KNEE;  REMOVAL ANTIBIOTIC SPACER RIGHT KNEE REIMPLANT TOTAL JOINT COMPONENT TO RIGHT KNEE ;  Surgeon: Husam Berkowitz MD;  Location:  OR     REMOVE HARDWARE ARTHROPLASTY KNEE, IRRIG & JOSE, PLACE ANTIBIOTIC CEMENT SPACER, COMBINED Right 2/20/2017    Procedure: COMBINED REMOVE HARDWARE ARTHROPLASTY KNEE, IRRIGATION AND DEBRIDEMENT, PLACE ANTIBIOTIC CEMENT BEADS / SPACER;  Surgeon: Husam Berkowitz MD;  Location:  OR     REVERSE ARTHROPLASTY SHOULDER Right 10/7/2019    Procedure: RIGHT TOTAL REVERSE SHOULDER ARTHROPLASTY;  Surgeon: Husam Berkowitz MD;  Location:  OR     TONSILLECTOMY       TONSILLECTOMY       total knee arthroplasy       Z TOTAL KNEE ARTHROPLASTY Right         MEDICATIONS:   Current Outpatient Medications:      acetaminophen (TYLENOL) 500 MG tablet, Take 1,000 mg by mouth At Bedtime, Disp: , Rfl:      alendronate (FOSAMAX) 70 MG tablet, Take 1 tablet (70 mg) by mouth every 7 days, Disp: 13 tablet, Rfl: 3     aspirin 81 MG EC tablet, Take 81 mg by mouth daily, Disp: , Rfl:      calcium carbonate (OS-MATHEW) 1500 (600 Ca) MG tablet, Take 650 mg by mouth daily, Disp: , Rfl:      carbamide peroxide (DEBROX) 6.5 % otic solution, Place 5 drops Into the left ear daily as needed for other (ear wax)., Disp: 15 mL, Rfl: 0     Cyanocobalamin (VITAMIN B-12) 3000 MCG SUBL, Place 2 each under the tongue daily Taking the  gummies - 2 for a dose of 3000 mcg, Disp: , Rfl:      furosemide (LASIX) 20 MG tablet, Take 1-2 tablets (20-40 mg) by mouth daily Usually Takes 20 mg daily., Disp: 180 tablet, Rfl: 1     GABAPENTIN PO, Take 300 mg by mouth 3 times daily., Disp: , Rfl:      glucosamine-chondroitin 500-400 MG CAPS per capsule, Take 2 capsules by mouth daily, Disp: , Rfl:      omeprazole (PRILOSEC) 40 MG DR capsule, Take 1 capsule (40 mg) by mouth daily, Disp: , Rfl:      oxyBUTYnin ER (DITROPAN XL) 10 MG 24 hr tablet, Take 1 tablet (10 mg) by mouth daily at 2 pm, Disp: 90 tablet, Rfl: 3     Potassium Gluconate 595 (99 K) MG TABS, Take 2 tablets by mouth every morning, Disp: , Rfl:      simvastatin (ZOCOR) 40 MG tablet, Take 1 tablet (40 mg) by mouth every morning (Patient taking differently: Take 40 mg by mouth at bedtime.), Disp: 90 tablet, Rfl: 3     Turmeric 500 MG CAPS, Take 1 capsule by mouth daily, Disp: , Rfl:      valsartan (DIOVAN) 160 MG tablet, Take 1 tablet (160 mg) by mouth daily, Disp: 90 tablet, Rfl: 3     valsartan (DIOVAN) 40 MG tablet, Take 1 tablet (40 mg) by mouth daily Take in addition to (with) 160 mg tablet for total dose of 200 mg once daily., Disp: 90 tablet, Rfl: 1     Vitamin D-Vitamin K (K2-D3 5000) 5000-90 UNIT-MCG CAPS, Take 1 capsule by mouth daily, Disp: , Rfl:      ALLERGIES:    Allergies   Allergen Reactions     Melon Anaphylaxis     Throat swelling     Pcn [Penicillin V Potassium] Anaphylaxis     Walnuts [Nuts] Angioedema and Anaphylaxis     Ceftriaxone Itching     Cephalexin Itching     Oxycodone Other (See Comments)     unconsciousness     Sulfa Antibiotics      Valium [Diazepam] Other (See Comments)     unconsciousness     Vicodin [Hydrocodone-Acetaminophen]      Patient unsure if allergy     Iodine Solution [Povidone Iodine] Rash        SOCIAL HISTORY:   Social History     Socioeconomic History     Marital status:      Spouse name: Not on file     Number of children: Not on file     Years  of education: Not on file     Highest education level: Not on file   Occupational History     Not on file   Tobacco Use     Smoking status: Never     Smokeless tobacco: Never   Vaping Use     Vaping status: Never Used   Substance and Sexual Activity     Alcohol use: Yes     Comment: occasional     Drug use: No     Sexual activity: Yes   Other Topics Concern     Parent/sibling w/ CABG, MI or angioplasty before 65F 55M? Not Asked   Social History Narrative     Not on file     Social Drivers of Health     Financial Resource Strain: Low Risk  (1/18/2024)    Financial Resource Strain      Within the past 12 months, have you or your family members you live with been unable to get utilities (heat, electricity) when it was really needed?: No   Food Insecurity: Low Risk  (1/18/2024)    Food Insecurity      Within the past 12 months, did you worry that your food would run out before you got money to buy more?: No      Within the past 12 months, did the food you bought just not last and you didn t have money to get more?: No   Transportation Needs: Low Risk  (1/18/2024)    Transportation Needs      Within the past 12 months, has lack of transportation kept you from medical appointments, getting your medicines, non-medical meetings or appointments, work, or from getting things that you need?: No   Physical Activity: Insufficiently Active (1/18/2024)    Exercise Vital Sign      Days of Exercise per Week: 2 days      Minutes of Exercise per Session: 20 min   Stress: No Stress Concern Present (1/18/2024)    [employer] [place] - Occupational Stress Questionnaire      Feeling of Stress : Not at all   Social Connections: Socially Integrated (1/18/2024)    Social Connection and Isolation Panel [NHANES]      Frequency of Communication with Friends and Family: More than three times a week      Frequency of Social Gatherings with Friends and Family: Three times a week      Attends Faith Services: More than 4  times per year      Active Member of Clubs or Organizations: Yes      Attends Club or Organization Meetings: More than 4 times per year      Marital Status: Living with partner   Interpersonal Safety: Low Risk  (11/26/2024)    Interpersonal Safety      Do you feel physically and emotionally safe where you currently live?: Yes      Within the past 12 months, have you been hit, slapped, kicked or otherwise physically hurt by someone?: No      Within the past 12 months, have you been humiliated or emotionally abused in other ways by your partner or ex-partner?: No   Housing Stability: Low Risk  (1/18/2024)    Housing Stability      Do you have housing? : Yes      Are you worried about losing your housing?: No        FAMILY HISTORY: No family history on file.     EXAM:Vitals: /82   Wt 87.5 kg (193 lb)   LMP  (LMP Unknown)   BMI 31.15 kg/m    BMI= Body mass index is 31.15 kg/m .    General appearance: Patient is alert and fully cooperative with history & exam.  No sign of distress is noted during the visit.     Psychiatric: Affect is pleasant & appropriate.  Patient appears motivated to improve health.     Respiratory: Breathing is regular & unlabored while sitting.     HEENT: Hearing is intact to spoken word.  Speech is clear.  No gross evidence of visual impairment that would impact ambulation.     Dermatologic: Partial-thickness ulceration to the medial aspect of the left second DIPJ.  This measures approximately 0.6 cm x 0.2 cm x 0.1 cm.  Base of the wound is granular.  No surrounding erythema purulent drainage or malodor noted.     Vascular: DP & PT pulses are intact & regular bilaterally.  No significant edema or varicosities noted.  CFT and skin temperature is normal to both lower extremities.     Neurologic: Lower extremity sensation is intact to light touch.  No evidence of weakness or contracture in the lower extremities.  No evidence of neuropathy.     Musculoskeletal: Patient is ambulatory without  assistive device or brace.  Adductovarus position of the left second toe at the DIPJ.  The right great toe is rubbing in this area.    Radiographs: left foot xray - I personally reviewed the xrays.  First tarsometatarsal arthrodesis with screw fixation. No  evidence of hardware failure. Solid bony bridging across the first  tarsometatarsal joint space. Screw fixation of the distal fifth  metatarsal across a healed fracture and/or osteotomy site. Combined  subtalar and tibiotalar arthrodesis with intramedullary suyapa and screw  fixation. Solid bony bridging across the tibiotalar and subtalar  joints. Mild to moderate multifocal degenerative arthrosis of the  midfoot. No acute fracture or evidence of osteomyelitis.     ASSESSMENT: .   Sore on toe (H)  Ulcer of left foot with fat layer exposed (H)  Left foot pain  Hammertoe of left foot       Medical Decision Making/Plan:  Reviewed patient[facility] in [facility].  Reviewed and discussed causes of hammertoes with patient.  Explained that this can be caused by an overpowering of muscles or by the way we walk.  Discussed conservative treatments such as orthotics, pads, shoe gear.  Explained that sometimes the flexor tendons can be cut to try and straighten the toe and reduce rubbing. This is normally done in office and patient is weight bearing in postop she for 1-2 weeks.  We also discussed surgical intervention to remove the joint and possibly fuse the toe.  Normally patient has a pin sticking out of the toe for about 6 weeks and can not get the foot wet. Patient would have to be minimal weight bearing in cam boot.      Discussed that the 2 toes are rubbing together causing the sore.  We talked about padding such as a toe cover.  Also talked about surgery to go in and remove some of the bone to try to decrease pressure from the inside out.  She would like to try the padding first.  We will have her try bacitracin and a Band-Aid to the area and the toe cover.  She was also  started on clindamycin for short course to try to help with pain redness and swelling.  She will call if she would like to proceed with surgery.    Patient risk factor: Patient is at medium risk for infection.        Digna Tang DPM, Podiatry/Foot and Ankle Surgery            Again, thank you for allowing me to participate in the care of your patient.        Sincerely,        Digna Tang DPM, Podiatry/Foot and Ankle Surgery

## 2024-12-11 NOTE — PATIENT INSTRUCTIONS
Thank you for choosing [facility] Podiatry / Foot & Ankle Surgery!    DR MOORE'S CLINIC:  [facility]   [address]   [place] [address]  [phone]    (Tues, Wed, Thur am, Fri pm)     [facility]  [address], [place] [address]  [phone]  (Every other Friday morning)    [facility]  [address] [place] [id number] [phone]  (Every other Friday)     TRIAGE LINE: [phone]  APPOINTMENTS: [phone]  RADIOLOGY: [phone]  SET UP SURGERY: [phone]  PHYSICAL THERAPY: [phone]   FAX NUMBER: [phone]  BILLING QUESTIONS: [phone]       Follow up: Surgery information      [email or web address] or call [phone]  TOE COVERS/CAPS         HAMMERTOES  Hammertoe is a contracture (bending) of one or both joints of the second, third, fourth, or fifth (little) toes. This abnormal bending can put pressure on the toe when wearing shoes, causing problems to develop.  Hammertoes usually start out as mild deformities and get progressively worse over time. In the earlier stages, hammertoes are flexible and the symptoms can often be managed with noninvasive measures. But if left untreated, hammertoes can become more rigid and will not respond to non-surgical treatment.  Because of the progressive nature of hammertoes, they should receive early attention. Hammertoes never get better without some kind of intervention.  CAUSES  The most common cause of hammertoe is a muscle/tendon imbalance. This imbalance, which leads to a bending of the toe, results from mechanical (structural) changes in the foot that occur over time in some people.  Hammertoes may be aggravated by shoes that don t fit properly. A hammertoe may result if a toe is too long and is forced into a cramped position when a tight shoe is worn.  Occasionally, hammertoe is the result of an earlier trauma to the toe. In some people, hammertoes are  inherited.  SYMPTOMS  Pain or irritation of the affected toe when wearing shoes.   Corns and calluses (a buildup of skin) on the toe, between two toes, or on the ball of the foot. Corns are caused by constant friction against the shoe. They may be soft or hard, depending upon their location.   Inflammation, redness, or a burning sensation   Contracture of the toe   In more severe cases of hammertoe, open sores may form.   DIAGNOSIS  Although hammertoes are readily apparent, to arrive at a diagnosis the foot and ankle surgeon will obtain a thorough history of your symptoms and examine your foot. During the physical examination, the doctor may attempt to reproduce your symptoms by manipulating your foot and will study the contractures of the toes. In addition, the foot and ankle surgeon may take x-rays to determine the degree of the deformities and assess any changes that may have occurred.   Hammertoes are progressive - they don t go away by themselves and usually they will get worse over time. However, not all cases are alike - some hammertoes progress more rapidly than others. Once your foot and ankle surgeon has evaluated your hammertoes, a treatment plan can be developed that is suited to your needs.  NON-SURGICAL TREATMENT  There is a variety of treatment options for hammertoe. The treatment your foot and ankle surgeon selects will depend upon the severity of your hammertoe and other factors.  A number of non-surgical measures can be undertaken:  Padding corns and calluses. Your foot and ankle surgeon can provide or prescribe pads designed to shield corns from irritation. If you want to try over-the-counter pads, avoid the medicated types. Medicated pads are generally not recommended because they may contain a small amount of acid that can be harmful. Consult your surgeon about this option.   Changes in shoewear. Avoid shoes with pointed toes, shoes that are too short, or shoes with high heels - conditions that  can force your toe against the front of the shoe. Instead, choose comfortable shoes with a deep, roomy toe box and heels no higher than two inches.   Orthotic devices. A custom orthotic device placed in your shoe may help control the muscle/tendon imbalance.   Injection therapy. Corticosteroid injections are sometimes used to ease pain and inflammation caused by hammertoe.   Medications. Oral nonsteroidal anti-inflammatory drugs (NSAIDs), such as ibuprofen, may be recommended to reduce pain and inflammation.   Splinting/strapping. Splints or small straps may be applied by the surgeon to realign the bent toe.   Exercises:   1. The Toe Tap  Stand flat on the ground in your bare feet. Raise all of your toes up off the ground as high as you can. Then starting with the little toes, slowly press them down to the ground. After the big toes are on the ground, start over by raising all of them up off the ground again. This motion is similar to tapping your fingers on a desk. Repeat this ten times.     2. Interlocking your Fingers and Toes  Cross your right foot over your knee and place the fingers of your left hand between your toes. Squeeze your toes together, pinching your fingers between them. Spread the toes apart and squeeze them together again. Repeat this ten times then do the other foot. Like most exercises, this will get easier the more you do it. If you are having a lot of difficulty with this exercise, start with just your index finger between your first and second toe, then later add your middle finger between your second and third toes, and so on until you can fit all your fingers between your toes. Do this ten times on each foot. Eventually you will be able to spread your toes apart without using your fingers.    3. Gripping the Floor   the floor by pressing the pads of your toes (not the tips) into the floor without curling your toes. Relax and repeat this ten times. If your shoes have the proper amount of  depth, you should be able to do this with shoes on.    HAMMERTOE TOE SURGERY   Hammertoe surgery is complex. The surgical procedure is an attempt to help the toe lay in a better position. Nearly every structure in the toe will be cut including the tendons, ligaments, skin and bone. Hammertoes are a complex deformity and final toe position is difficult to predict. The only sure way to position a toe is to fuse all three digital joints. That will not happen as some degree of toe motion is needed for walking. The toe may not be completely reduced as the surrounding skin and other structures may not allow the toe to return to a normal position. The tendons on adjacent toes may need to be cut at the time of the original or subsequent surgeries, as interconnections exist between the toes. The toe may drift after surgery. Stiffness may develop leading to new areas of pressure.   Future shoe choices will be critical in allowing the surgery to provide comfort. The toes will still hurt if shoes rub. The original pain may also persist as other foot problems may be contributing to the current pain. The toe may or may not be pinned in place. External pins would require complete avoidance of water on the foot for six weeks. The pin would be removed about six weeks after the surgery. Strict attention to protection is critical. The pin could get bumped or loosen resulting in early removal. Removal might be necessary before the bone heals which would negatively affect the final surgical outcome and toe allignment.       GETTING READY FOR YOUR SURGERY  ONE-THREE WEEKS BEFORE  1. See your Family Doctor or Primary Care Doctor for a History and Physical. If you do not, we may need to change the date of your surgery.  2. Please see pre-surgical medications below to which medications need to be stopped before surgery and when.    TEN OR MORE DAYS BEFORE    1. [place] with the hospital. ([facility])      By Phone: [phone].       By Internet: [email or web address]. Choose [facility].      If you do not register by phone or online, we will call to help you register.    SAME DAY SURGERY PATIENTS  1. You will need a family member of friend to drive you home. If you do not have one the surgery will be cancelled or rescheduled.  2. You will need a responsible adult to stay with you that night after the surgery.       We will ask this person to listen to some instructions before you leave the hospital.  * If your child is having surgery, and you would like a tour of the hospital, please call: [phone].      DAY BEFORE SURGERY  1. DO NOT EAT OR DRINK ANYTHING AFTER MIDNIGHT THE NIGHT BEFORE YOUR SURGERY!   2. DO NOT DRINK ALCOHOL.  3. Do not take over the counter drugs.  4. Some people need to have blood tests at the hospital. If you need blood tests, we will tell you in advance.  5. Take medications as directed by your doctor. You may take these with a small amount of water.  6. Do not chew gum, chew tobacco, or suck on hard candy the day of surgery.  7. Bring your insurance cards, a list of your medicines and co-pays you might need. Leave jewelry and other valuables at home.  8. If you received papers at your doctor's office, bring these with you to the surgery.    If you have questions about these instructions, please call: [phone]  Ask to speak with a pre-admitting nurse.    PRESURGICAL MEDICATIONS:  Certain prescription, over-the-counter, and herbal medications interfere with healing after an operation. The main concern relates to medications that increase bleeding at the surgical site. Excess blood under the incision results in poor wound healing, excess pain, increased scarring, and a higher risk of infection.    Some medications slow the healing process of bone. Medications can also interfere with the anesthesia drugs that keep you asleep during the operation. It is important to ensure that these medications are out  of your system prior to the operation. The list below details a number of medications that are of concern. Pay special attention to how long you should avoid these medications before your operation. Please note that this list is not complete. You should ask your surgeon or pharmacist if you are uncertain about other medications. Any herbal supplement not listed should be discontinued at least one week prior to surgery.    Aspirin: Hold for one week prior to surgery and restart the day after surgery. This over the counter medication promotes bleeding.    Motrin / Ibuprofen / Aleve / Advil / NSAIDS:  Stop one week prior to surgery. These medications affect bleeding and may cause delay in bone healing. Avoid taking these medications for six weeks after bone surgeries. Other procedures may allow you to restart sooner than 6 weeks after surgery.    Coumadin / Plavix: Your primary care provider will manage Coumadin in relation to surgery. Coumadin may result in excessive bleeding and may be adjusted before and after surgery.    Enbriel: Stop two weeks prior to surgery and restart two weeks after surgery. This medication can effect soft tissue healing and increases the risk of infection.    Remicade: Stop 8-12 weeks before surgery and restart two weeks after surgery. This medication can affect soft tissue healing and increases the risk of infection.    Humira: Stop 4 weeks before surgery and restart two weeks after surgery. This medication can affect soft tissue healing and increases the risk of infection.    Methotrexate: Stop one dose prior to surgery. This medication will be restarted when the wound appears to be healing well. Please ask your surgeon about restarting this medication when you are being seen in the office for wound checks.    Kava: Stop at least one day prior to surgery and may restart one day after surgery. Kava may increase the sedative effect of anesthetics that are given during the operation. Kava can  also increase bleeding at the surgical site.    Ephedra (ma lima): Stop at least one day prior to surgery and may restart one day after surgery.  Ephedra may increase the risk of heart attack and stroke. This medication can also increase bleeding at the surgical site.    Vaiden's Wort: Stop at least five days before surgery and may restart one day after surgery. Vaiden's wort may diminish the effects of several drugs that are given during surgery.    Ginseng: Stop at least one week prior to surgery and may restart one day after surgery.  Ginseng lowers blood sugar and may increase bleeding at the surgical site.    Ginkgo: Stop 36 hours before surgery and may restart one day after surgery. Ginkgo may increase bleeding at the surgical site.    Garlic: Stop at least one week prior to surgery and may restart one day after. Garlic may increase bleeding at the surgery site.    Valerian: Do a slow steady decrease in your daily dose over a period of 2-3 weeks before surgery to decrease the chance of withdrawal symptoms. Valerian may increase the sedative effect of anesthetics given during the operation.    Echinacea: There is no data on stopping echinacea prior to surgery. This medication though can be associated with allergic reactions and suppression of your immune system.    Vitamin E, Omega-3, Flax, Fish Oil, Glucosamine and Chondroitin: Stop 2 weeks prior to surgery and may restart one day after. These herbal medications can increase risk of bleeding at surgical site.      POST OPERATIVE HOME CARE INSTRUCTIONS  Activities: You should rest today. Stay off your feet as much as possible and keep your foot elevated above the level of your heart (about two pillow height). Wear your surgical shoe at all times when up. Limit walking to 5 to 10 minutes per hour over the next few days if your doctor has previously told you that you can put some weight on the foot after surgery, although limit the weight to your heel. If  you are supposed to be non-weight bearing, that means NO WEIGHT AT ALL ON THE FOOT. Use an ice pack on the ankle while awake 20 minutes per hour to help decrease pain and swelling.     Discomfort: If a prescription for pain was given, take as directed. If no pain medication was ordered, you may take a non-prescription, non-aspirin pain medication. If the pain is not relieved by pain medication, call the clinic.     Incision and Dressing: Your surgical dressing is a sterile dressing and should be left in place until removed by your physician. Keep the dressing dry by covering it with a plastic bag for showers, taking baths with the surgical foot out of the tub, or sponge bathing. Some bleeding on the dressing should be expected. If however, you notice active or excessive bleeding or a temperature over 100 degrees by mouth, call the clinic. Do not change dressing by yourself.  If the dressing becomes wet or dirty, please call the clinic as it may need a new sterile dressing applied. You may start getting the foot wet after the stitches are removed.     Do not wear regular shoes with a surgical bandage and/or external pins in your foot. Wear loose fitting clothing that easily will slip over the bandage and/or pins. Do not cover your surgical foot with blankets as they may damage the dressing/pins. Also, remember that dogs are not aware of your surgery. Please keep them away from the bandage/pins.   If your surgeon places external pins in your foot, you must keep the foot dry until the pins are removed at 6-8 weeks after the surgery. Pins should be covered with a dressing for protection. You should examine the pins and your skin often. Check for any spreading redness or yellow drainage from the pin areas. Do not apply ointment around the pins. Do not push a loose pin back into your foot. Please call the clinic if the pin is spinning or moving in and out. If the pins are bumped or loosened they may need to be removed  early. This may affect your surgical outcome.   Please call the clinic if you feel there is a problem with your pins and/or surgical bandage.    TIPS FOR SUCCESSFUL HEALING  How you care for the surgical site is critically important to achieve a successful result after surgery. Avoidance of injury, infection, excess swelling, scar tissue and stiffness are highly dependent on the care you provide over the next six weeks. Please do not hesitate to call if you have questions or concerns.   Your foot requires significant rest and elevation. Sitting for long hours with your foot elevated, however, will create its own problems. Expect muscle aches, back pain, cramps, etc. Optimal posture, lumbar support, back exercises, ice and heat may all help with your new aches and pains. Do not apply a heating pad to your foot or leg as this can cause increase swelling and pain. Rather use ice in those areas.   Pain medications cause drowsiness. You may frequently sleep during the day and then have trouble sleeping at night. Over the counter sleep aids might be more effective than narcotic pain medication to achieve a reasonable night's sleep.    Narcotic pain medications and inactivity lead to constipation. Limiting use of narcotics will help minimize this problem. The pain medications will not completely alleviate your pain. The purpose of pain pills is to take the edge off and help you get through the first few days. You can substitute Extra Strength Tylenol if pain is mild. Please note that narcotic pain pills usually contain acetaminophen (the active ingredient in Tylenol) so be careful to avoid the maximum dose of acetaminophen. You should take measures to avoid constipation by drinking plenty of water, eating lots of fruit and vegetables and taking the recommended dose of Metamucil or a similar fiber supplement. These measures should be continued for as long as you require narcotic pain medications and are inactive.      Showering is a major challenge. Your incision requires about three days to become sealed from water. Your bandage should not get wet and should not be removed. Do not attempt showering for the first three days. A sponge bath is preferred. You may attempt to shower on the fourth day after the operation. Your foot should be covered with a bag, tape and rubber bands. Double bagging is preferred. Standing in the shower with a bag on your foot is quite hazardous. A portable shower stool would be ideal. The bandage will need to be changed in the office if it becomes moistened. A moist bandage will not dry on its own. A moist dressing may lead to infection.   Stiffness will develop after any operation due to scarring. The scar tissue begins to form immediately after the surgery. Inactivity can cause excess stiffness and may lead to blood clots in your legs. Frequent range of motion exercises will help decrease stiffness, blood clots, scar tissue and adhesions. Please call if you are unsure about these recommendations.   Good luck and best wishes on a prompt recovery. Healing is slow but an important step in your recovery. You are in control of the final result. Please use this time wisely. Please do not hesitate to call if you have questions, concerns or comments.    * If you have any post-operative questions or concerns regarding your procedure, call our triage team at the [facility] at [phone].     GETTING READY FOR YOUR SURGERY  ONE-THREE WEEKS BEFORE  1. See your Family Doctor or Primary Care Doctor for a History and Physical. If you do not, we may need to change the date of your surgery.  2. Please see pre-surgical medications below to which medications need to be stopped before surgery and when.    TEN OR MORE DAYS BEFORE    1. [place] with the hospital. (For [facility])      By Phone: [phone].      By Internet: [email or web address]. Choose [facility].       If you do not register by phone or online, we will call to help you register.    SAME DAY SURGERY PATIENTS  1. You will need a family member of friend to drive you home. If you do not have one the surgery will be cancelled or rescheduled.  2. You will need a responsible adult to stay with you that night after the surgery.       We will ask this person to listen to some instructions before you leave the hospital.  * If your child is having surgery, and you would like a tour of the hospital, please call: [phone].      DAY BEFORE SURGERY  1. DO NOT EAT OR DRINK ANYTHING AFTER MIDNIGHT THE NIGHT BEFORE YOUR SURGERY!   2. DO NOT DRINK ALCOHOL.  3. Do not take over the counter drugs.  4. Some people need to have blood tests at the hospital. If you need blood tests, we will tell you in advance.  5. Take medications as directed by your doctor. You may take these with a small amount of water.  6. Do not chew gum, chew tobacco, or suck on hard candy the day of surgery.  7. Bring your insurance cards, a list of your medicines and co-pays you might need. Leave jewelry and other valuables at home.  8. If you received papers at your doctor's office, bring these with you to the surgery.    If you have questions about these instructions, please call: [phone]  Ask to speak with a pre-admitting nurse.    PRESURGICAL MEDICATIONS:  Certain prescription, over-the-counter, and herbal medications interfere with healing after an operation. The main concern relates to medications that increase bleeding at the surgical site. Excess blood under the incision results in poor wound healing, excess pain, increased scarring, and a higher risk of infection.    Some medications slow the healing process of bone. Medications can also interfere with the anesthesia drugs that keep you asleep during the operation. It is important to ensure that these medications are out of your system prior to the operation. The list below details a number of  medications that are of concern. Pay special attention to how long you should avoid these medications before your operation. Please note that this list is not complete. You should ask your surgeon or pharmacist if you are uncertain about other medications. Any herbal supplement not listed should be discontinued at least one week prior to surgery.    Aspirin: Hold for one week prior to surgery and restart the day after surgery. This over the counter medication promotes bleeding.    Motrin / Ibuprofen / Aleve / Advil / NSAIDS:  Stop one week prior to surgery. These medications affect bleeding and may cause delay in bone healing. Avoid taking these medications for six weeks after bone surgeries. Other procedures may allow you to restart sooner than 6 weeks after surgery.    Coumadin / Plavix: Your primary care provider will manage Coumadin in relation to surgery. Coumadin may result in excessive bleeding and may be adjusted before and after surgery.    Enbriel: Stop two weeks prior to surgery and restart two weeks after surgery. This medication can effect soft tissue healing and increases the risk of infection.    Remicade: Stop 8-12 weeks before surgery and restart two weeks after surgery. This medication can affect soft tissue healing and increases the risk of infection.    Humira: Stop 4 weeks before surgery and restart two weeks after surgery. This medication can affect soft tissue healing and increases the risk of infection.    Methotrexate: Stop one dose prior to surgery. This medication will be restarted when the wound appears to be healing well. Please ask your surgeon about restarting this medication when you are being seen in the office for wound checks.    Kava: Stop at least one day prior to surgery and may restart one day after surgery. Kava may increase the sedative effect of anesthetics that are given during the operation. Kava can also increase bleeding at the surgical site.    Ephedra (caroline lima): Stop  at least one day prior to surgery and may restart one day after surgery.  Ephedra may increase the risk of heart attack and stroke. This medication can also increase bleeding at the surgical site.    Abdoul's Wort: Stop at least five days before surgery and may restart one day after surgery. Abdoul's wort may diminish the effects of several drugs that are given during surgery.    Ginseng: Stop at least one week prior to surgery and may restart one day after surgery.  Ginseng lowers blood sugar and may increase bleeding at the surgical site.    Ginkgo: Stop 36 hours before surgery and may restart one day after surgery. Ginkgo may increase bleeding at the surgical site.    Garlic: Stop at least one week prior to surgery and may restart one day after. Garlic may increase bleeding at the surgery site.    Valerian: Do a slow steady decrease in your daily dose over a period of 2-3 weeks before surgery to decrease the chance of withdrawal symptoms. Valerian may increase the sedative effect of anesthetics given during the operation.    Echinacea: There is no data on stopping echinacea prior to surgery. This medication though can be associated with allergic reactions and suppression of your immune system.    Vitamin E, Omega-3, Flax, Fish Oil, Glucosamine and Chondroitin: Stop 2 weeks prior to surgery and may restart one day after. These herbal medications can increase risk of bleeding at surgical site.     POTENTIAL COMPLICATIONS OF FOOT & ANKLE SURGERY  Undergoing a surgical procedure involves a certain amount of risk. Risks of complications vary depending on the complexity of the surgery and how you take care of the surgical area during the healing process. Complications can range from minor infection to death. Some complications are temporary while others will be permanent.  Your surgeon weighs the risk of complications vs the potential benefit of undergoing surgery. You need to consider your tolerance for unexpected  problems as you decide whether to undergo surgery.    Foot and Ankle surgery involves cutting skin, bone, ligaments, blood vessels and joints.  These structures heal well but not without consequence. Any break to the skin can lead to infection. A deep infection involves bones or joints which can be devastating. Deep infection can lead to amputation or could spread to other parts of your body. Most infections are minor and easily treated with oral antibiotics. Infections are often times from bacteria already present on your skin. Proper care of the surgical site is an essential component of avoiding infection. Do not get the bandage wet and take proper care of external pins to avoid these problems.     Joint stiffness is inherent to any foot or ankle surgery. Joint surgery is a major component of reconstructive foot and ankle procedures. The ligaments and tissues around the joint are cut, and later repaired. Scare tissue limits joint mobility. This can be permanent but generally improves over the course of one year.    Surgery involves dissection around nerves. Visible nerves are moved out of the way while very small nerves are cut. Scar tissue develops around nerves and can lead to nerve pain, numbness, or neuromas. Nerve symptoms can be permanent. This can lead to numbness or sometimes hypersensitivity to touch and problems wearing shoes.    Bones do not always heal after surgery. Poor healing after a bone cut or joint fusion can lead to an extended period of casting or repeat surgery. Electronic bone stimulators are sometimes used to stimulate poor healing of bone. Nonunion is when joint fusion does not take.  This can occur as often as 10% of the time. Smoking doubles your risk of poor bone healing to 20%.    Bone grafting is sometimes necessary during the original or subsequent surgery. Bone is sometimes taken from other parts of your body or freeze dried bone from a bone bank from a bone bank or synthetic bone  material might be used.    A scar is always present after foot and ankle surgery. The scar will be visible and could be sensitive. Some people develop excessive scarring, which cannot be controlled by the surgeon. Scars can be unsightly and can restrict joint mobility.    Blood clots can develop in the calf after surgery. Foot and ankle surgery is a predisposing factor for blood clots. The blood clot could break and travel to your lung.  This condition can lead to death. Early warning signs could include calf swelling and pain, chest pain or shortness of breath. This is an emergency that requires immediate attention by a medical doctor!    Surgery will not necessarily create a pain-free foot. Even normal feet hurt. Crooked toes, bunions, neuromas, flat feet and arthritis should all be considered permanent conditions.  Ankle pain commonly requires multiple surgeries over a lifetime. Do not assume that having surgery will permanently fix your condition. You may need permanent alteration in shoes and activities to accommodate your foot and ankle problem.    Careful attention to post-operative recommendations will dramatically reduce your risk of complications. Proper dressing, wound care, elevation and rest will be essential to get the wound healed and minimize scarring. Strict attention to activity restrictions, such as non-weight bearing, or partial weight bearing is essential. Internal fixation devices may not resist the stress of walking. Some select surgeries allow the patient to walk, however this should be very minimal.    Despite these concerns, foot and ankle surgery leads to a high level of patient satisfaction. Your surgeon would not recommend surgery if he/she did not expect your foot to improve. Talk to your surgeon about any of the above issues.    POST OPERATIVE HOME CARE INSTRUCTIONS  Activities: You should rest today. Stay off your feet as much as possible and keep your foot elevated above the level of  your heart (about two pillow height). Wear your surgical shoe at all times when up. Limit walking to 5 to 10 minutes per hour over the next few days if your doctor has previously told you that you can put some weight on the foot after surgery, although limit the weight to your heel. If you are supposed to be non-weight bearing, that means NO WEIGHT AT ALL ON THE FOOT. Use an ice pack on the ankle while awake 20 minutes per hour to help decrease pain and swelling.     Discomfort: If a prescription for pain was given, take as directed. If no pain medication was ordered, you may take a non-prescription, non-aspirin pain medication. If the pain is not relieved by pain medication, call the clinic.     Incision and Dressing: Your surgical dressing is a sterile dressing and should be left in place until removed by your physician. Keep the dressing dry by covering it with a plastic bag for showers, taking baths with the surgical foot out of the tub, or sponge bathing. Some bleeding on the dressing should be expected. If however, you notice active or excessive bleeding or a temperature over 100 degrees by mouth, call the clinic. Do not change dressing by yourself.  If the dressing becomes wet or dirty, please call the clinic as it may need a new sterile dressing applied. You may start getting the foot wet after the stitches are removed.     Do not wear regular shoes with a surgical bandage and/or external pins in your foot. Wear loose fitting clothing that easily will slip over the bandage and/or pins. Do not cover your surgical foot with blankets as they may damage the dressing/pins. Also, remember that dogs are not aware of your surgery. Please keep them away from the bandage/pins.   If your surgeon places external pins in your foot, you must keep the foot dry until the pins are removed at 6-8 weeks after the surgery. Pins should be covered with a dressing for protection. You should examine the pins and your skin often.  Check for any spreading redness or yellow drainage from the pin areas. Do not apply ointment around the pins. Do not push a loose pin back into your foot. Please call the clinic if the pin is spinning or moving in and out. If the pins are bumped or loosened they may need to be removed early. This may affect your surgical outcome.   Please call the clinic if you feel there is a problem with your pins and/or surgical bandage.    TIPS FOR SUCCESSFUL HEALING  How you care for the surgical site is critically important to achieve a successful result after surgery. Avoidance of injury, infection, excess swelling, scar tissue and stiffness are highly dependent on the care you provide over the next six weeks. Please do not hesitate to call if you have questions or concerns.   Your foot requires significant rest and elevation. Sitting for long hours with your foot elevated, however, will create its own problems. Expect muscle aches, back pain, cramps, etc. Optimal posture, lumbar support, back exercises, ice and heat may all help with your new aches and pains. Do not apply a heating pad to your foot or leg as this can cause increase swelling and pain. Rather use ice in those areas.   Pain medications cause drowsiness. You may frequently sleep during the day and then have trouble sleeping at night. Over the counter sleep aids might be more effective than narcotic pain medication to achieve a reasonable night's sleep.    Narcotic pain medications and inactivity lead to constipation. Limiting use of narcotics will help minimize this problem. The pain medications will not completely alleviate your pain. The purpose of pain pills is to take the edge off and help you get through the first few days. You can substitute Extra Strength Tylenol if pain is mild. Please note that narcotic pain pills usually contain acetaminophen (the active ingredient in Tylenol) so be careful to avoid the maximum dose of acetaminophen. You should take  measures to avoid constipation by drinking plenty of water, eating lots of fruit and vegetables and taking the recommended dose of Metamucil or a similar fiber supplement. These measures should be continued for as long as you require narcotic pain medications and are inactive.     Showering is a major challenge. Your incision requires about three days to become sealed from water. Your bandage should not get wet and should not be removed. Do not attempt showering for the first three days. A sponge bath is preferred. You may attempt to shower on the fourth day after the operation. Your foot should be covered with a bag, tape and rubber bands. Double bagging is preferred. Standing in the shower with a bag on your foot is quite hazardous. A portable shower stool would be ideal. The bandage will need to be changed in the office if it becomes moistened. A moist bandage will not dry on its own. A moist dressing may lead to infection.   Stiffness will develop after any operation due to scarring. The scar tissue begins to form immediately after the surgery. Inactivity can cause excess stiffness and may lead to blood clots in your legs. Frequent range of motion exercises will help decrease stiffness, blood clots, scar tissue and adhesions. Please call if you are unsure about these recommendations.   Good luck and best wishes on a prompt recovery. Healing is slow but an important step in your recovery. You are in control of the final result. Please use this time wisely. Please do not hesitate to call if you have questions, concerns or comments.    * If you have any post-operative questions or concerns regarding your procedure, call our triage team at the [facility] at [phone] (option 4).

## 2024-12-11 NOTE — PROGRESS NOTES
PATIENT HISTORY: Jeanne Hunter PA-C requested I see this patient for their foot issue.  María Henderson is a 79 year old female who presents to clinic for on her toe.  Notes it has been going on for about 4 weeks.  An aching burning tingling pain.  Can be 6 out of 10.  Notes that her feet swell.  Worse with walking.  Denies specific injury.  Wondering what is causing this and what can be done for it.    Review of Systems:  Patient denies fever, chills, rash, wound, stiffness.     PAST MEDICAL HISTORY:   Past Medical History:   Diagnosis Date    Allergic rhinitis     Bacteremia due to Streptococcus     Edema     Gastro-oesophageal reflux disease     Heart murmur     ?murmur    Hyperlipemia     Hyperlipidemia     Hypertension     Kidney stone     Numbness and tingling     numbness left distal medial tibia    Osteoarthritis     Osteoarthrosis     Osteopenia     Paroxysmal supraventricular tachycardia (H)     S/P ablation of atrial fibrillation         PAST SURGICAL HISTORY:   Past Surgical History:   Procedure Laterality Date    7 foot and ankle       ARTHROPLASTY REVISION ANKLE  3/10/2014    Procedure: ARTHROPLASTY REVISION ANKLE;  REVISION LEFT TOTAL ANKLE  WITH CONVERSION TO  IM LAURIE FUSION WITH FEMORAL HEAD ALLOGRAFT (C-ARM);  Surgeon: Ramirez Fang MD;  Location: [facility]    ASPIRATION NEEDLE KNEE Right 12/27/2016    Procedure: ASPIRATION NEEDLE KNEE;  Surgeon: Jian Fisher MD;  Location:  OR    BUNIONECTOMY JONAS  10/24/2011    Procedure:BUNIONECTOMY JONAS; Left Foot Bunionette Repair (C-Arm); Surgeon:RAMIREZ FANG; Location:[facility]    COLONOSCOPY      ESOPHAGOSCOPY, GASTROSCOPY, DUODENOSCOPY (EGD), COMBINED N/A 1/5/2023    Procedure: ESOPHAGOGASTRODUODENOSCOPY WITH FOREIGN BODY REMOVAL;  Surgeon: Dedra Acosta MD;  Location:  OR    EYE SURGERY      cataract    H ABLATION SVT      HERNIA REPAIR      IRRIGATION AND DEBRIDEMENT FOOT, COMBINED Left 12/27/2016    Procedure:  COMBINED IRRIGATION AND DEBRIDEMENT FOOT;  Surgeon: Jian Fisher MD;  Location:  OR    ORTHOPEDIC SURGERY      RTKA    ORTHOPEDIC SURGERY Left     ROTATOR CUFF    REMOVE ANTIBIOTIC CEMENT BEADS / SPACER KNEE N/A 4/24/2017    Procedure: REMOVE ANTIBIOTIC CEMENT BEADS / SPACER KNEE;  REMOVAL ANTIBIOTIC SPACER RIGHT KNEE REIMPLANT TOTAL JOINT COMPONENT TO RIGHT KNEE ;  Surgeon: Husam Berkowitz MD;  Location:  OR    REMOVE HARDWARE ARTHROPLASTY KNEE, IRRIG & JOSE, PLACE ANTIBIOTIC CEMENT SPACER, COMBINED Right 2/20/2017    Procedure: COMBINED REMOVE HARDWARE ARTHROPLASTY KNEE, IRRIGATION AND DEBRIDEMENT, PLACE ANTIBIOTIC CEMENT BEADS / SPACER;  Surgeon: Husam Berkowitz MD;  Location:  OR    REVERSE ARTHROPLASTY SHOULDER Right 10/7/2019    Procedure: RIGHT TOTAL REVERSE SHOULDER ARTHROPLASTY;  Surgeon: Husam Berkowitz MD;  Location:  OR    TONSILLECTOMY      TONSILLECTOMY      total knee arthroplasy      ZZC TOTAL KNEE ARTHROPLASTY Right         MEDICATIONS:   Current Outpatient Medications:     acetaminophen (TYLENOL) 500 MG tablet, Take 1,000 mg by mouth At Bedtime, Disp: , Rfl:     alendronate (FOSAMAX) 70 MG tablet, Take 1 tablet (70 mg) by mouth every 7 days, Disp: 13 tablet, Rfl: 3    aspirin 81 MG EC tablet, Take 81 mg by mouth daily, Disp: , Rfl:     calcium carbonate (OS-MATHEW) 1500 (600 Ca) MG tablet, Take 650 mg by mouth daily, Disp: , Rfl:     carbamide peroxide (DEBROX) 6.5 % otic solution, Place 5 drops Into the left ear daily as needed for other (ear wax)., Disp: 15 mL, Rfl: 0    Cyanocobalamin (VITAMIN B-12) 3000 MCG SUBL, Place 2 each under the tongue daily Taking the gummies - 2 for a dose of 3000 mcg, Disp: , Rfl:     furosemide (LASIX) 20 MG tablet, Take 1-2 tablets (20-40 mg) by mouth daily Usually Takes 20 mg daily., Disp: 180 tablet, Rfl: 1    GABAPENTIN PO, Take 300 mg by mouth 3 times daily., Disp: , Rfl:     glucosamine-chondroitin 500-400 MG  CAPS per capsule, Take 2 capsules by mouth daily, Disp: , Rfl:     omeprazole (PRILOSEC) 40 MG DR capsule, Take 1 capsule (40 mg) by mouth daily, Disp: , Rfl:     oxyBUTYnin ER (DITROPAN XL) 10 MG 24 hr tablet, Take 1 tablet (10 mg) by mouth daily at 2 pm, Disp: 90 tablet, Rfl: 3    Potassium Gluconate 595 (99 K) MG TABS, Take 2 tablets by mouth every morning, Disp: , Rfl:     simvastatin (ZOCOR) 40 MG tablet, Take 1 tablet (40 mg) by mouth every morning (Patient taking differently: Take 40 mg by mouth at bedtime.), Disp: 90 tablet, Rfl: 3    Turmeric 500 MG CAPS, Take 1 capsule by mouth daily, Disp: , Rfl:     valsartan (DIOVAN) 160 MG tablet, Take 1 tablet (160 mg) by mouth daily, Disp: 90 tablet, Rfl: 3    valsartan (DIOVAN) 40 MG tablet, Take 1 tablet (40 mg) by mouth daily Take in addition to (with) 160 mg tablet for total dose of 200 mg once daily., Disp: 90 tablet, Rfl: 1    Vitamin D-Vitamin K (K2-D3 5000) 5000-90 UNIT-MCG CAPS, Take 1 capsule by mouth daily, Disp: , Rfl:      ALLERGIES:    Allergies   Allergen Reactions    Melon Anaphylaxis     Throat swelling    Pcn [Penicillin V Potassium] Anaphylaxis    Walnuts [Nuts] Angioedema and Anaphylaxis    Ceftriaxone Itching    Cephalexin Itching    Oxycodone Other (See Comments)     unconsciousness    Sulfa Antibiotics     Valium [Diazepam] Other (See Comments)     unconsciousness    Vicodin [Hydrocodone-Acetaminophen]      Patient unsure if allergy    Iodine Solution [Povidone Iodine] Rash        SOCIAL HISTORY:   Social History     Socioeconomic History    Marital status:      Spouse name: Not on file    Number of children: Not on file    Years of education: Not on file    Highest education level: Not on file   Occupational History    Not on file   Tobacco Use    Smoking status: Never    Smokeless tobacco: Never   Vaping Use    Vaping status: Never Used   Substance and Sexual Activity    Alcohol use: Yes     Comment: occasional    Drug use: No     Sexual activity: Yes   Other Topics Concern    Parent/sibling w/ CABG, MI or angioplasty before 65F 55M? Not Asked   Social History Narrative    Not on file     Social Drivers of Health     Financial Resource Strain: Low Risk  (1/18/2024)    Financial Resource Strain     Within the past 12 months, have you or your family members you live with been unable to get utilities (heat, electricity) when it was really needed?: No   Food Insecurity: Low Risk  (1/18/2024)    Food Insecurity     Within the past 12 months, did you worry that your food would run out before you got money to buy more?: No     Within the past 12 months, did the food you bought just not last and you didn t have money to get more?: No   Transportation Needs: Low Risk  (1/18/2024)    Transportation Needs     Within the past 12 months, has lack of transportation kept you from medical appointments, getting your medicines, non-medical meetings or appointments, work, or from getting things that you need?: No   Physical Activity: Insufficiently Active (1/18/2024)    Exercise Vital Sign     Days of Exercise per Week: 2 days     Minutes of Exercise per Session: 20 min   Stress: No Stress Concern Present (1/18/2024)    [employer] - Occupational Stress Questionnaire     Feeling of Stress : Not at all   Social Connections: Socially Integrated (1/18/2024)    Social Connection and Isolation Panel [NHANES]     Frequency of Communication with Friends and Family: More than three times a week     Frequency of Social Gatherings with Friends and Family: Three times a week     Attends Faith Services: More than 4 times per year     Active Member of Clubs or Organizations: Yes     Attends Club or Organization Meetings: More than 4 times per year     Marital Status: Living with partner   Interpersonal Safety: Low Risk  (11/26/2024)    Interpersonal Safety     Do you feel physically and emotionally safe where you currently live?: Yes      Within the past 12 months, have you been hit, slapped, kicked or otherwise physically hurt by someone?: No     Within the past 12 months, have you been humiliated or emotionally abused in other ways by your partner or ex-partner?: No   Housing Stability: Low Risk  (1/18/2024)    Housing Stability     Do you have housing? : Yes     Are you worried about losing your housing?: No        FAMILY HISTORY: No family history on file.     EXAM:Vitals: /82   Wt 87.5 kg (193 lb)   LMP  (LMP Unknown)   BMI 31.15 kg/m    BMI= Body mass index is 31.15 kg/m .    General appearance: Patient is alert and fully cooperative with history & exam.  No sign of distress is noted during the visit.     Psychiatric: Affect is pleasant & appropriate.  Patient appears motivated to improve health.     Respiratory: Breathing is regular & unlabored while sitting.     HEENT: Hearing is intact to spoken word.  Speech is clear.  No gross evidence of visual impairment that would impact ambulation.     Dermatologic: Partial-thickness ulceration to the medial aspect of the left second DIPJ.  This measures approximately 0.6 cm x 0.2 cm x 0.1 cm.  Base of the wound is granular.  No surrounding erythema purulent drainage or malodor noted.     Vascular: DP & PT pulses are intact & regular bilaterally.  No significant edema or varicosities noted.  CFT and skin temperature is normal to both lower extremities.     Neurologic: Lower extremity sensation is intact to light touch.  No evidence of weakness or contracture in the lower extremities.  No evidence of neuropathy.     Musculoskeletal: Patient is ambulatory without assistive device or brace.  Adductovarus position of the left second toe at the DIPJ.  The right great toe is rubbing in this area.    Radiographs: left foot xray - I personally reviewed the xrays.  First tarsometatarsal arthrodesis with screw fixation. No  evidence of hardware failure. Solid bony bridging across the  first  tarsometatarsal joint space. Screw fixation of the distal fifth  metatarsal across a healed fracture and/or osteotomy site. Combined  subtalar and tibiotalar arthrodesis with intramedullary suyapa and screw  fixation. Solid bony bridging across the tibiotalar and subtalar  joints. Mild to moderate multifocal degenerative arthrosis of the  midfoot. No acute fracture or evidence of osteomyelitis.     ASSESSMENT: .   Sore on toe (H)  Ulcer of left foot with fat layer exposed (H)  Left foot pain  Hammertoe of left foot       Medical Decision Making/Plan:  Reviewed patient's chart in [facility].  Reviewed and discussed causes of hammertoes with patient.  Explained that this can be caused by an overpowering of muscles or by the way we walk.  Discussed conservative treatments such as orthotics, pads, shoe gear.  Explained that sometimes the flexor tendons can be cut to try and straighten the toe and reduce rubbing. This is normally done in office and patient is weight bearing in postop she for 1-2 weeks.  We also discussed surgical intervention to remove the joint and possibly fuse the toe.  Normally patient has a pin sticking out of the toe for about 6 weeks and can not get the foot wet. Patient would have to be minimal weight bearing in cam boot.      Discussed that the 2 toes are rubbing together causing the sore.  We talked about padding such as a toe cover.  Also talked about surgery to go in and remove some of the bone to try to decrease pressure from the inside out.  She would like to try the padding first.  We will have her try bacitracin and a Band-Aid to the area and the toe cover.  She was also started on clindamycin for short course to try to help with pain redness and swelling.  She will call if she would like to proceed with surgery.    Patient risk factor: Patient is at medium risk for infection.        Digna Tang DPM, Podiatry/Foot and Ankle Surgery

## 2024-12-26 ENCOUNTER — PATIENT OUTREACH (OUTPATIENT)
Dept: CARE COORDINATION | Facility: CLINIC | Age: 79
End: 2024-12-26

## 2024-12-26 ENCOUNTER — HOSPITAL ENCOUNTER (OUTPATIENT)
Dept: WOUND CARE | Facility: CLINIC | Age: 79
End: 2024-12-26
Attending: FAMILY MEDICINE
Payer: OTHER MISCELLANEOUS

## 2024-12-26 VITALS — SYSTOLIC BLOOD PRESSURE: 151 MMHG | DIASTOLIC BLOOD PRESSURE: 85 MMHG | TEMPERATURE: 97.7 F

## 2024-12-26 DIAGNOSIS — M79.675 PAIN OF TOE OF LEFT FOOT: Primary | ICD-10-CM

## 2024-12-26 PROCEDURE — G0463 HOSPITAL OUTPT CLINIC VISIT: HCPCS

## 2024-12-26 NOTE — DISCHARGE INSTRUCTIONS
"12/26/2024   Vira Henderson   1945    A DME order was not completed because supplies were not needed    Plan 12/26/2024   -no need to return here unless you have an open draining wound which not present  -continue with your compression stockings to lower legs daily  -continue with leg elevation which will help to reduce swelling; see below  -advised that you protect or minimize rubbing of the toes as this is the likely source of your irritation to this left toe 2 due to bony prominence  -try small piece of mole skin or hydrocolloid to the medial aspect of right toe 2 in between the second and first toe on left foot; change this application with every shower; must be changed with each shower and if gets wet/soiled; these are available off shelf in stores in pharmacy section; look in foot care section  -the following is copy and pasted segment of Dr Tang podiatry note from recent visit on 12/11/24 which outlines her options for correcting the bony defect:   \"Reviewed and discussed causes of hammertoes with patient.  Explained that this can be caused by an overpowering of muscles or by the way we walk.  Discussed conservative treatments such as orthotics, pads, shoe gear.  Explained that sometimes the flexor tendons can be cut to try and straighten the toe and reduce rubbing. This is normally done in office and patient is weight bearing in postop she for 1-2 weeks.  We also discussed surgical intervention to remove the joint and possibly fuse the toe.  Normally patient has a pin sticking out of the toe for about 6 weeks and can not get the foot wet. Patient would have to be minimal weight bearing in cam boot.       Discussed that the 2 toes are rubbing together causing the sore.  We talked about padding such as a toe cover.  Also talked about surgery to go in and remove some of the bone to try to decrease pressure from the inside out.  She would like to try the padding first.  We will have her try bacitracin and a " "Band-Aid to the area and the toe cover.  She was also started on clindamycin for short course to try to help with pain redness and swelling.  She will call if she would like to proceed with surgery.\"      Elevation:  It is recommended that you elevate your legs above the level of your heart for 30 minutes: approximately 2-3 times each day   Ways to do this:   - Lay on the couch or your bed and prop your legs up on pillows   - Recline back as far as you can go in your recliner and prop your legs on pillows.   Doing these things will help reduce the edema in your legs.          Main Provider: Dillan Baum M.D. December 26, 2024    Call us at [phone] if you have any questions about your wounds, if you have redness or swelling around your wound, have a fever of 101 degrees Fahrenheit or greater or if you have any other problems or concerns. We answer the phone Monday through Friday 8 am to 4 pm, please leave a message as we check the voicemail frequently throughout the day. If you have a concern over the weekend, please leave a message and we will return your call Monday. If the need is urgent, go to the ER or urgent care.    If you had a positive experience please indicate that on your patient satisfaction survey form that [facility] will be sending you.    It was a pleasure meeting with you today.  Thank you for allowing me and my team the privilege of caring for you today.  YOU are the reason we are here, and I truly hope we provided you with the excellent service you deserve.  Please let us know if there is anything else we can do for you so that we can be sure you are leaving completely satisfied with your care experience.      If you have any billing related questions please call the [facility] Business office at [phone]. The clinic staff does not handle billing related matters.    If you are scheduled to have a follow up appointment, you will receive a reminder call the day before your " visit. On the appointment day please arrive 15 minutes prior to your appointment time. If you are unable to keep that appointment, please call the clinic to cancel or reschedule. If you are more than 10 minutes late or greater for your scheduled appointment time, the clinic policy is that you may be asked to reschedule.

## 2024-12-26 NOTE — PROGRESS NOTES
Wound Clinic Note         Visit date: 12/26/2024       Cheif Complaint:     María Henderson is a 79 year old   female had concerns including WOUND CARE.  The patient has lower extremity edema and a left toe wound.         HISTORY OF PRESENT ILLNESS:    María Henderson reports the wound has been present since early November 2024.  The wound began without a clear cause.   She has never had other difficult to heal wounds in this area.  The wound is between her first and second toe.  She does have quite a bit of swelling in this extremity due to multiple previous orthopedic surgeries on the left.  She has not worn any kind of compression stocking.  She reports when this wound first developed she was not using any different kind of shoe wear.  Since the wound developed it is scabbed over and then opened back up again several times.    I last saw this patient in the wound clinic on November 26, 2024 at which time she had a small wound on the left second toe caused by her toes rubbing against each other.  She already had an appointment scheduled with the podiatrist a couple weeks after that appointment.  At that time I recommended she cover the area with a simple dry gauze and get toe separators from [employer] or [employer].  She ended up seeing the podiatrist on December 11, 2024.  From the note it looks like the podiatrist went over some surgical corrections that could be done but also recommended applying bacitracin and a Band-Aid to the area.  The patient reports today that she does not recall any discussion with the podiatrist about any surgical interventions.  She does not have any further follow-up appointment scheduled with the podiatrist.    She has been applying a dry gauze to the area recently changed with bathing once a day.  She reports some days there is drainage and some days there is not.    She reports she got the gel toe separators that I had suggested but they were too thick and more  uncomfortable to have in between her toes.      The pateint denies fevers or chills.  They report the pain from the wound has been 0/10 and has remained about the same recently.      The patient reports propping up their legs occasionally during the day, but they do not elevate their legs above the level of their heart.     Today the patient reports maintaining a high protein diet, but has not been taking protein supplements lately.        The patient denies a history of diabetes, smoking or chronic steroid use.         The patient has not had any symptoms of infection relating to the wound recently and is not currently on antibiotics.       Problem List:   Past Medical History:   Diagnosis Date    Allergic rhinitis     Bacteremia due to Streptococcus     Edema     Gastro-oesophageal reflux disease     Heart murmur     ?murmur    Hyperlipemia     Hyperlipidemia     Hypertension     Kidney stone     Numbness and tingling     numbness left distal medial tibia    Osteoarthritis     Osteoarthrosis     Osteopenia     Paroxysmal supraventricular tachycardia (H)     S/P ablation of atrial fibrillation              Family Hx: family history is not on file.       Surgical Hx:   Past Surgical History:   Procedure Laterality Date    7 foot and ankle       ARTHROPLASTY REVISION ANKLE  3/10/2014    Procedure: ARTHROPLASTY REVISION ANKLE;  REVISION LEFT TOTAL ANKLE  WITH CONVERSION TO  IM LAURIE FUSION WITH FEMORAL HEAD ALLOGRAFT (C-ARM);  Surgeon: Ramirez Fang MD;  Location: [facility]    ASPIRATION NEEDLE KNEE Right 12/27/2016    Procedure: ASPIRATION NEEDLE KNEE;  Surgeon: Jian Fisher MD;  Location: [facility] OR    BUNIONECTOMY JONAS  10/24/2011    Procedure:BUNIONECTOMY JONAS; Left Foot Bunionette Repair (C-Arm); Surgeon:RAMIREZ FANG; Location:[facility]    COLONOSCOPY      ESOPHAGOSCOPY, GASTROSCOPY, DUODENOSCOPY (EGD), COMBINED N/A 1/5/2023    Procedure: ESOPHAGOGASTRODUODENOSCOPY WITH FOREIGN BODY REMOVAL;   Surgeon: Dedra Acosta MD;  Location:  OR    EYE SURGERY      cataract    H ABLATION SVT      HERNIA REPAIR      IRRIGATION AND DEBRIDEMENT FOOT, COMBINED Left 12/27/2016    Procedure: COMBINED IRRIGATION AND DEBRIDEMENT FOOT;  Surgeon: Jian Fisher MD;  Location:  OR    ORTHOPEDIC SURGERY      RTKA    ORTHOPEDIC SURGERY Left     ROTATOR CUFF    REMOVE ANTIBIOTIC CEMENT BEADS / SPACER KNEE N/A 4/24/2017    Procedure: REMOVE ANTIBIOTIC CEMENT BEADS / SPACER KNEE;  REMOVAL ANTIBIOTIC SPACER RIGHT KNEE REIMPLANT TOTAL JOINT COMPONENT TO RIGHT KNEE ;  Surgeon: Husam Berkowitz MD;  Location:  OR    REMOVE HARDWARE ARTHROPLASTY KNEE, IRRIG & JOSE, PLACE ANTIBIOTIC CEMENT SPACER, COMBINED Right 2/20/2017    Procedure: COMBINED REMOVE HARDWARE ARTHROPLASTY KNEE, IRRIGATION AND DEBRIDEMENT, PLACE ANTIBIOTIC CEMENT BEADS / SPACER;  Surgeon: Husam Berkowitz MD;  Location:  OR    REVERSE ARTHROPLASTY SHOULDER Right 10/7/2019    Procedure: RIGHT TOTAL REVERSE SHOULDER ARTHROPLASTY;  Surgeon: Husam Berkowitz MD;  Location:  OR    TONSILLECTOMY      TONSILLECTOMY      total knee arthroplasy      ZZC TOTAL KNEE ARTHROPLASTY Right           Allergies:    Allergies   Allergen Reactions    Melon Anaphylaxis     Throat swelling    Pcn [Penicillin V Potassium] Anaphylaxis    Walnuts [Nuts] Angioedema and Anaphylaxis    Ceftriaxone Itching    Cephalexin Itching    Oxycodone Other (See Comments)     unconsciousness    Sulfa Antibiotics     Valium [Diazepam] Other (See Comments)     unconsciousness    Vicodin [Hydrocodone-Acetaminophen]      Patient unsure if allergy    Iodine Solution [Povidone Iodine] Rash              Medication History:    Current Outpatient Medications   Medication Sig Dispense Refill    acetaminophen (TYLENOL) 500 MG tablet Take 1,000 mg by mouth At Bedtime      alendronate (FOSAMAX) 70 MG tablet Take 1 tablet (70 mg) by mouth every 7 days 13 tablet 3     aspirin 81 MG EC tablet Take 81 mg by mouth daily      calcium carbonate (OS-MATHEW) 1500 (600 Ca) MG tablet Take 650 mg by mouth daily      carbamide peroxide (DEBROX) 6.5 % otic solution Place 5 drops Into the left ear daily as needed for other (ear wax). 15 mL 0    Cyanocobalamin (VITAMIN B-12) 3000 MCG SUBL Place 2 each under the tongue daily Taking the gummies - 2 for a dose of 3000 mcg      furosemide (LASIX) 20 MG tablet Take 1-2 tablets (20-40 mg) by mouth daily Usually Takes 20 mg daily. 180 tablet 1    GABAPENTIN PO Take 300 mg by mouth 3 times daily.      glucosamine-chondroitin 500-400 MG CAPS per capsule Take 2 capsules by mouth daily      omeprazole (PRILOSEC) 40 MG DR capsule Take 1 capsule (40 mg) by mouth daily      oxyBUTYnin ER (DITROPAN XL) 10 MG 24 hr tablet Take 1 tablet (10 mg) by mouth daily at 2 pm 90 tablet 3    Potassium Gluconate 595 (99 K) MG TABS Take 2 tablets by mouth every morning      simvastatin (ZOCOR) 40 MG tablet Take 1 tablet (40 mg) by mouth every morning (Patient taking differently: Take 40 mg by mouth at bedtime.) 90 tablet 3    Turmeric 500 MG CAPS Take 1 capsule by mouth daily      valsartan (DIOVAN) 160 MG tablet Take 1 tablet (160 mg) by mouth daily 90 tablet 3    valsartan (DIOVAN) 40 MG tablet Take 1 tablet (40 mg) by mouth daily Take in addition to (with) 160 mg tablet for total dose of 200 mg once daily. 90 tablet 1    Vitamin D-Vitamin K (K2-D3 5000) 5000-90 UNIT-MCG CAPS Take 1 capsule by mouth daily       No current facility-administered medications for this encounter.         Tobacco History:  reports that she has never smoked. She has never used smokeless tobacco.       REVIEW OF SYMPTOMS:   The review of systems was negative except as noted in the HPI.           PHYSICAL EXAMINATION:     LMP  (LMP Unknown)            GENERAL: The patient overall appears well and is no acute distress.   HEAD: normocephalic   EYES: Sclera and conjunctiva clear   NECK: no obvious  masses   LUNGS: breathing is unlabored.   EXTREMITIES: No clubbing, cyanosis or edema   SKIN: No rashes or other abnormalities except as noted under the Wound section below.   NEUROLOGICAL: normal motor and sensory function   EDEMA: Moderate       WOUND: Healed      Also see below for wound details:       Circumferential volume measures:             No data to display                Ulceration(s)/Wound(s):   Please see the media tab under the chart review for pictures of the wounds.  Nursing staff removed dressings and cleansed wound.               Recent Labs   Lab Test 07/11/24  1615 04/09/24  1029   A1C 5.7* 5.9*          Recent Labs   Lab Test 09/27/24  1820 12/05/23  1037 10/20/21  1654   ALBUMIN 4.3 4.2 3.9              No sharp debridement performed today.                  ASSESSMENT:   This is a 79 year old  female with a healed wound in between her first and second toe, the patient also has lower extremity edema which was also managed during today's clinic visit.          PLAN:   We will cut a small piece of a hydrocolloid bandage to put in between her toes to keep the toes from rubbing against each other and give her the rest of the hydrocolloid bandage which she can use for another week or so until that is used up.  I then recommend that she get a piece of moleskin at [facility] which she can again cut a piece of input between the toes to keep the toes from rubbing against each other.  She will need to change this every time she showers.  If the podiatrist did not have any other suggestions for helping with this area I think this is the only thing I can recommend is that she continue to use moleskin or a similar product in between her toes to keep the toes from rubbing against each other and she would need to continue to do this for the rest of her life.      Separate from the wound care instructions we then discussed management strategies for lower extremity edema.  I explained the keys for  managing lower extremity edema are compression and elevation.  I have again explained to the patient today that controlling the edema is probably the most important thing we can do to help heal the wound.  I have specifically recommended that they lay down with their legs above the level of the heart for 30 minutes at least twice a day.  I emphasized that if we can not control the edema we will likely not be able to get this wound to heal.     I have encouraged the patient to continue on their high protein diet to aid in wound healing.   The patient will return to the wound clinic on an as-needed basis if further wounds develop.        30 minutes spent on the date of the encounter doing chart review, history and exam, documentation and further activities per the note, this time excludes any procedure time      Dillan Baum MD  12/26/2024   11:35 AM   [facility] Vascular/Wound  [phone]    This note was electronically signed by Dillan Baum MD        Further instructions from your care team         12/26/2024   Vira Henderson   1945    A DME order was not completed because supplies were not needed    Plan 12/26/2024   -no need to return here unless you have an open draining wound which not present  -continue with your compression stockings to lower legs daily  -continue with leg elevation which will help to reduce swelling; see below  -advised that you protect or minimize rubbing of the toes as this is the likely source of your irritation to this left toe 2 due to bony prominence  -try small piece of mole skin or hydrocolloid to the medial aspect of right toe 2 in between the second and first toe on left foot; change this application with every shower; must be changed with each shower and if gets wet/soiled; these are available off shelf in stores in pharmacy section; look in foot care section  -the following is copy and pasted segment of Dr Tang podiatry note from recent visit on  "12/11/24 which outlines her options for correcting the bony defect:   \"Reviewed and discussed causes of hammertoes with patient.  Explained that this can be caused by an overpowering of muscles or by the way we walk.  Discussed conservative treatments such as orthotics, pads, shoe gear.  Explained that sometimes the flexor tendons can be cut to try and straighten the toe and reduce rubbing. This is normally done in office and patient is weight bearing in postop she for 1-2 weeks.  We also discussed surgical intervention to remove the joint and possibly fuse the toe.  Normally patient has a pin sticking out of the toe for about 6 weeks and can not get the foot wet. Patient would have to be minimal weight bearing in cam boot.       Discussed that the 2 toes are rubbing together causing the sore.  We talked about padding such as a toe cover.  Also talked about surgery to go in and remove some of the bone to try to decrease pressure from the inside out.  She would like to try the padding first.  We will have her try bacitracin and a Band-Aid to the area and the toe cover.  She was also started on clindamycin for short course to try to help with pain redness and swelling.  She will call if she would like to proceed with surgery.\"      Elevation:  It is recommended that you elevate your legs above the level of your heart for 30 minutes: approximately 2-3 times each day   Ways to do this:   - Lay on the couch or your bed and prop your legs up on pillows   - Recline back as far as you can go in your recliner and prop your legs on pillows.   Doing these things will help reduce the edema in your legs.          Main Provider: Dillan Baum M.D. December 26, 2024    Call us at [phone] if you have any questions about your wounds, if you have redness or swelling around your wound, have a fever of 101 degrees Fahrenheit or greater or if you have any other problems or concerns. We answer the phone Monday through Friday 8 " am to 4 pm, please leave a message as we check the voicemail frequently throughout the day. If you have a concern over the weekend, please leave a message and we will return your call Monday. If the need is urgent, go to the ER or urgent care.    If you had a positive experience please indicate that on your patient satisfaction survey form that [facility] will be sending you.    It was a pleasure meeting with you today.  Thank you for allowing me and my team the privilege of caring for you today.  YOU are the reason we are here, and I truly hope we provided you with the excellent service you deserve.  Please let us know if there is anything else we can do for you so that we can be sure you are leaving completely satisfied with your care experience.      If you have any billing related questions please call the [facility] Business office at [phone]. The clinic staff does not handle billing related matters.    If you are scheduled to have a follow up appointment, you will receive a reminder call the day before your visit. On the appointment day please arrive 15 minutes prior to your appointment time. If you are unable to keep that appointment, please call the clinic to cancel or reschedule. If you are more than 10 minutes late or greater for your scheduled appointment time, the clinic policy is that you may be asked to reschedule.        ,

## 2025-01-09 ENCOUNTER — PATIENT OUTREACH (OUTPATIENT)
Dept: CARE COORDINATION | Facility: CLINIC | Age: 80
End: 2025-01-09

## 2025-01-09 ENCOUNTER — OFFICE VISIT (OUTPATIENT)
Dept: FAMILY MEDICINE | Facility: CLINIC | Age: 80
End: 2025-01-09
Payer: OTHER MISCELLANEOUS

## 2025-01-09 VITALS
OXYGEN SATURATION: 97 % | BODY MASS INDEX: 30.47 KG/M2 | WEIGHT: 189.6 LBS | TEMPERATURE: 97.5 F | HEART RATE: 71 BPM | RESPIRATION RATE: 19 BRPM | DIASTOLIC BLOOD PRESSURE: 71 MMHG | SYSTOLIC BLOOD PRESSURE: 114 MMHG | HEIGHT: 66 IN

## 2025-01-09 DIAGNOSIS — L03.032 CELLULITIS OF SECOND TOE OF LEFT FOOT: Primary | ICD-10-CM

## 2025-01-09 DIAGNOSIS — L97.521 SKIN ULCER OF SECOND TOE OF LEFT FOOT, LIMITED TO BREAKDOWN OF SKIN (H): ICD-10-CM

## 2025-01-09 RX ORDER — DOXYCYCLINE 100 MG/1
100 CAPSULE ORAL 2 TIMES DAILY
Qty: 20 CAPSULE | Refills: 0 | Status: SHIPPED | OUTPATIENT
Start: 2025-01-09 | End: 2025-01-19

## 2025-01-09 NOTE — PROGRESS NOTES
"  Assessment & Plan     Cellulitis of second toe of left foot  Appears infected. Started on antibiotic as noted below due to allergies unable to do Bactrim or Keflex. Follow up with wound care. She has had some drainage more recently from the ulcer.  - doxycycline hyclate (VIBRAMYCIN) 100 MG capsule; Take 1 capsule (100 mg) by mouth 2 times daily for 10 days.  - Wound Care Referral; Future    Skin ulcer of second toe of left foot, limited to breakdown of skin (H)  As noted above.  - Wound Care Referral; Future        Subjective   Pat is a 79 year old, presenting for the following health issues:  Wound Check (Follow up wound check)      1/9/2025    11:23 AM   Additional Questions   Roomed by Mikayla   Accompanied by Significant other -Leon     Wound Check       Follow up wound care on left toe    Patient has seen wound care and podiatry.    It was recommended she get some moleskin to provide a barrier from rubbing of the left toes. She was last seen by wound care 12/26/24.    She is back today due to pain and swelling in the toe. She feels the area has reopened.        Objective    /71 (BP Location: Left arm, Patient Position: Sitting, Cuff Size: Adult Large)   Pulse 71   Temp 97.5  F (36.4  C) (Oral)   Resp 19   Ht 1.676 m (5' 6\")   Wt 86 kg (189 lb 9.6 oz)   LMP  (LMP Unknown)   SpO2 97%   BMI 30.60 kg/m    Body mass index is 30.6 kg/m .  Physical Exam   GENERAL: No acute distress  HEENT: Normocephalic  VASCULAR: 2+ DP and PT pulses on the left  EXTREMITIES:   Left lower extremity- foot: Left 2nd digit with open sore around the medial aspect on the proximal phalanx. Erythema extending to the distal toe with tenderness. Left 1st digit with erythema along the lateral aspect (photos taken today)  NEURO: Alert, non-focal          Signed Electronically by: Marium Dodd PA-C    "

## 2025-01-15 ENCOUNTER — HOSPITAL ENCOUNTER (OUTPATIENT)
Dept: WOUND CARE | Facility: CLINIC | Age: 80
Discharge: HOME | End: 2025-01-15
Attending: FAMILY MEDICINE | Admitting: FAMILY MEDICINE
Payer: OTHER MISCELLANEOUS

## 2025-01-15 VITALS — SYSTOLIC BLOOD PRESSURE: 164 MMHG | DIASTOLIC BLOOD PRESSURE: 79 MMHG | TEMPERATURE: 97.7 F | HEART RATE: 78 BPM

## 2025-01-15 DIAGNOSIS — S91.105A OPEN WOUND OF LESSER TOE OF LEFT FOOT: Primary | ICD-10-CM

## 2025-01-15 DIAGNOSIS — L97.521 SKIN ULCER OF SECOND TOE OF LEFT FOOT, LIMITED TO BREAKDOWN OF SKIN (H): ICD-10-CM

## 2025-01-15 DIAGNOSIS — L03.032 CELLULITIS OF SECOND TOE OF LEFT FOOT: ICD-10-CM

## 2025-01-15 PROCEDURE — G0463 HOSPITAL OUTPT CLINIC VISIT: HCPCS

## 2025-01-15 PROCEDURE — 99214 OFFICE O/P EST MOD 30 MIN: CPT | Performed by: FAMILY MEDICINE

## 2025-01-15 NOTE — PROGRESS NOTES
Patient arrived for wound care visit. Certified Wound Care Nurse time spent evaluating patient record, completed a full evaluation and documented wound(s) & nghia-wound skin; provided recommendation based on treatment plan. Applied dressing, reviewed discharge instructions, patient education, and discussed plan of care with appropriate medical team staff members and patient and/or family members.   Patient provided education on care and prevention of the existing issue to left toe. No further questions or concerns.

## 2025-01-15 NOTE — PROGRESS NOTES
Wound Clinic Note         Visit date: 01/15/2025       Cristaif Complaint:     María Henderson is a 79 year old   female had concerns including WOUND CARE.  The patient has a left toe wound.         HISTORY OF PRESENT ILLNESS:    María Henderson reports the wound has been present since early January 2025.  The wound began without a clear cause.   She has persistently had troubles with a wound opening on her left second toe.  She previously saw podiatrist on December 11, 2024.  From the note it looks like the podiatrist went over some surgical corrections that could be done but also recommended applying bacitracin and a Band-Aid to the area.  The patient reported that she does not recall any discussion with the podiatrist about any surgical interventions.  She does not have any further follow-up appointment scheduled with the podiatrist.    I last saw this patient in the wound clinic on December 26, 2024 at which time her left second toe wound was healed.  She reports shortly after that visit the wound opened back up again.  Her primary care doctor recommended that she apply bacitracin and [facility]  She has been using that toe protector sleeve during the day and leaving the area open at night.  She does not seem to recall the hydrocolloid bandage that we had recommended for her.      The pateint denies fevers or chills.  They report the pain from the wound has been 0/10 and has remained about the same recently.        Today the patient reports maintaining a high protein diet, but has not been taking protein supplements lately.        The patient denies a history of diabetes, smoking or chronic steroid use.         The patient has not had any symptoms of infection relating to the wound recently and is not currently on antibiotics.       Problem List:   Past Medical History:   Diagnosis Date    Allergic rhinitis     Bacteremia due to Streptococcus     Edema      Gastro-oesophageal reflux disease     Heart murmur     ?murmur    Hyperlipemia     Hyperlipidemia     Hypertension     Kidney stone     Numbness and tingling     numbness left distal medial tibia    Osteoarthritis     Osteoarthrosis     Osteopenia     Paroxysmal supraventricular tachycardia     S/P ablation of atrial fibrillation              Family Hx: family history is not on file.       Surgical Hx:   Past Surgical History:   Procedure Laterality Date    7 foot and ankle       ARTHROPLASTY REVISION ANKLE  3/10/2014    Procedure: ARTHROPLASTY REVISION ANKLE;  REVISION LEFT TOTAL ANKLE  WITH CONVERSION TO  IM LAURIE FUSION WITH FEMORAL HEAD ALLOGRAFT (C-ARM);  Surgeon: Ramirez Fang MD;  Location: [facility]    ASPIRATION NEEDLE KNEE Right 12/27/2016    Procedure: ASPIRATION NEEDLE KNEE;  Surgeon: Jian Fisher MD;  Location:  OR    BUNIONECTOMY JONAS  10/24/2011    Procedure:BUNIONECTOMY JONAS; Left Foot Bunionette Repair (C-Arm); Surgeon:RAMIREZ FANG; Location:[facility]    COLONOSCOPY      ESOPHAGOSCOPY, GASTROSCOPY, DUODENOSCOPY (EGD), COMBINED N/A 1/5/2023    Procedure: ESOPHAGOGASTRODUODENOSCOPY WITH FOREIGN BODY REMOVAL;  Surgeon: Dedra Acosta MD;  Location:  OR    EYE SURGERY      cataract    H ABLATION SVT      HERNIA REPAIR      IRRIGATION AND DEBRIDEMENT FOOT, COMBINED Left 12/27/2016    Procedure: COMBINED IRRIGATION AND DEBRIDEMENT FOOT;  Surgeon: Jian Fisher MD;  Location:  OR    ORTHOPEDIC SURGERY      RTKA    ORTHOPEDIC SURGERY Left     ROTATOR CUFF    REMOVE ANTIBIOTIC CEMENT BEADS / SPACER KNEE N/A 4/24/2017    Procedure: REMOVE ANTIBIOTIC CEMENT BEADS / SPACER KNEE;  REMOVAL ANTIBIOTIC SPACER RIGHT KNEE REIMPLANT TOTAL JOINT COMPONENT TO RIGHT KNEE ;  Surgeon: Husam Berkowitz MD;  Location:  OR    REMOVE HARDWARE ARTHROPLASTY KNEE, IRRIG & JOSE, PLACE ANTIBIOTIC CEMENT SPACER, COMBINED Right 2/20/2017    Procedure: COMBINED REMOVE HARDWARE  ARTHROPLASTY KNEE, IRRIGATION AND DEBRIDEMENT, PLACE ANTIBIOTIC CEMENT BEADS / SPACER;  Surgeon: Husam Berkowitz MD;  Location: [facility] OR    REVERSE ARTHROPLASTY SHOULDER Right 10/7/2019    Procedure: RIGHT TOTAL REVERSE SHOULDER ARTHROPLASTY;  Surgeon: Husam Berkowitz MD;  Location:  OR    TONSILLECTOMY      TONSILLECTOMY      total knee arthroplasy      ZZC TOTAL KNEE ARTHROPLASTY Right           Allergies:    Allergies   Allergen Reactions    Melon Anaphylaxis     Throat swelling    Pcn [Penicillin V Potassium] Anaphylaxis    Walnuts [Nuts] Angioedema and Anaphylaxis    Ceftriaxone Itching    Cephalexin Itching    Oxycodone Other (See Comments)     unconsciousness    Sulfa Antibiotics     Valium [Diazepam] Other (See Comments)     unconsciousness    Vicodin [Hydrocodone-Acetaminophen]      Patient unsure if allergy    Iodine Solution [Povidone Iodine] Rash              Medication History:    Current Outpatient Medications   Medication Sig Dispense Refill    acetaminophen (TYLENOL) 500 MG tablet Take 1,000 mg by mouth At Bedtime      alendronate (FOSAMAX) 70 MG tablet Take 1 tablet (70 mg) by mouth every 7 days 13 tablet 3    aspirin 81 MG EC tablet Take 81 mg by mouth daily      calcium carbonate (OS-MATHEW) 1500 (600 Ca) MG tablet Take 650 mg by mouth daily      carbamide peroxide (DEBROX) 6.5 % otic solution Place 5 drops Into the left ear daily as needed for other (ear wax). 15 mL 0    Cyanocobalamin (VITAMIN B-12) 3000 MCG SUBL Place 2 each under the tongue daily Taking the gummies - 2 for a dose of 3000 mcg      doxycycline hyclate (VIBRAMYCIN) 100 MG capsule Take 1 capsule (100 mg) by mouth 2 times daily for 10 days. 20 capsule 0    furosemide (LASIX) 20 MG tablet Take 1-2 tablets (20-40 mg) by mouth daily Usually Takes 20 mg daily. 180 tablet 1    GABAPENTIN PO Take 300 mg by mouth 3 times daily.      glucosamine-chondroitin 500-400 MG CAPS per capsule Take 2 capsules by mouth daily       omeprazole (PRILOSEC) 40 MG DR capsule Take 1 capsule (40 mg) by mouth daily      oxyBUTYnin ER (DITROPAN XL) 10 MG 24 hr tablet Take 1 tablet (10 mg) by mouth daily at 2 pm 90 tablet 3    Potassium Gluconate 595 (99 K) MG TABS Take 2 tablets by mouth every morning      simvastatin (ZOCOR) 40 MG tablet Take 1 tablet (40 mg) by mouth every morning 90 tablet 3    Turmeric 500 MG CAPS Take 1 capsule by mouth daily      valsartan (DIOVAN) 160 MG tablet Take 1 tablet (160 mg) by mouth daily 90 tablet 3    valsartan (DIOVAN) 40 MG tablet Take 1 tablet (40 mg) by mouth daily Take in addition to (with) 160 mg tablet for total dose of 200 mg once daily. 90 tablet 1    Vitamin D-Vitamin K (K2-D3 5000) 5000-90 UNIT-MCG CAPS Take 1 capsule by mouth daily       No current facility-administered medications for this encounter.         Tobacco History:  reports that she has never smoked. She has never used smokeless tobacco.       REVIEW OF SYMPTOMS:   The review of systems was negative except as noted in the HPI.           PHYSICAL EXAMINATION:     BP (!) 164/79 (BP Location: Left arm, Patient Position: Sitting, Cuff Size: Adult Regular)   Pulse 78   Temp 97.7  F (36.5  C) (Temporal)   LMP  (LMP Unknown)            GENERAL: The patient overall appears well and is no acute distress.   HEAD: normocephalic   EYES: Sclera and conjunctiva clear   NECK: no obvious masses   LUNGS: breathing is unlabored.   EXTREMITIES: No clubbing, cyanosis or edema   SKIN: No rashes or other abnormalities except as noted under the Wound section below.   NEUROLOGICAL: normal motor and sensory function   EDEMA: Moderate       WOUND: There is maybe just a tiny pinpoint open area remaining, it is actually hard to tell if there is actually an open wound here.  There are no signs of infection.      Also see below for wound details:       Circumferential volume measures:             No data to display                Ulceration(s)/Wound(s):   Please see the  media tab under the chart review for pictures of the wounds.  Nursing staff removed dressings and cleansed wound.    Wound (used by OP [facility] only) 01/15/25 0752 2 toe Left medial (Active)   Thickness/Stage partial thickness 01/15/25 0700   Base pink;moist 01/15/25 0700   Periwound macerated 01/15/25 0700   Periwound Temperature warm 01/15/25 0700   Periwound Skin Turgor soft 01/15/25 0700   Edges open 01/15/25 0700   Length (cm) 0.2 01/15/25 0700   Width (cm) 0.1 01/15/25 0700   Depth (cm) 0.1 01/15/25 0700   Wound (cm^2) 0.02 cm^2 01/15/25 0700   Wound Volume (cm^3) 0 cm^3 01/15/25 0700   Drainage Amount none 01/15/25 0700             Recent Labs   Lab Test 07/11/24  1615 04/09/24  1029   A1C 5.7* 5.9*          Recent Labs   Lab Test 09/27/24  1820 12/05/23  1037 10/20/21  1654   ALBUMIN 4.3 4.2 3.9              No sharp debridement performed today.                  ASSESSMENT:   This is a 79 year old  female with a nearly healed wound in between her first and second toe.          PLAN:   We will again show her how to use the hydrocolloid bandage which she can use for a week or 2 to protect this area until it completely heals.  I have encouraged her to continue to use the toe sleeves every day even at night to protect the area.  If this area recurs she will need to see the a different podiatrist.      Separate from the wound care instructions we then discussed management strategies for lower extremity edema.  I explained the keys for managing lower extremity edema are compression and elevation.  I have again explained to the patient today that controlling the edema is probably the most important thing we can do to help heal the wound.  I have specifically recommended that they lay down with their legs above the level of the heart for 30 minutes at least twice a day.  I emphasized that if we can not control the edema we will likely not be able to get this wound to heal.     I have encouraged the patient to continue on  their high protein diet to aid in wound healing.   If the wound recurs she will come back to the wound clinic to see a podiatrist.      30 minutes spent on the date of the encounter doing chart review, history and exam, documentation and further activities per the note, this time excludes any procedure time      Dillan Baum MD  01/15/2025   8:31 AM   [facility] Vascular/Wound  [phone]    This note was electronically signed by Dillan Baum MD        Further instructions from your care team         01/15/2025   Vira Henderson   1945  A DME order was not completed because supplies were not needed    Plan 01/15/2025   Elevate your legs for 30 min 2 times a day  Continue to wear Compression stockings every day  Wear plastic bag to keep dressing dry during showers  Pad between toes using foam piece, callous pad/ mole skin or other options found  Avoid adding any Bacitracin or other moisture to wound    Wound Dressing Change: Left medial toe  Gently wash with gentle soap and water, pat dry  Cut a small strip of Hydrocolloid, warm between hands  Cover wound and hold to warm and meld to skin  Change every 3-5 days as needed     Main Provider: Dillan Baum M.D. January 15, 2025    Call us at [phone] if you have any questions about your wounds, if you have redness or swelling around your wound, have a fever of 101 degrees Fahrenheit or greater or if you have any other problems or concerns. We answer the phone Monday through Friday 8 am to 4 pm, please leave a message as we check the voicemail frequently throughout the day. If you have a concern over the weekend, please leave a message and we will return your call Monday. If the need is urgent, go to the ER or urgent care.    If you had a positive experience please indicate that on your patient satisfaction survey form that [facility] will be sending you.  It was a pleasure meeting with you today.  Thank you for allowing me  and my team the privilege of caring for you today.  YOU are the reason we are here, and I truly hope we provided you with the excellent service you deserve.  Please let us know if there is anything else we can do for you so that we can be sure you are leaving completely satisfied with your care experience.      If you have any billing related questions please call the [facility] Business office at [phone]. The clinic staff does not handle billing related matters.  If you are scheduled to have a follow up appointment, you will receive a reminder call the day before your visit. On the appointment day please arrive 15 minutes prior to your appointment time. If you are unable to keep that appointment, please call the clinic to cancel or reschedule. If you are more than 10 minutes late or greater for your scheduled appointment time, the clinic policy is that you may be asked to reschedule.         ,

## 2025-01-15 NOTE — DISCHARGE INSTRUCTIONS
01/15/2025   Vira Henderson   1945  A DME order was not completed because supplies were not needed    Plan 01/15/2025   Elevate your legs for 30 min 2 times a day  Continue to wear Compression stockings every day  Wear plastic bag to keep dressing dry during showers  Pad between toes using foam piece, callous pad/ mole skin or other options found  Avoid adding any Bacitracin or other moisture to wound    Wound Dressing Change: Left medial toe  Gently wash with gentle soap and water, pat dry  Cut a small strip of Hydrocolloid, warm between hands  Cover wound and hold to warm and meld to skin  Change every 3-5 days as needed     Main Provider: Dillan Baum M.D. January 15, 2025    Call us at [phone] if you have any questions about your wounds, if you have redness or swelling around your wound, have a fever of 101 degrees Fahrenheit or greater or if you have any other problems or concerns. We answer the phone Monday through Friday 8 am to 4 pm, please leave a message as we check the voicemail frequently throughout the day. If you have a concern over the weekend, please leave a message and we will return your call Monday. If the need is urgent, go to the ER or urgent care.    If you had a positive experience please indicate that on your patient satisfaction survey form that [facility] will be sending you.  It was a pleasure meeting with you today.  Thank you for allowing me and my team the privilege of caring for you today.  YOU are the reason we are here, and I truly hope we provided you with the excellent service you deserve.  Please let us know if there is anything else we can do for you so that we can be sure you are leaving completely satisfied with your care experience.      If you have any billing related questions please call the [facility] Business office at [phone]. The clinic staff does not handle billing related matters.  If you are scheduled to have a follow up appointment, you will  receive a reminder call the day before your visit. On the appointment day please arrive 15 minutes prior to your appointment time. If you are unable to keep that appointment, please call the clinic to cancel or reschedule. If you are more than 10 minutes late or greater for your scheduled appointment time, the clinic policy is that you may be asked to reschedule.

## 2025-02-06 ENCOUNTER — OFFICE VISIT (OUTPATIENT)
Dept: FAMILY MEDICINE | Facility: CLINIC | Age: 80
End: 2025-02-06
Payer: COMMERCIAL

## 2025-02-06 ENCOUNTER — ANCILLARY PROCEDURE (OUTPATIENT)
Dept: GENERAL RADIOLOGY | Facility: CLINIC | Age: 80
End: 2025-02-06
Payer: COMMERCIAL

## 2025-02-06 VITALS
BODY MASS INDEX: 30.22 KG/M2 | OXYGEN SATURATION: 95 % | SYSTOLIC BLOOD PRESSURE: 127 MMHG | HEART RATE: 68 BPM | TEMPERATURE: 98 F | DIASTOLIC BLOOD PRESSURE: 73 MMHG | RESPIRATION RATE: 16 BRPM | HEIGHT: 66 IN | WEIGHT: 188 LBS

## 2025-02-06 DIAGNOSIS — M25.50 MULTIPLE JOINT PAIN: Primary | ICD-10-CM

## 2025-02-06 DIAGNOSIS — M25.571 PAIN IN JOINT, ANKLE AND FOOT, RIGHT: ICD-10-CM

## 2025-02-06 LAB
BASOPHILS # BLD AUTO: 0 10E3/UL (ref 0–0.2)
BASOPHILS NFR BLD AUTO: 0 %
EOSINOPHIL # BLD AUTO: 0.3 10E3/UL (ref 0–0.7)
EOSINOPHIL NFR BLD AUTO: 3 %
ERYTHROCYTE [DISTWIDTH] IN BLOOD BY AUTOMATED COUNT: 14.1 % (ref 10–15)
HCT VFR BLD AUTO: 44.2 % (ref 35–47)
HGB BLD-MCNC: 13.6 G/DL (ref 11.7–15.7)
IMM GRANULOCYTES # BLD: 0 10E3/UL
IMM GRANULOCYTES NFR BLD: 0 %
LYMPHOCYTES # BLD AUTO: 2.1 10E3/UL (ref 0.8–5.3)
LYMPHOCYTES NFR BLD AUTO: 28 %
MCH RBC QN AUTO: 28.6 PG (ref 26.5–33)
MCHC RBC AUTO-ENTMCNC: 30.8 G/DL (ref 31.5–36.5)
MCV RBC AUTO: 93 FL (ref 78–100)
MONOCYTES # BLD AUTO: 0.7 10E3/UL (ref 0–1.3)
MONOCYTES NFR BLD AUTO: 9 %
NEUTROPHILS # BLD AUTO: 4.4 10E3/UL (ref 1.6–8.3)
NEUTROPHILS NFR BLD AUTO: 59 %
PLATELET # BLD AUTO: 252 10E3/UL (ref 150–450)
RBC # BLD AUTO: 4.75 10E6/UL (ref 3.8–5.2)
WBC # BLD AUTO: 7.4 10E3/UL (ref 4–11)

## 2025-02-06 PROCEDURE — 36415 COLL VENOUS BLD VENIPUNCTURE: CPT

## 2025-02-06 PROCEDURE — 99214 OFFICE O/P EST MOD 30 MIN: CPT

## 2025-02-06 PROCEDURE — 86140 C-REACTIVE PROTEIN: CPT

## 2025-02-06 PROCEDURE — 85025 COMPLETE CBC W/AUTO DIFF WBC: CPT

## 2025-02-06 PROCEDURE — 80053 COMPREHEN METABOLIC PANEL: CPT

## 2025-02-06 NOTE — PROGRESS NOTES
"  Assessment & Plan     Multiple joint pain  ***  - CBC with platelets and differential  - Comprehensive metabolic panel (BMP + Alb, Alk Phos, ALT, AST, Total. Bili, TP)  - CRP, inflammation        Have Rns call patient with results and plan    BMI  Estimated body mass index is 30.34 kg/m  as calculated from the following:    Height as of this encounter: 1.676 m (5' 6\").    Weight as of this encounter: 85.3 kg (188 lb).   {Weight Management Plan needed for ACO:551149}      {FOLLOW UP PLANS (Optional) Includes COVID19 Treatment Plan:245891}    Subjective   Pat is a 79 year old, presenting for the following health issues:  Mass (- lump on outer right ankle/foot; patient noticed this morning), Knee Pain (- posterior knee pain x 3 days), and Buttock (- pain in right buttock starting 3 days ago; radiates down into right thigh/leg)        2/6/2025     2:13 PM   Additional Questions   Roomed by KAMALJIT Cadena   Accompanied by Significant Other - Osman     History of Present Illness       Reason for visit:  Pain i butt area to knee to ankle   She is taking medications regularly.       {MA/LPN/RN Pre-Provider Visit Orders- hCG/UA/Strep (Optional):242808}  {SUPERLIST (Optional):332741}  {additonal problems for provider to add (Optional):902478}    {ROS Picklists (Optional):849078}      Objective    /73 (BP Location: Right arm, Patient Position: Sitting, Cuff Size: Adult Large)   Pulse 68   Temp 98  F (36.7  C) (Oral)   Resp 16   Ht 1.676 m (5' 6\")   Wt 85.3 kg (188 lb)   LMP  (LMP Unknown)   SpO2 95%   Breastfeeding No   BMI 30.34 kg/m    Body mass index is 30.34 kg/m .  Physical Exam   {Exam List (Optional):001648}    {Diagnostic Test Results (Optional):436204}        Signed Electronically by: DOMINIQUE Rose CNP  {Email feedback regarding this note to [email or web address]   :210049}  Answers submitted by the patient for this visit:  General Questionnaire (Submitted on " 2/6/2025)  Chief Complaint: Chronic problems general questions HPI Form  What is the reason for your visit today? : pain i butt area to knee to ankle  How many days per week do you miss taking your medication?: 0  Questionnaire about: Chronic problems general questions HPI Form (Submitted on 2/6/2025)  Chief Complaint: Chronic problems general questions HPI Form     and dry.   Neurological:      Mental Status: She is alert.            Results for orders placed or performed in visit on 02/06/25   XR Ankle Right G/E 3 Views     Status: None    Narrative    EXAM: XR ANKLE RIGHT G/E 3 VIEWS  LOCATION: [facility]  DATE: 2/6/2025    INDICATION:  Pain in joint, ankle and foot, right  COMPARISON: None.      Impression    IMPRESSION: No acute fracture. Diffuse bony demineralization. No ankle joint space narrowing. Severe degenerative arthritis in the midfoot. Medial ankle soft tissue swelling.   Results for orders placed or performed in visit on 02/06/25   Comprehensive metabolic panel (BMP + Alb, Alk Phos, ALT, AST, Total. Bili, TP)     Status: Abnormal   Result Value Ref Range    Sodium 147 (H) 135 - 145 mmol/L    Potassium 4.2 3.4 - 5.3 mmol/L    Carbon Dioxide (CO2) 30 (H) 22 - 29 mmol/L    Anion Gap 11 7 - 15 mmol/L    Urea Nitrogen 20.9 8.0 - 23.0 mg/dL    Creatinine 0.89 0.51 - 0.95 mg/dL    GFR Estimate 66 >60 mL/min/1.73m2    Calcium 10.4 8.8 - 10.4 mg/dL    Chloride 106 98 - 107 mmol/L    Glucose 81 70 - 99 mg/dL    Alkaline Phosphatase 96 40 - 150 U/L    AST 30 0 - 45 U/L    ALT 23 0 - 50 U/L    Protein Total 6.9 6.4 - 8.3 g/dL    Albumin 4.3 3.5 - 5.2 g/dL    Bilirubin Total 0.4 <=1.2 mg/dL   CRP, inflammation     Status: Normal   Result Value Ref Range    CRP Inflammation <3.00 <5.00 mg/L   CBC with platelets and differential     Status: Abnormal   Result Value Ref Range    WBC Count 7.4 4.0 - 11.0 10e3/uL    RBC Count 4.75 3.80 - 5.20 10e6/uL    Hemoglobin 13.6 11.7 - 15.7 g/dL    Hematocrit 44.2 35.0 - 47.0 %    MCV 93 78 - 100 fL    MCH 28.6 26.5 - 33.0 pg    MCHC 30.8 (L) 31.5 - 36.5 g/dL    RDW 14.1 10.0 - 15.0 %    Platelet Count 252 150 - 450 10e3/uL    % Neutrophils 59 %    % Lymphocytes 28 %    % Monocytes 9 %    % Eosinophils 3 %    % Basophils 0 %    % Immature Granulocytes 0 %    Absolute Neutrophils 4.4 1.6 - 8.3 10e3/uL    Absolute  Lymphocytes 2.1 0.8 - 5.3 10e3/uL    Absolute Monocytes 0.7 0.0 - 1.3 10e3/uL    Absolute Eosinophils 0.3 0.0 - 0.7 10e3/uL    Absolute Basophils 0.0 0.0 - 0.2 10e3/uL    Absolute Immature Granulocytes 0.0 <=0.4 10e3/uL   CBC with platelets and differential     Status: Abnormal    Narrative    The following orders were created for panel order CBC with platelets and differential.  Procedure                               Abnormality         Status                     ---------                               -----------         ------                     CBC with platelets and d...[[id number]]  Abnormal            Final result                 Please view results for these tests on the individual orders.     No results found for this visit on 02/06/25.        Signed Electronically by: DOMINIQUE Rose CNP    Answers submitted by the patient for this visit:  General Questionnaire (Submitted on 2/6/2025)  Chief Complaint: Chronic problems general questions HPI Form  What is the reason for your visit today? : pain i butt area to knee to ankle  How many days per week do you miss taking your medication?: 0  Questionnaire about: Chronic problems general questions HPI Form (Submitted on 2/6/2025)  Chief Complaint: Chronic problems general questions HPI Form

## 2025-02-07 LAB
ALBUMIN SERPL BCG-MCNC: 4.3 G/DL (ref 3.5–5.2)
ALP SERPL-CCNC: 96 U/L (ref 40–150)
ALT SERPL W P-5'-P-CCNC: 23 U/L (ref 0–50)
ANION GAP SERPL CALCULATED.3IONS-SCNC: 11 MMOL/L (ref 7–15)
AST SERPL W P-5'-P-CCNC: 30 U/L (ref 0–45)
BILIRUB SERPL-MCNC: 0.4 MG/DL
BUN SERPL-MCNC: 20.9 MG/DL (ref 8–23)
CALCIUM SERPL-MCNC: 10.4 MG/DL (ref 8.8–10.4)
CHLORIDE SERPL-SCNC: 106 MMOL/L (ref 98–107)
CREAT SERPL-MCNC: 0.89 MG/DL (ref 0.51–0.95)
CRP SERPL-MCNC: <3 MG/L
EGFRCR SERPLBLD CKD-EPI 2021: 66 ML/MIN/1.73M2
GLUCOSE SERPL-MCNC: 81 MG/DL (ref 70–99)
HCO3 SERPL-SCNC: 30 MMOL/L (ref 22–29)
POTASSIUM SERPL-SCNC: 4.2 MMOL/L (ref 3.4–5.3)
PROT SERPL-MCNC: 6.9 G/DL (ref 6.4–8.3)
SODIUM SERPL-SCNC: 147 MMOL/L (ref 135–145)

## 2025-02-17 DIAGNOSIS — I10 PRIMARY HYPERTENSION: ICD-10-CM

## 2025-02-17 RX ORDER — VALSARTAN 40 MG/1
40 TABLET ORAL DAILY
Qty: 90 TABLET | Refills: 2 | Status: SHIPPED | OUTPATIENT
Start: 2025-02-17

## 2025-03-04 ENCOUNTER — OFFICE VISIT (OUTPATIENT)
Dept: FAMILY MEDICINE | Facility: CLINIC | Age: 80
End: 2025-03-04
Payer: OTHER MISCELLANEOUS

## 2025-03-04 VITALS
RESPIRATION RATE: 16 BRPM | TEMPERATURE: 97.8 F | DIASTOLIC BLOOD PRESSURE: 74 MMHG | SYSTOLIC BLOOD PRESSURE: 124 MMHG | BODY MASS INDEX: 30.05 KG/M2 | OXYGEN SATURATION: 97 % | WEIGHT: 187 LBS | HEIGHT: 66 IN | HEART RATE: 69 BPM

## 2025-03-04 DIAGNOSIS — Z98.1 S/P ANKLE FUSION: ICD-10-CM

## 2025-03-04 DIAGNOSIS — S91.105A OPEN WOUND OF LESSER TOE OF LEFT FOOT: ICD-10-CM

## 2025-03-04 DIAGNOSIS — Z98.890 S/P FOOT SURGERY: ICD-10-CM

## 2025-03-04 DIAGNOSIS — Z01.818 PREOP GENERAL PHYSICAL EXAM: Primary | ICD-10-CM

## 2025-03-04 LAB
ERYTHROCYTE [DISTWIDTH] IN BLOOD BY AUTOMATED COUNT: 14.6 % (ref 10–15)
HCT VFR BLD AUTO: 43.2 % (ref 35–47)
HGB BLD-MCNC: 13.8 G/DL (ref 11.7–15.7)
MCH RBC QN AUTO: 28.9 PG (ref 26.5–33)
MCHC RBC AUTO-ENTMCNC: 31.9 G/DL (ref 31.5–36.5)
MCV RBC AUTO: 90 FL (ref 78–100)
PLATELET # BLD AUTO: 245 10E3/UL (ref 150–450)
RBC # BLD AUTO: 4.78 10E6/UL (ref 3.8–5.2)
WBC # BLD AUTO: 6.8 10E3/UL (ref 4–11)

## 2025-03-04 PROCEDURE — 85027 COMPLETE CBC AUTOMATED: CPT

## 2025-03-04 PROCEDURE — 36415 COLL VENOUS BLD VENIPUNCTURE: CPT

## 2025-03-04 PROCEDURE — 93000 ELECTROCARDIOGRAM COMPLETE: CPT

## 2025-03-04 PROCEDURE — 80048 BASIC METABOLIC PNL TOTAL CA: CPT

## 2025-03-04 PROCEDURE — 99214 OFFICE O/P EST MOD 30 MIN: CPT

## 2025-03-04 NOTE — PROGRESS NOTES
"Preoperative Evaluation  [facility]  [address] [facility] [address]  Phone: [phone]  Primary Provider: Roxanne Hatfield PA-C  Pre-op Performing Provider: DOMINIQUE Colindres CNP  Mar 4, 2025           3/4/2025   Surgical Information   What procedure is being done? Left foot surgery   Facility or Hospital where procedure/surgery will be performed: [facility]    Who is doing the procedure / surgery? Maik Armstrong M.D,   Date of surgery / procedure: 3/10/2025   Time of surgery / procedure: have not been told   Where do you plan to recover after surgery? at home with family     Fax number for surgical facility: [phone]  Fax number for work comp-  [phone]. Phone number for work comp contact (Jose Elias) [phone]    Assessment & Plan     The proposed surgical procedure is considered INTERMEDIATE risk.    Preop general physical exam  Open wound of lesser toe of left foot  Pre op instructions reviewed with patient. All questions/concerns answered. No cardiac or exertional symptoms    - Basic metabolic panel  (Ca, Cl, CO2, Creat, Gluc, K, Na, BUN); Future  - CBC with platelets; Future  - EKG 12-lead complete w/read - Clinics  - Basic metabolic panel  (Ca, Cl, CO2, Creat, Gluc, K, Na, BUN)  - CBC with platelets    S/P foot surgery   S/P ankle fusion  Patient stating that injury/surgery is related to work injury from 1996 and claiming work comp.   Podiatry note from 12/11/24 reviewed: \"Discussed that the 2 toes are rubbing together causing the sore. We talked about padding such as a toe cover. Also talked about surgery to go in and remove some of the bone to try to decrease pressure from the inside out.....Explained that sometimes the flexor tendons can be cut to try and straighten the toe and reduce rubbing. This is normally done in office and patient is weight bearing in postop she for 1-2 weeks. We also discussed surgical intervention to " "remove the joint and possibly fuse the toe.\"    Note from wound care provider 12/26/24: \"María Henderson reports the wound has been present since early November 2024.  The wound began without a clear cause.   She has never had other difficult to heal wounds in this area.  The wound is between her first and second toe.  She does have quite a bit of swelling in this extremity due to multiple previous orthopedic surgeries on the left.  She has not worn any kind of compression stocking. The patient reports propping up their legs occasionally during the day, but they do not elevate their legs above the level of their heart. \" No recent mention of work comp by other providers found by search of patients chart.       Risks and Recommendations  The patient has the following additional risks and recommendations for perioperative complications:   - No identified additional risk factors other than previously addressed    Preoperative Medication Instructions  Antiplatelet or Anticoagulation Medication Instructions   - aspirin: Discontinue aspirin 7 days prior to procedure to reduce bleeding risk. It should be resumed postoperatively.     Additional Medication Instructions   - Herbal medications and vitamins: DO NOT TAKE 14 days prior to surgery.   - ACE/ARB/ARNI (lisinopril, enalapril, losartan, valsartan, olmesartan, sacubritril/valsartan) : DO NOT TAKE on day of surgery (minimum 11 hours for general anesthesia).   - Diuretics (furosemide, hydrochlorothiazide, chlorothalidone): DO NOT TAKE on the day of surgery.   - Statins (atorvastatin, simvastatin, pravastatin) : Continue taking on the day of surgery.    - bisphosphonates (alendronate, ibandronate, risedronate): DO  NOT TAKE on day of surgery   - Omeprazole: Continue without modification    - pregabalin, gabapentin: Continue without modification.   - Oxybutynin - Continue without modification     Recommendation  Approval given to proceed with proposed procedure, " "without further diagnostic evaluation.    Subjective   Pat is a 79 year old, presenting for the following:  Pre-Op Exam and Work Comp          3/4/2025    10:03 AM   Additional Questions   Roomed by Josselyn CLARK: blister between first 2 toes on left foot. Has been to podiatry and wound specialist and wound not healing.     Fell in 1996 at work had many ankle surgeries. Has had open wound for 21 years, now healed for 8 years. The Blister/toe issue started 3 months ago. She is claiming the toe issue is a work comp issue because it is related to complications from her injury in 1996. She stated that her new surgeon who will be performing the surgery states it is NOT work comp, but pt states \"every other doctor I see says it is work comp/related to that injury\".        3/4/2025   Pre-Op Questionnaire   Have you ever had a heart attack or stroke? No   Have you ever had surgery on your heart or blood vessels, such as a stent placement, a coronary artery bypass, or surgery on an artery in your head, neck, heart, or legs? No   Do you have chest pain with activity? No   Do you have a history of heart failure? No   Do you currently have a cold, bronchitis or symptoms of other infection? No   Do you have a cough, shortness of breath, or wheezing? No   Do you or anyone in your family have previous history of blood clots? No   Do you or does anyone in your family have a serious bleeding problem such as prolonged bleeding following surgeries or cuts? No   Have you ever had problems with anemia or been told to take iron pills? No   Have you had any abnormal blood loss such as black, tarry or bloody stools, or abnormal vaginal bleeding? No   Have you ever had a blood transfusion? No   Are you willing to have a blood transfusion if it is medically needed before, during, or after your surgery? Yes   Have you or any of your relatives ever had problems with anesthesia? No   Do you have sleep apnea, excessive snoring or daytime " drowsiness? No   Do you have any artifical heart valves or other implanted medical devices like a pacemaker, defibrillator, or continuous glucose monitor? No   Do you have artificial joints? (!) YES   Are you allergic to latex? No     Health Care Directive  Patient does not have a Health Care Directive: Patient states has Advance Directive and will bring in a copy to clinic.      Preoperative Review of    reviewed - no record of controlled substances prescribed.        Patient Active Problem List    Diagnosis Date Noted    Open wound of lesser toe of left foot 11/26/2024     Priority: High    Pain of toe of left foot 12/26/2024     Priority: Medium    Need for vaccination against respiratory syncytial virus 04/09/2024     Priority: Medium    Encounter for screening for viral disease 04/09/2024     Priority: Medium    Hypokalemia 04/09/2024     Priority: Medium    Elevated glucose 04/09/2024     Priority: Medium    Hypoesthesia 04/09/2024     Priority: Medium    Gastroesophageal reflux disease with esophagitis without hemorrhage 04/09/2024     Priority: Medium    Status post reverse total arthroplasty of right shoulder 10/07/2019     Priority: Medium    History of septic arthritis 06/28/2018     Priority: Medium    Primary osteoarthritis involving multiple joints 06/28/2018     Priority: Medium    Knee joint replacement status 04/24/2017     Priority: Medium    SVT (supraventricular tachycardia) 04/06/2017     Priority: Medium    Left hip pain 03/22/2017     Priority: Medium    Pure hypercholesterolemia 02/27/2017     Priority: Medium    Gastroesophageal reflux disease without esophagitis 02/27/2017     Priority: Medium    Infection and inflammatory reaction due to unspecified internal joint prosthesis, subsequent encounter 02/27/2017     Priority: Medium    Sepsis (H) 12/27/2016     Priority: Medium    Cellulitis 12/26/2016     Priority: Medium    S/P ankle fusion 03/10/2014     Priority: Medium    Sprain of  neck 02/08/2012     Priority: Medium    Sprain and strain of shoulder and upper arm 02/08/2012     Priority: Medium    Elbow strain 02/08/2012     Priority: Medium    Wrist strain 02/08/2012     Priority: Medium    Pain in joint, ankle and foot 01/13/2012     Priority: Medium    Syncope 10/25/2011     Priority: Medium    Hypertension 10/25/2011     Priority: Medium    Bradycardia 10/25/2011     Priority: Medium    Hyperlipidemia LDL goal <130 10/25/2011     Priority: Medium    S/P foot surgery  10/24/2011     Priority: Medium     Bunionette excision as well as a medializing osteotomy of the fifth metatarsal, Cheilectomy and debridement of the sinus tarsi and subtalar joint.         Past Medical History:   Diagnosis Date    Allergic rhinitis     Bacteremia due to Streptococcus     Edema     Gastro-oesophageal reflux disease     Heart murmur     ?murmur    Hyperlipemia     Hyperlipidemia     Hypertension     Kidney stone     Numbness and tingling     numbness left distal medial tibia    Osteoarthritis     Osteoarthrosis     Osteopenia     Paroxysmal supraventricular tachycardia     S/P ablation of atrial fibrillation      Past Surgical History:   Procedure Laterality Date    7 foot and ankle       ARTHROPLASTY REVISION ANKLE  3/10/2014    Procedure: ARTHROPLASTY REVISION ANKLE;  REVISION LEFT TOTAL ANKLE  WITH CONVERSION TO  IM LAURIE FUSION WITH FEMORAL HEAD ALLOGRAFT (C-ARM);  Surgeon: Ramirez Fang MD;  Location: [facility]    ASPIRATION NEEDLE KNEE Right 12/27/2016    Procedure: ASPIRATION NEEDLE KNEE;  Surgeon: Jian Fisher MD;  Location:  OR    BUNIONECTOMY JONAS  10/24/2011    Procedure:BUNIONECTOMY JONAS; Left Foot Bunionette Repair (C-Arm); Surgeon:RAMIREZ FANG; Location:[facility]    COLONOSCOPY      ESOPHAGOSCOPY, GASTROSCOPY, DUODENOSCOPY (EGD), COMBINED N/A 1/5/2023    Procedure: ESOPHAGOGASTRODUODENOSCOPY WITH FOREIGN BODY REMOVAL;  Surgeon: Dedra Acosta MD;  Location:  OR     EYE SURGERY      cataract    H ABLATION SVT      HERNIA REPAIR      IRRIGATION AND DEBRIDEMENT FOOT, COMBINED Left 12/27/2016    Procedure: COMBINED IRRIGATION AND DEBRIDEMENT FOOT;  Surgeon: Jian Fisher MD;  Location:  OR    ORTHOPEDIC SURGERY      RTKA    ORTHOPEDIC SURGERY Left     ROTATOR CUFF    REMOVE ANTIBIOTIC CEMENT BEADS / SPACER KNEE N/A 4/24/2017    Procedure: REMOVE ANTIBIOTIC CEMENT BEADS / SPACER KNEE;  REMOVAL ANTIBIOTIC SPACER RIGHT KNEE REIMPLANT TOTAL JOINT COMPONENT TO RIGHT KNEE ;  Surgeon: Husam Berkowitz MD;  Location:  OR    REMOVE HARDWARE ARTHROPLASTY KNEE, IRRIG & JOSE, PLACE ANTIBIOTIC CEMENT SPACER, COMBINED Right 2/20/2017    Procedure: COMBINED REMOVE HARDWARE ARTHROPLASTY KNEE, IRRIGATION AND DEBRIDEMENT, PLACE ANTIBIOTIC CEMENT BEADS / SPACER;  Surgeon: Husam Berkowitz MD;  Location:  OR    REVERSE ARTHROPLASTY SHOULDER Right 10/7/2019    Procedure: RIGHT TOTAL REVERSE SHOULDER ARTHROPLASTY;  Surgeon: Husam Berkowitz MD;  Location:  OR    TONSILLECTOMY      TONSILLECTOMY      total knee arthroplasy      ZZC TOTAL KNEE ARTHROPLASTY Right      Current Outpatient Medications   Medication Sig Dispense Refill    acetaminophen (TYLENOL) 500 MG tablet Take 1,000 mg by mouth At Bedtime      alendronate (FOSAMAX) 70 MG tablet Take 1 tablet (70 mg) by mouth every 7 days 13 tablet 3    aspirin 81 MG EC tablet Take 81 mg by mouth daily      calcium carbonate (OS-MATHEW) 1500 (600 Ca) MG tablet Take 650 mg by mouth daily      carbamide peroxide (DEBROX) 6.5 % otic solution Place 5 drops Into the left ear daily as needed for other (ear wax). 15 mL 0    Cyanocobalamin (VITAMIN B-12) 3000 MCG SUBL Place 2 each under the tongue daily Taking the gummies - 2 for a dose of 3000 mcg      furosemide (LASIX) 20 MG tablet Take 1-2 tablets (20-40 mg) by mouth daily Usually Takes 20 mg daily. 180 tablet 1    GABAPENTIN PO Take 300 mg by mouth 3 times daily.       glucosamine-chondroitin 500-400 MG CAPS per capsule Take 2 capsules by mouth daily      omeprazole (PRILOSEC) 40 MG DR capsule Take 1 capsule (40 mg) by mouth daily      oxyBUTYnin ER (DITROPAN XL) 10 MG 24 hr tablet Take 1 tablet (10 mg) by mouth daily at 2 pm 90 tablet 3    Potassium Gluconate 595 (99 K) MG TABS Take 2 tablets by mouth every morning      simvastatin (ZOCOR) 40 MG tablet Take 1 tablet (40 mg) by mouth every morning 90 tablet 3    Turmeric 500 MG CAPS Take 1 capsule by mouth daily      valsartan (DIOVAN) 160 MG tablet Take 1 tablet (160 mg) by mouth daily 90 tablet 3    valsartan (DIOVAN) 40 MG tablet Take 1 tablet (40 mg) by mouth daily. Take in addition to (with) 160 mg tablet for total dose of 200 mg once daily. 90 tablet 2    Vitamin D-Vitamin K (K2-D3 5000) 5000-90 UNIT-MCG CAPS Take 1 capsule by mouth daily         Allergies   Allergen Reactions    Melon Anaphylaxis     Throat swelling    Pcn [Penicillin V Potassium] Anaphylaxis    Walnuts [Nuts] Angioedema and Anaphylaxis    Ceftriaxone Itching    Cephalexin Itching    Oxycodone Other (See Comments)     unconsciousness    Sulfa Antibiotics     Valium [Diazepam] Other (See Comments)     unconsciousness    Vicodin [Hydrocodone-Acetaminophen]      Patient unsure if allergy    Iodine Solution [Povidone Iodine] Rash        Social History     Tobacco Use    Smoking status: Never    Smokeless tobacco: Never   Substance Use Topics    Alcohol use: Yes     Comment: occasional       History   Drug Use No             Review of Systems  CONSTITUTIONAL: NEGATIVE for fever, chills, change in weight  EYES: NEGATIVE for vision changes or irritation  ENT/MOUTH: NEGATIVE for ear, mouth and throat problems  RESP: NEGATIVE for significant cough or SOB  CV: NEGATIVE for chest pain, palpitations or peripheral edema  GI: NEGATIVE for nausea, abdominal pain, heartburn, or change in bowel habits  : NEGATIVE for frequency, dysuria, or hematuria  MUSCULOSKELETAL:  "NEGATIVE for significant arthralgias or myalgia  NEURO: NEGATIVE for weakness, dizziness or paresthesias  ENDOCRINE: NEGATIVE for temperature intolerance, skin/hair changes  HEME: NEGATIVE for bleeding problems  SKIN: left foot toe wound between 1st and 2nd toe    Objective    /74 (BP Location: Right arm, Patient Position: Sitting, Cuff Size: Adult Regular)   Pulse 69   Temp 97.8  F (36.6  C) (Temporal)   Resp 16   Ht 1.676 m (5' 6\")   Wt 84.8 kg (187 lb)   LMP  (LMP Unknown)   SpO2 97%   BMI 30.18 kg/m     Estimated body mass index is 30.18 kg/m  as calculated from the following:    Height as of this encounter: 1.676 m (5' 6\").    Weight as of this encounter: 84.8 kg (187 lb).  Physical Exam  GENERAL: alert and no distress  EYES: Eyes grossly normal to inspection, and conjunctivae and sclerae normal  HENT: ear canals and TM's normal, and mouth without ulcers or lesions  NECK: no adenopathy, no asymmetry, masses, or scars  RESP: lungs clear to auscultation - no rales, rhonchi or wheezes  CV: regular rate and rhythm, normal S1 S2, no S3 or S4, no murmur, click or rub,   MS: no gross musculoskeletal defects noted, left ankle/let with 2+ edema  SKIN: left foot blister between 1st and 2nd toe covered with bandage. No significant visible surrounding erythema    Recent Labs   Lab Test 02/06/25  1514 09/27/24  1820 07/11/24  1615 04/09/24  1029   HGB 13.6 12.6  --   --     261  --   --    * 141  --  146*   POTASSIUM 4.2 3.3*  --  4.7   CR 0.89 0.86  --  0.90   A1C  --   --  5.7* 5.9*        Diagnostics  Recent Results (from the past week)   Basic metabolic panel  (Ca, Cl, CO2, Creat, Gluc, K, Na, BUN)    Collection Time: 03/04/25 10:58 AM   Result Value Ref Range    Sodium 146 (H) 135 - 145 mmol/L    Potassium 4.2 3.4 - 5.3 mmol/L    Chloride 104 98 - 107 mmol/L    Carbon Dioxide (CO2) 31 (H) 22 - 29 mmol/L    Anion Gap 11 7 - 15 mmol/L    Urea Nitrogen 21.7 8.0 - 23.0 mg/dL    Creatinine 0.90 " 0.51 - 0.95 mg/dL    GFR Estimate 65 >60 mL/min/1.73m2    Calcium 10.4 8.8 - 10.4 mg/dL    Glucose 116 (H) 70 - 99 mg/dL   CBC with platelets    Collection Time: 03/04/25 10:58 AM   Result Value Ref Range    WBC Count 6.8 4.0 - 11.0 10e3/uL    RBC Count 4.78 3.80 - 5.20 10e6/uL    Hemoglobin 13.8 11.7 - 15.7 g/dL    Hematocrit 43.2 35.0 - 47.0 %    MCV 90 78 - 100 fL    MCH 28.9 26.5 - 33.0 pg    MCHC 31.9 31.5 - 36.5 g/dL    RDW 14.6 10.0 - 15.0 %    Platelet Count 245 150 - 450 10e3/uL      EKG required for significant arrhythmia and not completed in the last 90 days.     Revised Cardiac Risk Index (RCRI)  The patient has the following serious cardiovascular risks for perioperative complications:   - No serious cardiac risks = 0 points     RCRI Interpretation: 0 points: Class I (very low risk - 0.4% complication rate)         Signed Electronically by: DOMINIQUE Colindres CNP  A copy of this evaluation report is provided to the requesting physician.

## 2025-03-04 NOTE — PATIENT INSTRUCTIONS
How to Take Your Medication Before Surgery  Preoperative Medication Instructions   Antiplatelet or Anticoagulation Medication Instructions   - aspirin: Discontinue aspirin 7 days prior to procedure to reduce bleeding risk. It should be resumed postoperatively.     Additional Medication Instructions   - Herbal medications and vitamins: DO NOT TAKE 14 days prior to surgery.   - ACE/ARB/ARNI (lisinopril, enalapril, losartan, valsartan, olmesartan, sacubritril/valsartan) : DO NOT TAKE on day of surgery (minimum 11 hours for general anesthesia).   - Diuretics (furosemide, hydrochlorothiazide, chlorothalidone): DO NOT TAKE on the day of surgery.   - Statins (atorvastatin, simvastatin, pravastatin) : Continue taking on the day of surgery.    - bisphosphonates (alendronate, ibandronate, risedronate): DO  NOT TAKE on day of surgery   - Omeprazole: Continue without modification    - pregabalin, gabapentin: Continue without modification.   - Oxybutynin - Continue without modification        Patient Education   Preparing for Your Surgery  For Adults  Getting started  In most cases, a nurse will call to review your health history and instructions. They will give you an arrival time based on your scheduled surgery time. Please be ready to share:  Your doctor's clinic name and phone number  Your medical, surgical, and anesthesia history  A list of allergies and sensitivities  A list of medicines, including herbal treatments and over-the-counter drugs  Whether the patient has a legal guardian (ask how to send us the papers in advance)  Note: You may not receive a call if you were seen at our PAC (Preoperative Assessment Center).  Please tell us if you're pregnant--or if there's any chance you might be pregnant. Some surgeries may injure a fetus (unborn baby), so they require a pregnancy test. Surgeries that are safe for a fetus don't always need a test, and you can choose whether to have one.   Preparing for surgery  Within 10 to  30 days of surgery: Have a pre-op exam (sometimes called an H&P, or History and Physical). This can be done at a clinic or pre-operative center.  If you're having a , you may not need this exam. Talk to your care team.  At your pre-op exam, talk to your care team about all medicines you take. (This includes CBD oil and any drugs, such as THC, marijuana, and other forms of cannabis.) If you need to stop any medicine before surgery, ask when to start taking it again.  This is for your safety. Many medicines and drugs can make you bleed too much during surgery. Some change how well surgery (anesthesia) drugs work.  Call your insurance company to let them know you're having surgery. (If you don't have insurance, call [phone].)  Call your clinic if there's any change in your health. This includes a scrape or scratch near the surgery site, or any signs of a cold (sore throat, runny nose, cough, rash, fever).  Eating and drinking guidelines  For your safety: Unless your surgeon tells you otherwise, follow the guidelines below.  Eat and drink as normal until 8 hours before you arrive for surgery. After that, no food or milk. You can spit out gum when you arrive.  Drink clear liquids until 2 hours before you arrive. These are liquids you can see through, like water, Gatorade, and Propel Water. They also include plain black coffee and tea (no cream or milk).  No alcohol for 24 hours before you arrive. The night before surgery, stop any drinks that contain THC.  If your care team tells you to take medicine on the morning of surgery, it's okay to take it with a sip of water. No other medicines or drugs are allowed (including CBD oil)--follow your care team's instructions.  If you have questions the day of surgery, call your hospital or surgery center.   Preventing infection  Shower or bathe the night before and the morning of surgery. Follow the instructions your clinic gave you. (If no instructions, use regular  soap.)  Don't shave or clip hair near your surgery site. We'll remove the hair if needed.  Don't smoke or vape the morning of surgery. No chewing tobacco for 6 hours before you arrive. A nicotine patch is okay. You may spit out nicotine gum when you arrive.  For some surgeries, the surgeon will tell you to fully quit smoking and nicotine.  We will make every effort to keep you safe from infection. We will:  Clean our hands often with soap and water (or an alcohol-based hand rub).  Clean the skin at your surgery site with a special soap that kills germs.  Give you a special gown to keep you warm. (Cold raises the risk of infection.)  Wear hair covers, masks, gowns, and gloves during surgery.  Give antibiotic medicine, if prescribed. Not all surgeries need this medicine.  What to bring on the day of surgery  Photo ID and insurance card  Copy of your health care directive, if you have one  Glasses and hearing aids (bring cases)  You can't wear contacts during surgery  Inhaler and eye drops, if you use them (tell us about these when you arrive)  CPAP machine or breathing device, if you use them  A few personal items, if spending the night  If you have . . .  A pacemaker, ICD (cardiac defibrillator), or other implant: Bring the ID card.  An implanted stimulator: Bring the remote control.  A legal guardian: Bring a copy of the certified (court-stamped) guardianship papers.  Please remove any jewelry, including body piercings. Leave jewelry and other valuables at home.  If you're going home the day of surgery  You must have a responsible adult drive you home. They should stay with you overnight as well.  If you don't have someone to stay with you, and you aren't safe to go home alone, we may keep you overnight. Insurance often won't pay for this.  After surgery  If it's hard to control your pain or you need more pain medicine, please call your surgeon's office.  Questions?   If you have any questions for your care team,  list them here:   ____________________________________________________________________________________________________________________________________________________________________________________________________________________________________________________________  For informational purposes only. Not to replace the advice of your health care provider. Copyright   2003, 2019 [facility]. All rights reserved. Clinically reviewed by Nick Mauro MD. [employer] [id number] - REV 08/24.

## 2025-03-05 ENCOUNTER — TELEPHONE (OUTPATIENT)
Dept: FAMILY MEDICINE | Facility: CLINIC | Age: 80
End: 2025-03-05
Payer: COMMERCIAL

## 2025-03-05 LAB
ANION GAP SERPL CALCULATED.3IONS-SCNC: 11 MMOL/L (ref 7–15)
BUN SERPL-MCNC: 21.7 MG/DL (ref 8–23)
CALCIUM SERPL-MCNC: 10.4 MG/DL (ref 8.8–10.4)
CHLORIDE SERPL-SCNC: 104 MMOL/L (ref 98–107)
CREAT SERPL-MCNC: 0.9 MG/DL (ref 0.51–0.95)
EGFRCR SERPLBLD CKD-EPI 2021: 65 ML/MIN/1.73M2
GLUCOSE SERPL-MCNC: 116 MG/DL (ref 70–99)
HCO3 SERPL-SCNC: 31 MMOL/L (ref 22–29)
POTASSIUM SERPL-SCNC: 4.2 MMOL/L (ref 3.4–5.3)
SODIUM SERPL-SCNC: 146 MMOL/L (ref 135–145)

## 2025-03-05 NOTE — TELEPHONE ENCOUNTER
Patient calling and states she is to take Fosamax day before surgery.  Instructions state not to take on day of surgery.  She is wondering if OK to take the day before surgery.  Advised OK to take Fosamax day prior to surgery.  Also wants to make sure preop faxed to surgeon and also work comp.  States she is to call work comp but unsure when to call.  Advised there is a note in preop to fax to work comp also.  Advised to give a day or so and then call work comp.  Has this been faxed?  If so, please let her know so she can call work comp.  Jeanette Arriaga RN

## 2025-03-05 NOTE — TELEPHONE ENCOUNTER
Note is signed - I am not back in office till tomorrow so you can either print now and fax for pt or wait till I'm back tomorrow for me to print     DOMINIQUE Colindres, CNP

## 2025-03-05 NOTE — TELEPHONE ENCOUNTER
Pre op was completed by Maggi Green however, it is not signed.  Awaiting for signature in order to fax.      Celina ALLEN, - [place] [address]  Primary Care- [facility]

## 2025-03-05 NOTE — TELEPHONE ENCOUNTER
Printed Pre-Op.  - faxed to [phone] (surgical facility) and work comp [phone].    Contacting patient to inform it was faxed to her workers comp.      Celina ALLEN, - [place] [address]  Primary Care- [facility] [place]  [facility]

## 2025-03-23 ENCOUNTER — HEALTH MAINTENANCE LETTER (OUTPATIENT)
Age: 80
End: 2025-03-23

## 2025-03-24 DIAGNOSIS — R39.15 URINARY URGENCY: ICD-10-CM

## 2025-03-24 RX ORDER — OXYBUTYNIN CHLORIDE 10 MG/1
10 TABLET, EXTENDED RELEASE ORAL
Qty: 90 TABLET | Refills: 0 | Status: SHIPPED | OUTPATIENT
Start: 2025-03-24

## 2025-03-24 NOTE — TELEPHONE ENCOUNTER
Medication Question or Refill    Contacts       Contact Date/Time Type Contact Phone/Fax    03/24/2025 03:47 PM CDT Phone (Incoming) Vira Henderson (Self) [phone] (M)            What medication are you calling about (include dose and sig)?: oxybutynin    Preferred Pharmacy:  [address] [address]  Phone: [phone] Fax: [phone]      Controlled Substance Agreement on file:   CSA -- Patient Level:    CSA: None found at the patient level.       Who prescribed the medication?: Roxanne    Do you need a refill? Yes    When did you use the medication last? 3/23/25    Patient offered an appointment? No    Do you have any questions or concerns?  No      Could we send this information to you in [facility] or would you prefer to receive a phone call?:   Patient would prefer a phone call   Okay to leave a detailed message?: Yes at Cell number on file:    Telephone Information:   Mobile [phone]

## 2025-03-26 ENCOUNTER — HOSPITAL ENCOUNTER (EMERGENCY)
Facility: CLINIC | Age: 80
Discharge: HOME | End: 2025-03-26
Attending: STUDENT IN AN ORGANIZED HEALTH CARE EDUCATION/TRAINING PROGRAM
Payer: COMMERCIAL

## 2025-03-26 ENCOUNTER — APPOINTMENT (OUTPATIENT)
Dept: CT IMAGING | Facility: CLINIC | Age: 80
End: 2025-03-26
Attending: STUDENT IN AN ORGANIZED HEALTH CARE EDUCATION/TRAINING PROGRAM
Payer: COMMERCIAL

## 2025-03-26 ENCOUNTER — NURSE TRIAGE (OUTPATIENT)
Dept: FAMILY MEDICINE | Facility: CLINIC | Age: 80
End: 2025-03-26
Payer: COMMERCIAL

## 2025-03-26 VITALS
OXYGEN SATURATION: 97 % | TEMPERATURE: 98 F | HEART RATE: 74 BPM | SYSTOLIC BLOOD PRESSURE: 182 MMHG | HEIGHT: 64 IN | WEIGHT: 187 LBS | DIASTOLIC BLOOD PRESSURE: 91 MMHG | BODY MASS INDEX: 31.92 KG/M2 | RESPIRATION RATE: 20 BRPM

## 2025-03-26 DIAGNOSIS — R10.31 RLQ ABDOMINAL PAIN: ICD-10-CM

## 2025-03-26 DIAGNOSIS — R10.9 RIGHT FLANK PAIN: Primary | ICD-10-CM

## 2025-03-26 LAB
ALBUMIN SERPL BCG-MCNC: 4 G/DL (ref 3.5–5.2)
ALBUMIN UR-MCNC: NEGATIVE MG/DL
ALP SERPL-CCNC: 105 U/L (ref 40–150)
ALT SERPL W P-5'-P-CCNC: 20 U/L (ref 0–50)
ANION GAP SERPL CALCULATED.3IONS-SCNC: 12 MMOL/L (ref 7–15)
APPEARANCE UR: CLEAR
AST SERPL W P-5'-P-CCNC: 28 U/L (ref 0–45)
BACTERIA #/AREA URNS HPF: ABNORMAL /HPF
BASOPHILS # BLD AUTO: 0 10E3/UL (ref 0–0.2)
BASOPHILS NFR BLD AUTO: 1 %
BILIRUB DIRECT SERPL-MCNC: 0.19 MG/DL (ref 0–0.3)
BILIRUB SERPL-MCNC: 0.6 MG/DL
BILIRUB UR QL STRIP: NEGATIVE
BUN SERPL-MCNC: 21.4 MG/DL (ref 8–23)
CALCIUM SERPL-MCNC: 9.9 MG/DL (ref 8.8–10.4)
CHLORIDE SERPL-SCNC: 103 MMOL/L (ref 98–107)
COLOR UR AUTO: ABNORMAL
CREAT SERPL-MCNC: 0.91 MG/DL (ref 0.51–0.95)
EGFRCR SERPLBLD CKD-EPI 2021: 64 ML/MIN/1.73M2
EOSINOPHIL # BLD AUTO: 0.2 10E3/UL (ref 0–0.7)
EOSINOPHIL NFR BLD AUTO: 3 %
ERYTHROCYTE [DISTWIDTH] IN BLOOD BY AUTOMATED COUNT: 14.6 % (ref 10–15)
GLUCOSE SERPL-MCNC: 103 MG/DL (ref 70–99)
GLUCOSE UR STRIP-MCNC: NEGATIVE MG/DL
HCO3 SERPL-SCNC: 28 MMOL/L (ref 22–29)
HCT VFR BLD AUTO: 43.7 % (ref 35–47)
HGB BLD-MCNC: 14 G/DL (ref 11.7–15.7)
HGB UR QL STRIP: NEGATIVE
HOLD SPECIMEN: NORMAL
HOLD SPECIMEN: NORMAL
HYALINE CASTS: 1 /LPF
IMM GRANULOCYTES # BLD: 0 10E3/UL
IMM GRANULOCYTES NFR BLD: 0 %
KETONES UR STRIP-MCNC: NEGATIVE MG/DL
LEUKOCYTE ESTERASE UR QL STRIP: NEGATIVE
LYMPHOCYTES # BLD AUTO: 1.6 10E3/UL (ref 0.8–5.3)
LYMPHOCYTES NFR BLD AUTO: 22 %
MCH RBC QN AUTO: 28.5 PG (ref 26.5–33)
MCHC RBC AUTO-ENTMCNC: 32 G/DL (ref 31.5–36.5)
MCV RBC AUTO: 89 FL (ref 78–100)
MONOCYTES # BLD AUTO: 0.6 10E3/UL (ref 0–1.3)
MONOCYTES NFR BLD AUTO: 8 %
NEUTROPHILS # BLD AUTO: 5 10E3/UL (ref 1.6–8.3)
NEUTROPHILS NFR BLD AUTO: 66 %
NITRATE UR QL: NEGATIVE
NRBC # BLD AUTO: 0 10E3/UL
NRBC BLD AUTO-RTO: 0 /100
PH UR STRIP: 6.5 [PH] (ref 5–7)
PLATELET # BLD AUTO: 274 10E3/UL (ref 150–450)
POTASSIUM SERPL-SCNC: 4.1 MMOL/L (ref 3.4–5.3)
PROT SERPL-MCNC: 6.7 G/DL (ref 6.4–8.3)
RBC # BLD AUTO: 4.91 10E6/UL (ref 3.8–5.2)
RBC URINE: <1 /HPF
SODIUM SERPL-SCNC: 143 MMOL/L (ref 135–145)
SP GR UR STRIP: 1.01 (ref 1–1.03)
SQUAMOUS EPITHELIAL: <1 /HPF
UROBILINOGEN UR STRIP-MCNC: NORMAL MG/DL
WBC # BLD AUTO: 7.5 10E3/UL (ref 4–11)
WBC URINE: 1 /HPF

## 2025-03-26 PROCEDURE — 82248 BILIRUBIN DIRECT: CPT | Performed by: STUDENT IN AN ORGANIZED HEALTH CARE EDUCATION/TRAINING PROGRAM

## 2025-03-26 PROCEDURE — 80048 BASIC METABOLIC PNL TOTAL CA: CPT | Performed by: STUDENT IN AN ORGANIZED HEALTH CARE EDUCATION/TRAINING PROGRAM

## 2025-03-26 PROCEDURE — 250N000011 HC RX IP 250 OP 636: Performed by: STUDENT IN AN ORGANIZED HEALTH CARE EDUCATION/TRAINING PROGRAM

## 2025-03-26 PROCEDURE — 85025 COMPLETE CBC W/AUTO DIFF WBC: CPT | Performed by: STUDENT IN AN ORGANIZED HEALTH CARE EDUCATION/TRAINING PROGRAM

## 2025-03-26 PROCEDURE — 250N000009 HC RX 250: Performed by: STUDENT IN AN ORGANIZED HEALTH CARE EDUCATION/TRAINING PROGRAM

## 2025-03-26 PROCEDURE — 82310 ASSAY OF CALCIUM: CPT | Performed by: STUDENT IN AN ORGANIZED HEALTH CARE EDUCATION/TRAINING PROGRAM

## 2025-03-26 PROCEDURE — 81001 URINALYSIS AUTO W/SCOPE: CPT | Performed by: STUDENT IN AN ORGANIZED HEALTH CARE EDUCATION/TRAINING PROGRAM

## 2025-03-26 PROCEDURE — 36415 COLL VENOUS BLD VENIPUNCTURE: CPT | Performed by: STUDENT IN AN ORGANIZED HEALTH CARE EDUCATION/TRAINING PROGRAM

## 2025-03-26 PROCEDURE — 84450 TRANSFERASE (AST) (SGOT): CPT | Performed by: STUDENT IN AN ORGANIZED HEALTH CARE EDUCATION/TRAINING PROGRAM

## 2025-03-26 PROCEDURE — 74177 CT ABD & PELVIS W/CONTRAST: CPT

## 2025-03-26 PROCEDURE — 99285 EMERGENCY DEPT VISIT HI MDM: CPT | Mod: 25

## 2025-03-26 PROCEDURE — 82040 ASSAY OF SERUM ALBUMIN: CPT | Performed by: STUDENT IN AN ORGANIZED HEALTH CARE EDUCATION/TRAINING PROGRAM

## 2025-03-26 RX ORDER — IOPAMIDOL 755 MG/ML
500 INJECTION, SOLUTION INTRAVASCULAR ONCE
Status: COMPLETED | OUTPATIENT
Start: 2025-03-26 | End: 2025-03-26

## 2025-03-26 RX ADMIN — IOPAMIDOL 94 ML: 755 INJECTION, SOLUTION INTRAVENOUS at 14:13

## 2025-03-26 RX ADMIN — SODIUM CHLORIDE 64 ML: 9 INJECTION, SOLUTION INTRAVENOUS at 14:13

## 2025-03-26 ASSESSMENT — COLUMBIA-SUICIDE SEVERITY RATING SCALE - C-SSRS
2. HAVE YOU ACTUALLY HAD ANY THOUGHTS OF KILLING YOURSELF IN THE PAST MONTH?: NO
6. HAVE YOU EVER DONE ANYTHING, STARTED TO DO ANYTHING, OR PREPARED TO DO ANYTHING TO END YOUR LIFE?: NO
1. IN THE PAST MONTH, HAVE YOU WISHED YOU WERE DEAD OR WISHED YOU COULD GO TO SLEEP AND NOT WAKE UP?: NO

## 2025-03-26 ASSESSMENT — ACTIVITIES OF DAILY LIVING (ADL)
ADLS_ACUITY_SCORE: 52
ADLS_ACUITY_SCORE: 52

## 2025-03-26 NOTE — TELEPHONE ENCOUNTER
"Pt calls back.  She has pain from her right side of her ab to the groin and she is having trouble walking up the stairs.  The worst is the pain on the right side of her ab and goes down to her groin.  The pain started yesterday.  She is rating the pain 8-9/10. Nothing makes it better.  Going up stairs or standing up makes it worse.      Per protocol, advised to go to the ER now.        Reason for Disposition   SEVERE abdominal pain (e.g., excruciating)    Additional Information   Negative: Passed out (e.g., fainted, lost consciousness, blacked out and was not responding)   Negative: Shock suspected (e.g., cold/pale/clammy skin, too weak to stand, low BP, rapid pulse)   Negative: Sounds like a life-threatening emergency to the triager   Negative: Followed an abdomen (stomach) injury   Negative: Chest pain   Negative: Abdominal pain and pregnant < 20 weeks   Negative: Abdominal pain and pregnant 20 or more weeks   Negative: Pain is mainly in upper abdomen (if needed ask: 'is it mainly above the belly button?')   Negative: Abdomen bloating or swelling are main symptoms    Answer Assessment - Initial Assessment Questions  1. LOCATION: \"Where does it hurt?\"       It hurts on her right side of her ab and goes down to her groin  2. RADIATION: \"Does the pain shoot anywhere else?\" (e.g., chest, back)      It goes into her back   3. ONSET: \"When did the pain begin?\" (e.g., minutes, hours or days ago)       The pain began yesterday   4. SUDDEN: \"Gradual or sudden onset?\"      Gradual   5. PATTERN \"Does the pain come and go, or is it constant?\"      Constant   6. SEVERITY: \"How bad is the pain?\"  (e.g., Scale 1-10; mild, moderate, or severe)      8-9/10   7. RECURRENT SYMPTOM: \"Have you ever had this type of stomach pain before?\" If Yes, ask: \"When was the last time?\" and \"What happened that time?\"       Yes - she had a kidney stone   8. CAUSE: \"What do you think is causing the stomach pain?\"      Unsure   9. " "RELIEVING/AGGRAVATING FACTORS: \"What makes it better or worse?\" (e.g., antacids, bending or twisting motion, bowel movement)      Nothing makes it better, going up and down stairs makes it worse or standing up from a chair makes it worse   10. OTHER SYMPTOMS: \"Do you have any other symptoms?\" (e.g., back pain, diarrhea, fever, urination pain, vomiting)        Pain pain  11. PREGNANCY: \"Is there any chance you are pregnant?\" \"When was your last menstrual period?\"        N/a    Protocols used: Abdominal Pain - Female-A-[place]    "

## 2025-03-26 NOTE — TELEPHONE ENCOUNTER
Reason for Call:  Appointment Request    Patient requesting this type of appt:  Henderson María    Requested provider: Roxanne Hatfield or any provider available today 3/26    Reason patient unable to be scheduled: Not within requested timeframe    When does patient want to be seen/preferred time: Same day    Comments: Vira has been having sideache on the right side starting from her waist, down to her groin, then across to the other leg and has been experiencing symptoms since yesterday 3/25 and was hoping she can be seen today at the [facility]. Clinic will have to call pt to schedule an appt if possible.    Could we send this information to you in [facility] [employer] or would you prefer to receive a phone call?:   Patient would prefer a phone call   Okay to leave a detailed message?: Yes at Cell number on file:    Telephone Information:   Mobile [phone]       Call taken on 3/26/2025 at 8:29 AM by Katherine Shafer

## 2025-03-26 NOTE — ED TRIAGE NOTES
Pt presents for evaluation of right sided flank pain that radiates in to RLE and across to the left thigh. Started 2 days ago. Denies any fever, nausea, vomiting, urinary symptoms or bowel issues. Hx of kidney stones, feels similar. Has had to have stones removed in the past, never passes them. Of note, pt had surgery on the left foot on March 10, 2025.

## 2025-03-26 NOTE — ED PROVIDER NOTES
"  Emergency Department Note      History of Present Illness     Chief Complaint   Flank Pain      HPI   María Henderson is a very pleasant 80 year old female with a history of kidney stones presenting with right-sided flank pain. The patient had an onset of right-sided flank pain 2 days ago. The pain is constant radiates into her right leg and her left thigh. Walking and going up and down the stairs exacerbates the pain. The patient denies fever, chills, nausea, vomiting, urinary symptoms, bowel issues, recent injuries, or abdominal surgical history. She notes that this feels like kidney stones that she had in the past. She adds that she never passes the kidney stones and rather has to have them \"blasted\". The patient recently had surgery to her left foot on 03/10/2025.    Independent Historian   None    Review of External Notes   I personally reviewed notes from the patient's clinic visit dated  3/4/25 . This provided me with information regarding patient's baseline medical problems.     Past Medical History     Medical History and Problem List   Allergic rhinitis  Bacteremia due to Streptococcus  Edema  Gastro-oesophageal reflux disease  Heart murmur  Hyperlipemia  Hyperlipidemia  Hypertension  Kidney stone  Numbness and tingling  Osteoarthritis  Osteoarthrosis  Osteopenia  Paroxysmal supraventricular tachycardia  S/P ablation of atrial fibrillation    Medications   Alendronate  Aspirin 81 mg  Furosemide  Gabapentin  Omeprazole  Oxybutynin  Simvastatin  Valsartan    Surgical History   7 foot and ankle  Arthroplasty revision ankle  Aspiration needle knee, right  Bunionectomy [facility]  Colonoscopy  EGD, combined  Cataract eye surgery  H ablation SVT  Hernia repair  Irrigation and debridement foot, combined left  Orthopedic surgery, rotator cuff left  Remove antibiotic cement beads/ spacer knee  Remove hardware arthroplasty knee, right irrigation and debridement, place antibiotic cement spacer, combined  Reverse " "arthroplasty shoulder, right  Tonsillectomy  Total knee arthroplasty, right    Physical Exam     Patient Vitals for the past 24 hrs:   BP Temp Temp src Pulse Resp SpO2 Height Weight   03/26/25 1147 (!) 182/91 98  F (36.7  C) Temporal 74 20 97 % 1.626 m (5' 4\") 84.8 kg (187 lb)     Physical Exam  Vitals and nursing note reviewed.   Constitutional:       General: She is not in acute distress.     Appearance: Normal appearance. She is not ill-appearing or diaphoretic.   HENT:      Nose: Nose normal.      Mouth/Throat:      Mouth: Mucous membranes are moist.   Eyes:      Extraocular Movements: Extraocular movements intact.      Conjunctiva/sclera: Conjunctivae normal.   Cardiovascular:      Rate and Rhythm: Normal rate and regular rhythm.   Pulmonary:      Effort: Pulmonary effort is normal.      Breath sounds: Normal breath sounds.   Abdominal:      General: Abdomen is flat.      Palpations: Abdomen is soft.      Tenderness: There is abdominal tenderness in the right lower quadrant. There is right CVA tenderness and left CVA tenderness.   Skin:     General: Skin is warm and dry.      Coloration: Skin is not jaundiced.   Neurological:      General: No focal deficit present.      Mental Status: She is alert and oriented to person, place, and time.      Sensory: No sensory deficit.      Motor: No weakness.           Diagnostics     Lab Results   Labs Ordered and Resulted from Time of ED Arrival to Time of ED Departure   ROUTINE UA WITH MICROSCOPIC REFLEX TO CULTURE - Abnormal       Result Value    Color Urine Straw      Appearance Urine Clear      Glucose Urine Negative      Bilirubin Urine Negative      Ketones Urine Negative      Specific Gravity Urine 1.010      Blood Urine Negative      pH Urine 6.5      Protein Albumin Urine Negative      Urobilinogen Urine Normal      Nitrite Urine Negative      Leukocyte Esterase Urine Negative      Bacteria Urine Few (*)     RBC Urine <1      WBC Urine 1      Squamous Epithelials " Urine <1      Hyaline Casts Urine 1     BASIC METABOLIC PANEL - Abnormal    Sodium 143      Potassium 4.1      Chloride 103      Carbon Dioxide (CO2) 28      Anion Gap 12      Urea Nitrogen 21.4      Creatinine 0.91      GFR Estimate 64      Calcium 9.9      Glucose 103 (*)    HEPATIC FUNCTION PANEL - Normal    Protein Total 6.7      Albumin 4.0      Bilirubin Total 0.6      Alkaline Phosphatase 105      AST 28      ALT 20      Bilirubin Direct 0.19     CBC WITH PLATELETS AND DIFFERENTIAL    WBC Count 7.5      RBC Count 4.91      Hemoglobin 14.0      Hematocrit 43.7      MCV 89      MCH 28.5      MCHC 32.0      RDW 14.6      Platelet Count 274      % Neutrophils 66      % Lymphocytes 22      % Monocytes 8      % Eosinophils 3      % Basophils 1      % Immature Granulocytes 0      NRBCs per 100 WBC 0      Absolute Neutrophils 5.0      Absolute Lymphocytes 1.6      Absolute Monocytes 0.6      Absolute Eosinophils 0.2      Absolute Basophils 0.0      Absolute Immature Granulocytes 0.0      Absolute NRBCs 0.0         Imaging   CT Abdomen Pelvis w Contrast   Final Result   IMPRESSION:    1.  No acute abnormality in the abdomen or pelvis. Specifically, normal appendix.   2.  Bilateral nonobstructing renal calculi.   3.  Indeterminate 1.6 cm left renal lesion, recommend dedicated contrast-enhanced renal protocol MRI on a nonemergent/outpatient for definitive characterization.          EKG   No ECG performed.     Independent Interpretation   None    ED Course      Medications Administered   Medications   iopamidol (ISOVUE-370) solution 500 mL (94 mLs Intravenous $Given 3/26/25 1413)   CT Scan Flush (64 mLs Intravenous $Given 3/26/25 1413)       Procedures   Procedures   None performed    Discussion of Management   None    ED Course   ED Course as of 03/26/25 1745   Wed Mar 26, 2025   3437 I have rechecked the patient and discussed care plan and discharge.       Additional Documentation  None    Medical Decision Making /  Diagnosis     CMS Diagnoses: None    MIPS       None    MDM   Patient presenting with right-sided flank pain for 2 days.  Vital signs notable for elevated blood pressure but otherwise reassuring.  Patient appears comfortable and does not require any medication for pain at this time.  No other symptoms that are concerning.  Considered differential including nephrolithiasis, UTI, appendicitis, diverticulitis, gallbladder pathology, among others.  Workup is reassuring.  Full range of labs including hepatic panel, CBC, BMP and urinalysis are unremarkable.  I did obtain CT of the abdomen pelvis with contrast to further assess for any pathology and this was unremarkable.  There is no evidence of acute pathology on that CT.  With this assessment, I do feel reassured.  Suspect possible musculoskeletal pain.  Shingles would also be on the differential although the patient does not have any rash at present.  Recommend close follow-up with primary care clinician for reevaluation and continued use over-the-counter analgesics for pain.  Return precautions were provided.      Disposition   The patient was discharged.     Diagnosis     ICD-10-CM    1. Right flank pain  R10.9       2. RLQ abdominal pain  R10.31            Discharge Medications   Discharge Medication List as of 3/26/2025  3:51 PM             Keith Brown MD  03/26/25 1661

## 2025-03-26 NOTE — DISCHARGE INSTRUCTIONS
Thank you for allowing us to evaluate you today.  Follow up with primary care clinician  in 1 week for reevaluation..  For pain, use acetaminophen (Tylenol).  Please read the guidance provided with your discharge instructions.  Immediately return to the emergency department with any concerns.

## 2025-03-28 ENCOUNTER — ANCILLARY PROCEDURE (OUTPATIENT)
Dept: GENERAL RADIOLOGY | Facility: CLINIC | Age: 80
End: 2025-03-28
Attending: GENERAL PRACTICE
Payer: COMMERCIAL

## 2025-03-28 DIAGNOSIS — R20.0 NUMBNESS AND TINGLING OF BOTH UPPER EXTREMITIES: ICD-10-CM

## 2025-03-28 DIAGNOSIS — M25.551 HIP PAIN, RIGHT: ICD-10-CM

## 2025-03-28 DIAGNOSIS — R20.2 NUMBNESS AND TINGLING OF BOTH UPPER EXTREMITIES: ICD-10-CM

## 2025-03-28 PROCEDURE — 73502 X-RAY EXAM HIP UNI 2-3 VIEWS: CPT | Mod: TC | Performed by: STUDENT IN AN ORGANIZED HEALTH CARE EDUCATION/TRAINING PROGRAM

## 2025-03-28 PROCEDURE — 72040 X-RAY EXAM NECK SPINE 2-3 VW: CPT | Mod: TC | Performed by: RADIOLOGY

## 2025-04-04 ENCOUNTER — HOSPITAL ENCOUNTER (OUTPATIENT)
Dept: MRI IMAGING | Facility: CLINIC | Age: 80
Discharge: HOME | End: 2025-04-04
Payer: COMMERCIAL

## 2025-04-04 DIAGNOSIS — N28.9 LESION OF LEFT NATIVE KIDNEY: ICD-10-CM

## 2025-04-04 PROCEDURE — 74183 MRI ABD W/O CNTR FLWD CNTR: CPT

## 2025-04-04 PROCEDURE — A9585 GADOBUTROL INJECTION: HCPCS

## 2025-04-04 PROCEDURE — 255N000002 HC RX 255 OP 636

## 2025-04-04 RX ORDER — GADOBUTROL 604.72 MG/ML
8.5 INJECTION INTRAVENOUS ONCE
Status: COMPLETED | OUTPATIENT
Start: 2025-04-04 | End: 2025-04-04

## 2025-04-04 RX ADMIN — GADOBUTROL 8.5 ML: 604.72 INJECTION INTRAVENOUS at 08:46

## 2025-04-07 ENCOUNTER — TELEPHONE (OUTPATIENT)
Dept: UROLOGY | Facility: CLINIC | Age: 80
End: 2025-04-07
Payer: COMMERCIAL

## 2025-04-07 NOTE — TELEPHONE ENCOUNTER
This encounter is being sent to inform the clinic that this patient has a referral from   Roxanne Hatfield PA-C in [facility]    for the diagnoses of   Kidney lesion, native, left [N28.9]    and has requested that this patient be seen within 1-2 days.  Based on the availability of our provider(s), we are unable to accommodate this request.    Were all sites offered this patient?  Yes    Does scheduling algorithm request to schedule next available?  No

## 2025-04-08 NOTE — TELEPHONE ENCOUNTER
Message #1 left for patient to return call     Upon return call, advise that urology does have an opening tomorrow 4/9/25 @ 8 AM, video appt with Dr Ugalde   They would not let RN schedule this appt and keep the one patient scheduled for 4/14/2025   If patient able to make it to the appt tomorrow 4/9/25 @ 8 AM she should call scheduling team back [phone] to change schedule and cancel the appt on 4/14/2025     RN placed call to Urology scheduling   They do have an opening tomorrow 4/9/2025 with Dr. Ugalde if patient is interested, it is a video visit @ 8:00 AM   If patient would like this appt, she can call back to cancel and reschedule [phone]     RN did not want to cancel and reschedule without discussing time with patient to ensure she could attend     Mimi Mendosa Registered Nurse  [facility]

## 2025-04-08 NOTE — TELEPHONE ENCOUNTER
If we can get something sooner (new likely cancer diagnosis) then I would like to have that done, but if not okay to keep on the 14th.    Roxanne Hatfield PA-C on 4/8/2025 at 3:36 PM

## 2025-04-08 NOTE — TELEPHONE ENCOUNTER
Patient calling wondering why her referral for urology was cancelled? She states that she already has an appointment scheduled.    Please call pt back.

## 2025-04-08 NOTE — TELEPHONE ENCOUNTER
LEONEL Rascon patient got scheduled for a virtual visit on the 14th at 3:30pm with Urology.  Urology referral was not cancelled.  Within 1-2 days was ordered though.  Do you want us to try and see if we can get it moved up?     Thanks    Lisa Vidal RN BSN  Clinic Nurse  [facility]

## 2025-04-09 NOTE — TELEPHONE ENCOUNTER
RN spoke to patient     Advised that Urology did have a sooner appt for today at 8 AM however we were unable to get ahold of her to see if she wanted to change appt time/date     RN advised patient she could contact the Urology team to see if any other openings prior to 4/14/2025 - patient states that her schedule is booked with her significant others appointments this week and will wait until 4/14/2025 to see Urology     Mimi Mendosa, Registered Nurse  [facility]

## 2025-04-14 ENCOUNTER — VIRTUAL VISIT (OUTPATIENT)
Dept: UROLOGY | Facility: CLINIC | Age: 80
End: 2025-04-14
Payer: COMMERCIAL

## 2025-04-14 DIAGNOSIS — N28.9 KIDNEY LESION, NATIVE, LEFT: ICD-10-CM

## 2025-04-14 DIAGNOSIS — I10 PRIMARY HYPERTENSION: ICD-10-CM

## 2025-04-14 DIAGNOSIS — N20.0 NEPHROLITHIASIS: Primary | ICD-10-CM

## 2025-04-14 PROCEDURE — 98001 SYNCH AUDIO-VIDEO NEW LOW 30: CPT | Performed by: UROLOGY

## 2025-04-14 PROCEDURE — 1125F AMNT PAIN NOTED PAIN PRSNT: CPT | Mod: 95 | Performed by: UROLOGY

## 2025-04-14 RX ORDER — VALSARTAN 160 MG/1
160 TABLET ORAL
Qty: 90 TABLET | Refills: 0 | Status: SHIPPED | OUTPATIENT
Start: 2025-04-14

## 2025-04-14 NOTE — LETTER
4/14/2025       RE: María Henderson  [address] [facility] [address]     Dear Colleague,    Thank you for referring your patient, María Henderson, to the [facility] [place] at [facility]. Please see a copy of my visit note below.    [facility]  Main Office: [address]  [address]  [place] [address]       CHIEF COMPLAINT:   Left renal mass  Bilateral kidney stones    HISTORY:   I was asked by Dr. Gross to see this 80-year-old woman regarding kidney stones and incidentally discovered left renal mass.  She had a CT scan performed in March that incidentally discovered both kidney stones and a small left renal mass.  She does have a prior history of stones and has had a prior ESWL 20 years ago.  She has no family history of renal cell carcinoma.  She did have a CT scan performed for right-sided abdominal pain but she says it did not feel like kidney stones as the cause of her pain.  She has felt well since the CT scan.      PAST MEDICAL HISTORY:   Past Medical History:   Diagnosis Date     Allergic rhinitis      Bacteremia due to Streptococcus      Edema      Gastro-oesophageal reflux disease      Heart murmur     ?murmur     Hyperlipemia      Hyperlipidemia      Hypertension      Kidney stone      Numbness and tingling     numbness left distal medial tibia     Osteoarthritis      Osteoarthrosis      Osteopenia      Paroxysmal supraventricular tachycardia      S/P ablation of atrial fibrillation        PAST SURGICAL HISTORY:     Past Surgical History:   Procedure Laterality Date     7 foot and ankle        ARTHROPLASTY REVISION ANKLE  3/10/2014    Procedure: ARTHROPLASTY REVISION ANKLE;  REVISION LEFT TOTAL ANKLE  WITH CONVERSION TO  IM LAURIE FUSION WITH FEMORAL HEAD ALLOGRAFT (C-ARM);  Surgeon: Ramirez Fang MD;  Location: [facility]     ASPIRATION NEEDLE KNEE Right 12/27/2016    Procedure: ASPIRATION NEEDLE KNEE;   Surgeon: Jian Fisher MD;  Location:  OR     BUNIONECTOMY JONAS  10/24/2011    Procedure:BUNIONECTOMY JONAS; Left Foot Bunionette Repair (C-Arm); Surgeon:BHAVIK PENG; Location: [facility]     COLONOSCOPY       ESOPHAGOSCOPY, GASTROSCOPY, DUODENOSCOPY (EGD), COMBINED N/A 1/5/2023    Procedure: ESOPHAGOGASTRODUODENOSCOPY WITH FOREIGN BODY REMOVAL;  Surgeon: Dedra Acosta MD;  Location:  OR     EYE SURGERY      cataract     H ABLATION SVT       HERNIA REPAIR       IRRIGATION AND DEBRIDEMENT FOOT, COMBINED Left 12/27/2016    Procedure: COMBINED IRRIGATION AND DEBRIDEMENT FOOT;  Surgeon: Jian Fisher MD;  Location:  OR     ORTHOPEDIC SURGERY      RTKA     ORTHOPEDIC SURGERY Left     ROTATOR CUFF     REMOVE ANTIBIOTIC CEMENT BEADS / SPACER KNEE N/A 4/24/2017    Procedure: REMOVE ANTIBIOTIC CEMENT BEADS / SPACER KNEE;  REMOVAL ANTIBIOTIC SPACER RIGHT KNEE REIMPLANT TOTAL JOINT COMPONENT TO RIGHT KNEE ;  Surgeon: Husam Berkowitz MD;  Location:  OR     REMOVE HARDWARE ARTHROPLASTY KNEE, IRRIG & JOSE, PLACE ANTIBIOTIC CEMENT SPACER, COMBINED Right 2/20/2017    Procedure: COMBINED REMOVE HARDWARE ARTHROPLASTY KNEE, IRRIGATION AND DEBRIDEMENT, PLACE ANTIBIOTIC CEMENT BEADS / SPACER;  Surgeon: Husam Berkowitz MD;  Location:  OR     REVERSE ARTHROPLASTY SHOULDER Right 10/7/2019    Procedure: RIGHT TOTAL REVERSE SHOULDER ARTHROPLASTY;  Surgeon: Husam Berkowitz MD;  Location:  OR     TONSILLECTOMY       TONSILLECTOMY       total knee arthroplasy       ZZC TOTAL KNEE ARTHROPLASTY Right        FAMILY HISTORY:   No family history on file.    SOCIAL HISTORY:   Social History     Tobacco Use     Smoking status: Never     Smokeless tobacco: Never   Substance Use Topics     Alcohol use: Yes     Comment: occasional        ALLERGIES:  Allergies   Allergen Reactions     Melon Anaphylaxis     Throat swelling     Pcn [Penicillin V Potassium] Anaphylaxis     Walnuts  [Nuts] Angioedema and Anaphylaxis     Ceftriaxone Itching     Cephalexin Itching     Oxycodone Other (See Comments)     unconsciousness     Sulfa Antibiotics      Valium [Diazepam] Other (See Comments)     unconsciousness     Vicodin [Hydrocodone-Acetaminophen]      Patient unsure if allergy     Iodine Solution [Povidone Iodine] Rash       MEDICATIONS:     Current Outpatient Medications:      acetaminophen (TYLENOL) 500 MG tablet, Take 1,000 mg by mouth At Bedtime, Disp: , Rfl:      alendronate (FOSAMAX) 70 MG tablet, Take 1 tablet (70 mg) by mouth every 7 days, Disp: 13 tablet, Rfl: 3     aspirin 81 MG EC tablet, Take 81 mg by mouth daily, Disp: , Rfl:      calcium carbonate (OS-MATHEW) 1500 (600 Ca) MG tablet, Take 650 mg by mouth daily, Disp: , Rfl:      carbamide peroxide (DEBROX) 6.5 % otic solution, Place 5 drops Into the left ear daily as needed for other (ear wax)., Disp: 15 mL, Rfl: 0     Cyanocobalamin (VITAMIN B-12) 3000 MCG SUBL, Place 2 each under the tongue daily Taking the gummies - 2 for a dose of 3000 mcg, Disp: , Rfl:      furosemide (LASIX) 20 MG tablet, Take 1-2 tablets (20-40 mg) by mouth daily Usually Takes 20 mg daily., Disp: 180 tablet, Rfl: 1     GABAPENTIN PO, Take 300 mg by mouth 3 times daily., Disp: , Rfl:      glucosamine-chondroitin 500-400 MG CAPS per capsule, Take 2 capsules by mouth daily, Disp: , Rfl:      omeprazole (PRILOSEC) 40 MG DR capsule, Take 1 capsule (40 mg) by mouth daily., Disp: 90 capsule, Rfl: 4     omeprazole (PRILOSEC) 40 MG DR capsule, Take 1 capsule (40 mg) by mouth daily, Disp: , Rfl:      oxyBUTYnin ER (DITROPAN XL) 10 MG 24 hr tablet, Take 1 tablet (10 mg) by mouth daily at 2 pm., Disp: 90 tablet, Rfl: 0     Potassium Gluconate 595 (99 K) MG TABS, Take 2 tablets by mouth every morning, Disp: , Rfl:      simvastatin (ZOCOR) 40 MG tablet, Take 1 tablet (40 mg) by mouth every morning., Disp: 90 tablet, Rfl: 4     Turmeric 500 MG CAPS, Take 1 capsule by mouth daily,  Disp: , Rfl:      valsartan (DIOVAN) 160 MG tablet, TAKE ONE TABLET BY MOUTH ONCE DAILY, Disp: 90 tablet, Rfl: 0     valsartan (DIOVAN) 40 MG tablet, Take 1 tablet (40 mg) by mouth daily. Take in addition to (with) 160 mg tablet for total dose of 200 mg once daily., Disp: 90 tablet, Rfl: 2     Vitamin D-Vitamin K (K2-D3 5000) 5000-90 UNIT-MCG CAPS, Take 1 capsule by mouth daily, Disp: , Rfl:     REVIEW OF SYSTEMS:  Allergic/Immunologic: negative  Constitutional Symptoms: negative   Fever: none   Weight loss: none   Other: none  Hematologic/Lymphatic: negative  Integumentary: negative   Breast: negative   Hair: negative   Skin: negative   Skin: negative  Eyes: negative  Ears/Nose/Throat: negative  Respiratory: negative  Cardiovascular: negative  Gastrointestinal: negative  Genitourinary: negative  Musculoskeletal: negative  Neurologic: negative  Psychiatric: negative  Reproductive System: negative  Endocrine: negative      PHYSICAL EXAM:    General: Alert and oriented to time, place, and self. In NAD   HEENT: Head AT/NC, EOMI, CN Grossly intact   Lungs: no respiratory distress, or pursed lip breathing   Heart: No obvious jugular venous distension present   Musculoskeltal: Normal movements. Normal appearing musculature  Skin: no suspicious lesions or rashes   Neuro: Alert, oriented, speech and mentation normal; moving all 4 extremities equally.   Psych: affect and mood normal      Laboratory Studies:     Imaging Studies: I personally reviewed her CT scan images.  A few small nonobstructing stones in each kidney.  The largest stone on the right side has a skin to stone distance of 15 cm, the largest on the left side has a skin to stone distance of 10 cm.    There is a small lower pole left renal mass.  I personally reviewed her MRI images that were performed in follow-up and the mass is solid versus cystic      CLINICAL IMPRESSION:   Bilateral kidney stones  Left renal mass    PLAN:   We first discussed her  incidentally discovered left renal mass.  The renal mass is extremely small.  Due to its small size it is difficult to characterize it is cystic versus solid.  I counseled her that the mass is either a benign cyst, a benign nonmalignant tumor, or an early renal cell carcinoma.  Even if the mass is renal cell carcinoma, most of these tend to be slow-growing and are unlikely to require treatment.  However, I do recommend surveillance to make certain the mass does not grow rapidly.  I recommended a CT scan in 6 months to monitor the mass and she agreed to the plan.      We also discussed her small kidney stones in each side.  I recommended observation of these as well, the CT scan we get in 6 months will also image the stones again as well.  I will see her back for this.      Ishan Manjarrez M.D.      Virtual Visit Details    Type of service:  Video Visit   Video Start Time: 3:30 PM  Video End Time:3:46 PM    Originating Location (pt. Location): Home    Distant Location (provider location):  On-site  Platform used for Video Visit: HemaWell      Again, thank you for allowing me to participate in the care of your patient.      Sincerely,    Ishan Manjarrez MD

## 2025-04-14 NOTE — PROGRESS NOTES
[facility]  Main Office: [address]  [address]  [place] [address]       CHIEF COMPLAINT:   Left renal mass  Bilateral kidney stones    HISTORY:   I was asked by Dr. Gross to see this 80-year-old woman regarding kidney stones and incidentally discovered left renal mass.  She had a CT scan performed in March that incidentally discovered both kidney stones and a small left renal mass.  She does have a prior history of stones and has had a prior ESWL 20 years ago.  She has no family history of renal cell carcinoma.  She did have a CT scan performed for right-sided abdominal pain but she says it did not feel like kidney stones as the cause of her pain.  She has felt well since the CT scan.      PAST MEDICAL HISTORY:   Past Medical History:   Diagnosis Date    Allergic rhinitis     Bacteremia due to Streptococcus     Edema     Gastro-oesophageal reflux disease     Heart murmur     ?murmur    Hyperlipemia     Hyperlipidemia     Hypertension     Kidney stone     Numbness and tingling     numbness left distal medial tibia    Osteoarthritis     Osteoarthrosis     Osteopenia     Paroxysmal supraventricular tachycardia     S/P ablation of atrial fibrillation        PAST SURGICAL HISTORY:     Past Surgical History:   Procedure Laterality Date    7 foot and ankle       ARTHROPLASTY REVISION ANKLE  3/10/2014    Procedure: ARTHROPLASTY REVISION ANKLE;  REVISION LEFT TOTAL ANKLE  WITH CONVERSION TO  IM LAURIE FUSION WITH FEMORAL HEAD ALLOGRAFT (C-ARM);  Surgeon: Ramirez Fang MD;  Location: [facility]    ASPIRATION NEEDLE KNEE Right 12/27/2016    Procedure: ASPIRATION NEEDLE KNEE;  Surgeon: Jian Fisher MD;  Location: [facility] OR    BUNIONECTOMY JONAS  10/24/2011    Procedure:BUNIONECTOMY JONAS; Left Foot Bunionette Repair (C-Arm); Surgeon:RAMIREZ FANG; Location:[facility]    COLONOSCOPY      ESOPHAGOSCOPY, GASTROSCOPY, DUODENOSCOPY (EGD), COMBINED N/A 1/5/2023    Procedure: ESOPHAGOGASTRODUODENOSCOPY WITH  FOREIGN BODY REMOVAL;  Surgeon: Dedra Acosta MD;  Location:  OR    EYE SURGERY      cataract    H ABLATION SVT      HERNIA REPAIR      IRRIGATION AND DEBRIDEMENT FOOT, COMBINED Left 12/27/2016    Procedure: COMBINED IRRIGATION AND DEBRIDEMENT FOOT;  Surgeon: Jian Fisher MD;  Location:  OR    ORTHOPEDIC SURGERY      RTKA    ORTHOPEDIC SURGERY Left     ROTATOR CUFF    REMOVE ANTIBIOTIC CEMENT BEADS / SPACER KNEE N/A 4/24/2017    Procedure: REMOVE ANTIBIOTIC CEMENT BEADS / SPACER KNEE;  REMOVAL ANTIBIOTIC SPACER RIGHT KNEE REIMPLANT TOTAL JOINT COMPONENT TO RIGHT KNEE ;  Surgeon: Husam Berkowitz MD;  Location:  OR    REMOVE HARDWARE ARTHROPLASTY KNEE, IRRIG & JOSE, PLACE ANTIBIOTIC CEMENT SPACER, COMBINED Right 2/20/2017    Procedure: COMBINED REMOVE HARDWARE ARTHROPLASTY KNEE, IRRIGATION AND DEBRIDEMENT, PLACE ANTIBIOTIC CEMENT BEADS / SPACER;  Surgeon: Husam Berkowitz MD;  Location:  OR    REVERSE ARTHROPLASTY SHOULDER Right 10/7/2019    Procedure: RIGHT TOTAL REVERSE SHOULDER ARTHROPLASTY;  Surgeon: Husam Berkowitz MD;  Location:  OR    TONSILLECTOMY      TONSILLECTOMY      total knee arthroplasy      ZZC TOTAL KNEE ARTHROPLASTY Right        FAMILY HISTORY:   No family history on file.    SOCIAL HISTORY:   Social History     Tobacco Use    Smoking status: Never    Smokeless tobacco: Never   Substance Use Topics    Alcohol use: Yes     Comment: occasional        ALLERGIES:  Allergies   Allergen Reactions    Melon Anaphylaxis     Throat swelling    Pcn [Penicillin V Potassium] Anaphylaxis    Walnuts [Nuts] Angioedema and Anaphylaxis    Ceftriaxone Itching    Cephalexin Itching    Oxycodone Other (See Comments)     unconsciousness    Sulfa Antibiotics     Valium [Diazepam] Other (See Comments)     unconsciousness    Vicodin [Hydrocodone-Acetaminophen]      Patient unsure if allergy    Iodine Solution [Povidone Iodine] Rash       MEDICATIONS:     Current  Outpatient Medications:     acetaminophen (TYLENOL) 500 MG tablet, Take 1,000 mg by mouth At Bedtime, Disp: , Rfl:     alendronate (FOSAMAX) 70 MG tablet, Take 1 tablet (70 mg) by mouth every 7 days, Disp: 13 tablet, Rfl: 3    aspirin 81 MG EC tablet, Take 81 mg by mouth daily, Disp: , Rfl:     calcium carbonate (OS-MATHEW) 1500 (600 Ca) MG tablet, Take 650 mg by mouth daily, Disp: , Rfl:     carbamide peroxide (DEBROX) 6.5 % otic solution, Place 5 drops Into the left ear daily as needed for other (ear wax)., Disp: 15 mL, Rfl: 0    Cyanocobalamin (VITAMIN B-12) 3000 MCG SUBL, Place 2 each under the tongue daily Taking the gummies - 2 for a dose of 3000 mcg, Disp: , Rfl:     furosemide (LASIX) 20 MG tablet, Take 1-2 tablets (20-40 mg) by mouth daily Usually Takes 20 mg daily., Disp: 180 tablet, Rfl: 1    GABAPENTIN PO, Take 300 mg by mouth 3 times daily., Disp: , Rfl:     glucosamine-chondroitin 500-400 MG CAPS per capsule, Take 2 capsules by mouth daily, Disp: , Rfl:     omeprazole (PRILOSEC) 40 MG DR capsule, Take 1 capsule (40 mg) by mouth daily., Disp: 90 capsule, Rfl: 4    omeprazole (PRILOSEC) 40 MG DR capsule, Take 1 capsule (40 mg) by mouth daily, Disp: , Rfl:     oxyBUTYnin ER (DITROPAN XL) 10 MG 24 hr tablet, Take 1 tablet (10 mg) by mouth daily at 2 pm., Disp: 90 tablet, Rfl: 0    Potassium Gluconate 595 (99 K) MG TABS, Take 2 tablets by mouth every morning, Disp: , Rfl:     simvastatin (ZOCOR) 40 MG tablet, Take 1 tablet (40 mg) by mouth every morning., Disp: 90 tablet, Rfl: 4    Turmeric 500 MG CAPS, Take 1 capsule by mouth daily, Disp: , Rfl:     valsartan (DIOVAN) 160 MG tablet, TAKE ONE TABLET BY MOUTH ONCE DAILY, Disp: 90 tablet, Rfl: 0    valsartan (DIOVAN) 40 MG tablet, Take 1 tablet (40 mg) by mouth daily. Take in addition to (with) 160 mg tablet for total dose of 200 mg once daily., Disp: 90 tablet, Rfl: 2    Vitamin D-Vitamin K (K2-D3 5000) 5000-90 UNIT-MCG CAPS, Take 1 capsule by mouth daily,  Disp: , Rfl:     REVIEW OF SYSTEMS:  Allergic/Immunologic: negative  Constitutional Symptoms: negative   Fever: none   Weight loss: none   Other: none  Hematologic/Lymphatic: negative  Integumentary: negative   Breast: negative   Hair: negative   Skin: negative   Skin: negative  Eyes: negative  Ears/Nose/Throat: negative  Respiratory: negative  Cardiovascular: negative  Gastrointestinal: negative  Genitourinary: negative  Musculoskeletal: negative  Neurologic: negative  Psychiatric: negative  Reproductive System: negative  Endocrine: negative      PHYSICAL EXAM:    General: Alert and oriented to time, place, and self. In NAD   HEENT: Head AT/NC, EOMI, CN Grossly intact   Lungs: no respiratory distress, or pursed lip breathing   Heart: No obvious jugular venous distension present   Musculoskeltal: Normal movements. Normal appearing musculature  Skin: no suspicious lesions or rashes   Neuro: Alert, oriented, speech and mentation normal; moving all 4 extremities equally.   Psych: affect and mood normal      Laboratory Studies:     Imaging Studies: I personally reviewed her CT scan images.  A few small nonobstructing stones in each kidney.  The largest stone on the right side has a skin to stone distance of 15 cm, the largest on the left side has a skin to stone distance of 10 cm.    There is a small lower pole left renal mass.  I personally reviewed her MRI images that were performed in follow-up and the mass is solid versus cystic      CLINICAL IMPRESSION:   Bilateral kidney stones  Left renal mass    PLAN:   We first discussed her incidentally discovered left renal mass.  The renal mass is extremely small.  Due to its small size it is difficult to characterize it is cystic versus solid.  I counseled her that the mass is either a benign cyst, a benign nonmalignant tumor, or an early renal cell carcinoma.  Even if the mass is renal cell carcinoma, most of these tend to be slow-growing and are unlikely to require  treatment.  However, I do recommend surveillance to make certain the mass does not grow rapidly.  I recommended a CT scan in 6 months to monitor the mass and she agreed to the plan.      We also discussed her small kidney stones in each side.  I recommended observation of these as well, the CT scan we get in 6 months will also image the stones again as well.  I will see her back for this.      Ishan Manjarrez M.D.      Virtual Visit Details    Type of service:  Video Visit   Video Start Time: 3:30 PM  Video End Time:3:46 PM    Originating Location (pt. Location): Home    Distant Location (provider location):  On-site  Platform used for Video Visit: Stewart

## 2025-04-14 NOTE — NURSING NOTE
Current patient location: [address]  [facility] [address]    Is the patient currently in the state of [place]? YES    Visit mode: VIDEO    If the visit is dropped, the patient can be reconnected by:VIDEO VISIT: Text to cell phone:   Telephone Information:   Mobile [phone]       Will anyone else be joining the visit? NO  (If patient encounters technical issues they should call [phone]    Are changes needed to the allergy or medication list? No and Pt stated no med changes    Are refills needed on medications prescribed by this physician? NO    Rooming Documentation:  Not applicable    Reason for visit: Consult    Soo AYALA

## 2025-04-15 ENCOUNTER — TELEPHONE (OUTPATIENT)
Dept: UROLOGY | Facility: CLINIC | Age: 80
End: 2025-04-15
Payer: COMMERCIAL

## 2025-04-15 NOTE — TELEPHONE ENCOUNTER
----- Message from Marlene WHITE sent at 4/15/2025 10:03 AM CDT -----  Regardin month follow up  Return in about 6 months (around 10/14/2025) for CT scan, Video visit or in person (patient preference).    SDB  25

## 2025-04-15 NOTE — TELEPHONE ENCOUNTER
BHAVESH to get scheduled in October     [facility]  [place] and [place]   Complex and Surgery Scheduler   [phone]

## 2025-04-16 DIAGNOSIS — M81.0 OSTEOPOROSIS, UNSPECIFIED OSTEOPOROSIS TYPE, UNSPECIFIED PATHOLOGICAL FRACTURE PRESENCE: ICD-10-CM

## 2025-04-16 RX ORDER — ALENDRONATE SODIUM 70 MG/1
TABLET ORAL
Qty: 36 TABLET | Refills: 0 | Status: SHIPPED | OUTPATIENT
Start: 2025-04-16

## 2025-05-05 ENCOUNTER — OFFICE VISIT (OUTPATIENT)
Dept: FAMILY MEDICINE | Facility: CLINIC | Age: 80
End: 2025-05-05
Payer: OTHER MISCELLANEOUS

## 2025-05-05 VITALS
DIASTOLIC BLOOD PRESSURE: 80 MMHG | TEMPERATURE: 98 F | HEIGHT: 64 IN | BODY MASS INDEX: 33.19 KG/M2 | SYSTOLIC BLOOD PRESSURE: 140 MMHG | OXYGEN SATURATION: 95 % | RESPIRATION RATE: 20 BRPM | HEART RATE: 72 BPM | WEIGHT: 194.4 LBS

## 2025-05-05 DIAGNOSIS — M16.0 ARTHRITIS OF BOTH HIPS: ICD-10-CM

## 2025-05-05 DIAGNOSIS — M54.9 UPPER BACK PAIN: ICD-10-CM

## 2025-05-05 DIAGNOSIS — M54.50 ACUTE BILATERAL LOW BACK PAIN WITHOUT SCIATICA: Primary | ICD-10-CM

## 2025-05-05 PROCEDURE — 99213 OFFICE O/P EST LOW 20 MIN: CPT | Performed by: PHYSICIAN ASSISTANT

## 2025-05-05 ASSESSMENT — PAIN SCALES - GENERAL: PAINLEVEL_OUTOF10: MODERATE PAIN (5)

## 2025-05-05 NOTE — NURSING NOTE
"Chief Complaint   Patient presents with    Work Comp     Initial BP (!) 148/84 (BP Location: Left arm, Patient Position: Sitting, Cuff Size: Adult Regular)   Pulse 72   Temp 98  F (36.7  C) (Oral)   Resp 20   Ht 1.626 m (5' 4\")   Wt 88.2 kg (194 lb 6.4 oz)   LMP  (LMP Unknown)   SpO2 95%   BMI 33.37 kg/m   Estimated body mass index is 33.37 kg/m  as calculated from the following:    Height as of this encounter: 1.626 m (5' 4\").    Weight as of this encounter: 88.2 kg (194 lb 6.4 oz).  BP completed using cuff size regular long left arm    Lisa Magill, CMA    "
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
## 2025-05-06 ENCOUNTER — PATIENT OUTREACH (OUTPATIENT)
Dept: CARE COORDINATION | Facility: CLINIC | Age: 80
End: 2025-05-06
Payer: COMMERCIAL

## 2025-05-08 ENCOUNTER — PATIENT OUTREACH (OUTPATIENT)
Dept: CARE COORDINATION | Facility: CLINIC | Age: 80
End: 2025-05-08
Payer: COMMERCIAL

## 2025-06-03 DIAGNOSIS — G62.9 NEUROPATHY: Primary | ICD-10-CM

## 2025-06-03 RX ORDER — GABAPENTIN 300 MG/1
300 CAPSULE ORAL 3 TIMES DAILY
Qty: 90 CAPSULE | Refills: 0 | Status: SHIPPED | OUTPATIENT
Start: 2025-06-03

## 2025-06-03 NOTE — TELEPHONE ENCOUNTER
Roxanne Hatfield PA-C     Pt is almost out of her gabapentin.  She states she got a 1000 capsules from [facility] from provider Maggi Poe on 10/22/24.  She will run out on Saturday.  She has a pre-op on Tuesday.  She is wondering if she can get a fill to get her till then.      She takes it for neuropathy and for nerve pain-she is in need of a hip replacement.  She has pain in her neck and back.  Med T'd up below. Please call patient with your response.    Lisa Vidal RN BSN  Clinic Nurse  [facility]

## 2025-06-10 ENCOUNTER — OFFICE VISIT (OUTPATIENT)
Dept: FAMILY MEDICINE | Facility: CLINIC | Age: 80
End: 2025-06-10
Payer: COMMERCIAL

## 2025-06-10 VITALS
HEART RATE: 78 BPM | BODY MASS INDEX: 32.15 KG/M2 | WEIGHT: 193 LBS | RESPIRATION RATE: 16 BRPM | HEIGHT: 65 IN | OXYGEN SATURATION: 96 % | TEMPERATURE: 97.9 F | SYSTOLIC BLOOD PRESSURE: 142 MMHG | DIASTOLIC BLOOD PRESSURE: 80 MMHG

## 2025-06-10 DIAGNOSIS — Z86.79 HISTORY OF SUPRAVENTRICULAR TACHYCARDIA: ICD-10-CM

## 2025-06-10 DIAGNOSIS — Z01.818 PREOP GENERAL PHYSICAL EXAM: Primary | ICD-10-CM

## 2025-06-10 DIAGNOSIS — E78.00 PURE HYPERCHOLESTEROLEMIA: ICD-10-CM

## 2025-06-10 DIAGNOSIS — G62.9 NEUROPATHY: ICD-10-CM

## 2025-06-10 DIAGNOSIS — B37.0 THRUSH: ICD-10-CM

## 2025-06-10 DIAGNOSIS — M25.551 HIP PAIN, RIGHT: ICD-10-CM

## 2025-06-10 DIAGNOSIS — R73.03 PREDIABETES: ICD-10-CM

## 2025-06-10 DIAGNOSIS — Z87.39 HISTORY OF SEPTIC ARTHRITIS: ICD-10-CM

## 2025-06-10 DIAGNOSIS — I10 PRIMARY HYPERTENSION: ICD-10-CM

## 2025-06-10 DIAGNOSIS — M79.675 GREAT TOE PAIN, LEFT: ICD-10-CM

## 2025-06-10 PROBLEM — Z29.11 NEED FOR VACCINATION AGAINST RESPIRATORY SYNCYTIAL VIRUS: Status: RESOLVED | Noted: 2024-04-09 | Resolved: 2025-06-10

## 2025-06-10 LAB
ERYTHROCYTE [DISTWIDTH] IN BLOOD BY AUTOMATED COUNT: 14.6 % (ref 10–15)
EST. AVERAGE GLUCOSE BLD GHB EST-MCNC: 114 MG/DL
HBA1C MFR BLD: 5.6 % (ref 0–5.6)
HCT VFR BLD AUTO: 44.9 % (ref 35–47)
HGB BLD-MCNC: 14 G/DL (ref 11.7–15.7)
MCH RBC QN AUTO: 28.3 PG (ref 26.5–33)
MCHC RBC AUTO-ENTMCNC: 31.2 G/DL (ref 31.5–36.5)
MCV RBC AUTO: 91 FL (ref 78–100)
PLATELET # BLD AUTO: 258 10E3/UL (ref 150–450)
RBC # BLD AUTO: 4.94 10E6/UL (ref 3.8–5.2)
WBC # BLD AUTO: 5.7 10E3/UL (ref 4–11)

## 2025-06-10 PROCEDURE — 3044F HG A1C LEVEL LT 7.0%: CPT | Performed by: PHYSICIAN ASSISTANT

## 2025-06-10 PROCEDURE — 3079F DIAST BP 80-89 MM HG: CPT | Performed by: PHYSICIAN ASSISTANT

## 2025-06-10 PROCEDURE — 80053 COMPREHEN METABOLIC PANEL: CPT | Performed by: PHYSICIAN ASSISTANT

## 2025-06-10 PROCEDURE — 1125F AMNT PAIN NOTED PAIN PRSNT: CPT | Performed by: PHYSICIAN ASSISTANT

## 2025-06-10 PROCEDURE — 99215 OFFICE O/P EST HI 40 MIN: CPT | Performed by: PHYSICIAN ASSISTANT

## 2025-06-10 PROCEDURE — 80061 LIPID PANEL: CPT | Performed by: PHYSICIAN ASSISTANT

## 2025-06-10 PROCEDURE — 36415 COLL VENOUS BLD VENIPUNCTURE: CPT | Performed by: PHYSICIAN ASSISTANT

## 2025-06-10 PROCEDURE — 3048F LDL-C <100 MG/DL: CPT | Performed by: PHYSICIAN ASSISTANT

## 2025-06-10 PROCEDURE — 3077F SYST BP >= 140 MM HG: CPT | Performed by: PHYSICIAN ASSISTANT

## 2025-06-10 PROCEDURE — 85027 COMPLETE CBC AUTOMATED: CPT | Performed by: PHYSICIAN ASSISTANT

## 2025-06-10 PROCEDURE — G2211 COMPLEX E/M VISIT ADD ON: HCPCS | Performed by: PHYSICIAN ASSISTANT

## 2025-06-10 PROCEDURE — 83036 HEMOGLOBIN GLYCOSYLATED A1C: CPT | Performed by: PHYSICIAN ASSISTANT

## 2025-06-10 RX ORDER — TRAMADOL HYDROCHLORIDE 50 MG/1
TABLET ORAL
COMMUNITY
Start: 2025-03-05 | End: 2025-06-10

## 2025-06-10 RX ORDER — MUPIROCIN 2 %
OINTMENT (GRAM) TOPICAL 2 TIMES DAILY
Qty: 30 G | Refills: 1 | Status: SHIPPED | OUTPATIENT
Start: 2025-06-10

## 2025-06-10 RX ORDER — NYSTATIN 100000 [USP'U]/ML
500000 SUSPENSION ORAL 4 TIMES DAILY
Qty: 200 ML | Refills: 0 | Status: SHIPPED | OUTPATIENT
Start: 2025-06-10 | End: 2025-06-20

## 2025-06-10 RX ORDER — CLINDAMYCIN HYDROCHLORIDE 150 MG/1
CAPSULE ORAL
COMMUNITY
Start: 2025-03-20 | End: 2025-06-10

## 2025-06-10 ASSESSMENT — PAIN SCALES - GENERAL: PAINLEVEL_OUTOF10: MODERATE PAIN (5)

## 2025-06-10 NOTE — PATIENT INSTRUCTIONS
How to Take Your Medication Before Surgery  Preoperative Medication Instructions   Antiplatelet or Anticoagulation Medication Instructions   - aspirin: Discontinue aspirin 7 days prior to procedure to reduce bleeding risk. It should be resumed postoperatively.     Additional Medication Instructions   - Herbal medications and vitamins: DO NOT TAKE 14 days prior to surgery.   - ACE/ARB/ARNI (lisinopril, enalapril, losartan, valsartan, olmesartan, sacubritril/valsartan) : DO NOT TAKE on day of surgery (minimum 11 hours for general anesthesia).   - Diuretics (furosemide, hydrochlorothiazide, chlorothalidone): DO NOT TAKE on the day of surgery.   - Statins (atorvastatin, simvastatin, pravastatin) : Continue taking on the day of surgery.    - pregabalin, gabapentin: Continue without modification.   - anticholinergics: DO NOT TAKE anticholingeric medication in older patient at risk of delirium.    - postassium: DO NOT TAKE day of surgery.   Hold omeprazole morning of    Patient Education   Preparing for Your Surgery  For Adults  Getting started  In most cases, a nurse will call to review your health history and instructions. They will give you an arrival time based on your scheduled surgery time. Please be ready to share:  Your doctor's clinic name and phone number  Your medical, surgical, and anesthesia history  A list of allergies and sensitivities  A list of medicines, including herbal treatments and over-the-counter drugs  Whether the patient has a legal guardian (ask how to send us the papers in advance)  Note: You may not receive a call if you were seen at our PAC (Preoperative Assessment Center).  Please tell us if you're pregnant--or if there's any chance you might be pregnant. Some surgeries may injure a fetus (unborn baby), so they require a pregnancy test. Surgeries that are safe for a fetus don't always need a test, and you can choose whether to have one.   Preparing for surgery  Within 10 to 30 days of  surgery: Have a pre-op exam (sometimes called an H&P, or History and Physical). This can be done at a clinic or pre-operative center.  If you're having a , you may not need this exam. Talk to your care team.  At your pre-op exam, talk to your care team about all medicines you take. (This includes CBD oil and any drugs, such as THC, marijuana, and other forms of cannabis.) If you need to stop any medicine before surgery, ask when to start taking it again.  This is for your safety. Many medicines and drugs can make you bleed too much during surgery. Some change how well surgery (anesthesia) drugs work.  Call your insurance company to let them know you're having surgery. (If you don't have insurance, call [phone].)  Call your clinic if there's any change in your health. This includes a scrape or scratch near the surgery site, or any signs of a cold (sore throat, runny nose, cough, rash, fever).  Eating and drinking guidelines  For your safety: Unless your surgeon tells you otherwise, follow the guidelines below.  Eat and drink as normal until 8 hours before you arrive for surgery. After that, no food or milk. You can spit out gum when you arrive.  Drink clear liquids until 2 hours before you arrive. These are liquids you can see through, like water, Gatorade, and Propel Water. They also include plain black coffee and tea (no cream or milk).  No alcohol for 24 hours before you arrive. The night before surgery, stop any drinks that contain THC.  If your care team tells you to take medicine on the morning of surgery, it's okay to take it with a sip of water. No other medicines or drugs are allowed (including CBD oil)--follow your care team's instructions.  If you have questions the day of surgery, call your hospital or surgery center.   Preventing infection  Shower or bathe the night before and the morning of surgery. Follow the instructions your clinic gave you. (If no instructions, use regular  soap.)  Don't shave or clip hair near your surgery site. We'll remove the hair if needed.  Don't smoke or vape the morning of surgery. No chewing tobacco for 6 hours before you arrive. A nicotine patch is okay. You may spit out nicotine gum when you arrive.  For some surgeries, the surgeon will tell you to fully quit smoking and nicotine.  We will make every effort to keep you safe from infection. We will:  Clean our hands often with soap and water (or an alcohol-based hand rub).  Clean the skin at your surgery site with a special soap that kills germs.  Give you a special gown to keep you warm. (Cold raises the risk of infection.)  Wear hair covers, masks, gowns, and gloves during surgery.  Give antibiotic medicine, if prescribed. Not all surgeries need this medicine.  What to bring on the day of surgery  Photo ID and insurance card  Copy of your health care directive, if you have one  Glasses and hearing aids (bring cases)  You can't wear contacts during surgery  Inhaler and eye drops, if you use them (tell us about these when you arrive)  CPAP machine or breathing device, if you use them  A few personal items, if spending the night  If you have . . .  A pacemaker, ICD (cardiac defibrillator), or other implant: Bring the ID card.  An implanted stimulator: Bring the remote control.  A legal guardian: Bring a copy of the certified (court-stamped) guardianship papers.  Please remove any jewelry, including body piercings. Leave jewelry and other valuables at home.  If you're going home the day of surgery  You must have a support person drive you home. They should stay with you overnight, and they may need to help with your self-care.  If you don't have a support person, please tells us as soon as possible. We can help.  After surgery  If it's hard to control your pain or you need more pain medicine, please call your surgeon's office.  Questions?   If you have any questions for your care team, list them here:    ____________________________________________________________________________________________________________________________________________________________________________________________________________________________________________________________  For informational purposes only. Not to replace the advice of your health care provider. Copyright   2003, 2019 [facility]. All rights reserved. Clinically reviewed by Nick Mauro MD. [employer] 620902 - REV 02/25.
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
## 2025-06-11 ENCOUNTER — RESULTS FOLLOW-UP (OUTPATIENT)
Dept: FAMILY MEDICINE | Facility: CLINIC | Age: 80
End: 2025-06-11

## 2025-06-11 LAB
ALBUMIN SERPL BCG-MCNC: 4.3 G/DL (ref 3.5–5.2)
ALP SERPL-CCNC: 97 U/L (ref 40–150)
ALT SERPL W P-5'-P-CCNC: 24 U/L (ref 0–50)
ANION GAP SERPL CALCULATED.3IONS-SCNC: 9 MMOL/L (ref 7–15)
AST SERPL W P-5'-P-CCNC: 29 U/L (ref 0–45)
BILIRUB SERPL-MCNC: 0.3 MG/DL
BUN SERPL-MCNC: 21.4 MG/DL (ref 8–23)
CALCIUM SERPL-MCNC: 10 MG/DL (ref 8.8–10.4)
CHLORIDE SERPL-SCNC: 104 MMOL/L (ref 98–107)
CHOLEST SERPL-MCNC: 160 MG/DL
CREAT SERPL-MCNC: 0.92 MG/DL (ref 0.51–0.95)
EGFRCR SERPLBLD CKD-EPI 2021: 63 ML/MIN/1.73M2
FASTING STATUS PATIENT QL REPORTED: ABNORMAL
FASTING STATUS PATIENT QL REPORTED: NORMAL
GLUCOSE SERPL-MCNC: 102 MG/DL (ref 70–99)
HCO3 SERPL-SCNC: 30 MMOL/L (ref 22–29)
HDLC SERPL-MCNC: 57 MG/DL
LDLC SERPL CALC-MCNC: 87 MG/DL
NONHDLC SERPL-MCNC: 103 MG/DL
POTASSIUM SERPL-SCNC: 4.4 MMOL/L (ref 3.4–5.3)
PROT SERPL-MCNC: 6.9 G/DL (ref 6.4–8.3)
SODIUM SERPL-SCNC: 143 MMOL/L (ref 135–145)
TRIGL SERPL-MCNC: 78 MG/DL

## 2025-06-11 RX ORDER — GABAPENTIN 300 MG/1
300 CAPSULE ORAL 3 TIMES DAILY
Qty: 270 CAPSULE | Refills: 1 | Status: SHIPPED | OUTPATIENT
Start: 2025-06-11

## 2025-06-19 DIAGNOSIS — R39.15 URINARY URGENCY: ICD-10-CM

## 2025-06-19 RX ORDER — OXYBUTYNIN CHLORIDE 10 MG/1
10 TABLET, EXTENDED RELEASE ORAL
Qty: 90 TABLET | Refills: 1 | Status: SHIPPED | OUTPATIENT
Start: 2025-06-19

## 2025-07-23 ENCOUNTER — PATIENT OUTREACH (OUTPATIENT)
Dept: CARE COORDINATION | Facility: CLINIC | Age: 80
End: 2025-07-23
Payer: COMMERCIAL

## 2025-08-12 DIAGNOSIS — I10 PRIMARY HYPERTENSION: ICD-10-CM

## 2025-08-12 RX ORDER — VALSARTAN 160 MG/1
160 TABLET ORAL DAILY
Qty: 90 TABLET | Refills: 0 | OUTPATIENT
Start: 2025-08-12

## (undated) DEVICE — GLOVE PROTEXIS POWDER FREE 7.5 ORTHOPEDIC 2D73ET75

## (undated) DEVICE — HOOD FLYTE W/PEELAWAY 408-800-100

## (undated) DEVICE — CAST PADDING 6" UNSTERILE 9046

## (undated) DEVICE — SOL NACL 0.9% IRRIG 1000ML BOTTLE 2F7124

## (undated) DEVICE — 2.7MM PERIPHERAL SCREW DRILL BIT

## (undated) DEVICE — LINEN TOWEL PACK X5 5464

## (undated) DEVICE — SU VICRYL 0 CT-1 27" J340H

## (undated) DEVICE — LINEN HALF SHEET 5512

## (undated) DEVICE — ESU GROUND PAD UNIVERSAL W/O CORD

## (undated) DEVICE — BONE CEMENT MIXEVAC III HI VAC KIT  0206-015-000

## (undated) DEVICE — SUCTION MANIFOLD NEPTUNE 2 SYS 4 PORT 0702-020-000

## (undated) DEVICE — DRSG ABDOMINAL 07 1/2X8" 7197D

## (undated) DEVICE — MANIFOLD NEPTUNE 4 PORT 700-20

## (undated) DEVICE — BLADE SAW SAGITTAL STRK 20.7X85X0.89MM 2108-109-000S13

## (undated) DEVICE — GLOVE PROTEXIS POWDER FREE 8.5 ORTHOPEDIC 2D73ET85

## (undated) DEVICE — TUBING SUCTION 6"X3/16" N56A

## (undated) DEVICE — DRSG XEROFORM 5X9" 8884431605

## (undated) DEVICE — ENDO FORCEP SPIKED SERRATED SHAFT JUMBO 239CM G56998

## (undated) DEVICE — DRSG XEROFORM 1X8"

## (undated) DEVICE — CAST PADDING 6" STERILE 9046S

## (undated) DEVICE — ENDO SNARE POLYPECTOMY OVAL 15MM LOOP SD-240U-15

## (undated) DEVICE — SUCTION IRR SYSTEM W/O TIP INTERPULSE HANDPIECE 0210-100-000

## (undated) DEVICE — DRSG KERLIX FLUFFS X5

## (undated) DEVICE — SOL WATER IRRIG 1000ML BOTTLE 2F7114

## (undated) DEVICE — KIT SURGICAL TURNOVER FVSD-01D

## (undated) DEVICE — GLOVE PROTEXIS W/NEU-THERA 7.5  2D73TE75

## (undated) DEVICE — BLADE SAW SAGITTAL STRK 25X90X1.37MM 4H SYS 6 6125-137-090

## (undated) DEVICE — GLOVE PROTEXIS W/NEU-THERA 6.5  2D73TE65

## (undated) DEVICE — SLING ARM LG 79-99157

## (undated) DEVICE — SOL NACL 0.9% IRRIG 1000ML BOTTLE 07138-09

## (undated) DEVICE — SU MONOCRYL 3-0 PS-2 27" Y427H

## (undated) DEVICE — SU VICRYL 2-0 TIE 12X18" J905T

## (undated) DEVICE — IMM SHOULDER CUFFED MED MALE WRIST/HUM 79-84035

## (undated) DEVICE — IMM KNEE 20" 0814-2660

## (undated) DEVICE — GLOVE PROTEXIS W/NEU-THERA 7.0  2D73TE70

## (undated) DEVICE — PACK TOTAL KNEE SOP15TKFSD

## (undated) DEVICE — GLOVE PROTEXIS W/NEU-THERA 8.5  2D73TE85

## (undated) DEVICE — SLING ARM MED 79-99155

## (undated) DEVICE — DRAIN ROUND W/RESERV KIT JACKSON PRATT 10FR 400ML SU130-402D

## (undated) DEVICE — ENDO LIGATOR UNIV 6 BAND G31917 MBL-U-6

## (undated) DEVICE — SU VICRYL 1 CP-1 27" J468H

## (undated) DEVICE — DRAPE STERI U 1015

## (undated) DEVICE — SU VICRYL 2-0 CP-1 27" UND J266H

## (undated) DEVICE — WIPES FOLEY CARE SURESTEP PROVON DFC100

## (undated) DEVICE — BLADE CLIPPER 4412A

## (undated) DEVICE — KIT PROCEDURE W/CLEAN-A-SCOPE LINERS V2 200800

## (undated) DEVICE — PREP CHLORAPREP 26ML TINTED ORANGE  260815

## (undated) DEVICE — STPL SKIN PROXIMATE 35 WIDE PMW35

## (undated) DEVICE — ENDO SNARE EXACTO COLD 9MM LOOP 2.4MMX230CM 00711115

## (undated) DEVICE — BONE CLEANING TIP INTERPULSE  0210-010-000

## (undated) DEVICE — BLADE KNIFE SURG 15 371115

## (undated) DEVICE — SLING ARM MED 0814-0063

## (undated) DEVICE — DRSG STERI STRIP 1/2X4" R1547

## (undated) DEVICE — SOLUTION WOUND CLEANSING 3/4OZ 10% PVP EA-L3011FB-50

## (undated) DEVICE — 3.2MM CENTRAL SCREW DRILL BIT

## (undated) DEVICE — DRSG GAUZE 4X4" 3033

## (undated) DEVICE — BLADE KNIFE SURG 10 371110

## (undated) DEVICE — SPONGE LAP 18X18" X8435

## (undated) DEVICE — Device

## (undated) DEVICE — GLOVE PROTEXIS W/NEU-THERA 8.0  2D73TE80

## (undated) DEVICE — SU ETHIBOND 0 CTX CR  8X18" CX31D

## (undated) DEVICE — PACK OPEN SHOULDER SOP15OCFSC

## (undated) DEVICE — PACK SET-UP STD 9102

## (undated) DEVICE — GLOVE SENSICARE PI ORTHO PF 6.5 LATEX FREE MSG9465

## (undated) DEVICE — LINEN FULL SHEET 5511

## (undated) RX ORDER — FENTANYL CITRATE 50 UG/ML
INJECTION, SOLUTION INTRAMUSCULAR; INTRAVENOUS
Status: DISPENSED
Start: 2019-10-07

## (undated) RX ORDER — LIDOCAINE HYDROCHLORIDE 20 MG/ML
INJECTION, SOLUTION EPIDURAL; INFILTRATION; INTRACAUDAL; PERINEURAL
Status: DISPENSED
Start: 2019-10-07

## (undated) RX ORDER — FENTANYL CITRATE 50 UG/ML
INJECTION, SOLUTION INTRAMUSCULAR; INTRAVENOUS
Status: DISPENSED
Start: 2017-02-20

## (undated) RX ORDER — PROPOFOL 10 MG/ML
INJECTION, EMULSION INTRAVENOUS
Status: DISPENSED
Start: 2017-04-24

## (undated) RX ORDER — CLINDAMYCIN PHOSPHATE 900 MG/50ML
INJECTION, SOLUTION INTRAVENOUS
Status: DISPENSED
Start: 2017-02-20

## (undated) RX ORDER — FENTANYL CITRATE 50 UG/ML
INJECTION, SOLUTION INTRAMUSCULAR; INTRAVENOUS
Status: DISPENSED
Start: 2017-04-24

## (undated) RX ORDER — PROPOFOL 10 MG/ML
INJECTION, EMULSION INTRAVENOUS
Status: DISPENSED
Start: 2019-10-07

## (undated) RX ORDER — PROPOFOL 10 MG/ML
INJECTION, EMULSION INTRAVENOUS
Status: DISPENSED
Start: 2023-01-05

## (undated) RX ORDER — LIDOCAINE HYDROCHLORIDE 20 MG/ML
INJECTION, SOLUTION EPIDURAL; INFILTRATION; INTRACAUDAL; PERINEURAL
Status: DISPENSED
Start: 2017-04-24

## (undated) RX ORDER — HYDROMORPHONE HYDROCHLORIDE 1 MG/ML
INJECTION, SOLUTION INTRAMUSCULAR; INTRAVENOUS; SUBCUTANEOUS
Status: DISPENSED
Start: 2019-10-07

## (undated) RX ORDER — ONDANSETRON 2 MG/ML
INJECTION INTRAMUSCULAR; INTRAVENOUS
Status: DISPENSED
Start: 2019-10-07

## (undated) RX ORDER — HYDROMORPHONE HYDROCHLORIDE 1 MG/ML
INJECTION, SOLUTION INTRAMUSCULAR; INTRAVENOUS; SUBCUTANEOUS
Status: DISPENSED
Start: 2017-02-20

## (undated) RX ORDER — HEPARIN SODIUM 1000 [USP'U]/ML
INJECTION, SOLUTION INTRAVENOUS; SUBCUTANEOUS
Status: DISPENSED
Start: 2017-03-24

## (undated) RX ORDER — FENTANYL CITRATE 50 UG/ML
INJECTION, SOLUTION INTRAMUSCULAR; INTRAVENOUS
Status: DISPENSED
Start: 2017-03-24

## (undated) RX ORDER — CLINDAMYCIN PHOSPHATE 900 MG/50ML
INJECTION, SOLUTION INTRAVENOUS
Status: DISPENSED
Start: 2017-04-24

## (undated) RX ORDER — ACETAMINOPHEN 500 MG
TABLET ORAL
Status: DISPENSED
Start: 2017-02-20

## (undated) RX ORDER — ACETAMINOPHEN 500 MG
TABLET ORAL
Status: DISPENSED
Start: 2017-04-24

## (undated) RX ORDER — DEXAMETHASONE SODIUM PHOSPHATE 4 MG/ML
INJECTION, SOLUTION INTRA-ARTICULAR; INTRALESIONAL; INTRAMUSCULAR; INTRAVENOUS; SOFT TISSUE
Status: DISPENSED
Start: 2017-02-20

## (undated) RX ORDER — DEXAMETHASONE SODIUM PHOSPHATE 4 MG/ML
INJECTION, SOLUTION INTRA-ARTICULAR; INTRALESIONAL; INTRAMUSCULAR; INTRAVENOUS; SOFT TISSUE
Status: DISPENSED
Start: 2019-10-07

## (undated) RX ORDER — ONDANSETRON 2 MG/ML
INJECTION INTRAMUSCULAR; INTRAVENOUS
Status: DISPENSED
Start: 2017-04-24

## (undated) RX ORDER — CLINDAMYCIN PHOSPHATE 900 MG/50ML
INJECTION, SOLUTION INTRAVENOUS
Status: DISPENSED
Start: 2019-10-07

## (undated) RX ORDER — DEXAMETHASONE SODIUM PHOSPHATE 4 MG/ML
INJECTION, SOLUTION INTRA-ARTICULAR; INTRALESIONAL; INTRAMUSCULAR; INTRAVENOUS; SOFT TISSUE
Status: DISPENSED
Start: 2017-04-24

## (undated) RX ORDER — ACETAMINOPHEN 325 MG/1
TABLET ORAL
Status: DISPENSED
Start: 2019-10-07